# Patient Record
Sex: MALE | Race: WHITE | NOT HISPANIC OR LATINO
[De-identification: names, ages, dates, MRNs, and addresses within clinical notes are randomized per-mention and may not be internally consistent; named-entity substitution may affect disease eponyms.]

---

## 2017-01-20 ENCOUNTER — OTHER (OUTPATIENT)
Age: 72
End: 2017-01-20

## 2017-01-20 ENCOUNTER — APPOINTMENT (OUTPATIENT)
Dept: INTERNAL MEDICINE | Facility: CLINIC | Age: 72
End: 2017-01-20

## 2017-01-20 VITALS — SYSTOLIC BLOOD PRESSURE: 150 MMHG | DIASTOLIC BLOOD PRESSURE: 80 MMHG

## 2017-01-23 LAB
PSA FREE FLD-MCNC: 18.6 %
PSA FREE SERPL-MCNC: 0.38 NG/ML
PSA SERPL-MCNC: 2.07 NG/ML

## 2017-02-24 ENCOUNTER — APPOINTMENT (OUTPATIENT)
Dept: INTERNAL MEDICINE | Facility: CLINIC | Age: 72
End: 2017-02-24

## 2017-02-26 LAB
25(OH)D3 SERPL-MCNC: 29.5 NG/ML
ALBUMIN SERPL ELPH-MCNC: 4.5 G/DL
ALP BLD-CCNC: 95 U/L
ALT SERPL-CCNC: 20 U/L
ANION GAP SERPL CALC-SCNC: 18 MMOL/L
APPEARANCE: CLEAR
AST SERPL-CCNC: 19 U/L
BASOPHILS # BLD AUTO: 0.05 K/UL
BASOPHILS NFR BLD AUTO: 0.8 %
BILIRUB SERPL-MCNC: 0.3 MG/DL
BILIRUBIN URINE: NEGATIVE
BLOOD URINE: NEGATIVE
BUN SERPL-MCNC: 30 MG/DL
CALCIUM SERPL-MCNC: 9.9 MG/DL
CHLORIDE SERPL-SCNC: 105 MMOL/L
CHOLEST SERPL-MCNC: 192 MG/DL
CHOLEST/HDLC SERPL: 3.4 RATIO
CO2 SERPL-SCNC: 22 MMOL/L
COLOR: YELLOW
CREAT SERPL-MCNC: 1.18 MG/DL
CREAT SPEC-SCNC: 109 MG/DL
EOSINOPHIL # BLD AUTO: 0.28 K/UL
EOSINOPHIL NFR BLD AUTO: 4.5 %
GLUCOSE QUALITATIVE U: NORMAL MG/DL
GLUCOSE SERPL-MCNC: 137 MG/DL
HBA1C MFR BLD HPLC: 7.1 %
HCT VFR BLD CALC: 46.4 %
HDLC SERPL-MCNC: 56 MG/DL
HGB BLD-MCNC: 14.7 G/DL
IMM GRANULOCYTES NFR BLD AUTO: 0.5 %
KETONES URINE: NEGATIVE
LDLC SERPL CALC-MCNC: 113 MG/DL
LEUKOCYTE ESTERASE URINE: NEGATIVE
LYMPHOCYTES # BLD AUTO: 1.42 K/UL
LYMPHOCYTES NFR BLD AUTO: 23 %
MAN DIFF?: NORMAL
MCHC RBC-ENTMCNC: 28.9 PG
MCHC RBC-ENTMCNC: 31.7 GM/DL
MCV RBC AUTO: 91.2 FL
MICROALBUMIN 24H UR DL<=1MG/L-MCNC: 4.8 MG/DL
MICROALBUMIN/CREAT 24H UR-RTO: 44 UG/MG
MONOCYTES # BLD AUTO: 0.79 K/UL
MONOCYTES NFR BLD AUTO: 12.8 %
NEUTROPHILS # BLD AUTO: 3.61 K/UL
NEUTROPHILS NFR BLD AUTO: 58.4 %
NITRITE URINE: NEGATIVE
PH URINE: 6
PLATELET # BLD AUTO: 200 K/UL
POTASSIUM SERPL-SCNC: 4.7 MMOL/L
PROT SERPL-MCNC: 7.7 G/DL
PROTEIN URINE: NEGATIVE MG/DL
RBC # BLD: 5.09 M/UL
RBC # FLD: 13.5 %
SODIUM SERPL-SCNC: 145 MMOL/L
SPECIFIC GRAVITY URINE: 1.02
T4 SERPL-MCNC: 6 UG/DL
TRIGL SERPL-MCNC: 117 MG/DL
TSH SERPL-ACNC: 1.87 UIU/ML
UROBILINOGEN URINE: NORMAL MG/DL
WBC # FLD AUTO: 6.18 K/UL

## 2017-02-28 ENCOUNTER — APPOINTMENT (OUTPATIENT)
Dept: INTERNAL MEDICINE | Facility: CLINIC | Age: 72
End: 2017-02-28

## 2017-02-28 ENCOUNTER — NON-APPOINTMENT (OUTPATIENT)
Age: 72
End: 2017-02-28

## 2017-02-28 VITALS
WEIGHT: 175 LBS | BODY MASS INDEX: 26.22 KG/M2 | RESPIRATION RATE: 15 BRPM | HEART RATE: 60 BPM | HEIGHT: 68.5 IN | SYSTOLIC BLOOD PRESSURE: 150 MMHG | DIASTOLIC BLOOD PRESSURE: 80 MMHG

## 2017-02-28 RX ORDER — OLOPATADINE HYDROCHLORIDE 7 MG/ML
0.7 SOLUTION OPHTHALMIC
Qty: 2 | Refills: 0 | Status: DISCONTINUED | COMMUNITY
Start: 2017-02-21

## 2017-02-28 RX ORDER — AMOXICILLIN 500 MG/1
500 TABLET, FILM COATED ORAL
Qty: 15 | Refills: 0 | Status: DISCONTINUED | COMMUNITY
Start: 2017-01-27

## 2017-02-28 RX ORDER — AMOXICILLIN AND CLAVULANATE POTASSIUM 875; 125 MG/1; MG/1
875-125 TABLET, COATED ORAL TWICE DAILY
Qty: 14 | Refills: 0 | Status: DISCONTINUED | COMMUNITY
Start: 2017-01-20 | End: 2017-02-28

## 2017-04-03 ENCOUNTER — RX RENEWAL (OUTPATIENT)
Age: 72
End: 2017-04-03

## 2017-04-21 ENCOUNTER — APPOINTMENT (OUTPATIENT)
Dept: INTERNAL MEDICINE | Facility: CLINIC | Age: 72
End: 2017-04-21

## 2017-04-24 LAB
ALBUMIN SERPL ELPH-MCNC: 4.7 G/DL
ALP BLD-CCNC: 88 U/L
ALT SERPL-CCNC: 21 U/L
ANION GAP SERPL CALC-SCNC: 16 MMOL/L
AST SERPL-CCNC: 21 U/L
BILIRUB SERPL-MCNC: 0.4 MG/DL
BUN SERPL-MCNC: 28 MG/DL
CALCIUM SERPL-MCNC: 10.1 MG/DL
CHLORIDE SERPL-SCNC: 100 MMOL/L
CHOLEST SERPL-MCNC: 185 MG/DL
CHOLEST/HDLC SERPL: 2.9 RATIO
CO2 SERPL-SCNC: 23 MMOL/L
CREAT SERPL-MCNC: 1.27 MG/DL
GLUCOSE SERPL-MCNC: 159 MG/DL
HBA1C MFR BLD HPLC: 6.9 %
HDLC SERPL-MCNC: 63 MG/DL
LDLC SERPL CALC-MCNC: 99 MG/DL
POTASSIUM SERPL-SCNC: 4.7 MMOL/L
PROT SERPL-MCNC: 7.8 G/DL
SODIUM SERPL-SCNC: 139 MMOL/L
TRIGL SERPL-MCNC: 113 MG/DL

## 2017-04-25 ENCOUNTER — APPOINTMENT (OUTPATIENT)
Dept: INTERNAL MEDICINE | Facility: CLINIC | Age: 72
End: 2017-04-25

## 2017-05-16 ENCOUNTER — APPOINTMENT (OUTPATIENT)
Dept: INTERNAL MEDICINE | Facility: CLINIC | Age: 72
End: 2017-05-16

## 2017-05-16 VITALS — WEIGHT: 176 LBS | BODY MASS INDEX: 26.37 KG/M2 | HEIGHT: 68.5 IN

## 2017-07-26 ENCOUNTER — APPOINTMENT (OUTPATIENT)
Dept: INTERNAL MEDICINE | Facility: CLINIC | Age: 72
End: 2017-07-26

## 2017-07-26 VITALS
SYSTOLIC BLOOD PRESSURE: 122 MMHG | WEIGHT: 176 LBS | HEART RATE: 60 BPM | DIASTOLIC BLOOD PRESSURE: 62 MMHG | HEIGHT: 68.5 IN | BODY MASS INDEX: 26.37 KG/M2

## 2017-08-08 ENCOUNTER — APPOINTMENT (OUTPATIENT)
Dept: INTERNAL MEDICINE | Facility: CLINIC | Age: 72
End: 2017-08-08
Payer: MEDICARE

## 2017-08-08 PROCEDURE — 36415 COLL VENOUS BLD VENIPUNCTURE: CPT

## 2017-08-09 LAB
ALBUMIN SERPL ELPH-MCNC: 4.2 G/DL
ALP BLD-CCNC: 76 U/L
ALT SERPL-CCNC: 18 U/L
ANION GAP SERPL CALC-SCNC: 15 MMOL/L
AST SERPL-CCNC: 25 U/L
BILIRUB SERPL-MCNC: 0.2 MG/DL
BUN SERPL-MCNC: 27 MG/DL
CALCIUM SERPL-MCNC: 9.6 MG/DL
CHLORIDE SERPL-SCNC: 106 MMOL/L
CHOLEST SERPL-MCNC: 177 MG/DL
CHOLEST/HDLC SERPL: 2.9 RATIO
CO2 SERPL-SCNC: 23 MMOL/L
CREAT SERPL-MCNC: 1.19 MG/DL
GLUCOSE SERPL-MCNC: 130 MG/DL
HBA1C MFR BLD HPLC: 6.4 %
HDLC SERPL-MCNC: 61 MG/DL
LDLC SERPL CALC-MCNC: 97 MG/DL
POTASSIUM SERPL-SCNC: 5 MMOL/L
PROT SERPL-MCNC: 7.6 G/DL
PSA SERPL-MCNC: 2.36 NG/ML
SODIUM SERPL-SCNC: 144 MMOL/L
TRIGL SERPL-MCNC: 94 MG/DL

## 2017-10-30 ENCOUNTER — RX RENEWAL (OUTPATIENT)
Age: 72
End: 2017-10-30

## 2017-12-19 ENCOUNTER — APPOINTMENT (OUTPATIENT)
Dept: INTERNAL MEDICINE | Facility: CLINIC | Age: 72
End: 2017-12-19
Payer: MEDICARE

## 2017-12-19 PROCEDURE — 36415 COLL VENOUS BLD VENIPUNCTURE: CPT

## 2017-12-20 LAB
ANION GAP SERPL CALC-SCNC: 15 MMOL/L
BUN SERPL-MCNC: 29 MG/DL
CALCIUM SERPL-MCNC: 9.9 MG/DL
CHLORIDE SERPL-SCNC: 106 MMOL/L
CHOLEST SERPL-MCNC: 174 MG/DL
CHOLEST/HDLC SERPL: 2.9 RATIO
CO2 SERPL-SCNC: 20 MMOL/L
CREAT SERPL-MCNC: 1.33 MG/DL
GLUCOSE SERPL-MCNC: 147 MG/DL
HBA1C MFR BLD HPLC: 6.7 %
HDLC SERPL-MCNC: 61 MG/DL
LDLC SERPL CALC-MCNC: 99 MG/DL
POTASSIUM SERPL-SCNC: 4.9 MMOL/L
SODIUM SERPL-SCNC: 141 MMOL/L
TRIGL SERPL-MCNC: 70 MG/DL

## 2017-12-27 ENCOUNTER — APPOINTMENT (OUTPATIENT)
Dept: INTERNAL MEDICINE | Facility: CLINIC | Age: 72
End: 2017-12-27
Payer: MEDICARE

## 2017-12-27 PROCEDURE — 99213 OFFICE O/P EST LOW 20 MIN: CPT

## 2017-12-27 RX ORDER — RANITIDINE 150 MG/1
150 TABLET ORAL
Qty: 60 | Refills: 5 | Status: DISCONTINUED | COMMUNITY
Start: 2017-04-25 | End: 2017-12-27

## 2018-03-16 ENCOUNTER — APPOINTMENT (OUTPATIENT)
Dept: INTERNAL MEDICINE | Facility: CLINIC | Age: 73
End: 2018-03-16
Payer: MEDICARE

## 2018-03-16 PROCEDURE — 36415 COLL VENOUS BLD VENIPUNCTURE: CPT

## 2018-03-19 LAB
25(OH)D3 SERPL-MCNC: 34.3 NG/ML
ALBUMIN SERPL ELPH-MCNC: 4.6 G/DL
ALP BLD-CCNC: 84 U/L
ALT SERPL-CCNC: 24 U/L
ANION GAP SERPL CALC-SCNC: 13 MMOL/L
APPEARANCE: CLEAR
AST SERPL-CCNC: 21 U/L
BASOPHILS # BLD AUTO: 0.04 K/UL
BASOPHILS NFR BLD AUTO: 0.6 %
BILIRUB SERPL-MCNC: 0.3 MG/DL
BILIRUBIN URINE: NEGATIVE
BLOOD URINE: NEGATIVE
BUN SERPL-MCNC: 27 MG/DL
CALCIUM SERPL-MCNC: 9.9 MG/DL
CHLORIDE SERPL-SCNC: 103 MMOL/L
CHOLEST SERPL-MCNC: 155 MG/DL
CHOLEST/HDLC SERPL: 2.9 RATIO
CO2 SERPL-SCNC: 25 MMOL/L
COLOR: YELLOW
CREAT SERPL-MCNC: 1.19 MG/DL
CREAT SPEC-SCNC: 101 MG/DL
EOSINOPHIL # BLD AUTO: 0.31 K/UL
EOSINOPHIL NFR BLD AUTO: 5 %
GLUCOSE QUALITATIVE U: NEGATIVE MG/DL
GLUCOSE SERPL-MCNC: 153 MG/DL
HBA1C MFR BLD HPLC: 7.1 %
HCT VFR BLD CALC: 41.8 %
HDLC SERPL-MCNC: 54 MG/DL
HGB BLD-MCNC: 14.4 G/DL
IMM GRANULOCYTES NFR BLD AUTO: 0.2 %
KETONES URINE: NEGATIVE
LDLC SERPL CALC-MCNC: 80 MG/DL
LEUKOCYTE ESTERASE URINE: NEGATIVE
LYMPHOCYTES # BLD AUTO: 1.42 K/UL
LYMPHOCYTES NFR BLD AUTO: 22.9 %
MAN DIFF?: NORMAL
MCHC RBC-ENTMCNC: 29.8 PG
MCHC RBC-ENTMCNC: 34.4 GM/DL
MCV RBC AUTO: 86.4 FL
MICROALBUMIN 24H UR DL<=1MG/L-MCNC: 3.7 MG/DL
MICROALBUMIN/CREAT 24H UR-RTO: 37 MG/G
MONOCYTES # BLD AUTO: 0.67 K/UL
MONOCYTES NFR BLD AUTO: 10.8 %
NEUTROPHILS # BLD AUTO: 3.75 K/UL
NEUTROPHILS NFR BLD AUTO: 60.5 %
NITRITE URINE: NEGATIVE
PH URINE: 5.5
PLATELET # BLD AUTO: 192 K/UL
POTASSIUM SERPL-SCNC: 4.7 MMOL/L
PROT SERPL-MCNC: 7.9 G/DL
PROTEIN URINE: NEGATIVE MG/DL
PSA SERPL-MCNC: 2.7 NG/ML
RBC # BLD: 4.84 M/UL
RBC # FLD: 12.8 %
SODIUM SERPL-SCNC: 141 MMOL/L
SPECIFIC GRAVITY URINE: 1.02
TRIGL SERPL-MCNC: 103 MG/DL
TSH SERPL-ACNC: 1.8 UIU/ML
UROBILINOGEN URINE: NEGATIVE MG/DL
WBC # FLD AUTO: 6.2 K/UL

## 2018-04-04 ENCOUNTER — APPOINTMENT (OUTPATIENT)
Dept: INTERNAL MEDICINE | Facility: CLINIC | Age: 73
End: 2018-04-04
Payer: MEDICARE

## 2018-04-04 PROCEDURE — G0439: CPT

## 2018-04-04 PROCEDURE — 99215 OFFICE O/P EST HI 40 MIN: CPT | Mod: 25

## 2018-04-09 ENCOUNTER — FORM ENCOUNTER (OUTPATIENT)
Age: 73
End: 2018-04-09

## 2018-04-10 ENCOUNTER — OUTPATIENT (OUTPATIENT)
Dept: OUTPATIENT SERVICES | Facility: HOSPITAL | Age: 73
LOS: 1 days | End: 2018-04-10
Payer: MEDICARE

## 2018-04-10 ENCOUNTER — APPOINTMENT (OUTPATIENT)
Dept: ULTRASOUND IMAGING | Facility: CLINIC | Age: 73
End: 2018-04-10
Payer: MEDICARE

## 2018-04-10 DIAGNOSIS — Z00.8 ENCOUNTER FOR OTHER GENERAL EXAMINATION: ICD-10-CM

## 2018-04-10 PROCEDURE — 93925 LOWER EXTREMITY STUDY: CPT | Mod: 26

## 2018-04-10 PROCEDURE — 93925 LOWER EXTREMITY STUDY: CPT

## 2018-04-16 ENCOUNTER — OTHER (OUTPATIENT)
Age: 73
End: 2018-04-16

## 2018-04-16 ENCOUNTER — RX RENEWAL (OUTPATIENT)
Age: 73
End: 2018-04-16

## 2018-04-21 ENCOUNTER — RX RENEWAL (OUTPATIENT)
Age: 73
End: 2018-04-21

## 2018-05-02 ENCOUNTER — RX RENEWAL (OUTPATIENT)
Age: 73
End: 2018-05-02

## 2018-07-06 ENCOUNTER — APPOINTMENT (OUTPATIENT)
Dept: INTERNAL MEDICINE | Facility: CLINIC | Age: 73
End: 2018-07-06
Payer: MEDICARE

## 2018-07-06 PROCEDURE — 36415 COLL VENOUS BLD VENIPUNCTURE: CPT

## 2018-07-08 LAB
ALBUMIN SERPL ELPH-MCNC: 4.5 G/DL
ALP BLD-CCNC: 76 U/L
ALT SERPL-CCNC: 22 U/L
ANION GAP SERPL CALC-SCNC: 15 MMOL/L
AST SERPL-CCNC: 26 U/L
BASOPHILS # BLD AUTO: 0.03 K/UL
BASOPHILS NFR BLD AUTO: 0.5 %
BILIRUB SERPL-MCNC: 0.5 MG/DL
BUN SERPL-MCNC: 35 MG/DL
CALCIUM SERPL-MCNC: 9.8 MG/DL
CHLORIDE SERPL-SCNC: 100 MMOL/L
CHOLEST SERPL-MCNC: 155 MG/DL
CHOLEST/HDLC SERPL: 2.6 RATIO
CO2 SERPL-SCNC: 24 MMOL/L
CREAT SERPL-MCNC: 1.35 MG/DL
EOSINOPHIL # BLD AUTO: 0.21 K/UL
EOSINOPHIL NFR BLD AUTO: 3.6 %
GLUCOSE SERPL-MCNC: 114 MG/DL
HBA1C MFR BLD HPLC: 6.5 %
HCT VFR BLD CALC: 43.5 %
HDLC SERPL-MCNC: 59 MG/DL
HGB BLD-MCNC: 13.8 G/DL
IMM GRANULOCYTES NFR BLD AUTO: 0 %
LDLC SERPL CALC-MCNC: 78 MG/DL
LYMPHOCYTES # BLD AUTO: 1.33 K/UL
LYMPHOCYTES NFR BLD AUTO: 22.7 %
MAN DIFF?: NORMAL
MCHC RBC-ENTMCNC: 28.2 PG
MCHC RBC-ENTMCNC: 31.7 GM/DL
MCV RBC AUTO: 89 FL
MONOCYTES # BLD AUTO: 0.78 K/UL
MONOCYTES NFR BLD AUTO: 13.3 %
NEUTROPHILS # BLD AUTO: 3.51 K/UL
NEUTROPHILS NFR BLD AUTO: 59.9 %
PLATELET # BLD AUTO: 208 K/UL
POTASSIUM SERPL-SCNC: 4.6 MMOL/L
PROT SERPL-MCNC: 7.7 G/DL
RBC # BLD: 4.89 M/UL
RBC # FLD: 12.9 %
SODIUM SERPL-SCNC: 139 MMOL/L
TRIGL SERPL-MCNC: 90 MG/DL
WBC # FLD AUTO: 5.86 K/UL

## 2018-07-10 ENCOUNTER — APPOINTMENT (OUTPATIENT)
Dept: INTERNAL MEDICINE | Facility: CLINIC | Age: 73
End: 2018-07-10
Payer: MEDICARE

## 2018-07-10 PROCEDURE — 99213 OFFICE O/P EST LOW 20 MIN: CPT

## 2018-07-10 RX ORDER — ISOSORBIDE MONONITRATE 30 MG/1
30 TABLET, EXTENDED RELEASE ORAL
Qty: 30 | Refills: 0 | Status: DISCONTINUED | COMMUNITY
Start: 2018-01-24

## 2018-07-10 RX ORDER — METOPROLOL SUCCINATE 25 MG/1
25 TABLET, EXTENDED RELEASE ORAL
Qty: 90 | Refills: 0 | Status: DISCONTINUED | COMMUNITY
Start: 2018-04-20

## 2018-07-10 NOTE — HISTORY OF PRESENT ILLNESS
[FreeTextEntry1] : 72 yo man returns for f/u evaluation and management of his chronic problems.   [de-identified] : 72 yo male with ASHD, hypertension, and diabetes who has now stopped Metoprolol and he thinks the circulation in the foot is better.\par He is having a good summer and has no recurent problems.

## 2018-07-10 NOTE — ASSESSMENT
[FreeTextEntry1] : 72 yo male with ASHD, AODM, Hypertension, and lots of symptoms which have gotten better.  His metabolic parameters and findings are all in the recommended range.\par

## 2018-07-10 NOTE — PHYSICAL EXAM
[No Acute Distress] : no acute distress [No JVD] : no jugular venous distention [Supple] : supple [Clear to Auscultation] : lungs were clear to auscultation bilaterally [Normal Percussion] : the chest was normal to percussion [Regular Rhythm] : with a regular rhythm [Normal S1, S2] : normal S1 and S2 [No Edema] : there was no peripheral edema [Soft] : abdomen soft [No CVA Tenderness] : no CVA  tenderness [No Spinal Tenderness] : no spinal tenderness [No Rash] : no rash [Normal Affect] : the affect was normal [Normal Insight/Judgement] : insight and judgment were intact

## 2018-09-12 ENCOUNTER — RX RENEWAL (OUTPATIENT)
Age: 73
End: 2018-09-12

## 2018-10-16 ENCOUNTER — RX RENEWAL (OUTPATIENT)
Age: 73
End: 2018-10-16

## 2018-10-23 ENCOUNTER — MEDICATION RENEWAL (OUTPATIENT)
Age: 73
End: 2018-10-23

## 2019-01-04 ENCOUNTER — APPOINTMENT (OUTPATIENT)
Dept: INTERNAL MEDICINE | Facility: CLINIC | Age: 74
End: 2019-01-04
Payer: MEDICARE

## 2019-01-04 PROCEDURE — 36415 COLL VENOUS BLD VENIPUNCTURE: CPT

## 2019-01-05 LAB
ALBUMIN SERPL ELPH-MCNC: 4.4 G/DL
ALP BLD-CCNC: 81 U/L
ALT SERPL-CCNC: 22 U/L
ANION GAP SERPL CALC-SCNC: 13 MMOL/L
AST SERPL-CCNC: 24 U/L
BASOPHILS # BLD AUTO: 0.03 K/UL
BASOPHILS NFR BLD AUTO: 0.5 %
BILIRUB SERPL-MCNC: 0.3 MG/DL
BUN SERPL-MCNC: 30 MG/DL
CALCIUM SERPL-MCNC: 9.7 MG/DL
CHLORIDE SERPL-SCNC: 103 MMOL/L
CHOLEST SERPL-MCNC: 161 MG/DL
CHOLEST/HDLC SERPL: 2.9 RATIO
CO2 SERPL-SCNC: 26 MMOL/L
CREAT SERPL-MCNC: 1.36 MG/DL
EOSINOPHIL # BLD AUTO: 0.36 K/UL
EOSINOPHIL NFR BLD AUTO: 5.8 %
GLUCOSE SERPL-MCNC: 160 MG/DL
HBA1C MFR BLD HPLC: 6.9 %
HCT VFR BLD CALC: 45 %
HDLC SERPL-MCNC: 55 MG/DL
HGB BLD-MCNC: 14.2 G/DL
IMM GRANULOCYTES NFR BLD AUTO: 0.5 %
LDLC SERPL CALC-MCNC: 79 MG/DL
LYMPHOCYTES # BLD AUTO: 1.45 K/UL
LYMPHOCYTES NFR BLD AUTO: 23.3 %
MAN DIFF?: NORMAL
MCHC RBC-ENTMCNC: 28.6 PG
MCHC RBC-ENTMCNC: 31.6 GM/DL
MCV RBC AUTO: 90.5 FL
MONOCYTES # BLD AUTO: 0.69 K/UL
MONOCYTES NFR BLD AUTO: 11.1 %
NEUTROPHILS # BLD AUTO: 3.66 K/UL
NEUTROPHILS NFR BLD AUTO: 58.8 %
PLATELET # BLD AUTO: 203 K/UL
POTASSIUM SERPL-SCNC: 4.9 MMOL/L
PROT SERPL-MCNC: 7.5 G/DL
RBC # BLD: 4.97 M/UL
RBC # FLD: 12.8 %
SODIUM SERPL-SCNC: 142 MMOL/L
TRIGL SERPL-MCNC: 133 MG/DL
WBC # FLD AUTO: 6.22 K/UL

## 2019-01-10 ENCOUNTER — APPOINTMENT (OUTPATIENT)
Dept: INTERNAL MEDICINE | Facility: CLINIC | Age: 74
End: 2019-01-10
Payer: MEDICARE

## 2019-01-10 PROCEDURE — 99213 OFFICE O/P EST LOW 20 MIN: CPT

## 2019-01-10 NOTE — PHYSICAL EXAM
[No Acute Distress] : no acute distress [Well Nourished] : well nourished [No JVD] : no jugular venous distention [Clear to Auscultation] : lungs were clear to auscultation bilaterally [Normal Percussion] : the chest was normal to percussion [Normal Rate] : normal rate  [Regular Rhythm] : with a regular rhythm [Normal S1, S2] : normal S1 and S2 [No Edema] : there was no peripheral edema [de-identified] : 1/6 murmur

## 2019-01-10 NOTE — HISTORY OF PRESENT ILLNESS
[FreeTextEntry1] : 74 yo male presents for an interval examination.  He notes no change in symptoms and is actually feeling well. [de-identified] : 74 yo male with hypertension, Diabetes, hyperlipidemia, and CAD presents for an interval evaluation.  He admits to being bad with his diet and wants to have his blood tests checked.

## 2019-01-10 NOTE — PHYSICAL EXAM
[No Acute Distress] : no acute distress [Well Nourished] : well nourished [No JVD] : no jugular venous distention [Clear to Auscultation] : lungs were clear to auscultation bilaterally [Normal Percussion] : the chest was normal to percussion [Normal Rate] : normal rate  [Regular Rhythm] : with a regular rhythm [Normal S1, S2] : normal S1 and S2 [No Edema] : there was no peripheral edema [de-identified] : 1/6 murmur

## 2019-01-10 NOTE — HISTORY OF PRESENT ILLNESS
[FreeTextEntry1] : 74 yo male presents for an interval examination.  He notes no change in symptoms and is actually feeling well. [de-identified] : 72 yo male with hypertension, Diabetes, hyperlipidemia, and CAD presents for an interval evaluation.  He admits to being bad with his diet and wants to have his blood tests checked.

## 2019-03-12 ENCOUNTER — APPOINTMENT (OUTPATIENT)
Dept: INTERNAL MEDICINE | Facility: CLINIC | Age: 74
End: 2019-03-12

## 2019-04-03 ENCOUNTER — APPOINTMENT (OUTPATIENT)
Dept: CARDIOLOGY | Facility: CLINIC | Age: 74
End: 2019-04-03
Payer: MEDICARE

## 2019-04-03 ENCOUNTER — NON-APPOINTMENT (OUTPATIENT)
Age: 74
End: 2019-04-03

## 2019-04-03 VITALS
HEART RATE: 84 BPM | HEIGHT: 68.5 IN | SYSTOLIC BLOOD PRESSURE: 169 MMHG | BODY MASS INDEX: 26.22 KG/M2 | DIASTOLIC BLOOD PRESSURE: 86 MMHG | WEIGHT: 175 LBS | OXYGEN SATURATION: 98 %

## 2019-04-03 VITALS — SYSTOLIC BLOOD PRESSURE: 140 MMHG | DIASTOLIC BLOOD PRESSURE: 80 MMHG | HEART RATE: 80 BPM

## 2019-04-03 PROCEDURE — 99205 OFFICE O/P NEW HI 60 MIN: CPT

## 2019-04-03 PROCEDURE — 93000 ELECTROCARDIOGRAM COMPLETE: CPT

## 2019-04-03 NOTE — PHYSICAL EXAM
[General Appearance - Well Developed] : well developed [Normal Appearance] : normal appearance [Well Groomed] : well groomed [General Appearance - Well Nourished] : well nourished [No Deformities] : no deformities [General Appearance - In No Acute Distress] : no acute distress [Normal Conjunctiva] : the conjunctiva exhibited no abnormalities [Eyelids - No Xanthelasma] : the eyelids demonstrated no xanthelasmas [Normal Oral Mucosa] : normal oral mucosa [No Oral Pallor] : no oral pallor [No Oral Cyanosis] : no oral cyanosis [Normal Jugular Venous A Waves Present] : normal jugular venous A waves present [Normal Jugular Venous V Waves Present] : normal jugular venous V waves present [No Jugular Venous Navas A Waves] : no jugular venous navas A waves [Heart Rate And Rhythm] : heart rate and rhythm were normal [Heart Sounds] : normal S1 and S2 [Respiration, Rhythm And Depth] : normal respiratory rhythm and effort [Exaggerated Use Of Accessory Muscles For Inspiration] : no accessory muscle use [Auscultation Breath Sounds / Voice Sounds] : lungs were clear to auscultation bilaterally [Abdomen Soft] : soft [Abdomen Tenderness] : non-tender [Abdomen Mass (___ Cm)] : no abdominal mass palpated [Abnormal Walk] : normal gait [Gait - Sufficient For Exercise Testing] : the gait was sufficient for exercise testing [Nail Clubbing] : no clubbing of the fingernails [Cyanosis, Localized] : no localized cyanosis [Petechial Hemorrhages (___cm)] : no petechial hemorrhages [Skin Color & Pigmentation] : normal skin color and pigmentation [] : no rash [No Venous Stasis] : no venous stasis [Skin Lesions] : no skin lesions [No Skin Ulcers] : no skin ulcer [No Xanthoma] : no  xanthoma was observed [Oriented To Time, Place, And Person] : oriented to person, place, and time [Affect] : the affect was normal [Mood] : the mood was normal [FreeTextEntry1] : Somewhat anxious

## 2019-04-03 NOTE — REVIEW OF SYSTEMS
[Recent Weight Gain (___ Lbs)] : no recent weight gain [Feeling Fatigued] : not feeling fatigued [Recent Weight Loss (___ Lbs)] : no recent weight loss [Dyspnea on exertion] : dyspnea during exertion [Chest  Pressure] : chest pressure [Lower Ext Edema] : no extremity edema [Leg Claudication] : no intermittent leg claudication [Palpitations] : no palpitations [Abdominal Pain] : no abdominal pain [Heartburn] : no heartburn [Change in Appetite] : no change in appetite [Dysphagia] : no dysphagia [Dizziness] : no dizziness [Convulsions] : no convulsions [see HPI] : see HPI [Depression] : no depression [Anxiety] : anxiety [Under Stress] : not under stress [Suicidal] : not suicidal [Negative] : Heme/Lymph

## 2019-04-03 NOTE — HISTORY OF PRESENT ILLNESS
[FreeTextEntry1] : April 3, 2019. Patient has been followed by Dr. Larry Toth and was followed by cardiology at [facility]. In June of 2011 after a positive stress test had cardiac angiography, which had a distal 90% RCA lesion and only nonobstructive disease in the LAD and circumflex. This was stented and his symptoms were improved. His symptoms then recurred. They havet been exertional chest pressure and shortness of breath when he walks after eating a meal, especially a large meal. Never gets it at rest and does not get it when he exerts himself if he has not just eaten. Did well for a while, but then the symptoms returned and in October of 2016 after another positive ECG treadmill test he had repeat cardiac catheterization. There was an 80% mid RCA lesion and again no significant disease in the LAD was circumflex and he had 2 stents to the RCA . The difference was this time his symptoms did not change. He has been placed on Omeprazole and this has not seemed to make a difference, or perhaps it did initially. He has not seen GI. He otherwise seems to be doing well. In July of 2014. He had bilateral carotid endarterectomies. His most recent echo showed normal LV function. His EKG is normal sinus rhythm and a normal tracing. He has hypertension and hyperlipidemia.He is a former smoker. His father had hypertension and CVA, but there does not seem to be a family history of coronary artery disease.

## 2019-04-03 NOTE — REASON FOR VISIT
[FreeTextEntry1] : 73-year-old with known coronary disease and exertional chest pain after eating a large meal. Now for new cardiology evaluation

## 2019-04-15 ENCOUNTER — APPOINTMENT (OUTPATIENT)
Age: 74
End: 2019-04-15
Payer: MEDICARE

## 2019-04-15 ENCOUNTER — RX RENEWAL (OUTPATIENT)
Age: 74
End: 2019-04-15

## 2019-04-15 ENCOUNTER — LABORATORY RESULT (OUTPATIENT)
Age: 74
End: 2019-04-15

## 2019-04-15 PROCEDURE — 36415 COLL VENOUS BLD VENIPUNCTURE: CPT

## 2019-04-16 ENCOUNTER — LABORATORY RESULT (OUTPATIENT)
Age: 74
End: 2019-04-16

## 2019-04-16 LAB
ALBUMIN SERPL ELPH-MCNC: 4.7 G/DL
ALP BLD-CCNC: 83 U/L
ALT SERPL-CCNC: 22 U/L
ANION GAP SERPL CALC-SCNC: 11 MMOL/L
APPEARANCE: CLEAR
AST SERPL-CCNC: 26 U/L
BASOPHILS # BLD AUTO: 0.04 K/UL
BASOPHILS NFR BLD AUTO: 0.7 %
BILIRUB SERPL-MCNC: 0.3 MG/DL
BILIRUBIN URINE: NEGATIVE
BLOOD URINE: NEGATIVE
BUN SERPL-MCNC: 22 MG/DL
CALCIUM SERPL-MCNC: 9.9 MG/DL
CHLORIDE SERPL-SCNC: 107 MMOL/L
CHOLEST SERPL-MCNC: 159 MG/DL
CHOLEST/HDLC SERPL: 2.9 RATIO
CK SERPL-CCNC: 57 U/L
CO2 SERPL-SCNC: 25 MMOL/L
COLOR: NORMAL
CREAT SERPL-MCNC: 1.09 MG/DL
CREAT SPEC-SCNC: 124 MG/DL
EOSINOPHIL # BLD AUTO: 0.26 K/UL
EOSINOPHIL NFR BLD AUTO: 4.3 %
ESTIMATED AVERAGE GLUCOSE: 169 MG/DL
GLUCOSE QUALITATIVE U: NEGATIVE
GLUCOSE SERPL-MCNC: 130 MG/DL
HBA1C MFR BLD HPLC: 7.5 %
HCT VFR BLD CALC: 43.7 %
HDLC SERPL-MCNC: 55 MG/DL
HGB BLD-MCNC: 13.8 G/DL
IMM GRANULOCYTES NFR BLD AUTO: 0.8 %
KETONES URINE: NEGATIVE
LDLC SERPL CALC-MCNC: 73 MG/DL
LEUKOCYTE ESTERASE URINE: NEGATIVE
LYMPHOCYTES # BLD AUTO: 1.25 K/UL
LYMPHOCYTES NFR BLD AUTO: 20.7 %
MAN DIFF?: NORMAL
MCHC RBC-ENTMCNC: 28.2 PG
MCHC RBC-ENTMCNC: 31.6 GM/DL
MCV RBC AUTO: 89.4 FL
MICROALBUMIN 24H UR DL<=1MG/L-MCNC: 17.4 MG/DL
MICROALBUMIN/CREAT 24H UR-RTO: 141 MG/G
MONOCYTES # BLD AUTO: 0.68 K/UL
MONOCYTES NFR BLD AUTO: 11.2 %
NEUTROPHILS # BLD AUTO: 3.77 K/UL
NEUTROPHILS NFR BLD AUTO: 62.3 %
NITRITE URINE: NEGATIVE
PH URINE: 6
PLATELET # BLD AUTO: 204 K/UL
POTASSIUM SERPL-SCNC: 4.8 MMOL/L
PROT SERPL-MCNC: 7.6 G/DL
PROTEIN URINE: ABNORMAL
PSA FREE FLD-MCNC: 16 %
PSA FREE SERPL-MCNC: 0.87 NG/ML
PSA SERPL-MCNC: 5.51 NG/ML
RBC # BLD: 4.89 M/UL
RBC # FLD: 12.9 %
SODIUM SERPL-SCNC: 143 MMOL/L
SPECIFIC GRAVITY URINE: 1.02
T4 SERPL-MCNC: 5.6 UG/DL
TRIGL SERPL-MCNC: 157 MG/DL
TSH SERPL-ACNC: 1.42 UIU/ML
UROBILINOGEN URINE: NORMAL
WBC # FLD AUTO: 6.05 K/UL

## 2019-04-17 LAB — 25(OH)D3 SERPL-MCNC: 40.5 NG/ML

## 2019-04-19 ENCOUNTER — APPOINTMENT (OUTPATIENT)
Dept: CARDIOLOGY | Facility: CLINIC | Age: 74
End: 2019-04-19

## 2019-04-19 ENCOUNTER — OUTPATIENT (OUTPATIENT)
Dept: OUTPATIENT SERVICES | Facility: HOSPITAL | Age: 74
LOS: 1 days | End: 2019-04-19
Payer: MEDICARE

## 2019-04-19 DIAGNOSIS — R07.9 CHEST PAIN, UNSPECIFIED: ICD-10-CM

## 2019-04-19 PROCEDURE — 75574 CT ANGIO HRT W/3D IMAGE: CPT | Mod: 26

## 2019-04-19 PROCEDURE — 75574 CT ANGIO HRT W/3D IMAGE: CPT

## 2019-04-24 ENCOUNTER — CLINICAL ADVICE (OUTPATIENT)
Age: 74
End: 2019-04-24

## 2019-04-25 ENCOUNTER — APPOINTMENT (OUTPATIENT)
Dept: INTERNAL MEDICINE | Facility: CLINIC | Age: 74
End: 2019-04-25
Payer: MEDICARE

## 2019-04-25 VITALS
RESPIRATION RATE: 14 BRPM | HEART RATE: 68 BPM | SYSTOLIC BLOOD PRESSURE: 118 MMHG | HEIGHT: 68.5 IN | DIASTOLIC BLOOD PRESSURE: 64 MMHG | BODY MASS INDEX: 25.62 KG/M2 | WEIGHT: 171 LBS

## 2019-04-25 PROCEDURE — G0439: CPT

## 2019-04-25 PROCEDURE — 36415 COLL VENOUS BLD VENIPUNCTURE: CPT

## 2019-04-25 PROCEDURE — 99214 OFFICE O/P EST MOD 30 MIN: CPT | Mod: 25

## 2019-04-25 RX ORDER — ROSUVASTATIN CALCIUM 20 MG/1
20 TABLET, FILM COATED ORAL
Qty: 90 | Refills: 3 | Status: DISCONTINUED | COMMUNITY
Start: 2018-04-16 | End: 2019-04-25

## 2019-04-25 RX ORDER — OMEPRAZOLE MAGNESIUM 20 MG/1
20 TABLET, DELAYED RELEASE ORAL DAILY
Qty: 90 | Refills: 2 | Status: DISCONTINUED | COMMUNITY
Start: 2017-12-27 | End: 2019-04-25

## 2019-04-25 NOTE — PHYSICAL EXAM
[No Acute Distress] : no acute distress [Well Nourished] : well nourished [Well-Appearing] : well-appearing [Well Developed] : well developed [Normal Sclera/Conjunctiva] : normal sclera/conjunctiva [PERRL] : pupils equal round and reactive to light [EOMI] : extraocular movements intact [Normal Outer Ear/Nose] : the outer ears and nose were normal in appearance [Normal Oropharynx] : the oropharynx was normal [No JVD] : no jugular venous distention [Supple] : supple [No Lymphadenopathy] : no lymphadenopathy [Thyroid Normal, No Nodules] : the thyroid was normal and there were no nodules present [No Respiratory Distress] : no respiratory distress  [Clear to Auscultation] : lungs were clear to auscultation bilaterally [No Accessory Muscle Use] : no accessory muscle use [Normal Percussion] : the chest was normal to percussion [Regular Rhythm] : with a regular rhythm [Normal Rate] : normal rate  [Normal S1, S2] : normal S1 and S2 [No Murmur] : no murmur heard [No Carotid Bruits] : no carotid bruits [No Varicosities] : no varicosities [No Abdominal Bruit] : a ~M bruit was not heard ~T in the abdomen [No Edema] : there was no peripheral edema [No Extremity Clubbing/Cyanosis] : no extremity clubbing/cyanosis [No Palpable Aorta] : no palpable aorta [Non-distended] : non-distended [Soft] : abdomen soft [Non Tender] : non-tender [No Masses] : no abdominal mass palpated [Normal Bowel Sounds] : normal bowel sounds [No HSM] : no HSM [Normal Posterior Cervical Nodes] : no posterior cervical lymphadenopathy [No CVA Tenderness] : no CVA  tenderness [Normal Anterior Cervical Nodes] : no anterior cervical lymphadenopathy [No Spinal Tenderness] : no spinal tenderness [No Joint Swelling] : no joint swelling [No Rash] : no rash [Grossly Normal Strength/Tone] : grossly normal strength/tone [Coordination Grossly Intact] : coordination grossly intact [Normal Gait] : normal gait [No Focal Deficits] : no focal deficits [Deep Tendon Reflexes (DTR)] : deep tendon reflexes were 2+ and symmetric [Normal Affect] : the affect was normal [Normal Insight/Judgement] : insight and judgment were intact [de-identified] : Carotid Endarterectomy bilat [de-identified] : Trace left post tibial pulses

## 2019-04-25 NOTE — HEALTH RISK ASSESSMENT
[Very Good] : ~his/her~ current health as very good [Good] : ~his/her~  mood as  good [Intercurrent ED visits] : went to ED [16-20] : 16-20 [No falls in past year] : Patient reported no falls in the past year [0] : 2) Feeling down, depressed, or hopeless: Not at all (0) [Unemployed] : unemployed [High School] : high school [] :  [# Of Children ___] : has [unfilled] children [Fully functional (bathing, dressing, toileting, transferring, walking, feeding)] : Fully functional (bathing, dressing, toileting, transferring, walking, feeding) [Feels Safe at Home] : Feels safe at home [Fully functional (using the telephone, shopping, preparing meals, housekeeping, doing laundry, using] : Fully functional and needs no help or supervision to perform IADLs (using the telephone, shopping, preparing meals, housekeeping, doing laundry, using transportation, managing medications and managing finances) [With Patient/Caregiver] : With Patient/Caregiver [Name: ___] : Health Care Proxy's Name: [unfilled]  [Relationship: ___] : Relationship: [unfilled] [de-identified] : Nedra Bolton, [de-identified] : Stopped 30 years ago [] : No [de-identified] : Walking, and house chores [de-identified] : Low salt [FreeTextEntry2] :  [[id number]] : 0 [AdvancecareDate] : 4/25/19 [FreeTextEntry4] : [id number] Darlin

## 2019-04-25 NOTE — REVIEW OF SYSTEMS
[Recent Change In Weight] : ~T recent weight change [Nausea] : nausea [Hearing Loss] : hearing loss [Easy Bruising] : easy bruising [Negative] : Heme/Lymph [FreeTextEntry2] : Umair

## 2019-04-29 ENCOUNTER — RX RENEWAL (OUTPATIENT)
Age: 74
End: 2019-04-29

## 2019-05-21 ENCOUNTER — APPOINTMENT (OUTPATIENT)
Dept: INTERNAL MEDICINE | Facility: CLINIC | Age: 74
End: 2019-05-21
Payer: MEDICARE

## 2019-05-21 VITALS
HEIGHT: 68.5 IN | BODY MASS INDEX: 25.62 KG/M2 | SYSTOLIC BLOOD PRESSURE: 106 MMHG | TEMPERATURE: 97.8 F | HEART RATE: 64 BPM | WEIGHT: 171 LBS | DIASTOLIC BLOOD PRESSURE: 70 MMHG | RESPIRATION RATE: 14 BRPM

## 2019-05-21 VITALS — TEMPERATURE: 97.8 F

## 2019-05-21 PROCEDURE — 99214 OFFICE O/P EST MOD 30 MIN: CPT

## 2019-05-21 NOTE — HISTORY OF PRESENT ILLNESS
[FreeTextEntry1] : 72 yo male presents for a f/u of his symptoms since altering his medications which he is tolerating well.  Patient is s/p a Prostate Bx yesterday [de-identified] : 72 yo male with anginal symptoms, hypertension, and recent headache due to a rise in his BP medications all of which is good at this point.\par He denies pain from Prostate Bx but would like to have his temperature checked

## 2019-06-03 ENCOUNTER — RX RENEWAL (OUTPATIENT)
Age: 74
End: 2019-06-03

## 2019-07-18 ENCOUNTER — APPOINTMENT (OUTPATIENT)
Dept: INTERNAL MEDICINE | Facility: CLINIC | Age: 74
End: 2019-07-18
Payer: MEDICARE

## 2019-07-18 PROCEDURE — 36415 COLL VENOUS BLD VENIPUNCTURE: CPT

## 2019-07-19 LAB
PSA FREE FLD-MCNC: 25 %
PSA FREE SERPL-MCNC: 0.1 NG/ML
PSA SERPL-MCNC: 0.4 NG/ML

## 2019-07-29 ENCOUNTER — APPOINTMENT (OUTPATIENT)
Dept: INTERNAL MEDICINE | Facility: CLINIC | Age: 74
End: 2019-07-29
Payer: MEDICARE

## 2019-07-29 VITALS
WEIGHT: 174 LBS | SYSTOLIC BLOOD PRESSURE: 140 MMHG | DIASTOLIC BLOOD PRESSURE: 70 MMHG | BODY MASS INDEX: 26.07 KG/M2 | HEIGHT: 68.5 IN

## 2019-07-29 PROCEDURE — 99213 OFFICE O/P EST LOW 20 MIN: CPT

## 2019-07-29 RX ORDER — FINASTERIDE 5 MG/1
5 TABLET, FILM COATED ORAL
Qty: 90 | Refills: 0 | Status: DISCONTINUED | COMMUNITY
Start: 2016-11-11 | End: 2019-07-29

## 2019-07-29 RX ORDER — LISINOPRIL 20 MG/1
20 TABLET ORAL
Qty: 60 | Refills: 3 | Status: DISCONTINUED | COMMUNITY
Start: 2019-04-25 | End: 2019-07-29

## 2019-07-29 RX ORDER — AMLODIPINE BESYLATE 10 MG/1
10 TABLET ORAL
Qty: 1 | Refills: 1 | Status: DISCONTINUED | COMMUNITY
Start: 2017-04-25 | End: 2019-07-29

## 2019-07-29 RX ORDER — SULFAMETHOXAZOLE AND TRIMETHOPRIM 800; 160 MG/1; MG/1
800-160 TABLET ORAL
Qty: 10 | Refills: 0 | Status: COMPLETED | COMMUNITY
Start: 2019-05-01

## 2019-07-29 RX ORDER — SODIUM PHOSPHATE,MONO-DIBASIC 19G-7G/118
7-19 ENEMA (ML) RECTAL
Qty: 266 | Refills: 0 | Status: COMPLETED | COMMUNITY
Start: 2019-07-23

## 2019-07-29 RX ORDER — CEFUROXIME AXETIL 500 MG/1
500 TABLET ORAL
Qty: 10 | Refills: 0 | Status: DISCONTINUED | COMMUNITY
Start: 2019-05-01

## 2019-07-29 NOTE — HISTORY OF PRESENT ILLNESS
[FreeTextEntry1] : 73 yo male presents for the evaluation of his BP which has been running low [de-identified] : 75 yo male with ASHD and hypertension recently diagnosed with Prostate Cancer and started on hormonal therapy.  Since starting the therapy he has found his BP to be surprisingly low at times.  He has been holding his Amlodipine and lisinopril since Saturday.  NO BP medication since Saturday

## 2019-07-29 NOTE — PHYSICAL EXAM
[No Acute Distress] : no acute distress [Well Nourished] : well nourished [Well Developed] : well developed [Well-Appearing] : well-appearing [Normal Sclera/Conjunctiva] : normal sclera/conjunctiva [PERRL] : pupils equal round and reactive to light [EOMI] : extraocular movements intact [Normal Outer Ear/Nose] : the outer ears and nose were normal in appearance [Normal Oropharynx] : the oropharynx was normal [No JVD] : no jugular venous distention [No Lymphadenopathy] : no lymphadenopathy [Supple] : supple [Thyroid Normal, No Nodules] : the thyroid was normal and there were no nodules present [No Respiratory Distress] : no respiratory distress  [No Accessory Muscle Use] : no accessory muscle use [Clear to Auscultation] : lungs were clear to auscultation bilaterally [Normal Rate] : normal rate  [Normal S1, S2] : normal S1 and S2 [Regular Rhythm] : with a regular rhythm [No Murmur] : no murmur heard [No Carotid Bruits] : no carotid bruits [No Abdominal Bruit] : a ~M bruit was not heard ~T in the abdomen [No Varicosities] : no varicosities [Pedal Pulses Present] : the pedal pulses are present [No Edema] : there was no peripheral edema [No Palpable Aorta] : no palpable aorta [No Extremity Clubbing/Cyanosis] : no extremity clubbing/cyanosis [Soft] : abdomen soft [Non Tender] : non-tender [Non-distended] : non-distended [No Masses] : no abdominal mass palpated [No HSM] : no HSM [Normal Bowel Sounds] : normal bowel sounds [Normal Posterior Cervical Nodes] : no posterior cervical lymphadenopathy [Normal Anterior Cervical Nodes] : no anterior cervical lymphadenopathy [No CVA Tenderness] : no CVA  tenderness [No Spinal Tenderness] : no spinal tenderness [No Joint Swelling] : no joint swelling [Grossly Normal Strength/Tone] : grossly normal strength/tone [Coordination Grossly Intact] : coordination grossly intact [No Rash] : no rash [No Focal Deficits] : no focal deficits [Normal Gait] : normal gait [Deep Tendon Reflexes (DTR)] : deep tendon reflexes were 2+ and symmetric [Normal Affect] : the affect was normal [Normal Insight/Judgement] : insight and judgment were intact

## 2019-08-21 ENCOUNTER — APPOINTMENT (OUTPATIENT)
Dept: INTERNAL MEDICINE | Facility: CLINIC | Age: 74
End: 2019-08-21
Payer: MEDICARE

## 2019-08-21 VITALS
WEIGHT: 172 LBS | HEIGHT: 68.5 IN | HEART RATE: 64 BPM | BODY MASS INDEX: 25.77 KG/M2 | SYSTOLIC BLOOD PRESSURE: 110 MMHG | RESPIRATION RATE: 15 BRPM | DIASTOLIC BLOOD PRESSURE: 58 MMHG

## 2019-08-21 PROCEDURE — 99214 OFFICE O/P EST MOD 30 MIN: CPT

## 2019-08-21 NOTE — PHYSICAL EXAM
[Well Nourished] : well nourished [No Acute Distress] : no acute distress [Well Developed] : well developed [Well-Appearing] : well-appearing [Normal Sclera/Conjunctiva] : normal sclera/conjunctiva [PERRL] : pupils equal round and reactive to light [EOMI] : extraocular movements intact [Normal Outer Ear/Nose] : the outer ears and nose were normal in appearance [No JVD] : no jugular venous distention [Normal Oropharynx] : the oropharynx was normal [No Lymphadenopathy] : no lymphadenopathy [Supple] : supple [Thyroid Normal, No Nodules] : the thyroid was normal and there were no nodules present [No Respiratory Distress] : no respiratory distress  [No Accessory Muscle Use] : no accessory muscle use [Clear to Auscultation] : lungs were clear to auscultation bilaterally [Normal Rate] : normal rate  [Regular Rhythm] : with a regular rhythm [Normal S1, S2] : normal S1 and S2 [No Murmur] : no murmur heard [No Abdominal Bruit] : a ~M bruit was not heard ~T in the abdomen [No Carotid Bruits] : no carotid bruits [Pedal Pulses Present] : the pedal pulses are present [No Varicosities] : no varicosities [No Edema] : there was no peripheral edema [No Palpable Aorta] : no palpable aorta [Non Tender] : non-tender [Soft] : abdomen soft [No Extremity Clubbing/Cyanosis] : no extremity clubbing/cyanosis [Non-distended] : non-distended [No Masses] : no abdominal mass palpated [No HSM] : no HSM [Normal Bowel Sounds] : normal bowel sounds [Normal Posterior Cervical Nodes] : no posterior cervical lymphadenopathy [No CVA Tenderness] : no CVA  tenderness [Normal Anterior Cervical Nodes] : no anterior cervical lymphadenopathy [No Joint Swelling] : no joint swelling [No Spinal Tenderness] : no spinal tenderness [Grossly Normal Strength/Tone] : grossly normal strength/tone [No Rash] : no rash [No Focal Deficits] : no focal deficits [Coordination Grossly Intact] : coordination grossly intact [Deep Tendon Reflexes (DTR)] : deep tendon reflexes were 2+ and symmetric [Normal Gait] : normal gait [Normal Affect] : the affect was normal [Normal Insight/Judgement] : insight and judgment were intact

## 2019-08-21 NOTE — HISTORY OF PRESENT ILLNESS
[Spouse] : spouse [FreeTextEntry1] : 75 yo male with prostate cancer presents for an interval exam.  He is suffering with hot flashes and sweats because of this therapy.  He is awaiting RT [de-identified] : 75 yo male discovered to have Prostate Cancer in addition to his hypertension, ASHD, and GERD.  He has been started on Lupron and Zytiga with a fall in his PSA.  Unfortunately, he is having drenching night sweats and flashes.  In general, he feels OK and hiis medical illness is not bothering him.  His Bp has been controlled on his present drugs.\par He will begin his RT in October,.

## 2019-09-13 ENCOUNTER — APPOINTMENT (OUTPATIENT)
Dept: INTERNAL MEDICINE | Facility: CLINIC | Age: 74
End: 2019-09-13
Payer: MEDICARE

## 2019-09-13 VITALS
DIASTOLIC BLOOD PRESSURE: 70 MMHG | HEIGHT: 68.5 IN | BODY MASS INDEX: 25.62 KG/M2 | SYSTOLIC BLOOD PRESSURE: 134 MMHG | WEIGHT: 171 LBS

## 2019-09-13 PROCEDURE — 36415 COLL VENOUS BLD VENIPUNCTURE: CPT

## 2019-09-13 PROCEDURE — 99214 OFFICE O/P EST MOD 30 MIN: CPT | Mod: 25

## 2019-09-13 RX ORDER — AMLODIPINE BESYLATE 5 MG/1
5 TABLET ORAL
Qty: 90 | Refills: 1 | Status: DISCONTINUED | COMMUNITY
Start: 2019-04-29 | End: 2019-09-13

## 2019-09-13 NOTE — PHYSICAL EXAM
[General Appearance - In No Acute Distress] : in no acute distress [General Appearance - Well Nourished] : well nourished [General Appearance - Well Developed] : well developed [Jugular Venous Distention Increased] : there was no jugular-venous distention [Respiration, Rhythm And Depth] : normal respiratory rhythm and effort [Auscultation Breath Sounds / Voice Sounds] : lungs were clear to auscultation bilaterally [Heart Rate And Rhythm] : heart rate was normal and rhythm regular [Heart Sounds Gallop] : no gallops [Heart Sounds Pericardial Friction Rub] : no pericardial rub [Edema] : there was no peripheral edema [Abdomen Soft] : soft [Abdomen Tenderness] : non-tender [No CVA Tenderness] : no ~M costovertebral angle tenderness [No Spinal Tenderness] : no spinal tenderness [Abnormal Walk] : normal gait [No Focal Deficits] : no focal deficits [FreeTextEntry1] : No renal bruit

## 2019-09-16 LAB
ANION GAP SERPL CALC-SCNC: 15 MMOL/L
APPEARANCE: CLEAR
BACTERIA: NEGATIVE
BILIRUBIN URINE: NEGATIVE
BLOOD URINE: ABNORMAL
BUN SERPL-MCNC: 38 MG/DL
CALCIUM SERPL-MCNC: 9.8 MG/DL
CHLORIDE SERPL-SCNC: 105 MMOL/L
CO2 SERPL-SCNC: 19 MMOL/L
COLOR: YELLOW
CREAT SERPL-MCNC: 1.78 MG/DL
GLUCOSE QUALITATIVE U: NEGATIVE
GLUCOSE SERPL-MCNC: 125 MG/DL
HYALINE CASTS: 0 /LPF
KETONES URINE: NEGATIVE
LEUKOCYTE ESTERASE URINE: NEGATIVE
MICROSCOPIC-UA: NORMAL
NITRITE URINE: NEGATIVE
PH URINE: 5.5
POTASSIUM SERPL-SCNC: 5.7 MMOL/L
PROTEIN URINE: ABNORMAL
RED BLOOD CELLS URINE: 2 /HPF
SODIUM SERPL-SCNC: 139 MMOL/L
SPECIFIC GRAVITY URINE: 1.02
SQUAMOUS EPITHELIAL CELLS: 1 /HPF
UROBILINOGEN URINE: NORMAL
WHITE BLOOD CELLS URINE: 3 /HPF

## 2019-09-20 ENCOUNTER — APPOINTMENT (OUTPATIENT)
Dept: INTERNAL MEDICINE | Facility: CLINIC | Age: 74
End: 2019-09-20
Payer: MEDICARE

## 2019-09-20 PROCEDURE — 36415 COLL VENOUS BLD VENIPUNCTURE: CPT

## 2019-09-23 LAB
ANION GAP SERPL CALC-SCNC: 10 MMOL/L
BUN SERPL-MCNC: 38 MG/DL
CALCIUM SERPL-MCNC: 9.8 MG/DL
CHLORIDE SERPL-SCNC: 105 MMOL/L
CO2 SERPL-SCNC: 26 MMOL/L
CREAT SERPL-MCNC: 1.74 MG/DL
GLUCOSE SERPL-MCNC: 159 MG/DL
POTASSIUM SERPL-SCNC: 5.4 MMOL/L
SODIUM SERPL-SCNC: 141 MMOL/L

## 2019-10-07 ENCOUNTER — APPOINTMENT (OUTPATIENT)
Dept: INTERNAL MEDICINE | Facility: CLINIC | Age: 74
End: 2019-10-07
Payer: MEDICARE

## 2019-10-07 PROCEDURE — 36415 COLL VENOUS BLD VENIPUNCTURE: CPT

## 2019-10-10 LAB
ANION GAP SERPL CALC-SCNC: 13 MMOL/L
BUN SERPL-MCNC: 38 MG/DL
CALCIUM SERPL-MCNC: 9.7 MG/DL
CHLORIDE SERPL-SCNC: 104 MMOL/L
CO2 SERPL-SCNC: 23 MMOL/L
CREAT SERPL-MCNC: 1.25 MG/DL
GLUCOSE SERPL-MCNC: 152 MG/DL
POTASSIUM SERPL-SCNC: 4.4 MMOL/L
SODIUM SERPL-SCNC: 140 MMOL/L

## 2019-10-21 ENCOUNTER — RX RENEWAL (OUTPATIENT)
Age: 74
End: 2019-10-21

## 2019-11-15 ENCOUNTER — APPOINTMENT (OUTPATIENT)
Dept: INTERNAL MEDICINE | Facility: CLINIC | Age: 74
End: 2019-11-15
Payer: MEDICARE

## 2019-11-15 VITALS
RESPIRATION RATE: 15 BRPM | DIASTOLIC BLOOD PRESSURE: 80 MMHG | WEIGHT: 178 LBS | HEIGHT: 68.5 IN | BODY MASS INDEX: 26.67 KG/M2 | HEART RATE: 62 BPM | SYSTOLIC BLOOD PRESSURE: 130 MMHG

## 2019-11-15 PROCEDURE — 99214 OFFICE O/P EST MOD 30 MIN: CPT

## 2019-11-15 NOTE — PHYSICAL EXAM
[No Acute Distress] : no acute distress [Well Developed] : well developed [Normal Oropharynx] : the oropharynx was normal [Clear to Auscultation] : lungs were clear to auscultation bilaterally [Normal Percussion] : the chest was normal to percussion [Normal Rate] : normal rate  [Regular Rhythm] : with a regular rhythm [No Edema] : there was no peripheral edema [Soft] : abdomen soft [Non Tender] : non-tender

## 2019-11-15 NOTE — HISTORY OF PRESENT ILLNESS
[FreeTextEntry1] : 75 yo male going through hormonal therapy and very tired, sleeping poorly [de-identified] : 75 yo patient with multiple medical problems going through hormonal deprivation therapy and experiencing hot flashes and drenching night weats for which he was given Gabapentin with no effect.\par He is sleeping poorly and is tired.\par He is otherwise well.

## 2020-01-02 ENCOUNTER — NON-APPOINTMENT (OUTPATIENT)
Age: 75
End: 2020-01-02

## 2020-01-02 ENCOUNTER — APPOINTMENT (OUTPATIENT)
Dept: INTERNAL MEDICINE | Facility: CLINIC | Age: 75
End: 2020-01-02
Payer: MEDICARE

## 2020-01-02 VITALS
SYSTOLIC BLOOD PRESSURE: 118 MMHG | DIASTOLIC BLOOD PRESSURE: 68 MMHG | RESPIRATION RATE: 15 BRPM | BODY MASS INDEX: 26.07 KG/M2 | WEIGHT: 174 LBS | HEART RATE: 62 BPM | HEIGHT: 68.5 IN

## 2020-01-02 VITALS — TEMPERATURE: 98.3 F

## 2020-01-02 PROCEDURE — 99214 OFFICE O/P EST MOD 30 MIN: CPT | Mod: 25

## 2020-01-02 PROCEDURE — 36415 COLL VENOUS BLD VENIPUNCTURE: CPT

## 2020-01-02 PROCEDURE — 93000 ELECTROCARDIOGRAM COMPLETE: CPT

## 2020-01-02 NOTE — PHYSICAL EXAM
[No Acute Distress] : no acute distress [Well Nourished] : well nourished [Well Developed] : well developed [Well-Appearing] : well-appearing [Normal Sclera/Conjunctiva] : normal sclera/conjunctiva [EOMI] : extraocular movements intact [PERRL] : pupils equal round and reactive to light [Normal Outer Ear/Nose] : the outer ears and nose were normal in appearance [Normal Oropharynx] : the oropharynx was normal [No JVD] : no jugular venous distention [No Lymphadenopathy] : no lymphadenopathy [Supple] : supple [Thyroid Normal, No Nodules] : the thyroid was normal and there were no nodules present [No Respiratory Distress] : no respiratory distress  [No Accessory Muscle Use] : no accessory muscle use [Clear to Auscultation] : lungs were clear to auscultation bilaterally [Regular Rhythm] : with a regular rhythm [Normal Rate] : normal rate  [Normal S1, S2] : normal S1 and S2 [No Murmur] : no murmur heard [No Carotid Bruits] : no carotid bruits [No Abdominal Bruit] : a ~M bruit was not heard ~T in the abdomen [No Varicosities] : no varicosities [No Edema] : there was no peripheral edema [Soft] : abdomen soft [No Palpable Aorta] : no palpable aorta [Non Tender] : non-tender [No Masses] : no abdominal mass palpated [Non-distended] : non-distended [No HSM] : no HSM [Normal Bowel Sounds] : normal bowel sounds [Normal Posterior Cervical Nodes] : no posterior cervical lymphadenopathy [Normal Anterior Cervical Nodes] : no anterior cervical lymphadenopathy [No CVA Tenderness] : no CVA  tenderness [No Joint Swelling] : no joint swelling [No Spinal Tenderness] : no spinal tenderness [No Rash] : no rash [Coordination Grossly Intact] : coordination grossly intact [Grossly Normal Strength/Tone] : grossly normal strength/tone [Normal Gait] : normal gait [Deep Tendon Reflexes (DTR)] : deep tendon reflexes were 2+ and symmetric [Normal Affect] : the affect was normal [No Focal Deficits] : no focal deficits [Normal Insight/Judgement] : insight and judgment were intact [de-identified] : s/p endarterectomy [de-identified] : Hernias as previously

## 2020-01-02 NOTE — HISTORY OF PRESENT ILLNESS
[Coronary Artery Disease] : coronary artery disease [Recent Myocardial Infarction] : no recent myocardial infarction [Smoker] : smoker [Implantable Device/Pacemaker] : no implantable device/pacemaker [No Adverse Anesthesia Reaction] : no adverse anesthesia reaction in self or family member [Chronic Anticoagulation] : chronic anticoagulation [Diabetes] : diabetes [____ METs%] : [unfilled] METs% [(Patient denies any chest pain, claudication, dyspnea on exertion, orthopnea, palpitations or syncope)] : Patient denies any chest pain, claudication, dyspnea on exertion, orthopnea, palpitations or syncope [Moderate (4-6 METs)] : Moderate (4-6 METs) [Anti-Platelet Agents: _____] : Anti-Platelet Agents: [unfilled] [FreeTextEntry2] : 1/8/2020 [FreeTextEntry1] : Cataract Extraction OS and lens implant. [FreeTextEntry3] : ANA MARIA Hawk [FreeTextEntry4] : 73 yo male with ASHD s/p stents (the last about 3-4 years ago) and AODM as well as hypertension and Prostate Cancer who is presenting for medical evaluation prior to cataract surgery.\par He is feeling well except for Hot Flashes  due to Hormonal deprivation treatment with LUpron.  He is s/p a Course of RT which was finished in December.\par He is asymptomatic from the cardiopulmonary point of view at this time and has had a recent CT angiogram that failed to reveal any coronary artery lesions in need of intervention. [FreeTextEntry7] : CTA

## 2020-01-02 NOTE — ASSESSMENT
[Patient Optimized for Surgery] : Patient optimized for surgery [Modify anti-platelet treatment prior to procedure] : Modify anti-platelet treatment prior to procedure [No Further Testing Recommended] : no further testing recommended [As per surgery] : as per surgery [Continue medications as is] : Continue current medications [FreeTextEntry4] : 75 yo male with hypertension, AODM, ASHD, and Prostate Cancer presents for a medical evaluation prior to planned Cataract Surgery.  He is optimized for the planned surgery.   [FreeTextEntry6] : To continue ASA 81 mg po QOD [FreeTextEntry7] : To take only lisinopril 10 mg on the morning of surgery

## 2020-01-02 NOTE — PLAN
[FreeTextEntry1] : In view of stents in the past and extensive vascular disease would prefer continuation of minimal ASA prophylaxis throughout operative period.

## 2020-01-03 LAB
ALBUMIN SERPL ELPH-MCNC: 4.5 G/DL
ALP BLD-CCNC: 83 U/L
ALT SERPL-CCNC: 32 U/L
ANION GAP SERPL CALC-SCNC: 13 MMOL/L
AST SERPL-CCNC: 25 U/L
BASOPHILS # BLD AUTO: 0.04 K/UL
BASOPHILS NFR BLD AUTO: 0.8 %
BILIRUB SERPL-MCNC: 0.3 MG/DL
BUN SERPL-MCNC: 45 MG/DL
CALCIUM SERPL-MCNC: 10 MG/DL
CHLORIDE SERPL-SCNC: 105 MMOL/L
CO2 SERPL-SCNC: 23 MMOL/L
CREAT SERPL-MCNC: 1.59 MG/DL
EOSINOPHIL # BLD AUTO: 0.26 K/UL
EOSINOPHIL NFR BLD AUTO: 5.4 %
GLUCOSE SERPL-MCNC: 139 MG/DL
HCT VFR BLD CALC: 38.7 %
HGB BLD-MCNC: 11.6 G/DL
IMM GRANULOCYTES NFR BLD AUTO: 0.4 %
LYMPHOCYTES # BLD AUTO: 0.77 K/UL
LYMPHOCYTES NFR BLD AUTO: 15.9 %
MAN DIFF?: NORMAL
MCHC RBC-ENTMCNC: 28.6 PG
MCHC RBC-ENTMCNC: 30 GM/DL
MCV RBC AUTO: 95.3 FL
MONOCYTES # BLD AUTO: 0.57 K/UL
MONOCYTES NFR BLD AUTO: 11.8 %
NEUTROPHILS # BLD AUTO: 3.18 K/UL
NEUTROPHILS NFR BLD AUTO: 65.7 %
PLATELET # BLD AUTO: 169 K/UL
POTASSIUM SERPL-SCNC: 5 MMOL/L
PROT SERPL-MCNC: 7.1 G/DL
RBC # BLD: 4.06 M/UL
RBC # FLD: 12.5 %
SODIUM SERPL-SCNC: 141 MMOL/L
WBC # FLD AUTO: 4.84 K/UL

## 2020-02-14 ENCOUNTER — APPOINTMENT (OUTPATIENT)
Dept: INTERNAL MEDICINE | Facility: CLINIC | Age: 75
End: 2020-02-14
Payer: MEDICARE

## 2020-02-14 PROCEDURE — 36415 COLL VENOUS BLD VENIPUNCTURE: CPT

## 2020-02-15 LAB
ALBUMIN SERPL ELPH-MCNC: 4.6 G/DL
ALP BLD-CCNC: 113 U/L
ALT SERPL-CCNC: 18 U/L
ANION GAP SERPL CALC-SCNC: 13 MMOL/L
AST SERPL-CCNC: 19 U/L
BILIRUB SERPL-MCNC: 0.2 MG/DL
BUN SERPL-MCNC: 47 MG/DL
CALCIUM SERPL-MCNC: 10.4 MG/DL
CHLORIDE SERPL-SCNC: 105 MMOL/L
CHOLEST SERPL-MCNC: 160 MG/DL
CHOLEST/HDLC SERPL: 3 RATIO
CO2 SERPL-SCNC: 24 MMOL/L
CREAT SERPL-MCNC: 1.5 MG/DL
ESTIMATED AVERAGE GLUCOSE: 186 MG/DL
GLUCOSE SERPL-MCNC: 177 MG/DL
HBA1C MFR BLD HPLC: 8.1 %
HDLC SERPL-MCNC: 54 MG/DL
LDLC SERPL CALC-MCNC: 70 MG/DL
POTASSIUM SERPL-SCNC: 5.1 MMOL/L
PROT SERPL-MCNC: 7 G/DL
SODIUM SERPL-SCNC: 143 MMOL/L
TRIGL SERPL-MCNC: 180 MG/DL

## 2020-02-19 ENCOUNTER — APPOINTMENT (OUTPATIENT)
Dept: INTERNAL MEDICINE | Facility: CLINIC | Age: 75
End: 2020-02-19
Payer: MEDICARE

## 2020-02-19 DIAGNOSIS — H25.11 AGE-RELATED NUCLEAR CATARACT, RIGHT EYE: ICD-10-CM

## 2020-02-19 PROCEDURE — 99214 OFFICE O/P EST MOD 30 MIN: CPT

## 2020-02-20 NOTE — PHYSICAL EXAM
[No Acute Distress] : no acute distress [Well Nourished] : well nourished [Well Developed] : well developed [Well-Appearing] : well-appearing [Normal Sclera/Conjunctiva] : normal sclera/conjunctiva [Normal Oropharynx] : the oropharynx was normal [Normal TMs] : both tympanic membranes were normal [No JVD] : no jugular venous distention [No Lymphadenopathy] : no lymphadenopathy [Thyroid Normal, No Nodules] : the thyroid was normal and there were no nodules present [No Respiratory Distress] : no respiratory distress  [No Accessory Muscle Use] : no accessory muscle use [Clear to Auscultation] : lungs were clear to auscultation bilaterally [Normal Percussion] : the chest was normal to percussion [Normal Rate] : normal rate  [Regular Rhythm] : with a regular rhythm [Normal S1, S2] : normal S1 and S2 [No Carotid Bruits] : no carotid bruits [No Abdominal Bruit] : a ~M bruit was not heard ~T in the abdomen [No Varicosities] : no varicosities [Pedal Pulses Present] : the pedal pulses are present [No Edema] : there was no peripheral edema [No Palpable Aorta] : no palpable aorta [No Extremity Clubbing/Cyanosis] : no extremity clubbing/cyanosis [Soft] : abdomen soft [Non Tender] : non-tender [Non-distended] : non-distended [No HSM] : no HSM [No Masses] : no abdominal mass palpated [Normal Bowel Sounds] : normal bowel sounds [Normal Posterior Cervical Nodes] : no posterior cervical lymphadenopathy [Normal Anterior Cervical Nodes] : no anterior cervical lymphadenopathy [Normal] : no posterior cervical lymphadenopathy and no anterior cervical lymphadenopathy [No CVA Tenderness] : no CVA  tenderness [No Spinal Tenderness] : no spinal tenderness [No Joint Swelling] : no joint swelling [Grossly Normal Strength/Tone] : grossly normal strength/tone [Coordination Grossly Intact] : coordination grossly intact [No Focal Deficits] : no focal deficits [Normal Gait] : normal gait [Normal Affect] : the affect was normal [Normal Insight/Judgement] : insight and judgment were intact [de-identified] : 1/6 murmur

## 2020-02-20 NOTE — HISTORY OF PRESENT ILLNESS
[FreeTextEntry1] : 73 yo patient presents feeling OK and for clearance for a second cataract surgery next week. [de-identified] : 75 yo male that underwent cataract surgery on his left eye recently and without problems that is now scheduled for surgery next week on the right eye with insertion of a variable lens.  He continues to do well and is in the midst of treatment for Prostate Cancer.  He is receiving hormonal deprivation therapy s/p RT and is experiencing hot flashes which are difficult for him.\par He has been stable from the cardiovascular point of view.

## 2020-03-30 ENCOUNTER — RX RENEWAL (OUTPATIENT)
Age: 75
End: 2020-03-30

## 2020-04-23 ENCOUNTER — RX CHANGE (OUTPATIENT)
Age: 75
End: 2020-04-23

## 2020-05-12 ENCOUNTER — RX RENEWAL (OUTPATIENT)
Age: 75
End: 2020-05-12

## 2020-06-06 ENCOUNTER — RX RENEWAL (OUTPATIENT)
Age: 75
End: 2020-06-06

## 2020-06-08 ENCOUNTER — LABORATORY RESULT (OUTPATIENT)
Age: 75
End: 2020-06-08

## 2020-06-08 ENCOUNTER — APPOINTMENT (OUTPATIENT)
Dept: INTERNAL MEDICINE | Facility: CLINIC | Age: 75
End: 2020-06-08
Payer: MEDICARE

## 2020-06-08 ENCOUNTER — NON-APPOINTMENT (OUTPATIENT)
Age: 75
End: 2020-06-08

## 2020-06-08 VITALS
SYSTOLIC BLOOD PRESSURE: 120 MMHG | WEIGHT: 179 LBS | HEIGHT: 68.5 IN | TEMPERATURE: 97 F | DIASTOLIC BLOOD PRESSURE: 72 MMHG | BODY MASS INDEX: 26.82 KG/M2 | HEART RATE: 66 BPM

## 2020-06-08 PROCEDURE — 36415 COLL VENOUS BLD VENIPUNCTURE: CPT

## 2020-06-08 PROCEDURE — 99214 OFFICE O/P EST MOD 30 MIN: CPT | Mod: 25

## 2020-06-08 NOTE — HISTORY OF PRESENT ILLNESS
[FreeTextEntry8] : 73 yo male presents urgently this morning because of right sided abdominal  pain that started about 3 days ago\par 73 yo male with hypertension, ASHD, PVD, and a history of kidney stones and diverticular disease who was doing well on Friday.  He had a poppy seed bagel in the AM and a sausage and pepper hero for dinner.  About 3 hours after dinner, he experienced the suddened onset of right sided mid abdominal  and flank pain which waxes and wanes but does not resolve.  He has developed no other symptoms, had a BM after the onset of the pain but none since but is passing gas, he has not eaten anything and when he tries to eat the pain get worse.  He denies fever, diarrhea, vomiting, or bleeding.  He denies urinary frequency.  He describes no radiation.\par He thinks it might be renal colic which he has experienced on the left side.\par He has been taking some Oxycodone for pain but none today.

## 2020-06-08 NOTE — PHYSICAL EXAM
[No Acute Distress] : no acute distress [Normal Sclera/Conjunctiva] : normal sclera/conjunctiva [No Respiratory Distress] : no respiratory distress  [No Accessory Muscle Use] : no accessory muscle use [Clear to Auscultation] : lungs were clear to auscultation bilaterally [Regular Rhythm] : with a regular rhythm [Normal Rate] : normal rate  [No Edema] : there was no peripheral edema [Soft] : abdomen soft [Non-distended] : non-distended [Non Tender] : non-tender [Normal Bowel Sounds] : normal bowel sounds [No HSM] : no HSM [No Hernias] : no hernias [de-identified] : Pain on deep palpation more in the flank than the abdomen withou evidence or rebound

## 2020-06-10 ENCOUNTER — APPOINTMENT (OUTPATIENT)
Dept: INTERNAL MEDICINE | Facility: CLINIC | Age: 75
End: 2020-06-10
Payer: MEDICARE

## 2020-06-10 LAB
ALBUMIN SERPL ELPH-MCNC: 4.6 G/DL
ALP BLD-CCNC: 84 U/L
ALT SERPL-CCNC: 14 U/L
ANION GAP SERPL CALC-SCNC: 17 MMOL/L
AST SERPL-CCNC: 18 U/L
BASOPHILS # BLD AUTO: 0 K/UL
BASOPHILS NFR BLD AUTO: 0 %
BILIRUB SERPL-MCNC: 0.4 MG/DL
BUN SERPL-MCNC: 54 MG/DL
CALCIUM SERPL-MCNC: 9.9 MG/DL
CHLORIDE SERPL-SCNC: 103 MMOL/L
CO2 SERPL-SCNC: 20 MMOL/L
CREAT SERPL-MCNC: 3.45 MG/DL
EOSINOPHIL # BLD AUTO: 0 K/UL
EOSINOPHIL NFR BLD AUTO: 0 %
ERYTHROCYTE [SEDIMENTATION RATE] IN BLOOD BY WESTERGREN METHOD: 104 MM/HR
GLUCOSE SERPL-MCNC: 140 MG/DL
HCT VFR BLD CALC: 40.9 %
HGB BLD-MCNC: 12.7 G/DL
LYMPHOCYTES # BLD AUTO: 0.89 K/UL
LYMPHOCYTES NFR BLD AUTO: 7 %
MAN DIFF?: NORMAL
MCHC RBC-ENTMCNC: 28.3 PG
MCHC RBC-ENTMCNC: 31.1 GM/DL
MCV RBC AUTO: 91.1 FL
MONOCYTES # BLD AUTO: 2.1 K/UL
MONOCYTES NFR BLD AUTO: 16.5 %
NEUTROPHILS # BLD AUTO: 9.74 K/UL
NEUTROPHILS NFR BLD AUTO: 76.5 %
PLATELET # BLD AUTO: 207 K/UL
POTASSIUM SERPL-SCNC: 5.6 MMOL/L
PROT SERPL-MCNC: 7.3 G/DL
RBC # BLD: 4.49 M/UL
RBC # FLD: 13.5 %
SODIUM SERPL-SCNC: 141 MMOL/L
WBC # FLD AUTO: 12.73 K/UL

## 2020-06-10 PROCEDURE — 36415 COLL VENOUS BLD VENIPUNCTURE: CPT

## 2020-06-11 ENCOUNTER — RX RENEWAL (OUTPATIENT)
Age: 75
End: 2020-06-11

## 2020-06-15 ENCOUNTER — LABORATORY RESULT (OUTPATIENT)
Age: 75
End: 2020-06-15

## 2020-06-15 ENCOUNTER — APPOINTMENT (OUTPATIENT)
Dept: INTERNAL MEDICINE | Facility: CLINIC | Age: 75
End: 2020-06-15
Payer: MEDICARE

## 2020-06-15 PROCEDURE — 99213 OFFICE O/P EST LOW 20 MIN: CPT | Mod: 25

## 2020-06-15 PROCEDURE — 36415 COLL VENOUS BLD VENIPUNCTURE: CPT

## 2020-06-15 NOTE — PHYSICAL EXAM
[No Acute Distress] : no acute distress [No Respiratory Distress] : no respiratory distress  [Well-Appearing] : well-appearing [Clear to Auscultation] : lungs were clear to auscultation bilaterally [No Accessory Muscle Use] : no accessory muscle use [Normal Rate] : normal rate  [No Edema] : there was no peripheral edema [Regular Rhythm] : with a regular rhythm [Soft] : abdomen soft [Non Tender] : non-tender [de-identified] : Uncomfortable from urinary manipulation

## 2020-06-15 NOTE — HISTORY OF PRESENT ILLNESS
[FreeTextEntry1] : 75 yo male s/p right ureteral stent and stone extraction on Friday.  Notes some burning today on urination. [de-identified] : 75 yo male with multiple medical problems who presented with Renal Colic and ANTONIO and is s/p ureterolithotomy and stent on Friday.  He notes some hematuria and dysuria for the last 2 days but denies fever or chills.  He was given Macrodantin BID which he is still taking.\par He states that his appetite is good.

## 2020-06-22 ENCOUNTER — APPOINTMENT (OUTPATIENT)
Dept: INTERNAL MEDICINE | Facility: CLINIC | Age: 75
End: 2020-06-22

## 2020-06-22 ENCOUNTER — APPOINTMENT (OUTPATIENT)
Dept: INTERNAL MEDICINE | Facility: CLINIC | Age: 75
End: 2020-06-22
Payer: MEDICARE

## 2020-06-22 PROCEDURE — 36415 COLL VENOUS BLD VENIPUNCTURE: CPT

## 2020-06-24 LAB
ALBUMIN SERPL ELPH-MCNC: 4.5 G/DL
ALP BLD-CCNC: 129 U/L
ALT SERPL-CCNC: 28 U/L
ANION GAP SERPL CALC-SCNC: 16 MMOL/L
ANION GAP SERPL CALC-SCNC: 17 MMOL/L
ANION GAP SERPL CALC-SCNC: 17 MMOL/L
APPEARANCE: ABNORMAL
AST SERPL-CCNC: 23 U/L
BASOPHILS # BLD AUTO: 0.05 K/UL
BASOPHILS NFR BLD AUTO: 0.7 %
BILIRUB SERPL-MCNC: 0.2 MG/DL
BILIRUBIN URINE: ABNORMAL
BLOOD URINE: ABNORMAL
BUN SERPL-MCNC: 40 MG/DL
BUN SERPL-MCNC: 49 MG/DL
BUN SERPL-MCNC: 72 MG/DL
CALCIUM SERPL-MCNC: 10 MG/DL
CALCIUM SERPL-MCNC: 10.6 MG/DL
CALCIUM SERPL-MCNC: 9.3 MG/DL
CHLORIDE SERPL-SCNC: 100 MMOL/L
CHLORIDE SERPL-SCNC: 105 MMOL/L
CHLORIDE SERPL-SCNC: 106 MMOL/L
CHOLEST SERPL-MCNC: 167 MG/DL
CHOLEST/HDLC SERPL: 3 RATIO
CO2 SERPL-SCNC: 18 MMOL/L
CO2 SERPL-SCNC: 19 MMOL/L
CO2 SERPL-SCNC: 21 MMOL/L
COLOR: ABNORMAL
CREAT SERPL-MCNC: 1.49 MG/DL
CREAT SERPL-MCNC: 1.95 MG/DL
CREAT SERPL-MCNC: 4.22 MG/DL
EOSINOPHIL # BLD AUTO: 0.33 K/UL
EOSINOPHIL NFR BLD AUTO: 4.7 %
ESTIMATED AVERAGE GLUCOSE: 180 MG/DL
GLUCOSE QUALITATIVE U: ABNORMAL
GLUCOSE SERPL-MCNC: 127 MG/DL
GLUCOSE SERPL-MCNC: 146 MG/DL
GLUCOSE SERPL-MCNC: 156 MG/DL
HBA1C MFR BLD HPLC: 7.9 %
HCT VFR BLD CALC: 39.3 %
HDLC SERPL-MCNC: 55 MG/DL
HGB BLD-MCNC: 11.8 G/DL
IMM GRANULOCYTES NFR BLD AUTO: 0.7 %
KETONES URINE: NEGATIVE
LDLC SERPL CALC-MCNC: 78 MG/DL
LEUKOCYTE ESTERASE URINE: ABNORMAL
LYMPHOCYTES # BLD AUTO: 0.93 K/UL
LYMPHOCYTES NFR BLD AUTO: 13.2 %
MAN DIFF?: NORMAL
MCHC RBC-ENTMCNC: 27.4 PG
MCHC RBC-ENTMCNC: 30 GM/DL
MCV RBC AUTO: 91.2 FL
MONOCYTES # BLD AUTO: 0.7 K/UL
MONOCYTES NFR BLD AUTO: 10 %
NEUTROPHILS # BLD AUTO: 4.96 K/UL
NEUTROPHILS NFR BLD AUTO: 70.7 %
NITRITE URINE: POSITIVE
PH URINE: 6
PLATELET # BLD AUTO: 255 K/UL
POTASSIUM SERPL-SCNC: 4.9 MMOL/L
POTASSIUM SERPL-SCNC: 5.2 MMOL/L
POTASSIUM SERPL-SCNC: 5.9 MMOL/L
PROT SERPL-MCNC: 7 G/DL
PROTEIN URINE: ABNORMAL
PSA SERPL-MCNC: <0.01 NG/ML
RBC # BLD: 4.31 M/UL
RBC # FLD: 13.3 %
SODIUM SERPL-SCNC: 134 MMOL/L
SODIUM SERPL-SCNC: 140 MMOL/L
SODIUM SERPL-SCNC: 144 MMOL/L
SPECIFIC GRAVITY URINE: 1.02
T4 FREE SERPL-MCNC: 1 NG/DL
TRIGL SERPL-MCNC: 167 MG/DL
TSH SERPL-ACNC: 1.01 UIU/ML
UROBILINOGEN URINE: ABNORMAL
WBC # FLD AUTO: 7.02 K/UL

## 2020-08-25 RX ORDER — LISINOPRIL 10 MG/1
10 TABLET ORAL DAILY
Qty: 90 | Refills: 5 | Status: DISCONTINUED | COMMUNITY
Start: 2019-09-13 | End: 2020-08-25

## 2020-08-26 ENCOUNTER — RX CHANGE (OUTPATIENT)
Age: 75
End: 2020-08-26

## 2020-08-27 ENCOUNTER — RX CHANGE (OUTPATIENT)
Age: 75
End: 2020-08-27

## 2020-09-18 ENCOUNTER — APPOINTMENT (OUTPATIENT)
Dept: INTERNAL MEDICINE | Facility: CLINIC | Age: 75
End: 2020-09-18
Payer: MEDICARE

## 2020-09-18 PROCEDURE — 82044 UR ALBUMIN SEMIQUANTITATIVE: CPT | Mod: QW

## 2020-09-24 ENCOUNTER — NON-APPOINTMENT (OUTPATIENT)
Age: 75
End: 2020-09-24

## 2020-09-24 ENCOUNTER — APPOINTMENT (OUTPATIENT)
Dept: INTERNAL MEDICINE | Facility: CLINIC | Age: 75
End: 2020-09-24
Payer: MEDICARE

## 2020-09-24 VITALS
OXYGEN SATURATION: 94 % | HEART RATE: 87 BPM | TEMPERATURE: 98 F | BODY MASS INDEX: 25.48 KG/M2 | DIASTOLIC BLOOD PRESSURE: 72 MMHG | WEIGHT: 176 LBS | HEIGHT: 69.5 IN | RESPIRATION RATE: 16 BRPM | SYSTOLIC BLOOD PRESSURE: 130 MMHG

## 2020-09-24 DIAGNOSIS — Z92.3 PERSONAL HISTORY OF IRRADIATION: ICD-10-CM

## 2020-09-24 PROCEDURE — 93000 ELECTROCARDIOGRAM COMPLETE: CPT | Mod: 59

## 2020-09-24 PROCEDURE — G0439: CPT

## 2020-09-24 PROCEDURE — 99214 OFFICE O/P EST MOD 30 MIN: CPT | Mod: 25

## 2020-09-24 RX ORDER — EMPAGLIFLOZIN 10 MG/1
10 TABLET, FILM COATED ORAL DAILY
Qty: 90 | Refills: 3 | Status: DISCONTINUED | COMMUNITY
Start: 2020-02-27 | End: 2020-09-24

## 2020-09-29 PROBLEM — Z92.3: Status: RESOLVED | Noted: 2020-09-29 | Resolved: 2020-09-29

## 2020-09-29 NOTE — HISTORY OF PRESENT ILLNESS
[FreeTextEntry1] : 76 yo man presents for a preventive and comprehensive exam.  He is actually feeling pretty well.  Having hot flashes. [de-identified] : 76 yo male s/p Cataract Surgery with Prostate Cancer (on Lupron), hypertension, AODM, ASHD, GERD,  PVD, Hyperlipidemia, and low back pain presenting for a comprehensive exam.  He is actually feeling pretty well but is concerned about the cost of his medications and his sciatica pain which is improved.l\par His major problem is Hot Flashes and sweats from his hormonal deprivation therapy

## 2020-09-29 NOTE — HEALTH RISK ASSESSMENT
[Good] : ~his/her~ current health as good [Very Good] : ~his/her~  mood as very good [No] : No [No falls in past year] : Patient reported no falls in the past year [0] : 2) Feeling down, depressed, or hopeless: Not at all (0) [None] : None [With Significant Other] : lives with significant other [# of Members in Household ___] :  household currently consist of [unfilled] member(s) [Retired] : retired [High School] : high school [] :  [# Of Children ___] : has [unfilled] children [Feels Safe at Home] : Feels safe at home [Fully functional (bathing, dressing, toileting, transferring, walking, feeding)] : Fully functional (bathing, dressing, toileting, transferring, walking, feeding) [Fully functional (using the telephone, shopping, preparing meals, housekeeping, doing laundry, using] : Fully functional and needs no help or supervision to perform IADLs (using the telephone, shopping, preparing meals, housekeeping, doing laundry, using transportation, managing medications and managing finances) [Reports changes in hearing] : Reports changes in hearing [Reports normal functional visual acuity (ie: able to read med bottle)] : Reports normal functional visual acuity [Smoke Detector] : smoke detector [Carbon Monoxide Detector] : carbon monoxide detector [Safety elements used in home] : safety elements used in home [Seat Belt] :  uses seat belt [Sunscreen] : uses sunscreen [Name: ___] : Health Care Proxy's Name: [unfilled]  [Relationship: ___] : Relationship: [unfilled] [FreeTextEntry1] : Diabetes and cost of medication [] : No [de-identified] : Walking when he can; PT for his back; Yard work [de-identified] : Eating poorly during the Pandemic [Change in mental status noted] : No change in mental status noted [Language] : denies difficulty with language [Behavior] : denies difficulty with behavior [Learning/Retaining New Information] : denies difficulty learning/retaining new information [Handling Complex Tasks] : denies difficulty handling complex tasks [Reasoning] : denies difficulty with reasoning [Sexually Active] : not sexually active [High Risk Behavior] : no high risk behavior [Reports changes in vision] : Reports no changes in vision [Reports changes in dental health] : Reports no changes in dental health [de-identified] : Getting worse [AdvancecareDate] : 9/24/20

## 2020-09-30 LAB
25(OH)D3 SERPL-MCNC: 55.4 NG/ML
ALBUMIN SERPL ELPH-MCNC: 5 G/DL
ALP BLD-CCNC: 108 U/L
ALT SERPL-CCNC: 21 U/L
ANION GAP SERPL CALC-SCNC: 15 MMOL/L
APPEARANCE: CLEAR
AST SERPL-CCNC: 21 U/L
BASOPHILS # BLD AUTO: 0.04 K/UL
BASOPHILS NFR BLD AUTO: 0.9 %
BILIRUB SERPL-MCNC: 0.3 MG/DL
BILIRUBIN URINE: NEGATIVE
BLOOD URINE: NEGATIVE
BUN SERPL-MCNC: 33 MG/DL
CALCIUM SERPL-MCNC: 10.4 MG/DL
CHLORIDE SERPL-SCNC: 105 MMOL/L
CHOLEST SERPL-MCNC: 194 MG/DL
CHOLEST/HDLC SERPL: 2.9 RATIO
CO2 SERPL-SCNC: 24 MMOL/L
COLOR: YELLOW
CREAT SERPL-MCNC: 1.19 MG/DL
EOSINOPHIL # BLD AUTO: 0.2 K/UL
EOSINOPHIL NFR BLD AUTO: 4.4 %
ESTIMATED AVERAGE GLUCOSE: 194 MG/DL
GLUCOSE QUALITATIVE U: ABNORMAL
GLUCOSE SERPL-MCNC: 179 MG/DL
HBA1C MFR BLD HPLC: 8.4 %
HCT VFR BLD CALC: 42 %
HDLC SERPL-MCNC: 66 MG/DL
HGB BLD-MCNC: 12.6 G/DL
IMM GRANULOCYTES NFR BLD AUTO: 0.7 %
KETONES URINE: NEGATIVE
LDLC SERPL CALC-MCNC: 98 MG/DL
LEUKOCYTE ESTERASE URINE: NEGATIVE
LYMPHOCYTES # BLD AUTO: 0.8 K/UL
LYMPHOCYTES NFR BLD AUTO: 17.4 %
MAN DIFF?: NORMAL
MCHC RBC-ENTMCNC: 27.2 PG
MCHC RBC-ENTMCNC: 30 GM/DL
MCV RBC AUTO: 90.7 FL
MONOCYTES # BLD AUTO: 0.61 K/UL
MONOCYTES NFR BLD AUTO: 13.3 %
NEUTROPHILS # BLD AUTO: 2.91 K/UL
NEUTROPHILS NFR BLD AUTO: 63.3 %
NITRITE URINE: NEGATIVE
PH URINE: 5
PLATELET # BLD AUTO: 185 K/UL
POTASSIUM SERPL-SCNC: 4.8 MMOL/L
PROT SERPL-MCNC: 7.3 G/DL
PROTEIN URINE: NORMAL
PSA SERPL-MCNC: <0.01 NG/ML
RBC # BLD: 4.63 M/UL
RBC # FLD: 13.8 %
SARS-COV-2 IGG SERPL IA-ACNC: 0.01 INDEX
SARS-COV-2 IGG SERPL QL IA: NEGATIVE
SODIUM SERPL-SCNC: 144 MMOL/L
SPECIFIC GRAVITY URINE: 1.02
T4 SERPL-MCNC: 5.9 UG/DL
TRIGL SERPL-MCNC: 144 MG/DL
TSH SERPL-ACNC: 1.62 UIU/ML
UROBILINOGEN URINE: NORMAL
WBC # FLD AUTO: 4.59 K/UL

## 2020-10-28 ENCOUNTER — RX RENEWAL (OUTPATIENT)
Age: 75
End: 2020-10-28

## 2020-11-24 ENCOUNTER — APPOINTMENT (OUTPATIENT)
Dept: INTERNAL MEDICINE | Facility: CLINIC | Age: 75
End: 2020-11-24
Payer: MEDICARE

## 2020-11-24 PROCEDURE — 99214 OFFICE O/P EST MOD 30 MIN: CPT | Mod: 25

## 2020-11-24 PROCEDURE — 36415 COLL VENOUS BLD VENIPUNCTURE: CPT

## 2020-11-24 NOTE — PHYSICAL EXAM
[No Acute Distress] : no acute distress [Well Developed] : well developed [Normal Oropharynx] : the oropharynx was normal [Clear to Auscultation] : lungs were clear to auscultation bilaterally [Normal Percussion] : the chest was normal to percussion [Normal Rate] : normal rate  [Regular Rhythm] : with a regular rhythm [No Edema] : there was no peripheral edema [No CVA Tenderness] : no CVA  tenderness [No Rash] : no rash [Memory Grossly Normal] : memory grossly normal

## 2020-11-24 NOTE — HISTORY OF PRESENT ILLNESS
[FreeTextEntry8] : 76 yo male presents for the evaluation of two episodes of painless dark urine which he thinks is blood since yesterday.  At other times he has urinated and the urine looks clear.\par He denies trauma or changes in medication.\par He is taking Vitamins.\par The patient has recently gone through Radiation for Prostate Cancer and is getting hormonal deprivation therapy.\par In addition, he has had kidney stones and is s/p instrumentation and cystoscopy in June.  He has residual stones and may have past one.\par

## 2020-11-30 ENCOUNTER — APPOINTMENT (OUTPATIENT)
Dept: ULTRASOUND IMAGING | Facility: CLINIC | Age: 75
End: 2020-11-30
Payer: MEDICARE

## 2020-11-30 ENCOUNTER — OUTPATIENT (OUTPATIENT)
Dept: OUTPATIENT SERVICES | Facility: HOSPITAL | Age: 75
LOS: 1 days | End: 2020-11-30
Payer: MEDICARE

## 2020-11-30 DIAGNOSIS — C61 MALIGNANT NEOPLASM OF PROSTATE: ICD-10-CM

## 2020-11-30 LAB
ANION GAP SERPL CALC-SCNC: 11 MMOL/L
BACTERIA UR CULT: NORMAL
BASOPHILS # BLD AUTO: 0.03 K/UL
BASOPHILS NFR BLD AUTO: 0.5 %
BILIRUB UR QL STRIP: NEGATIVE
BUN SERPL-MCNC: 37 MG/DL
CALCIUM SERPL-MCNC: 10.3 MG/DL
CHLORIDE SERPL-SCNC: 102 MMOL/L
CLARITY UR: NORMAL
CO2 SERPL-SCNC: 26 MMOL/L
COLLECTION METHOD: NORMAL
CREAT SERPL-MCNC: 1.48 MG/DL
EOSINOPHIL # BLD AUTO: 0.24 K/UL
EOSINOPHIL NFR BLD AUTO: 3.6 %
GLUCOSE SERPL-MCNC: 101 MG/DL
GLUCOSE UR-MCNC: ABNORMAL
HCG UR QL: 0.2 EU/DL
HCT VFR BLD CALC: 38.3 %
HGB BLD-MCNC: 12.2 G/DL
HGB UR QL STRIP.AUTO: NORMAL
IMM GRANULOCYTES NFR BLD AUTO: 0.8 %
KETONES UR-MCNC: NEGATIVE
LEUKOCYTE ESTERASE UR QL STRIP: NEGATIVE
LYMPHOCYTES # BLD AUTO: 1.08 K/UL
LYMPHOCYTES NFR BLD AUTO: 16.4 %
MAN DIFF?: NORMAL
MCHC RBC-ENTMCNC: 28 PG
MCHC RBC-ENTMCNC: 31.9 GM/DL
MCV RBC AUTO: 88 FL
MONOCYTES # BLD AUTO: 0.81 K/UL
MONOCYTES NFR BLD AUTO: 12.3 %
NEUTROPHILS # BLD AUTO: 4.38 K/UL
NEUTROPHILS NFR BLD AUTO: 66.4 %
NITRITE UR QL STRIP: NEGATIVE
PH UR STRIP: 5.5
PLATELET # BLD AUTO: 184 K/UL
POTASSIUM SERPL-SCNC: 5.2 MMOL/L
PROT UR STRIP-MCNC: NORMAL
RBC # BLD: 4.35 M/UL
RBC # FLD: 13.5 %
SODIUM SERPL-SCNC: 139 MMOL/L
SP GR UR STRIP: 1.01
WBC # FLD AUTO: 6.59 K/UL

## 2020-11-30 PROCEDURE — 76770 US EXAM ABDO BACK WALL COMP: CPT

## 2020-11-30 PROCEDURE — 76770 US EXAM ABDO BACK WALL COMP: CPT | Mod: 26

## 2020-12-07 LAB — BACTERIA UR CULT: NORMAL

## 2020-12-24 ENCOUNTER — RX RENEWAL (OUTPATIENT)
Age: 75
End: 2020-12-24

## 2021-01-08 ENCOUNTER — APPOINTMENT (OUTPATIENT)
Dept: INTERNAL MEDICINE | Facility: CLINIC | Age: 76
End: 2021-01-08
Payer: MEDICARE

## 2021-01-08 LAB
BASOPHILS # BLD AUTO: 0.04 K/UL
BASOPHILS NFR BLD AUTO: 0.7 %
EOSINOPHIL # BLD AUTO: 0.23 K/UL
EOSINOPHIL NFR BLD AUTO: 3.9 %
ESTIMATED AVERAGE GLUCOSE: 200 MG/DL
HBA1C MFR BLD HPLC: 8.6 %
HCT VFR BLD CALC: 41 %
HGB BLD-MCNC: 13 G/DL
IMM GRANULOCYTES NFR BLD AUTO: 0.9 %
LYMPHOCYTES # BLD AUTO: 0.86 K/UL
LYMPHOCYTES NFR BLD AUTO: 14.7 %
MAN DIFF?: NORMAL
MCHC RBC-ENTMCNC: 29 PG
MCHC RBC-ENTMCNC: 31.7 GM/DL
MCV RBC AUTO: 91.5 FL
MONOCYTES # BLD AUTO: 0.63 K/UL
MONOCYTES NFR BLD AUTO: 10.8 %
NEUTROPHILS # BLD AUTO: 4.05 K/UL
NEUTROPHILS NFR BLD AUTO: 69 %
PLATELET # BLD AUTO: 191 K/UL
RBC # BLD: 4.48 M/UL
RBC # FLD: 13.2 %
WBC # FLD AUTO: 5.86 K/UL

## 2021-01-08 PROCEDURE — 36415 COLL VENOUS BLD VENIPUNCTURE: CPT

## 2021-01-08 PROCEDURE — 99072 ADDL SUPL MATRL&STAF TM PHE: CPT

## 2021-01-09 LAB
25(OH)D3 SERPL-MCNC: 51.4 NG/ML
ALBUMIN SERPL ELPH-MCNC: 4.9 G/DL
ALP BLD-CCNC: 112 U/L
ALT SERPL-CCNC: 20 U/L
ANION GAP SERPL CALC-SCNC: 17 MMOL/L
APPEARANCE: CLEAR
AST SERPL-CCNC: 17 U/L
BILIRUB SERPL-MCNC: 0.3 MG/DL
BILIRUBIN URINE: NEGATIVE
BLOOD URINE: NEGATIVE
BUN SERPL-MCNC: 40 MG/DL
CALCIUM SERPL-MCNC: 10.3 MG/DL
CHLORIDE SERPL-SCNC: 107 MMOL/L
CHOLEST SERPL-MCNC: 190 MG/DL
CO2 SERPL-SCNC: 21 MMOL/L
COLOR: YELLOW
CREAT SERPL-MCNC: 1.57 MG/DL
CREAT SPEC-SCNC: 78 MG/DL
GLUCOSE QUALITATIVE U: ABNORMAL
GLUCOSE SERPL-MCNC: 193 MG/DL
HDLC SERPL-MCNC: 64 MG/DL
KETONES URINE: NEGATIVE
LDLC SERPL CALC-MCNC: 93 MG/DL
LEUKOCYTE ESTERASE URINE: NEGATIVE
MICROALBUMIN 24H UR DL<=1MG/L-MCNC: 2.7 MG/DL
MICROALBUMIN/CREAT 24H UR-RTO: 35 MG/G
NITRITE URINE: NEGATIVE
NONHDLC SERPL-MCNC: 127 MG/DL
PH URINE: 5.5
POTASSIUM SERPL-SCNC: 5 MMOL/L
PROT SERPL-MCNC: 7.4 G/DL
PROTEIN URINE: NORMAL
PSA SERPL-MCNC: <0.01 NG/ML
SODIUM SERPL-SCNC: 144 MMOL/L
SPECIFIC GRAVITY URINE: 1.02
TRIGL SERPL-MCNC: 169 MG/DL
TSH SERPL-ACNC: 1.75 UIU/ML
UROBILINOGEN URINE: NORMAL

## 2021-01-27 ENCOUNTER — APPOINTMENT (OUTPATIENT)
Dept: INTERNAL MEDICINE | Facility: CLINIC | Age: 76
End: 2021-01-27
Payer: MEDICARE

## 2021-01-27 VITALS
DIASTOLIC BLOOD PRESSURE: 78 MMHG | SYSTOLIC BLOOD PRESSURE: 134 MMHG | HEIGHT: 69.5 IN | TEMPERATURE: 98.4 F | BODY MASS INDEX: 26.64 KG/M2 | WEIGHT: 184 LBS | HEART RATE: 80 BPM | RESPIRATION RATE: 16 BRPM

## 2021-01-27 PROCEDURE — 99072 ADDL SUPL MATRL&STAF TM PHE: CPT

## 2021-01-27 PROCEDURE — 99214 OFFICE O/P EST MOD 30 MIN: CPT

## 2021-01-27 NOTE — HISTORY OF PRESENT ILLNESS
[FreeTextEntry1] : 76 yo male presents because he has not been exercising and is worried about his blood sugar which is high. [de-identified] : 74 yo male with ASHD, Diabetes, and Prostate Carcininoma who presents because his blood sugar is poorly controlled.  He has not been exercising and has been eating everything because of Covid.

## 2021-01-27 NOTE — PHYSICAL EXAM
[No Acute Distress] : no acute distress [Well Nourished] : well nourished [Normal Outer Ear/Nose] : the outer ears and nose were normal in appearance [No JVD] : no jugular venous distention [Clear to Auscultation] : lungs were clear to auscultation bilaterally [Normal Percussion] : the chest was normal to percussion [Normal Rate] : normal rate  [Regular Rhythm] : with a regular rhythm [No Edema] : there was no peripheral edema [Soft] : abdomen soft [Non Tender] : non-tender

## 2021-02-24 ENCOUNTER — APPOINTMENT (OUTPATIENT)
Dept: INTERNAL MEDICINE | Facility: CLINIC | Age: 76
End: 2021-02-24

## 2021-03-09 ENCOUNTER — APPOINTMENT (OUTPATIENT)
Dept: CARDIOLOGY | Facility: CLINIC | Age: 76
End: 2021-03-09
Payer: MEDICARE

## 2021-03-09 ENCOUNTER — NON-APPOINTMENT (OUTPATIENT)
Age: 76
End: 2021-03-09

## 2021-03-09 VITALS — DIASTOLIC BLOOD PRESSURE: 86 MMHG | HEART RATE: 96 BPM | SYSTOLIC BLOOD PRESSURE: 146 MMHG

## 2021-03-09 VITALS
HEART RATE: 102 BPM | WEIGHT: 185 LBS | DIASTOLIC BLOOD PRESSURE: 66 MMHG | OXYGEN SATURATION: 98 % | BODY MASS INDEX: 26.78 KG/M2 | SYSTOLIC BLOOD PRESSURE: 150 MMHG | HEIGHT: 69.5 IN

## 2021-03-09 PROCEDURE — 93325 DOPPLER ECHO COLOR FLOW MAPG: CPT

## 2021-03-09 PROCEDURE — 36415 COLL VENOUS BLD VENIPUNCTURE: CPT

## 2021-03-09 PROCEDURE — 93351 STRESS TTE COMPLETE: CPT

## 2021-03-09 PROCEDURE — 99215 OFFICE O/P EST HI 40 MIN: CPT | Mod: 25

## 2021-03-09 PROCEDURE — 93320 DOPPLER ECHO COMPLETE: CPT

## 2021-03-09 PROCEDURE — 99072 ADDL SUPL MATRL&STAF TM PHE: CPT

## 2021-03-09 PROCEDURE — 93000 ELECTROCARDIOGRAM COMPLETE: CPT | Mod: 59

## 2021-03-10 LAB
ALBUMIN SERPL ELPH-MCNC: 4.8 G/DL
ALP BLD-CCNC: 130 U/L
ALT SERPL-CCNC: 22 U/L
ANION GAP SERPL CALC-SCNC: 16 MMOL/L
APTT BLD: 34.1 SEC
AST SERPL-CCNC: 20 U/L
BASOPHILS # BLD AUTO: 0.05 K/UL
BASOPHILS NFR BLD AUTO: 0.8 %
BILIRUB SERPL-MCNC: 0.2 MG/DL
BUN SERPL-MCNC: 40 MG/DL
CALCIUM SERPL-MCNC: 10.4 MG/DL
CHLORIDE SERPL-SCNC: 102 MMOL/L
CHOLEST SERPL-MCNC: 185 MG/DL
CO2 SERPL-SCNC: 21 MMOL/L
CREAT SERPL-MCNC: 1.56 MG/DL
EOSINOPHIL # BLD AUTO: 0.46 K/UL
EOSINOPHIL NFR BLD AUTO: 7 %
ESTIMATED AVERAGE GLUCOSE: 192 MG/DL
GLUCOSE SERPL-MCNC: 160 MG/DL
HBA1C MFR BLD HPLC: 8.3 %
HCT VFR BLD CALC: 38.3 %
HDLC SERPL-MCNC: 53 MG/DL
HGB BLD-MCNC: 12.3 G/DL
IMM GRANULOCYTES NFR BLD AUTO: 0.9 %
INR PPP: 0.95 RATIO
LDLC SERPL CALC-MCNC: 61 MG/DL
LYMPHOCYTES # BLD AUTO: 1.01 K/UL
LYMPHOCYTES NFR BLD AUTO: 15.4 %
MAN DIFF?: NORMAL
MCHC RBC-ENTMCNC: 29.9 PG
MCHC RBC-ENTMCNC: 32.1 GM/DL
MCV RBC AUTO: 93 FL
MONOCYTES # BLD AUTO: 0.88 K/UL
MONOCYTES NFR BLD AUTO: 13.4 %
NEUTROPHILS # BLD AUTO: 4.11 K/UL
NEUTROPHILS NFR BLD AUTO: 62.5 %
NONHDLC SERPL-MCNC: 132 MG/DL
NT-PROBNP SERPL-MCNC: 373 PG/ML
PLATELET # BLD AUTO: 202 K/UL
POTASSIUM SERPL-SCNC: 4.8 MMOL/L
PROT SERPL-MCNC: 7.4 G/DL
PT BLD: 11.3 SEC
RBC # BLD: 4.12 M/UL
RBC # FLD: 13.8 %
SODIUM SERPL-SCNC: 138 MMOL/L
TRIGL SERPL-MCNC: 356 MG/DL
TSH SERPL-ACNC: 1.95 UIU/ML
WBC # FLD AUTO: 6.57 K/UL

## 2021-03-12 ENCOUNTER — OUTPATIENT (OUTPATIENT)
Dept: OUTPATIENT SERVICES | Facility: HOSPITAL | Age: 76
LOS: 1 days | End: 2021-03-12
Payer: MEDICARE

## 2021-03-12 DIAGNOSIS — Z11.52 ENCOUNTER FOR SCREENING FOR COVID-19: ICD-10-CM

## 2021-03-12 LAB
SARS-COV-2 IGG SERPL IA-ACNC: 0.12 INDEX
SARS-COV-2 IGG SERPL QL IA: NEGATIVE
SARS-COV-2 RNA SPEC QL NAA+PROBE: SIGNIFICANT CHANGE UP

## 2021-03-12 PROCEDURE — U0003: CPT

## 2021-03-12 PROCEDURE — U0005: CPT

## 2021-03-12 PROCEDURE — C9803: CPT

## 2021-03-15 ENCOUNTER — OUTPATIENT (OUTPATIENT)
Dept: OUTPATIENT SERVICES | Facility: HOSPITAL | Age: 76
LOS: 1 days | Discharge: HOME | End: 2021-03-15
Payer: MEDICARE

## 2021-03-15 VITALS
SYSTOLIC BLOOD PRESSURE: 150 MMHG | RESPIRATION RATE: 16 BRPM | DIASTOLIC BLOOD PRESSURE: 68 MMHG | OXYGEN SATURATION: 95 % | TEMPERATURE: 98 F | HEART RATE: 82 BPM | WEIGHT: 182.1 LBS | HEIGHT: 69 IN

## 2021-03-15 VITALS
RESPIRATION RATE: 20 BRPM | SYSTOLIC BLOOD PRESSURE: 145 MMHG | TEMPERATURE: 98 F | HEART RATE: 81 BPM | OXYGEN SATURATION: 98 % | DIASTOLIC BLOOD PRESSURE: 67 MMHG

## 2021-03-15 DIAGNOSIS — Z98.890 OTHER SPECIFIED POSTPROCEDURAL STATES: Chronic | ICD-10-CM

## 2021-03-15 DIAGNOSIS — Z95.5 PRESENCE OF CORONARY ANGIOPLASTY IMPLANT AND GRAFT: Chronic | ICD-10-CM

## 2021-03-15 DIAGNOSIS — R93.1 ABNORMAL FINDINGS ON DIAGNOSTIC IMAGING OF HEART AND CORONARY CIRCULATION: ICD-10-CM

## 2021-03-15 DIAGNOSIS — I25.10 ATHEROSCLEROTIC HEART DISEASE OF NATIVE CORONARY ARTERY WITHOUT ANGINA PECTORIS: ICD-10-CM

## 2021-03-15 LAB
ALBUMIN SERPL ELPH-MCNC: 4.7 G/DL — SIGNIFICANT CHANGE UP (ref 3.3–5)
ALP SERPL-CCNC: 100 U/L — SIGNIFICANT CHANGE UP (ref 40–120)
ALT FLD-CCNC: 20 U/L — SIGNIFICANT CHANGE UP (ref 10–45)
ANION GAP SERPL CALC-SCNC: 12 MMOL/L — SIGNIFICANT CHANGE UP (ref 5–17)
AST SERPL-CCNC: 19 U/L — SIGNIFICANT CHANGE UP (ref 10–40)
BILIRUB SERPL-MCNC: 0.2 MG/DL — SIGNIFICANT CHANGE UP (ref 0.2–1.2)
BUN SERPL-MCNC: 47 MG/DL — HIGH (ref 7–23)
CALCIUM SERPL-MCNC: 10.6 MG/DL — HIGH (ref 8.4–10.5)
CHLORIDE SERPL-SCNC: 106 MMOL/L — SIGNIFICANT CHANGE UP (ref 96–108)
CO2 SERPL-SCNC: 23 MMOL/L — SIGNIFICANT CHANGE UP (ref 22–31)
CREAT SERPL-MCNC: 1.62 MG/DL — HIGH (ref 0.5–1.3)
GLUCOSE BLDC GLUCOMTR-MCNC: 130 MG/DL — HIGH (ref 70–99)
GLUCOSE BLDC GLUCOMTR-MCNC: 90 MG/DL — SIGNIFICANT CHANGE UP (ref 70–99)
GLUCOSE SERPL-MCNC: 132 MG/DL — HIGH (ref 70–99)
HCT VFR BLD CALC: 38.1 % — LOW (ref 39–50)
HGB BLD-MCNC: 12.4 G/DL — LOW (ref 13–17)
MCHC RBC-ENTMCNC: 29.5 PG — SIGNIFICANT CHANGE UP (ref 27–34)
MCHC RBC-ENTMCNC: 32.5 GM/DL — SIGNIFICANT CHANGE UP (ref 32–36)
MCV RBC AUTO: 90.5 FL — SIGNIFICANT CHANGE UP (ref 80–100)
NRBC # BLD: 0 /100 WBCS — SIGNIFICANT CHANGE UP (ref 0–0)
PLATELET # BLD AUTO: 211 K/UL — SIGNIFICANT CHANGE UP (ref 150–400)
POTASSIUM SERPL-MCNC: 5 MMOL/L — SIGNIFICANT CHANGE UP (ref 3.5–5.3)
POTASSIUM SERPL-SCNC: 5 MMOL/L — SIGNIFICANT CHANGE UP (ref 3.5–5.3)
PROT SERPL-MCNC: 7.9 G/DL — SIGNIFICANT CHANGE UP (ref 6–8.3)
RBC # BLD: 4.21 M/UL — SIGNIFICANT CHANGE UP (ref 4.2–5.8)
RBC # FLD: 13.4 % — SIGNIFICANT CHANGE UP (ref 10.3–14.5)
SODIUM SERPL-SCNC: 141 MMOL/L — SIGNIFICANT CHANGE UP (ref 135–145)
WBC # BLD: 6.1 K/UL — SIGNIFICANT CHANGE UP (ref 3.8–10.5)
WBC # FLD AUTO: 6.1 K/UL — SIGNIFICANT CHANGE UP (ref 3.8–10.5)

## 2021-03-15 PROCEDURE — C1887: CPT

## 2021-03-15 PROCEDURE — 93456 R HRT CORONARY ARTERY ANGIO: CPT | Mod: 26,59

## 2021-03-15 PROCEDURE — 99152 MOD SED SAME PHYS/QHP 5/>YRS: CPT

## 2021-03-15 PROCEDURE — 80053 COMPREHEN METABOLIC PANEL: CPT

## 2021-03-15 PROCEDURE — C9600: CPT | Mod: RC

## 2021-03-15 PROCEDURE — 85027 COMPLETE CBC AUTOMATED: CPT

## 2021-03-15 PROCEDURE — 99153 MOD SED SAME PHYS/QHP EA: CPT

## 2021-03-15 PROCEDURE — C1894: CPT

## 2021-03-15 PROCEDURE — C1725: CPT

## 2021-03-15 PROCEDURE — 93005 ELECTROCARDIOGRAM TRACING: CPT

## 2021-03-15 PROCEDURE — 93456 R HRT CORONARY ARTERY ANGIO: CPT | Mod: 59

## 2021-03-15 PROCEDURE — C1874: CPT

## 2021-03-15 PROCEDURE — 82962 GLUCOSE BLOOD TEST: CPT

## 2021-03-15 PROCEDURE — 93010 ELECTROCARDIOGRAM REPORT: CPT | Mod: 76

## 2021-03-15 PROCEDURE — C1769: CPT

## 2021-03-15 PROCEDURE — 92928 PRQ TCAT PLMT NTRAC ST 1 LES: CPT | Mod: RC

## 2021-03-15 RX ORDER — CLOPIDOGREL BISULFATE 75 MG/1
1 TABLET, FILM COATED ORAL
Qty: 0 | Refills: 3 | DISCHARGE
Start: 2021-03-15 | End: 2022-03-09

## 2021-03-15 RX ORDER — SODIUM CHLORIDE 9 MG/ML
1000 INJECTION INTRAMUSCULAR; INTRAVENOUS; SUBCUTANEOUS
Refills: 0 | Status: DISCONTINUED | OUTPATIENT
Start: 2021-03-15 | End: 2021-03-29

## 2021-03-15 RX ORDER — DEXTROSE 50 % IN WATER 50 %
25 SYRINGE (ML) INTRAVENOUS ONCE
Refills: 0 | Status: DISCONTINUED | OUTPATIENT
Start: 2021-03-15 | End: 2021-03-29

## 2021-03-15 RX ORDER — CLOPIDOGREL BISULFATE 75 MG/1
1 TABLET, FILM COATED ORAL
Qty: 90 | Refills: 3
Start: 2021-03-15 | End: 2022-03-09

## 2021-03-15 RX ORDER — INSULIN LISPRO 100/ML
VIAL (ML) SUBCUTANEOUS AT BEDTIME
Refills: 0 | Status: DISCONTINUED | OUTPATIENT
Start: 2021-03-15 | End: 2021-03-29

## 2021-03-15 RX ORDER — GLUCAGON INJECTION, SOLUTION 0.5 MG/.1ML
1 INJECTION, SOLUTION SUBCUTANEOUS ONCE
Refills: 0 | Status: DISCONTINUED | OUTPATIENT
Start: 2021-03-15 | End: 2021-03-29

## 2021-03-15 RX ORDER — INSULIN LISPRO 100/ML
VIAL (ML) SUBCUTANEOUS
Refills: 0 | Status: DISCONTINUED | OUTPATIENT
Start: 2021-03-15 | End: 2021-03-29

## 2021-03-15 RX ORDER — SODIUM CHLORIDE 9 MG/ML
1000 INJECTION, SOLUTION INTRAVENOUS
Refills: 0 | Status: DISCONTINUED | OUTPATIENT
Start: 2021-03-15 | End: 2021-03-29

## 2021-03-15 RX ORDER — DEXTROSE 50 % IN WATER 50 %
12.5 SYRINGE (ML) INTRAVENOUS ONCE
Refills: 0 | Status: DISCONTINUED | OUTPATIENT
Start: 2021-03-15 | End: 2021-03-29

## 2021-03-15 RX ORDER — DEXTROSE 50 % IN WATER 50 %
15 SYRINGE (ML) INTRAVENOUS ONCE
Refills: 0 | Status: DISCONTINUED | OUTPATIENT
Start: 2021-03-15 | End: 2021-03-29

## 2021-03-15 RX ORDER — METFORMIN HYDROCHLORIDE 850 MG/1
1 TABLET ORAL
Qty: 0 | Refills: 0 | DISCHARGE

## 2021-03-15 RX ADMIN — SODIUM CHLORIDE 75 MILLILITER(S): 9 INJECTION INTRAMUSCULAR; INTRAVENOUS; SUBCUTANEOUS at 13:35

## 2021-03-15 NOTE — H&P CARDIOLOGY - HISTORY OF PRESENT ILLNESS
76 y/o  male PMH HTN, HLD, DM2, CAD with prior stents x 3 to RCA (most recent 10/2016), B/L CEA, borderline severe AS (OG 0.8 sqcm on most recent echo), mild to mod MR, with c/o worsening exertional chest pain over the past few months. Seen and evaluated by cardiologist, Dr. Miller, and had a stress echo on 3/9/2021. Exercised for 2 mins - EKG had 4-5mm horizontal ST depressions which in recovery became downsloping with TWI and lasted over 20 mins. Echo showed new WMA in the anterior wall and possibly lateral wall; EF 65%, diastolic dysfunction. Referred for further evaluation today.    76 y/o  male PMH HTN, HLD, DM2, CAD with prior stents x 3 to RCA (most recent 10/2016), B/L CEA, borderline severe AS (OG 0.8 sqcm on most recent echo), mild to mod MR, CKD, with c/o worsening exertional chest pain over the past few months. Seen and evaluated by cardiologist, Dr. Miller, and had a stress echo on 3/9/2021. Exercised for 2 mins - EKG had 4-5mm horizontal ST depressions which in recovery became downsloping with TWI and lasted over 20 mins. Echo showed new WMA in the anterior wall and possibly lateral wall; EF 65%, diastolic dysfunction. Referred for further evaluation today.

## 2021-03-15 NOTE — CHART NOTE - NSCHARTNOTEFT_GEN_A_CORE
Removal of Femoral Sheath    Pulses in the right lower extremity are palpable. The patient was placed in the supine position. The insertion site was identified and the sutures were removed per protocol.  The 5 and 7 Romanian femoral sheath were then removed. Direct pressure was applied for 20 minutes.     Monitoring of the right groin and both lower extremities including neuro-vascular checks and vital signs every 15 minutes x 4, then every 30 minutes x 2, then every 1 hour was ordered.    Complications: None

## 2021-03-15 NOTE — H&P CARDIOLOGY - PSH
H/O carotid endarterectomy    Stented coronary artery     H/O carotid endarterectomy    H/O hernia repair    H/O lithotripsy    Stented coronary artery

## 2021-03-15 NOTE — ASU DISCHARGE PLAN (ADULT/PEDIATRIC) - ASU DC SPECIAL INSTRUCTIONSFT
Wound Care:   the day AFTER your procedure remove bandage GENTLY, and clean using  mild soap and gentle warm, water stream, pat dry. leave OPEN to air. YOU MAY SHOWER   DO NOT apply lotions, creams, ointments, powder, perfumes to your incision site  DO NOT SOAK your site for 1 week ( no baths, no pools, no tubs, etc...)  Check  your groin daily. A small amount of bruising, and soreness are normal    ACTIVITY: for 24 hours   - DO NOT DRIVE  - DO NOT make any important decisions or sign legal documents   - DO NOT operate heavy machineries  - you may resume sexual activity in 48 hours, unless otherwise instructed by your cardiologist     If your procedure was done through the WRIST: for the NEXT 3DAYS:  - avoid pushing, pulling, with that affected wrist   - avoid repeated movement of that hand and wrist (eg: typing, hammering)  - DO NOT LIFT anything more than 5 lbs     If your procedure was done through the GROIN: for the NEXT 5 DAYS  - Limit climbing stairs, DO NOT soak in bathtub or pool  - no strenuous activities, pushing, pulling, straining  - Do not lift anything 10lbs or heavier     MEDICATION:   take your medications as explained (see discharge paperwork)   If you received a STENT, you will be taking antiplatelet medications to KEEP YOUR STENT OPEN ( eg: Aspirin, Plavix, Brilinta, Effient, etc).  Take as prescribed DO NOT STOP taking them without consulting with your cardiologist first.     Follow heart healthy diet recommended by your doctor, , if you smoke STOP SMOKING ( may call [phone] for center of tobacco control if you need assistance)     CALL your doctor to make appointment in 2 WEEKS     ***CALL YOUR DOCTOR***  if you experience: fever, chills, body aches, or severe pain, swelling, redness, heat or yellow discharge at incision site  If you experience Bleeding or excruciating pain at the procedural site, swelling ( golf ball size) at your procedural site  If you experience CHEST PAIN  If you experience extremity numbness, tingling, temperature change ( of your procedural site)   If you are unable to reach your doctor, you may contact:   -Cardiology Office at [facility] at [phone] or   - [facility] [phone]  - [facility] [phone]

## 2021-03-15 NOTE — ASU DISCHARGE PLAN (ADULT/PEDIATRIC) - CARE PROVIDER_API CALL
Adam Miller (MD)  Cardiovascular Disease; Internal Medicine  [address], [address]  [place] [address]  Phone: [phone]  Fax: [phone]  Established Patient  Follow Up Time: 2 weeks

## 2021-03-15 NOTE — H&P CARDIOLOGY - PMH
Coronary artery disease involving native coronary artery of native heart, angina presence unspecified    Essential hypertension    Hyperlipidemia, unspecified hyperlipidemia type    Type 2 diabetes mellitus without complication, without long-term current use of insulin     ANTONIO (acute kidney injury)    Aortic valve stenosis, etiology of cardiac valve disease unspecified    Coronary artery disease involving native coronary artery of native heart, angina presence unspecified    Essential hypertension    Hyperlipidemia, unspecified hyperlipidemia type    Mitral valve insufficiency, unspecified etiology    Type 2 diabetes mellitus without complication, without long-term current use of insulin     ANTONIO (acute kidney injury)    Aortic valve stenosis, etiology of cardiac valve disease unspecified    Chronic kidney disease, unspecified CKD stage    Coronary artery disease involving native coronary artery of native heart, angina presence unspecified    Essential hypertension    Hyperlipidemia, unspecified hyperlipidemia type    Mitral valve insufficiency, unspecified etiology    Renal calculi    Type 2 diabetes mellitus without complication, without long-term current use of insulin

## 2021-03-16 DIAGNOSIS — I25.110 ATHEROSCLEROTIC HEART DISEASE OF NATIVE CORONARY ARTERY WITH UNSTABLE ANGINA PECTORIS: ICD-10-CM

## 2021-03-16 PROBLEM — N20.0 CALCULUS OF KIDNEY: Chronic | Status: ACTIVE | Noted: 2021-03-15

## 2021-03-16 PROBLEM — N18.9 CHRONIC KIDNEY DISEASE, UNSPECIFIED: Chronic | Status: ACTIVE | Noted: 2021-03-15

## 2021-03-16 PROBLEM — I25.10 ATHEROSCLEROTIC HEART DISEASE OF NATIVE CORONARY ARTERY WITHOUT ANGINA PECTORIS: Chronic | Status: ACTIVE | Noted: 2021-03-15

## 2021-03-16 PROBLEM — I35.0 NONRHEUMATIC AORTIC (VALVE) STENOSIS: Chronic | Status: ACTIVE | Noted: 2021-03-15

## 2021-03-16 PROBLEM — E11.9 TYPE 2 DIABETES MELLITUS WITHOUT COMPLICATIONS: Chronic | Status: ACTIVE | Noted: 2021-03-15

## 2021-03-16 PROBLEM — E78.5 HYPERLIPIDEMIA, UNSPECIFIED: Chronic | Status: ACTIVE | Noted: 2021-03-15

## 2021-03-16 PROBLEM — I10 ESSENTIAL (PRIMARY) HYPERTENSION: Chronic | Status: ACTIVE | Noted: 2021-03-15

## 2021-03-16 PROBLEM — I34.0 NONRHEUMATIC MITRAL (VALVE) INSUFFICIENCY: Chronic | Status: ACTIVE | Noted: 2021-03-15

## 2021-03-16 PROBLEM — N17.9 ACUTE KIDNEY FAILURE, UNSPECIFIED: Chronic | Status: ACTIVE | Noted: 2021-03-15

## 2021-03-16 NOTE — PHYSICAL EXAM
[General Appearance - Well Developed] : well developed [Normal Appearance] : normal appearance [Well Groomed] : well groomed [General Appearance - Well Nourished] : well nourished [No Deformities] : no deformities [General Appearance - In No Acute Distress] : no acute distress [Normal Conjunctiva] : the conjunctiva exhibited no abnormalities [Eyelids - No Xanthelasma] : the eyelids demonstrated no xanthelasmas [Normal Oral Mucosa] : normal oral mucosa [No Oral Pallor] : no oral pallor [No Oral Cyanosis] : no oral cyanosis [Normal Jugular Venous A Waves Present] : normal jugular venous A waves present [Normal Jugular Venous V Waves Present] : normal jugular venous V waves present [No Jugular Venous Navas A Waves] : no jugular venous navas A waves [Respiration, Rhythm And Depth] : normal respiratory rhythm and effort [Exaggerated Use Of Accessory Muscles For Inspiration] : no accessory muscle use [Auscultation Breath Sounds / Voice Sounds] : lungs were clear to auscultation bilaterally [Heart Rate And Rhythm] : heart rate and rhythm were normal [Heart Sounds] : normal S1 and S2 [Abdomen Soft] : soft [Abdomen Tenderness] : non-tender [Abdomen Mass (___ Cm)] : no abdominal mass palpated [Abnormal Walk] : normal gait [Gait - Sufficient For Exercise Testing] : the gait was sufficient for exercise testing [Nail Clubbing] : no clubbing of the fingernails [Cyanosis, Localized] : no localized cyanosis [Petechial Hemorrhages (___cm)] : no petechial hemorrhages [Skin Color & Pigmentation] : normal skin color and pigmentation [] : no rash [No Venous Stasis] : no venous stasis [Skin Lesions] : no skin lesions [No Skin Ulcers] : no skin ulcer [No Xanthoma] : no  xanthoma was observed [Oriented To Time, Place, And Person] : oriented to person, place, and time [Affect] : the affect was normal [Mood] : the mood was normal [FreeTextEntry1] : Somewhat anxious

## 2021-03-16 NOTE — REVIEW OF SYSTEMS
[Recent Weight Gain (___ Lbs)] : recent [unfilled] ~Ulb weight gain [Dyspnea on exertion] : dyspnea during exertion [Chest  Pressure] : chest pressure [see HPI] : see HPI [Anxiety] : anxiety [Negative] : Heme/Lymph [Feeling Fatigued] : not feeling fatigued [Recent Weight Loss (___ Lbs)] : no recent weight loss [Lower Ext Edema] : no extremity edema [Leg Claudication] : no intermittent leg claudication [Palpitations] : no palpitations [Abdominal Pain] : no abdominal pain [Heartburn] : no heartburn [Change in Appetite] : no change in appetite [Dysphagia] : no dysphagia [Dizziness] : no dizziness [Convulsions] : no convulsions [Depression] : no depression [Under Stress] : not under stress [Suicidal] : not suicidal

## 2021-03-16 NOTE — HISTORY OF PRESENT ILLNESS
[FreeTextEntry1] : April 3, 2019. Patient has been followed by Dr. Larry Toth and was followed by cardiology at [facility]. In June of 2011 after a positive stress test had cardiac angiography, which had a distal 90% RCA lesion and only nonobstructive disease in the LAD and circumflex. This was stented and his symptoms were improved. His symptoms then recurred. They havet been exertional chest pressure and shortness of breath when he walks after eating a meal, especially a large meal. Never gets it at rest and does not get it when he exerts himself if he has not just eaten. Did well for a while, but then the symptoms returned and in October of 2016 after another positive ECG treadmill test he had repeat cardiac catheterization. There was an 80% mid RCA lesion and again no significant disease in the LAD was circumflex and he had 2 stents to the RCA . The difference was this time his symptoms did not change. He has been placed on Omeprazole and this has not seemed to make a difference, or perhaps it did initially. He has not seen GI. He otherwise seems to be doing well. In July of 2014. He had bilateral carotid endarterectomies. His most recent echo showed normal LV function. His EKG is normal sinus rhythm and a normal tracing. He has hypertension and hyperlipidemia.He is a former smoker. His father had hypertension and CVA, but there does not seem to be a family history of coronary artery disease. \par April 24, 2019.  Patient tried to increase his omeprazole, and then said he got a stomach ache and stopped it. He went for a CTA of the coronary arteries. Minor disease in the LAD and borderline severe 60-69% in the proximal circumflex and 60-70% in the RCA. Problem with him is whether his symptoms are related to ischemia or not. After long discussion I recommended that he have the angiogram. If non-obstructive disease, try Ranexa and if no help, consider noncardiac causes. If significant disease then he should have stents and again, we can see if this relieves his symptoms or not. Patient to discuss with his wife and then let me know   \par \par March 1, 2021.Patient called.  His exertional chest pain is getting worse and coming more frequently and more easier for the last few months.  I have not seen him since April 2019 and back then when we did a CTA it was mostly unrevealing despite his previous 3 stents in his RCA.  I will schedule him for a stress echo as soon as possible. \par March 9, 2021.  Patient came for stress echocardiogram.  Resting echo with borderline severe though not yet critical aortic stenosis and totally normal LV function.  On treadmill within the first minute STs started to go down and heart rate shot up to 140.  I stopped the treadmill at 2 minutes with severe ST depressions and shortness of breath but no chest pain.  Post exercise echo shows new wall motion abnormality in the entire anterior wall and may be some hypokinesis in the lateral wall.  Long discussion with patient and wife and they will be booked for coronary angiography and left and right heart cath as soon as he can have a Covid swab done.  (He had the first vaccine already and the second vaccine was 5 days ago)\par March 16, 2021.Cath only showed 80% RCA lesion and the rest was nonobstructive.  The RCA was stented and the patient was sent home that evening.  He is on aspirin and Plavix.  He will continue metoprolol ER 50 daily.  He has a follow-up with me in 2 weeks and then will be evaluated by structural heart for a TAVR \par \par \par \par

## 2021-03-30 ENCOUNTER — APPOINTMENT (OUTPATIENT)
Dept: CARDIOLOGY | Facility: CLINIC | Age: 76
End: 2021-03-30
Payer: MEDICARE

## 2021-03-30 ENCOUNTER — NON-APPOINTMENT (OUTPATIENT)
Age: 76
End: 2021-03-30

## 2021-03-30 VITALS
HEART RATE: 76 BPM | WEIGHT: 182 LBS | SYSTOLIC BLOOD PRESSURE: 126 MMHG | OXYGEN SATURATION: 98 % | DIASTOLIC BLOOD PRESSURE: 67 MMHG | BODY MASS INDEX: 26.49 KG/M2

## 2021-03-30 PROCEDURE — 99215 OFFICE O/P EST HI 40 MIN: CPT

## 2021-03-30 PROCEDURE — 93000 ELECTROCARDIOGRAM COMPLETE: CPT

## 2021-03-30 PROCEDURE — 99072 ADDL SUPL MATRL&STAF TM PHE: CPT

## 2021-03-30 PROCEDURE — 36415 COLL VENOUS BLD VENIPUNCTURE: CPT

## 2021-03-31 LAB
ALBUMIN SERPL ELPH-MCNC: 4.4 G/DL
ALP BLD-CCNC: 105 U/L
ALT SERPL-CCNC: 16 U/L
ANION GAP SERPL CALC-SCNC: 14 MMOL/L
AST SERPL-CCNC: 17 U/L
BASOPHILS # BLD AUTO: 0.05 K/UL
BASOPHILS NFR BLD AUTO: 0.8 %
BILIRUB SERPL-MCNC: 0.3 MG/DL
BUN SERPL-MCNC: 43 MG/DL
CALCIUM SERPL-MCNC: 9.8 MG/DL
CHLORIDE SERPL-SCNC: 104 MMOL/L
CO2 SERPL-SCNC: 20 MMOL/L
CREAT SERPL-MCNC: 1.45 MG/DL
EOSINOPHIL # BLD AUTO: 0.26 K/UL
EOSINOPHIL NFR BLD AUTO: 4.2 %
HCT VFR BLD CALC: 37.1 %
HGB BLD-MCNC: 11.6 G/DL
IMM GRANULOCYTES NFR BLD AUTO: 1.1 %
LYMPHOCYTES # BLD AUTO: 0.96 K/UL
LYMPHOCYTES NFR BLD AUTO: 15.6 %
MAN DIFF?: NORMAL
MCHC RBC-ENTMCNC: 29.1 PG
MCHC RBC-ENTMCNC: 31.3 GM/DL
MCV RBC AUTO: 93.2 FL
MONOCYTES # BLD AUTO: 0.74 K/UL
MONOCYTES NFR BLD AUTO: 12 %
NEUTROPHILS # BLD AUTO: 4.09 K/UL
NEUTROPHILS NFR BLD AUTO: 66.3 %
NT-PROBNP SERPL-MCNC: 478 PG/ML
PLATELET # BLD AUTO: 207 K/UL
POTASSIUM SERPL-SCNC: 5.1 MMOL/L
PROT SERPL-MCNC: 7.2 G/DL
RBC # BLD: 3.98 M/UL
RBC # FLD: 13.2 %
SODIUM SERPL-SCNC: 139 MMOL/L
WBC # FLD AUTO: 6.17 K/UL

## 2021-04-06 ENCOUNTER — NON-APPOINTMENT (OUTPATIENT)
Age: 76
End: 2021-04-06

## 2021-04-06 ENCOUNTER — APPOINTMENT (OUTPATIENT)
Dept: CARDIOTHORACIC SURGERY | Facility: CLINIC | Age: 76
End: 2021-04-06
Payer: MEDICARE

## 2021-04-06 ENCOUNTER — OUTPATIENT (OUTPATIENT)
Dept: OUTPATIENT SERVICES | Facility: HOSPITAL | Age: 76
LOS: 1 days | End: 2021-04-06
Payer: MEDICARE

## 2021-04-06 VITALS
SYSTOLIC BLOOD PRESSURE: 151 MMHG | HEIGHT: 69 IN | WEIGHT: 183 LBS | BODY MASS INDEX: 27.11 KG/M2 | HEART RATE: 70 BPM | RESPIRATION RATE: 15 BRPM | DIASTOLIC BLOOD PRESSURE: 72 MMHG | TEMPERATURE: 97.4 F | OXYGEN SATURATION: 98 %

## 2021-04-06 VITALS
OXYGEN SATURATION: 98 % | SYSTOLIC BLOOD PRESSURE: 151 MMHG | HEART RATE: 74 BPM | WEIGHT: 183 LBS | DIASTOLIC BLOOD PRESSURE: 72 MMHG | RESPIRATION RATE: 16 BRPM | HEIGHT: 69 IN | BODY MASS INDEX: 27.11 KG/M2 | TEMPERATURE: 97.4 F

## 2021-04-06 DIAGNOSIS — Z95.5 PRESENCE OF CORONARY ANGIOPLASTY IMPLANT AND GRAFT: Chronic | ICD-10-CM

## 2021-04-06 DIAGNOSIS — I35.0 NONRHEUMATIC AORTIC (VALVE) STENOSIS: ICD-10-CM

## 2021-04-06 DIAGNOSIS — K21.9 GASTRO-ESOPHAGEAL REFLUX DISEASE W/OUT ESOPHAGITIS: ICD-10-CM

## 2021-04-06 DIAGNOSIS — U07.1 COVID-19: ICD-10-CM

## 2021-04-06 DIAGNOSIS — Z98.890 OTHER SPECIFIED POSTPROCEDURAL STATES: Chronic | ICD-10-CM

## 2021-04-06 LAB
ALBUMIN SERPL ELPH-MCNC: 4.8 G/DL
ALP BLD-CCNC: 110 U/L
ALT SERPL-CCNC: 18 U/L
ANION GAP SERPL CALC-SCNC: 13 MMOL/L
AST SERPL-CCNC: 18 U/L
BASOPHILS # BLD AUTO: 0.05 K/UL
BASOPHILS NFR BLD AUTO: 0.7 %
BILIRUB SERPL-MCNC: 0.2 MG/DL
BUN SERPL-MCNC: 40 MG/DL
CALCIUM SERPL-MCNC: 10.8 MG/DL
CHLORIDE SERPL-SCNC: 106 MMOL/L
CO2 SERPL-SCNC: 21 MMOL/L
CREAT SERPL-MCNC: 1.41 MG/DL
EOSINOPHIL # BLD AUTO: 0.18 K/UL
EOSINOPHIL NFR BLD AUTO: 2.6 %
GLUCOSE SERPL-MCNC: 146 MG/DL
HCT VFR BLD CALC: 38.3 %
HGB BLD-MCNC: 12.1 G/DL
IMM GRANULOCYTES NFR BLD AUTO: 1.6 %
LYMPHOCYTES # BLD AUTO: 0.95 K/UL
LYMPHOCYTES NFR BLD AUTO: 13.8 %
MAN DIFF?: NORMAL
MCHC RBC-ENTMCNC: 29 PG
MCHC RBC-ENTMCNC: 31.6 GM/DL
MCV RBC AUTO: 91.8 FL
MONOCYTES # BLD AUTO: 0.75 K/UL
MONOCYTES NFR BLD AUTO: 10.9 %
NEUTROPHILS # BLD AUTO: 4.84 K/UL
NEUTROPHILS NFR BLD AUTO: 70.4 %
NT-PROBNP SERPL-MCNC: 927 PG/ML
PLATELET # BLD AUTO: 214 K/UL
POTASSIUM SERPL-SCNC: 5.3 MMOL/L
PROT SERPL-MCNC: 8.1 G/DL
RBC # BLD: 4.17 M/UL
RBC # FLD: 13.2 %
SODIUM SERPL-SCNC: 140 MMOL/L
WBC # FLD AUTO: 6.88 K/UL

## 2021-04-06 PROCEDURE — 99072 ADDL SUPL MATRL&STAF TM PHE: CPT

## 2021-04-06 PROCEDURE — 99204 OFFICE O/P NEW MOD 45 MIN: CPT

## 2021-04-06 PROCEDURE — 99211 OFF/OP EST MAY X REQ PHY/QHP: CPT

## 2021-04-06 PROCEDURE — 99214 OFFICE O/P EST MOD 30 MIN: CPT

## 2021-04-06 PROCEDURE — 93306 TTE W/DOPPLER COMPLETE: CPT | Mod: 26

## 2021-04-06 PROCEDURE — 93306 TTE W/DOPPLER COMPLETE: CPT

## 2021-04-06 PROCEDURE — 93000 ELECTROCARDIOGRAM COMPLETE: CPT

## 2021-04-06 RX ORDER — ASPIRIN 81 MG/1
81 TABLET, DELAYED RELEASE ORAL
Refills: 0 | Status: ACTIVE | COMMUNITY

## 2021-04-06 RX ORDER — GABAPENTIN 300 MG/1
300 CAPSULE ORAL TWICE DAILY
Qty: 180 | Refills: 3 | Status: DISCONTINUED | COMMUNITY
Start: 2020-01-02 | End: 2021-04-06

## 2021-04-06 NOTE — REVIEW OF SYSTEMS
[Feeling Tired] : feeling tired [Chest Pain] : chest pain [SOB on Exertion] : shortness of breath during exertion [Negative] : Heme/Lymph [Lower Ext Edema] : no extremity edema [Dizziness] : no dizziness

## 2021-04-06 NOTE — REASON FOR VISIT
[Initial Evaluation] : an initial evaluation [Spouse] : spouse [FreeTextEntry1] : aortic valve stenosis

## 2021-04-06 NOTE — ASSESSMENT
[FreeTextEntry1] : Mr. Burgos is a 75 year old male with past medical history includes HTN, HLD, Prostate Cancer (treatment completed last month), GERD, BPH, and Carotid Disease s/p bilateral CEA in July 2014.  He was referred by Dr. Miller for evaluation of aortic stenosis. He has a history of CAD with previous stent x 2 to RCA. He began experiencing exertional angina in early March. He had an abnormal stress test on 3/9. Cardiac catheterization on 3/15 with Dr. Beaulieu demonstrated a prox RCA lesion of 80% which was stented. Echocardiogram showed progression of aortic stenosis, and he was referred for evaluation. He reports shortness of breath on exertion, denies dizziness, PND, orthopnea or syncope. \par \par I had the pleasure of evaluating your patient,Mr. ROSA BURGOS who is an 75 year M with heart failure symptoms of chest discomfort on exertion (NYHA class II).\par \par  Mr. ROSA BURGOS has severe AS with pG 46 mmHg and mG 25 mmHg, DVI 0.25 and an OG of 1 cm²).  He is a low risk for surgical aortic valve replacement.  The risks and benefits of both SAVR and JESSICA have been explained and the patient would like to proceed with further workup and consideration for JESSICA by the structural heart team.  I explained the risks of vascular complication, pacemaker requirement and possible paravalvular leakage. He will require CT scan and catheterization before finalizing plans for the procedure. The patient was also made aware of the possibility of using conscious sedation or general anesthesia during the procedure.  Once completed, all imaging will be reviewed by the Heart Team and an optimal treatment strategy will be recommended.\par \par Plan:\par 1) Lab work scheduled for today CBC, CMP and Pro BNP\par 2) Structural CT scan without coronaries \par 3) Cardiac Catherization to evaluate coronary anatomy \par \par \par

## 2021-04-06 NOTE — DATA REVIEWED
[FreeTextEntry1] : Echo: 3/9/2021 OG 0.8 sqcm, mGr 28 mmHg, pGr 50 mmHg, AoV 3.5, DVI 0.26, moderate mitral regurgitation LVEF 73%. \par \par Cardiac Cath: 3/15/2021 LM ml, prox/mid LAD 30%, dCx 40%, pRCA 80% \par Stent: 3/15/2021 CALLUM pRCA

## 2021-04-06 NOTE — PHYSICAL EXAM
[General Appearance - Well Developed] : well developed [Normal Appearance] : normal appearance [General Appearance - Well Nourished] : well nourished [Normal Conjunctiva] : the conjunctiva exhibited no abnormalities [Normal Oral Mucosa] : normal oral mucosa [Normal Jugular Venous A Waves Present] : normal jugular venous A waves present [Normal Jugular Venous V Waves Present] : normal jugular venous V waves present [No Jugular Venous Navas A Waves] : no jugular venous navas A waves [Normal Rate] : normal [Rhythm Regular] : regular [No Pitting Edema] : no pitting edema present [Respiration, Rhythm And Depth] : normal respiratory rhythm and effort [Auscultation Breath Sounds / Voice Sounds] : lungs were clear to auscultation bilaterally [Bowel Sounds] : normal bowel sounds [Abdomen Soft] : soft [Abnormal Walk] : normal gait [Nail Clubbing] : no clubbing of the fingernails [Skin Turgor] : normal skin turgor [] : no rash [Oriented To Time, Place, And Person] : oriented to person, place, and time [Impaired Insight] : insight and judgment were intact [I] : a grade 1 [Right Carotid Bruit] : no bruit heard over the right carotid [Left Carotid Bruit] : no bruit heard over the left carotid [Rt] : no varicose veins of the right leg [Lt] : no varicose veins of the left leg

## 2021-04-06 NOTE — PHYSICAL EXAM
[General Appearance - Alert] : alert [General Appearance - In No Acute Distress] : in no acute distress [Sclera] : the sclera and conjunctiva were normal [Hearing Threshold Finger Rub Not [place]] : hearing was normal [Jugular Venous Distention Increased] : there was no jugular-venous distention [Both Tympanic Membranes Were Examined] : both tympanic membranes were normal [Respiration, Rhythm And Depth] : normal respiratory rhythm and effort [Auscultation Breath Sounds / Voice Sounds] : lungs were clear to auscultation bilaterally [Heart Rate And Rhythm] : heart rate was normal and rhythm regular [Murmurs] : no murmurs [Examination Of The Chest] : the chest was normal in appearance [Chest Visual Inspection Thoracic Asymmetry] : no chest asymmetry [Breast Appearance] : normal in appearance [Bowel Sounds] : normal bowel sounds [Abdomen Soft] : soft [Cervical Lymph Nodes Enlarged Posterior Bilaterally] : posterior cervical [Cervical Lymph Nodes Enlarged Anterior Bilaterally] : anterior cervical [Involuntary Movements] : no involuntary movements were seen [Skin Color & Pigmentation] : normal skin color and pigmentation [No Focal Deficits] : no focal deficits [Oriented To Time, Place, And Person] : oriented to person, place, and time [Impaired Insight] : insight and judgment were intact [Right Carotid Bruit] : no bruit heard over the right carotid [Left Carotid Bruit] : no bruit heard over the left carotid

## 2021-04-06 NOTE — HISTORY OF PRESENT ILLNESS
[FreeTextEntry1] : Mr. Harper is a 75 year old male, referred by Dr. Miller for evaluation of aortic stenosis. He has a history of CAD with previous stent x2 to RCA. He began experiencing exertional angina in early March. He had an abnormal stress test on 3/9. Cardiac catheterization on 3/15 with Dr. Beaulieu demonstrated a prox RCA lesion of 80% which was stented. TTE showed progression of aortic stenosis, and he was referred for evaluation. \par \par He has had persistent exertional angina for the past two years. He has had multiple interventions to his RCA over the years (2 stents in 2011, 2 stents in 2016, and in March with Dr. Beaulieu), only feeling relief from symptoms after the first intervention in 2011. He has no dyspnea, PND, orthopnea, dizziness, or LE edema. \par \par Past medical history includes HTN, HLD, Prostate Cancer (completed treatment last month), GERD, BPH, and Carotid Disease s/p bilateral CEA in 2004. \par \par He is most bothered by "a feeling in my chest area" that was triggered when he took a walk after eating--this dates back 3 years. He notes "now it happens all the time, this chest pressure and pain" and he feels pain "going down my arms and legs" with activity. He was a very active man, cutting his grass, shoveling snow, and working on his cars, but is no longer able to do those things comfortably. He is very unhappy with his QOL and notes "I have to try something". He has no dizziness, LH, or syncope. He does not have dyspnea. He quit smoking over 30 years ago. If he eats tomatoes or tomato sauce, he takes a PPI "for the acid reflux", also with spicy foods. He has not seen a Gastroenterologist or had an EGD. \par \par Repeat echocardiogram demonstrates borderline severe aortic stenosis. EF normal, OG 1 sqcm, mGr 25 mHg, pGr 46 mmHg, DVI 0.25. He was up this morning at 4am to urinate, and then went to his kitchen for a glass of water. The line to the  was leaking and his kitchen was flooded. He notes "I was in such a frenzy that the symptoms I have came up and were really strong and I had to sit and compose myself", which is why he feels his BP is elevated today.

## 2021-04-06 NOTE — HISTORY OF PRESENT ILLNESS
[FreeTextEntry1] : Mr. Harper is a 75 year old male with past medical history includes HTN, HLD, Prostate Cancer, GERD, BPH, and Carotid Disease s/p bilateral CEA in July 2014.  He was referred by Dr. Miller for evaluation of aortic stenosis. He has a history of CAD with previous stent x 2 to RCA. He began experiencing exertional angina in early March. He had an abnormal stress test on 3/9. Cardiac catheterization on 3/15 with Dr. Beaulieu demonstrated a prox RCA lesion of 80% which was stented. TTE showed progression of aortic stenosis, and he was referred for evaluation. He reports chest discomfort on exertion which has progressed (NYHA II). \par \par

## 2021-04-06 NOTE — REVIEW OF SYSTEMS
[Feeling Fatigued] : feeling fatigued [Chest  Pressure] : chest pressure [Negative] : Heme/Lymph [Fever] : no fever [Chills] : no chills [Shortness Of Breath] : no shortness of breath [Dyspnea on exertion] : not dyspnea during exertion [Lower Ext Edema] : no extremity edema [Abdominal Pain] : no abdominal pain [Nausea] : no nausea [Change in Appetite] : no change in appetite [Dizziness] : no dizziness [Numbness (Hypesthesia)] : no numbness

## 2021-04-06 NOTE — CONSULT LETTER
[Dear  ___] : Dear ~DMITRIY, [Please see my note below.] : Please see my note below. [Consult Closing:] : Thank you very much for allowing me to participate in the care of this patient.  If you have any questions, please do not hesitate to contact me. [Sincerely,] : Sincerely, [FreeTextEntry2] : Dr. Adam Miller\par [address] [facility]\[place], [address] [FreeTextEntry3] : Presley Johnston MD\par \par Cardiovascular & Thoracic Surgery\par Professor\par [facility]\par \par

## 2021-04-06 NOTE — DISCUSSION/SUMMARY
[Aortic Stenosis] : aortic stenosis [Deteriorating] : deteriorating [None] : none [Aortic Valve Replacement] : aortic valve replacement [Patient] : the patient [Family] : the patient's family [FreeTextEntry1] : Mr Harper has borderline severe AS (OG 1) and chronic angina that is refractory to both medical therapy and recent repeat revascularization; there may also be underlying microvascular disease. It is unclear if there is also a non-cardiac component to his symptoms. As I have explained to him and his wife in extensive detail, his symptoms are somewhat disproportionate to his degree of AS, and as such he may not have complete relief after JESSICA. That being said, with an OG of 1 and symptoms, it is reasonable to proceed. He will be scheduled for a cardiac structural CT to complete his pre JESSICA evaluation, after which all imaging will be reviewed by the Heart Team and an optimal treatment strategy recommended. He clearly has symptoms with eating and is using a PPI intermittently; he states he has never had an EGD. I am therefore recommending that concurrent to our JESSICA evaluation, he should be referred to GI for evaluation--he intends to discuss this with his PCP ASAP. We discussed the potential risks (and benefits) of JESSICA in detail, including but not limited to death, stroke, renal failure, bleeding, the potential need for conversion to OHS, and the possible need for a pacemaker--he acknowledges all and wishes to proceed.

## 2021-04-22 ENCOUNTER — RESULT REVIEW (OUTPATIENT)
Age: 76
End: 2021-04-22

## 2021-04-22 ENCOUNTER — OUTPATIENT (OUTPATIENT)
Dept: OUTPATIENT SERVICES | Facility: HOSPITAL | Age: 76
LOS: 1 days | End: 2021-04-22
Payer: MEDICARE

## 2021-04-22 ENCOUNTER — APPOINTMENT (OUTPATIENT)
Dept: CARDIOLOGY | Facility: CLINIC | Age: 76
End: 2021-04-22

## 2021-04-22 DIAGNOSIS — Z00.00 ENCOUNTER FOR GENERAL ADULT MEDICAL EXAMINATION WITHOUT ABNORMAL FINDINGS: ICD-10-CM

## 2021-04-22 DIAGNOSIS — Z98.890 OTHER SPECIFIED POSTPROCEDURAL STATES: Chronic | ICD-10-CM

## 2021-04-22 DIAGNOSIS — I35.0 NONRHEUMATIC AORTIC (VALVE) STENOSIS: ICD-10-CM

## 2021-04-22 DIAGNOSIS — Z95.5 PRESENCE OF CORONARY ANGIOPLASTY IMPLANT AND GRAFT: Chronic | ICD-10-CM

## 2021-04-22 PROCEDURE — 75572 CT HRT W/3D IMAGE: CPT

## 2021-04-22 PROCEDURE — 75572 CT HRT W/3D IMAGE: CPT | Mod: 26

## 2021-04-26 ENCOUNTER — NON-APPOINTMENT (OUTPATIENT)
Age: 76
End: 2021-04-26

## 2021-05-11 ENCOUNTER — OUTPATIENT (OUTPATIENT)
Dept: OUTPATIENT SERVICES | Facility: HOSPITAL | Age: 76
LOS: 1 days | End: 2021-05-11
Payer: MEDICARE

## 2021-05-11 ENCOUNTER — APPOINTMENT (OUTPATIENT)
Dept: ULTRASOUND IMAGING | Facility: HOSPITAL | Age: 76
End: 2021-05-11

## 2021-05-11 VITALS
HEIGHT: 69 IN | TEMPERATURE: 98 F | WEIGHT: 181.44 LBS | HEART RATE: 68 BPM | DIASTOLIC BLOOD PRESSURE: 80 MMHG | SYSTOLIC BLOOD PRESSURE: 146 MMHG | RESPIRATION RATE: 16 BRPM | OXYGEN SATURATION: 97 %

## 2021-05-11 DIAGNOSIS — E11.9 TYPE 2 DIABETES MELLITUS WITHOUT COMPLICATIONS: ICD-10-CM

## 2021-05-11 DIAGNOSIS — Z98.890 OTHER SPECIFIED POSTPROCEDURAL STATES: Chronic | ICD-10-CM

## 2021-05-11 DIAGNOSIS — Z29.9 ENCOUNTER FOR PROPHYLACTIC MEASURES, UNSPECIFIED: ICD-10-CM

## 2021-05-11 DIAGNOSIS — I35.0 NONRHEUMATIC AORTIC (VALVE) STENOSIS: ICD-10-CM

## 2021-05-11 DIAGNOSIS — I10 ESSENTIAL (PRIMARY) HYPERTENSION: ICD-10-CM

## 2021-05-11 DIAGNOSIS — Z11.52 ENCOUNTER FOR SCREENING FOR COVID-19: ICD-10-CM

## 2021-05-11 DIAGNOSIS — Z95.5 PRESENCE OF CORONARY ANGIOPLASTY IMPLANT AND GRAFT: Chronic | ICD-10-CM

## 2021-05-11 LAB
A1C WITH ESTIMATED AVERAGE GLUCOSE RESULT: 7.3 % — HIGH (ref 4–5.6)
ANION GAP SERPL CALC-SCNC: 16 MMOL/L — SIGNIFICANT CHANGE UP (ref 5–17)
BLD GP AB SCN SERPL QL: NEGATIVE — SIGNIFICANT CHANGE UP
BUN SERPL-MCNC: 38 MG/DL — HIGH (ref 7–23)
CALCIUM SERPL-MCNC: 10 MG/DL — SIGNIFICANT CHANGE UP (ref 8.4–10.5)
CHLORIDE SERPL-SCNC: 103 MMOL/L — SIGNIFICANT CHANGE UP (ref 96–108)
CO2 SERPL-SCNC: 20 MMOL/L — LOW (ref 22–31)
CREAT SERPL-MCNC: 1.48 MG/DL — HIGH (ref 0.5–1.3)
ESTIMATED AVERAGE GLUCOSE: 163 MG/DL — HIGH (ref 68–114)
GLUCOSE SERPL-MCNC: 157 MG/DL — HIGH (ref 70–99)
HCT VFR BLD CALC: 38.4 % — LOW (ref 39–50)
HGB BLD-MCNC: 12.4 G/DL — LOW (ref 13–17)
MCHC RBC-ENTMCNC: 29.3 PG — SIGNIFICANT CHANGE UP (ref 27–34)
MCHC RBC-ENTMCNC: 32.3 GM/DL — SIGNIFICANT CHANGE UP (ref 32–36)
MCV RBC AUTO: 90.8 FL — SIGNIFICANT CHANGE UP (ref 80–100)
MRSA PCR RESULT.: SIGNIFICANT CHANGE UP
NRBC # BLD: 0 /100 WBCS — SIGNIFICANT CHANGE UP (ref 0–0)
PLATELET # BLD AUTO: 197 K/UL — SIGNIFICANT CHANGE UP (ref 150–400)
POTASSIUM SERPL-MCNC: 4.8 MMOL/L — SIGNIFICANT CHANGE UP (ref 3.5–5.3)
POTASSIUM SERPL-SCNC: 4.8 MMOL/L — SIGNIFICANT CHANGE UP (ref 3.5–5.3)
RBC # BLD: 4.23 M/UL — SIGNIFICANT CHANGE UP (ref 4.2–5.8)
RBC # FLD: 13.2 % — SIGNIFICANT CHANGE UP (ref 10.3–14.5)
RH IG SCN BLD-IMP: NEGATIVE — SIGNIFICANT CHANGE UP
S AUREUS DNA NOSE QL NAA+PROBE: SIGNIFICANT CHANGE UP
SARS-COV-2 RNA SPEC QL NAA+PROBE: SIGNIFICANT CHANGE UP
SODIUM SERPL-SCNC: 139 MMOL/L — SIGNIFICANT CHANGE UP (ref 135–145)
WBC # BLD: 5.46 K/UL — SIGNIFICANT CHANGE UP (ref 3.8–10.5)
WBC # FLD AUTO: 5.46 K/UL — SIGNIFICANT CHANGE UP (ref 3.8–10.5)

## 2021-05-11 PROCEDURE — U0003: CPT

## 2021-05-11 PROCEDURE — 71046 X-RAY EXAM CHEST 2 VIEWS: CPT

## 2021-05-11 PROCEDURE — 93880 EXTRACRANIAL BILAT STUDY: CPT | Mod: 26

## 2021-05-11 PROCEDURE — C9803: CPT

## 2021-05-11 PROCEDURE — 87640 STAPH A DNA AMP PROBE: CPT

## 2021-05-11 PROCEDURE — 71046 X-RAY EXAM CHEST 2 VIEWS: CPT | Mod: 26

## 2021-05-11 PROCEDURE — 80048 BASIC METABOLIC PNL TOTAL CA: CPT

## 2021-05-11 PROCEDURE — 83036 HEMOGLOBIN GLYCOSYLATED A1C: CPT

## 2021-05-11 PROCEDURE — G0463: CPT

## 2021-05-11 PROCEDURE — 86850 RBC ANTIBODY SCREEN: CPT

## 2021-05-11 PROCEDURE — 87641 MR-STAPH DNA AMP PROBE: CPT

## 2021-05-11 PROCEDURE — 85027 COMPLETE CBC AUTOMATED: CPT

## 2021-05-11 PROCEDURE — 93880 EXTRACRANIAL BILAT STUDY: CPT

## 2021-05-11 PROCEDURE — 86901 BLOOD TYPING SEROLOGIC RH(D): CPT

## 2021-05-11 PROCEDURE — U0005: CPT

## 2021-05-11 PROCEDURE — 86900 BLOOD TYPING SEROLOGIC ABO: CPT

## 2021-05-11 RX ORDER — METOPROLOL TARTRATE 50 MG
1 TABLET ORAL
Qty: 0 | Refills: 0 | DISCHARGE

## 2021-05-11 RX ORDER — EMPAGLIFLOZIN 10 MG/1
1 TABLET, FILM COATED ORAL
Qty: 0 | Refills: 0 | DISCHARGE

## 2021-05-11 RX ORDER — ROSUVASTATIN CALCIUM 5 MG/1
1 TABLET ORAL
Qty: 0 | Refills: 0 | DISCHARGE

## 2021-05-11 RX ORDER — METFORMIN HYDROCHLORIDE 850 MG/1
1 TABLET ORAL
Qty: 0 | Refills: 0 | DISCHARGE

## 2021-05-11 RX ORDER — LISINOPRIL 2.5 MG/1
1 TABLET ORAL
Qty: 0 | Refills: 0 | DISCHARGE

## 2021-05-11 NOTE — H&P PST ADULT - HEALTH CARE MAINTENANCE
Moderna 2nd   COVID + 3/2021 asymptomatic, no antibodies 3/11 negative Has not received flu vaccine 2020-21 season  Received Moderna vaccines X 2 - does not recall date

## 2021-05-11 NOTE — H&P PST ADULT - NSICDXPASTMEDICALHX_GEN_ALL_CORE_FT
PAST MEDICAL HISTORY:  ANTONIO (acute kidney injury)     Aortic valve stenosis, etiology of cardiac valve disease unspecified     Chronic kidney disease, unspecified CKD stage     Coronary artery disease involving native coronary artery of native heart, angina presence unspecified     Essential hypertension     Hyperlipidemia, unspecified hyperlipidemia type     Mitral valve insufficiency, unspecified etiology     Prostate cancer 5/2019 - treated with radiation and hormones    Renal calculi     Type 2 diabetes mellitus without complication, without long-term current use of insulin

## 2021-05-11 NOTE — H&P PST ADULT - HISTORY OF PRESENT ILLNESS
74 y/o  male PMH HTN, HLD, DM2, CAD with prior stents x 3 to RCA (most recent 10/2016), B/L CEA, borderline severe AS (OG 0.8 sqcm on most recent echo), mild to mod MR, CKD, with c/o worsening exertional chest pain over the past few months. Seen and evaluated by cardiologist, Dr. Miller, and had a stress echo on 3/9/2021. Exercised for 2 mins - EKG had 4-5mm horizontal ST depressions which in recovery became downsloping with TWI and lasted over 20 mins. Echo showed new WMA in the anterior wall and possibly lateral wall; EF 65%, diastolic dysfunction. Referred for further evaluation today.    76 yo male, PMH HTN, HLD, DM2, Carotid stenosis s/p bilateral endarterectomy, CAD CALLUM x 3 to RCA, most recent 3/15/21. Pt. reports chest pain this winter while shoveling snow, saw cardiology, had abnormal stress test, s/p cardiac cath and a new stent placed on 3/15/21, Echocardiogram revealed moderate to severe AS. Pt. still SOB when exerting himself, denies any further chest pain. Presents to [facility] for scheduled TAVR on 5/13/21. Pt. denies COVID-19 infection in 2020/2021, denies close contact with anyone that has been ill, no fever, cough, dyspnea in past two weeks. Pt. tested + for COVID-19 on 3/5 (tested since his grandchild tested positive) he was asymptomatic, new test on 3/12 was negative for COVID-19,  IgG antibodies on 3/11/21 negative.     Pt. is scheduled for COVID-19 testing today, 5/12, at [facility]

## 2021-05-11 NOTE — H&P PST ADULT - NSICDXPROBLEM_GEN_ALL_CORE_FT
PROBLEM DIAGNOSES  Problem: Severe aortic stenosis  Assessment and Plan: TAVR 5/13/21  Pre-op instructions, including Chlorhexidine soap, provided - all questions answered    Problem: Hypertension  Assessment and Plan: Continue BP meds as indicated    Problem: DM2 (diabetes mellitus, type 2)  Assessment and Plan: FS on arrival to [facility]    Problem: Prophylactic measure  Assessment and Plan: The Caprini score indicates that this patient is at high risk for a VTE event (score 6 or greater). Surgical patients in this group will benefit from both pharmacologic prophylaxis and intermittent compression devices.  The surgical team will determine the balance between VTE risk and bleeding risk, and other clinical considerations

## 2021-05-11 NOTE — H&P PST ADULT - ASSESSMENT
STEPHANIEI VTE 2.0 SCORE [CLOT updated 2019]    AGE RELATED RISK FACTORS                                                       MOBILITY RELATED FACTORS  [ ] Age 41-60 years                                            (1 Point)                    [ ] Bed rest                                                        (1 Point)  [ ] Age: 61-74 years                                           (2 Points)                  [ ] Plaster cast                                                   (2 Points)  [ x] Age= 75 years                                              (3 Points)                    [ ] Bed bound for more than 72 hours                 (2 Points)    DISEASE RELATED RISK FACTORS                                               GENDER SPECIFIC FACTORS  [ ] Edema in the lower extremities                       (1 Point)              [ ] Pregnancy                                                     (1 Point)  [ ] Varicose veins                                               (1 Point)                     [ ] Post-partum < 6 weeks                                   (1 Point)             [x ] BMI > 25 Kg/m2                                            (1 Point)                     [ ] Hormonal therapy  or oral contraception          (1 Point)                 [ ] Sepsis (in the previous month)                        (1 Point)               [ ] History of pregnancy complications                 (1 point)  [ ] Pneumonia or serious lung disease                                               [ ] Unexplained or recurrent                     (1 Point)           (in the previous month)                               (1 Point)  [ ] Abnormal pulmonary function test                     (1 Point)                 SURGERY RELATED RISK FACTORS  [ ] Acute myocardial infarction                              (1 Point)               [ ]  Section                                             (1 Point)  [ ] Congestive heart failure (in the previous month)  (1 Point)      [ ] Minor surgery                                                  (1 Point)   [ ] Inflammatory bowel disease                             (1 Point)               [ ] Arthroscopic surgery                                        (2 Points)  [ ] Central venous access                                      (2 Points)                [x ] General surgery lasting more than 45 minutes (2 points)  [x ] Malignancy- Present or previous                   (2 Points)                [ ] Elective arthroplasty                                         (5 points)    [ ] Stroke (in the previous month)                          (5 Points)                                                                                                                                                           HEMATOLOGY RELATED FACTORS                                                 TRAUMA RELATED RISK FACTORS  [ ] Prior episodes of VTE                                     (3 Points)                [ ] Fracture of the hip, pelvis, or leg                       (5 Points)  [ ] Positive family history for VTE                         (3 Points)             [ ] Acute spinal cord injury (in the previous month)  (5 Points)  [ ] Prothrombin 74029 A                                     (3 Points)               [ ] Paralysis  (less than 1 month)                             (5 Points)  [ ] Factor V Leiden                                             (3 Points)                  [ ] Multiple Trauma within 1 month                        (5 Points)  [ ] Lupus anticoagulants                                     (3 Points)                                                           [ ] Anticardiolipin antibodies                               (3 Points)                                                       [ ] High homocysteine in the blood                      (3 Points)                                             [ ] Other congenital or acquired thrombophilia      (3 Points)                                                [ ] Heparin induced thrombocytopenia                  (3 Points)                                     Total Score [   8       ]

## 2021-05-11 NOTE — H&P PST ADULT - NSICDXPASTSURGICALHX_GEN_ALL_CORE_FT
PAST SURGICAL HISTORY:  H/O carotid endarterectomy bilateral 7/2014    H/O hernia repair 1966, left inguinal    H/O lithotripsy 6/2020    Stented coronary artery X 3 in RCA, last one 3/15/21

## 2021-05-11 NOTE — H&P PST ADULT - PAIN SCALE PREFERRED, PROFILE
Disp Refills Start End     hydrocodone-ibuprofen (VICOPROFEN) 7.5-200 MG per tablet 60 tablet 0 6/18/2020     Sig - Route: Take 1 tablet by mouth 2 times daily as needed for Pain. - Oral      Last visit 12/5/19  Next visit 12/10/20    PDMP reviewed 07/17/20  PDMP dispensed 6/18/2020  Qty: 60  Days: 30  Refills: 0    Rx prepped and routed to MD for approval.    numerical 0-10

## 2021-05-12 PROBLEM — C61 MALIGNANT NEOPLASM OF PROSTATE: Chronic | Status: ACTIVE | Noted: 2021-05-11

## 2021-05-13 ENCOUNTER — INPATIENT (INPATIENT)
Facility: HOSPITAL | Age: 76
LOS: 1 days | Discharge: HOME | DRG: 267 | End: 2021-05-15
Attending: THORACIC SURGERY (CARDIOTHORACIC VASCULAR SURGERY) | Admitting: THORACIC SURGERY (CARDIOTHORACIC VASCULAR SURGERY)
Payer: MEDICARE

## 2021-05-13 ENCOUNTER — APPOINTMENT (OUTPATIENT)
Dept: CARDIOTHORACIC SURGERY | Facility: HOSPITAL | Age: 76
End: 2021-05-13

## 2021-05-13 VITALS
OXYGEN SATURATION: 99 % | SYSTOLIC BLOOD PRESSURE: 126 MMHG | TEMPERATURE: 98 F | HEIGHT: 69 IN | HEART RATE: 100 BPM | WEIGHT: 181.88 LBS | RESPIRATION RATE: 18 BRPM | DIASTOLIC BLOOD PRESSURE: 62 MMHG

## 2021-05-13 DIAGNOSIS — Z95.5 PRESENCE OF CORONARY ANGIOPLASTY IMPLANT AND GRAFT: Chronic | ICD-10-CM

## 2021-05-13 DIAGNOSIS — I35.0 NONRHEUMATIC AORTIC (VALVE) STENOSIS: ICD-10-CM

## 2021-05-13 DIAGNOSIS — Z98.890 OTHER SPECIFIED POSTPROCEDURAL STATES: Chronic | ICD-10-CM

## 2021-05-13 LAB
ALBUMIN SERPL ELPH-MCNC: 4.1 G/DL — SIGNIFICANT CHANGE UP (ref 3.3–5)
ALP SERPL-CCNC: 74 U/L — SIGNIFICANT CHANGE UP (ref 40–120)
ALT FLD-CCNC: 19 U/L — SIGNIFICANT CHANGE UP (ref 10–45)
ANION GAP SERPL CALC-SCNC: 16 MMOL/L — SIGNIFICANT CHANGE UP (ref 5–17)
AST SERPL-CCNC: 22 U/L — SIGNIFICANT CHANGE UP (ref 10–40)
BILIRUB SERPL-MCNC: 0.3 MG/DL — SIGNIFICANT CHANGE UP (ref 0.2–1.2)
BUN SERPL-MCNC: 42 MG/DL — HIGH (ref 7–23)
CALCIUM SERPL-MCNC: 8.9 MG/DL — SIGNIFICANT CHANGE UP (ref 8.4–10.5)
CHLORIDE SERPL-SCNC: 105 MMOL/L — SIGNIFICANT CHANGE UP (ref 96–108)
CO2 SERPL-SCNC: 18 MMOL/L — LOW (ref 22–31)
CREAT SERPL-MCNC: 1.61 MG/DL — HIGH (ref 0.5–1.3)
GLUCOSE BLDC GLUCOMTR-MCNC: 124 MG/DL — HIGH (ref 70–99)
GLUCOSE BLDC GLUCOMTR-MCNC: 148 MG/DL — HIGH (ref 70–99)
GLUCOSE SERPL-MCNC: 113 MG/DL — HIGH (ref 70–99)
POTASSIUM SERPL-MCNC: 4.6 MMOL/L — SIGNIFICANT CHANGE UP (ref 3.5–5.3)
POTASSIUM SERPL-SCNC: 4.6 MMOL/L — SIGNIFICANT CHANGE UP (ref 3.5–5.3)
PROT SERPL-MCNC: 6.4 G/DL — SIGNIFICANT CHANGE UP (ref 6–8.3)
RH IG SCN BLD-IMP: NEGATIVE — SIGNIFICANT CHANGE UP
SARS-COV-2 RNA SPEC QL NAA+PROBE: SIGNIFICANT CHANGE UP
SODIUM SERPL-SCNC: 139 MMOL/L — SIGNIFICANT CHANGE UP (ref 135–145)

## 2021-05-13 PROCEDURE — 33361 REPLACE AORTIC VALVE PERQ: CPT | Mod: 62,Q0

## 2021-05-13 PROCEDURE — 37220: CPT

## 2021-05-13 PROCEDURE — 93355 ECHO TRANSESOPHAGEAL (TEE): CPT

## 2021-05-13 PROCEDURE — 93010 ELECTROCARDIOGRAM REPORT: CPT

## 2021-05-13 RX ORDER — CEFUROXIME AXETIL 250 MG
1500 TABLET ORAL EVERY 8 HOURS
Refills: 0 | Status: COMPLETED | OUTPATIENT
Start: 2021-05-13 | End: 2021-05-14

## 2021-05-13 RX ORDER — ASPIRIN/CALCIUM CARB/MAGNESIUM 324 MG
81 TABLET ORAL DAILY
Refills: 0 | Status: DISCONTINUED | OUTPATIENT
Start: 2021-05-13 | End: 2021-05-15

## 2021-05-13 RX ORDER — CEFUROXIME AXETIL 250 MG
1500 TABLET ORAL ONCE
Refills: 0 | Status: COMPLETED | OUTPATIENT
Start: 2021-05-13 | End: 2021-05-13

## 2021-05-13 RX ORDER — LIDOCAINE HCL 20 MG/ML
0.2 VIAL (ML) INJECTION ONCE
Refills: 0 | Status: DISCONTINUED | OUTPATIENT
Start: 2021-05-13 | End: 2021-05-13

## 2021-05-13 RX ORDER — SODIUM CHLORIDE 9 MG/ML
3 INJECTION INTRAMUSCULAR; INTRAVENOUS; SUBCUTANEOUS EVERY 8 HOURS
Refills: 0 | Status: DISCONTINUED | OUTPATIENT
Start: 2021-05-13 | End: 2021-05-13

## 2021-05-13 RX ORDER — HYDRALAZINE HCL 50 MG
25 TABLET ORAL ONCE
Refills: 0 | Status: COMPLETED | OUTPATIENT
Start: 2021-05-13 | End: 2021-05-13

## 2021-05-13 RX ORDER — CLOPIDOGREL BISULFATE 75 MG/1
75 TABLET, FILM COATED ORAL DAILY
Refills: 0 | Status: DISCONTINUED | OUTPATIENT
Start: 2021-05-13 | End: 2021-05-15

## 2021-05-13 RX ADMIN — Medication 100 MILLIGRAM(S): at 21:38

## 2021-05-13 RX ADMIN — CLOPIDOGREL BISULFATE 75 MILLIGRAM(S): 75 TABLET, FILM COATED ORAL at 21:38

## 2021-05-13 RX ADMIN — Medication 25 MILLIGRAM(S): at 18:21

## 2021-05-13 NOTE — BRIEF OPERATIVE NOTE - NSICDXBRIEFPROCEDURE_GEN_ALL_CORE_FT
PROCEDURES:  TAVR, percutaneous femoral approach, using prosthetic valve 13-May-2021 15:04:53  Jalil Sen

## 2021-05-13 NOTE — PRE-OP CHECKLIST - 2.
Patient here for blood work. [facility].
patient oriented to unit and procedure

## 2021-05-13 NOTE — PROGRESS NOTE ADULT - ASSESSMENT
76 yo male, PMH HTN, HLD, DM2, Carotid stenosis s/p bilateral endarterectomy, CAD CALLUM x 3 to RCA, most recent 3/15/21. Pt. reports chest pain this winter while shoveling snow, saw cardiology, had abnormal stress test, s/p cardiac cath and a new stent placed on 3/15/21, Echocardiogram revealed moderate to severe AS. Pt. still SOB when exerting himself, denies any further chest pain. Presents to PST for scheduled TAVR on 5/13/21. Pt. denies COVID-19 infection in 2020/2021, denies close contact with anyone that has been ill, no fever, cough, dyspnea in past two weeks. Pt. tested + for COVID-19 on 3/5 (tested since his grandchild tested positive) he was asymptomatic, new test on 3/12 was negative for COVID-19,  IgG antibodies on 3/11/21 negative.       5/13 VSS; Patient seen at bedside, VSS, groins are stable. Restart ASA/PLAVIX.  74 yo male, PMH HTN, HLD, DM2, Carotid stenosis s/p bilateral endarterectomy, CAD CALLUM x 3 to RCA, most recent 3/15/21. Pt. reports chest pain this winter while shoveling snow, saw cardiology, had abnormal stress test, s/p cardiac cath and a new stent placed on 3/15/21, Echocardiogram revealed moderate to severe AS. Pt. still SOB when exerting himself, denies any further chest pain. Presents to PST for scheduled TAVR on 5/13/21. Pt. denies COVID-19 infection in 2020/2021, denies close contact with anyone that has been ill, no fever, cough, dyspnea in past two weeks. Pt. tested + for COVID-19 on 3/5 (tested since his grandchild tested positive) he was asymptomatic, new test on 3/12 was negative for COVID-19,  IgG antibodies on 3/11/21 negative.       5/13 VSS; Patient seen at bedside, VSS, groins are stable. Restart ASA/PLAVIX tonight @ 21:00  74 yo male, PMH HTN, HLD, DM2, Carotid stenosis s/p bilateral endarterectomy, CAD CALLUM x 3 to RCA, most recent 3/15/21. Pt. reports chest pain this winter while shoveling snow, saw cardiology, had abnormal stress test, s/p cardiac cath and a new stent placed on 3/15/21, Echocardiogram revealed moderate to severe AS. Pt. still SOB when exerting himself, denies any further chest pain. Presents to PST for scheduled TAVR on 5/13/21. Pt. denies COVID-19 infection in 2020/2021, denies close contact with anyone that has been ill, no fever, cough, dyspnea in past two weeks. Pt. tested + for COVID-19 on 3/5 (tested since his grandchild tested positive) he was asymptomatic, new test on 3/12 was negative for COVID-19,  IgG antibodies on 3/11/21 negative.       5/13 VSS; Patient seen at bedside, VSS, groins are stable. Restart ASA/Plavix tonight @ 21:00

## 2021-05-13 NOTE — PRE-ANESTHESIA EVALUATION ADULT - NSANTHPMHFT_GEN_ALL_CORE
74 yo male, PMH HTN, HLD, DM2, Carotid stenosis s/p bilateral endarterectomy, CAD CALLUM x 3 to RCA, most recent 3/15/21. TTE shows normal BiV function, mod MR, moderate to severe AS.

## 2021-05-13 NOTE — PROGRESS NOTE ADULT - SUBJECTIVE AND OBJECTIVE BOX
This patient has been implanted with a Transcatheter Aortic Valve Implant under the following NCT/SUSI:    TAVR:    Commercial Implant NCT [id number], SUSI N/A and will be placed into the TVT Registry.        DUSTY Jones  [id number]

## 2021-05-13 NOTE — PROGRESS NOTE ADULT - SUBJECTIVE AND OBJECTIVE BOX
VITAL SIGNS    Telemetry:   Vital Signs Last 24 Hrs  T(C): 36.6 (05-13-21 @ 17:39), Max: 36.7 (05-13-21 @ 15:00)  T(F): 97.9 (05-13-21 @ 17:39), Max: 98 (05-13-21 @ 15:00)  HR: 81 (05-13-21 @ 17:39) (70 - 100)  BP: 158/70 (05-13-21 @ 17:39) (126/62 - 158/70)  RR: 18 (05-13-21 @ 17:39) (16 - 18)  SpO2: 98% (05-13-21 @ 17:39) (94% - 100%)            05-13 @ 07:01  -  05-13 @ 17:50  --------------------------------------------------------  IN: 0 mL / OUT: 300 mL / NET: -300 mL       Daily Height in cm: 175.26 (13 May 2021 12:08)    Daily   Admit Wt: Drug Dosing Weight  Height (cm): 175.3 (13 May 2021 12:08)  Weight (kg): 82.5 (13 May 2021 12:08)  BMI (kg/m2): 26.8 (13 May 2021 12:08)  BSA (m2): 1.98 (13 May 2021 12:08)    Bilirubin Total, Serum: 0.3 mg/dL (05-13 @ 16:53)    CAPILLARY BLOOD GLUCOSE      POCT Blood Glucose.: 124 mg/dL (13 May 2021 16:00)  POCT Blood Glucose.: 148 mg/dL (13 May 2021 09:14)          MEDICATIONS  aspirin enteric coated 81 milliGRAM(s) Oral daily  cefuroxime  IVPB 1500 milliGRAM(s) IV Intermittent every 8 hours  clopidogrel Tablet 75 milliGRAM(s) Oral daily      >>> <<<  PHYSICAL EXAM  Subjective: "Hi"   Neurology: alert and oriented x 3, nonfocal, no gross deficits  CV : RRR S1S2, no murmurs, rubs or gallops   Lungs: CTA B/L, No wheezing, rales, rhonchi. Non labored respirations   Abdomen: soft, NT, ND, (-)BM  :  voiding  Extremities: No LE edema bilaterally, +DP, No calf tenderness       LABS  05-13    139  |  105  |  42<H>  ----------------------------<  113<H>  4.6   |  18<L>  |  1.61<H>    Ca    8.9      13 May 2021 16:53    TPro  6.4  /  Alb  4.1  /  TBili  0.3  /  DBili  x   /  AST  22  /  ALT  19  /  AlkPhos  74  05-13                         PAST MEDICAL & SURGICAL HISTORY:  Essential hypertension    Hyperlipidemia, unspecified hyperlipidemia type    Type 2 diabetes mellitus without complication, without long-term current use of insulin    Coronary artery disease involving native coronary artery of native heart, angina presence unspecified    Aortic valve stenosis, etiology of cardiac valve disease unspecified    Mitral valve insufficiency, unspecified etiology    ANTONIO (acute kidney injury)    Chronic kidney disease, unspecified CKD stage    Renal calculi    Prostate cancer  5/2019 - treated with radiation and hormones    Stented coronary artery  X 3 in RCA, last one 3/15/21    H/O carotid endarterectomy  bilateral 7/2014    H/O hernia repair  1966, left inguinal    H/O lithotripsy  6/2020          VITAL SIGNS    Telemetry: SR 90   Vital Signs Last 24 Hrs  T(C): 36.6 (05-13-21 @ 17:39), Max: 36.7 (05-13-21 @ 15:00)  T(F): 97.9 (05-13-21 @ 17:39), Max: 98 (05-13-21 @ 15:00)  HR: 81 (05-13-21 @ 17:39) (70 - 100)  BP: 158/70 (05-13-21 @ 17:39) (126/62 - 158/70)  RR: 18 (05-13-21 @ 17:39) (16 - 18)  SpO2: 98% (05-13-21 @ 17:39) (94% - 100%)            05-13 @ 07:01  -  05-13 @ 17:50  --------------------------------------------------------  IN: 0 mL / OUT: 300 mL / NET: -300 mL       Daily Height in cm: 175.26 (13 May 2021 12:08)    Daily   Admit Wt: Drug Dosing Weight  Height (cm): 175.3 (13 May 2021 12:08)  Weight (kg): 82.5 (13 May 2021 12:08)  BMI (kg/m2): 26.8 (13 May 2021 12:08)  BSA (m2): 1.98 (13 May 2021 12:08)    Bilirubin Total, Serum: 0.3 mg/dL (05-13 @ 16:53)    CAPILLARY BLOOD GLUCOSE      POCT Blood Glucose.: 124 mg/dL (13 May 2021 16:00)  POCT Blood Glucose.: 148 mg/dL (13 May 2021 09:14)          MEDICATIONS  aspirin enteric coated 81 milliGRAM(s) Oral daily  cefuroxime  IVPB 1500 milliGRAM(s) IV Intermittent every 8 hours  clopidogrel Tablet 75 milliGRAM(s) Oral daily      >>> <<<  PHYSICAL EXAM  Subjective: "Hi"   Neurology: alert and oriented x 3, nonfocal, no gross deficits  CV : RRR S1S2, no murmurs, rubs or gallops   Lungs: CTA B/L, No wheezing, rales, rhonchi. Non labored respirations   Abdomen: soft, NT, ND, (-)BM  :  voiding  Bilat groins c/d/i soft, no hematoma, no bleeding   Extremities: No LE edema bilaterally, +DP, No calf tenderness       LABS  05-13    139  |  105  |  42<H>  ----------------------------<  113<H>  4.6   |  18<L>  |  1.61<H>    Ca    8.9      13 May 2021 16:53    TPro  6.4  /  Alb  4.1  /  TBili  0.3  /  DBili  x   /  AST  22  /  ALT  19  /  AlkPhos  74  05-13                      PAST MEDICAL & SURGICAL HISTORY:  Essential hypertension    Hyperlipidemia, unspecified hyperlipidemia type    Type 2 diabetes mellitus without complication, without long-term current use of insulin    Coronary artery disease involving native coronary artery of native heart, angina presence unspecified    Aortic valve stenosis, etiology of cardiac valve disease unspecified    Mitral valve insufficiency, unspecified etiology    ANTONIO (acute kidney injury)    Chronic kidney disease, unspecified CKD stage    Renal calculi    Prostate cancer  5/2019 - treated with radiation and hormones    Stented coronary artery  X 3 in RCA, last one 3/15/21    H/O carotid endarterectomy  bilateral 7/2014    H/O hernia repair  1966, left inguinal    H/O lithotripsy  6/2020

## 2021-05-13 NOTE — CHART NOTE - NSCHARTNOTEFT_GEN_A_CORE
s/p TAVR    Gen anesthesia  protamine given    left groin perclose x1   right groin perclose x1  left right harrison: removed    recent stent: continue asa and plavix (ordered for tonight)    post op Zinacef ordered    Dispo: 2C 263D  sign out given to Ava GUERRIER

## 2021-05-13 NOTE — PROGRESS NOTE ADULT - PROBLEM SELECTOR PLAN 1
5/13 S/P TAVR   Continue with Asa/Plavix   TTE in am   EKG daily   Monitor bilateral groins 5/13 S/P TAVR   Restart Asa/Plavix @ 21:00 5/13   TTE in am   EKG daily   Monitor bilateral groins

## 2021-05-14 ENCOUNTER — FORM ENCOUNTER (OUTPATIENT)
Age: 76
End: 2021-05-14

## 2021-05-14 DIAGNOSIS — E11.9 TYPE 2 DIABETES MELLITUS WITHOUT COMPLICATIONS: ICD-10-CM

## 2021-05-14 DIAGNOSIS — Z95.2 PRESENCE OF PROSTHETIC HEART VALVE: ICD-10-CM

## 2021-05-14 DIAGNOSIS — I10 ESSENTIAL (PRIMARY) HYPERTENSION: ICD-10-CM

## 2021-05-14 LAB
ALBUMIN SERPL ELPH-MCNC: 4 G/DL — SIGNIFICANT CHANGE UP (ref 3.3–5)
ALP SERPL-CCNC: 80 U/L — SIGNIFICANT CHANGE UP (ref 40–120)
ALT FLD-CCNC: 16 U/L — SIGNIFICANT CHANGE UP (ref 10–45)
ANION GAP SERPL CALC-SCNC: 13 MMOL/L — SIGNIFICANT CHANGE UP (ref 5–17)
AST SERPL-CCNC: 22 U/L — SIGNIFICANT CHANGE UP (ref 10–40)
BILIRUB SERPL-MCNC: 0.3 MG/DL — SIGNIFICANT CHANGE UP (ref 0.2–1.2)
BUN SERPL-MCNC: 40 MG/DL — HIGH (ref 7–23)
CALCIUM SERPL-MCNC: 8.9 MG/DL — SIGNIFICANT CHANGE UP (ref 8.4–10.5)
CHLORIDE SERPL-SCNC: 105 MMOL/L — SIGNIFICANT CHANGE UP (ref 96–108)
CO2 SERPL-SCNC: 19 MMOL/L — LOW (ref 22–31)
CREAT SERPL-MCNC: 1.72 MG/DL — HIGH (ref 0.5–1.3)
GLUCOSE BLDC GLUCOMTR-MCNC: 179 MG/DL — HIGH (ref 70–99)
GLUCOSE BLDC GLUCOMTR-MCNC: 212 MG/DL — HIGH (ref 70–99)
GLUCOSE BLDC GLUCOMTR-MCNC: 214 MG/DL — HIGH (ref 70–99)
GLUCOSE SERPL-MCNC: 113 MG/DL — HIGH (ref 70–99)
HCT VFR BLD CALC: 34.2 % — LOW (ref 39–50)
HGB BLD-MCNC: 10.9 G/DL — LOW (ref 13–17)
MCHC RBC-ENTMCNC: 28.8 PG — SIGNIFICANT CHANGE UP (ref 27–34)
MCHC RBC-ENTMCNC: 31.9 GM/DL — LOW (ref 32–36)
MCV RBC AUTO: 90.2 FL — SIGNIFICANT CHANGE UP (ref 80–100)
NRBC # BLD: 0 /100 WBCS — SIGNIFICANT CHANGE UP (ref 0–0)
PLATELET # BLD AUTO: 147 K/UL — LOW (ref 150–400)
POTASSIUM SERPL-MCNC: 4.5 MMOL/L — SIGNIFICANT CHANGE UP (ref 3.5–5.3)
POTASSIUM SERPL-SCNC: 4.5 MMOL/L — SIGNIFICANT CHANGE UP (ref 3.5–5.3)
PROT SERPL-MCNC: 6.5 G/DL — SIGNIFICANT CHANGE UP (ref 6–8.3)
RBC # BLD: 3.79 M/UL — LOW (ref 4.2–5.8)
RBC # FLD: 13.2 % — SIGNIFICANT CHANGE UP (ref 10.3–14.5)
SODIUM SERPL-SCNC: 137 MMOL/L — SIGNIFICANT CHANGE UP (ref 135–145)
WBC # BLD: 6.17 K/UL — SIGNIFICANT CHANGE UP (ref 3.8–10.5)
WBC # FLD AUTO: 6.17 K/UL — SIGNIFICANT CHANGE UP (ref 3.8–10.5)

## 2021-05-14 PROCEDURE — 93010 ELECTROCARDIOGRAM REPORT: CPT

## 2021-05-14 PROCEDURE — 93306 TTE W/DOPPLER COMPLETE: CPT | Mod: 26

## 2021-05-14 PROCEDURE — 99222 1ST HOSP IP/OBS MODERATE 55: CPT

## 2021-05-14 PROCEDURE — 99232 SBSQ HOSP IP/OBS MODERATE 35: CPT

## 2021-05-14 RX ORDER — INSULIN LISPRO 100/ML
VIAL (ML) SUBCUTANEOUS
Refills: 0 | Status: DISCONTINUED | OUTPATIENT
Start: 2021-05-14 | End: 2021-05-15

## 2021-05-14 RX ORDER — PANTOPRAZOLE SODIUM 20 MG/1
40 TABLET, DELAYED RELEASE ORAL
Refills: 0 | Status: DISCONTINUED | OUTPATIENT
Start: 2021-05-14 | End: 2021-05-15

## 2021-05-14 RX ORDER — LISINOPRIL 2.5 MG/1
10 TABLET ORAL DAILY
Refills: 0 | Status: DISCONTINUED | OUTPATIENT
Start: 2021-05-14 | End: 2021-05-15

## 2021-05-14 RX ORDER — ATORVASTATIN CALCIUM 80 MG/1
20 TABLET, FILM COATED ORAL AT BEDTIME
Refills: 0 | Status: DISCONTINUED | OUTPATIENT
Start: 2021-05-14 | End: 2021-05-15

## 2021-05-14 RX ADMIN — ATORVASTATIN CALCIUM 20 MILLIGRAM(S): 80 TABLET, FILM COATED ORAL at 22:08

## 2021-05-14 RX ADMIN — PANTOPRAZOLE SODIUM 40 MILLIGRAM(S): 20 TABLET, DELAYED RELEASE ORAL at 11:15

## 2021-05-14 RX ADMIN — Medication 100 MILLIGRAM(S): at 05:10

## 2021-05-14 RX ADMIN — Medication 2: at 11:39

## 2021-05-14 RX ADMIN — CLOPIDOGREL BISULFATE 75 MILLIGRAM(S): 75 TABLET, FILM COATED ORAL at 11:15

## 2021-05-14 RX ADMIN — LISINOPRIL 10 MILLIGRAM(S): 2.5 TABLET ORAL at 09:29

## 2021-05-14 RX ADMIN — Medication 2: at 16:51

## 2021-05-14 RX ADMIN — Medication 81 MILLIGRAM(S): at 11:15

## 2021-05-14 RX ADMIN — Medication 1: at 22:08

## 2021-05-14 NOTE — PROGRESS NOTE ADULT - PROBLEM SELECTOR PLAN 1
5/13 S/P TAVR   Restart Asa/Plavix @ 21:00 5/13   TTE in am   EKG daily   Monitor bilateral groins continue postop care  continue asa and plavix for anticoagulation   ck echo today  ekg daily   monitor bilateral groin sites  shower qd  discharge planning- home in am sat 5/15 with MEG

## 2021-05-14 NOTE — PROGRESS NOTE ADULT - SUBJECTIVE AND OBJECTIVE BOX
Structural Heart Team    Mr Harper was seen this morning.  He was conversing with his roommate and had no complaints.  He said he felt well and denied chest pain/pressure, sob and dizziness as well as groin pain.  There were no acute events overnight.       REVIEW OF SYSTEMS:    CONSTITUTIONAL: No weakness, fevers or chills  EYES/ENT: No visual changes;  No vertigo or throat pain   NECK: No pain or stiffness  RESPIRATORY: No cough, wheezing, hemoptysis; No shortness of breath  CARDIOVASCULAR: No chest pain or palpitations  GASTROINTESTINAL: No abdominal or epigastric pain. No nausea, vomiting, or hematemesis; No diarrhea or constipation. No melena or hematochezia.  GENITOURINARY: No dysuria, frequency or hematuria  NEUROLOGICAL: No numbness or weakness  SKIN: No itching, rashes      Allergies    Zytiga (Unknown)    Intolerances      Vital Signs Last 24 Hrs  T(C): 36.4 (14 May 2021 11:31), Max: 36.9 (14 May 2021 04:23)  T(F): 97.6 (14 May 2021 11:31), Max: 98.4 (14 May 2021 04:23)  HR: 97 (14 May 2021 11:31) (70 - 100)  BP: 148/70 (14 May 2021 11:31) (127/65 - 161/66)  BP(mean): 86 (13 May 2021 19:39) (69 - 86)  RR: 18 (14 May 2021 11:31) (16 - 18)  SpO2: 93% (14 May 2021 11:31) (93% - 100%)    MEDICATIONS  (STANDING):  aspirin enteric coated 81 milliGRAM(s) Oral daily  atorvastatin 20 milliGRAM(s) Oral at bedtime  clopidogrel Tablet 75 milliGRAM(s) Oral daily  insulin lispro (ADMELOG) corrective regimen sliding scale   SubCutaneous Before meals and at bedtime  lisinopril 10 milliGRAM(s) Oral daily  pantoprazole    Tablet 40 milliGRAM(s) Oral before breakfast      Exam-  General: NAD, WDWN, appropriate affect  Cor: s1s2, RRR, no murmurs   EKG/Tele: SR , QRS 90  Pulm: Clear, no wheezes, rales, or rhonchi, no use of accessory muscles  Gastrointestinal: soft, nontender, nondistended, +bowel sounds  Extremities: no edema, 2+ DP pulses  Groin: nontender, clean and dry, soft, no hematoma b/l  Neuro: A&Ox3, nonfocal                          10.9   6.17  )-----------( 147      ( 14 May 2021 04:30 )             34.2   05-14    137  |  105  |  40<H>  ----------------------------<  113<H>  4.5   |  19<L>  |  1.72<H>    Ca    8.9      14 May 2021 04:30    TPro  6.5  /  Alb  4.0  /  TBili  0.3  /  DBili  x   /  AST  22  /  ALT  16  /  AlkPhos  80  05-14    I&O's Summary    13 May 2021 07:01  -  14 May 2021 07:00  --------------------------------------------------------  IN: 450 mL / OUT: 2050 mL / NET: -1600 mL    14 May 2021 07:01  -  14 May 2021 14:59  --------------------------------------------------------  IN: 760 mL / OUT: 700 mL / NET: 60 mL      < from: Transthoracic Echocardiogram (05.14.21 @ 07:45) >  Dimensions:    Normal Values:  LA:     3.6    2.0 - 4.0 cm  Ao:     3.2    2.0 - 3.8 cm  SEPTUM: 1.1    0.6 - 1.2 cm  PWT:    1.0    0.6 - 1.1 cm  LVIDd:  4.6    3.0 - 5.6 cm  LVIDs:  2.9    1.8 - 4.0 cm  Derived variables:  LVMI: 86 g/m2  RWT: 0.43  Fractional short: 37 %  EF (Visual Estimate): 70-75 %  Doppler Peak Velocity (m/sec): AoV=1.7  ------------------------------------------------------------------------  Observations:  Mitral Valve: Mitral annular calcification, otherwise  normal mitral valve. Mild mitral regurgitation.  Aortic Valve/Aorta: Transcatheter aortic valve replacement.  Peak transaortic valve gradient equals 12 mm Hg, mean  transaortic valve gradient equals 6 mm Hg, which is  probably normal in the presence of a transcatheter aortic  valve replacement. Dimensionless Index=0.68. No aortic  valve regurgitation seen. Peak left ventricular outflow  tract gradient equals 3 mm Hg, mean gradient is equal to 2  mm Hg, LVOT velocity time integral equals 21 cm.  Aortic Root: 3.2 cm.  LVOT diameter: 2.1 cm.  Left Atrium: Normal left atrium.  LA volume index = 34  cc/m2.  Left Ventricle: Normal left ventricular systolic function.  No segmental wall motion abnormalities. Normal left  ventricular internal dimensions and wall thicknesses.  Moderate diastolic dysfunction (Stage II).  Right Heart: Normal right atrium. Normal right ventricular  size and function. Tricuspid valve not well visualized,  probably normal. Minimal tricuspid regurgitation. Pulmonic  valve not well visualized, probably normal. Minimal  pulmonic regurgitation.  Pericardium/Pleura: Normal pericardium with no pericardial  effusion.  Hemodynamic: Estimated right ventricular systolic pressure  equals 34 mm Hg, assuming right atrial pressure equals 3 mm  Hg, consistent with normal pulmonary pressures.  ------------------------------------------------------------------------  Conclusions:  1. Mitral annular calcification, otherwise normal mitral  valve. Mild mitral regurgitation.  2. Transcatheter aortic valve replacement. Peak transaortic  valve gradient equals 12 mm Hg, mean transaortic valve  gradient equals 6 mm Hg, which is probably normal in the  presence of a transcatheter aortic valve replacement.  Dimensionless Index=0.68. No aortic valve regurgitation  seen.  3. Normal left ventricular systolic function. No segmental  wall motion abnormalities.  4. Normal right ventricular size and function.    < end of copied text >          Assessment/Plan:  Mr Harper is POD 1 s/p TF TAVR (29 CV) for severe symptomatic AS  - post TAVR TTE done and noted  - asa/plavix  - resume preop meds  - ambulate as tolerated  - likely d/c home tomorrow   -- will d/c home with an MCOT to monitor for post TAVR conduction delays and arrhythmias for 30 days    - Discharge plan: follow up with Dr. Johnston in one week and follow up with Structural Heart Team in one month.  Echo will be done at 1 month follow up visit.  Plan discussed with patient     DUSTY Coleman  [phone]

## 2021-05-14 NOTE — PROGRESS NOTE ADULT - SUBJECTIVE AND OBJECTIVE BOX
VITAL SIGNS    Telemetry:    Vital Signs Last 24 Hrs  T(C): 36.9 (21 @ 04:23), Max: 36.9 (21 @ 04:23)  T(F): 98.4 (21 @ 04:23), Max: 98.4 (21 @ 04:23)  HR: 83 (21 04:23) (70 - 100)  BP: 143/68 (21 @ 04:23) (126/62 - 161/66)  RR: 18 (21 @ 04:23) (16 - 18)  SpO2: 95% (21 @ 04:23) (94% - 100%)             @ 07:01  -   @ 07:00  --------------------------------------------------------  IN: 450 mL / OUT: 2050 mL / NET: -1600 mL     @ 07:01  -   @ 09:08  --------------------------------------------------------  IN: 0 mL / OUT: 300 mL / NET: -300 mL       Daily Height in cm: 175.26 (13 May 2021 12:08)    Daily Weight in k.5 (14 May 2021 08:54)  Admit Wt: Drug Dosing Weight  Height (cm): 175.3 (13 May 2021 12:08)  Weight (kg): 82.5 (13 May 2021 12:08)  BMI (kg/m2): 26.8 (13 May 2021 12:08)  BSA (m2): 1.98 (13 May 2021 12:08)    Bilirubin Total, Serum: 0.3 mg/dL ( @ 04:30)  Bilirubin Total, Serum: 0.3 mg/dL ( @ 16:53)    CAPILLARY BLOOD GLUCOSE      POCT Blood Glucose.: 124 mg/dL (13 May 2021 16:00)  POCT Blood Glucose.: 148 mg/dL (13 May 2021 09:14)          aspirin enteric coated 81 milliGRAM(s) Oral daily  clopidogrel Tablet 75 milliGRAM(s) Oral daily      PHYSICAL EXAM    Subjective: "Hi.   Neurology: alert and oriented x 3, nonfocal, no gross deficits  CV : tele:  RSR  Sternal Wound :  CDI with dressing , Stable  Lungs: clear. RR easy, unlabored   Abdomen: soft, nontender, nondistended, positive bowel sounds, bowel movement   Neg N/V/D   :  pt voiding without difficulty   Extremities:   TILLEY; edema, neg calf tenderness.   PPP bilaterally      PW:  Chest tubes:                 VITAL SIGNS    Telemetry:  rsr 70-80   Vital Signs Last 24 Hrs  T(C): 36.9 (21 @ 04:23), Max: 36.9 (21 @ 04:23)  T(F): 98.4 (21 @ 04:23), Max: 98.4 (21 @ 04:23)  HR: 83 (21 04:23) (70 - 100)  BP: 143/68 (21 @ 04:23) (126/62 - 161/66)  RR: 18 (21 @ 04:23) (16 - 18)  SpO2: 95% (21 @ 04:23) (94% - 100%)             @ 07:01  -   @ 07:00  --------------------------------------------------------  IN: 450 mL / OUT: 2050 mL / NET: -1600 mL     @ 07:01  -   @ 09:08  --------------------------------------------------------  IN: 0 mL / OUT: 300 mL / NET: -300 mL       Daily Height in cm: 175.26 (13 May 2021 12:08)    Daily Weight in k.5 (14 May 2021 08:54)  Admit Wt: Drug Dosing Weight  Height (cm): 175.3 (13 May 2021 12:08)  Weight (kg): 82.5 (13 May 2021 12:08)  BMI (kg/m2): 26.8 (13 May 2021 12:08)  BSA (m2): 1.98 (13 May 2021 12:08)    Bilirubin Total, Serum: 0.3 mg/dL ( 04:30)  Bilirubin Total, Serum: 0.3 mg/dL ( @ 16:53)    CAPILLARY BLOOD GLUCOSE      POCT Blood Glucose.: 124 mg/dL (13 May 2021 16:00)  POCT Blood Glucose.: 148 mg/dL (13 May 2021 09:14)          aspirin enteric coated 81 milliGRAM(s) Oral daily  clopidogrel Tablet 75 milliGRAM(s) Oral daily      PHYSICAL EXAM    Subjective: "Can I shower?"   Neurology: alert and oriented x 3, nonfocal, no gross deficits  CV : tele:  RSR 70-80   Lungs: clear. RR easy, unlabored   Abdomen: soft, nontender, nondistended, positive bowel sounds, +  bowel movement   Neg N/V/D   :  pt voiding without difficulty   Extremities:   TILLEY; neg LE edema, neg calf tenderness.   PPP bilaterally; bilateral groin sites cdi thelma- neg hematoma/ bleeding

## 2021-05-14 NOTE — PROGRESS NOTE ADULT - PROBLEM SELECTOR PROBLEM 1
Aortic valve stenosis, etiology of cardiac valve disease unspecified S/P TAVR (transcatheter aortic valve replacement)

## 2021-05-14 NOTE — CONSULT NOTE ADULT - SUBJECTIVE AND OBJECTIVE BOX
Patient seen and evaluated @   Chief Complaint: Patient is a 75y old  Male who presents with a chief complaint of S/P TAVR (13 May 2021 17:50)      HPI: June 2011 patient had cardiac cath at [facility] with a stent to the distal RCA and only nonobstructive disease in the LAD and circumflex.  2016 symptoms recurred and he had another cath with an 80% mid RCA lesion and again no change in the LAD and circumflex and he received 2 stents to the RCA.  However his symptoms did not resolve this time and he was placed on omeprazole with some improvement.  Patient also had bilateral carotid endarterectomies in the past.  In April 2019 he had a CTA of his coronaries which showed moderate disease in the LAD, borderline severe lesion in the proximal circumflex and a 60 to 70% lesion in the RCA.  Patient did not want to do another angiogram at that time Ranexa was added.  I had not seen him since 2019 until March 2021 when he was having exertional chest pain that was getting worse over the last few months.  On stress echocardiogram he had severe ST depression in just the first minute and his heart rate shot up to 140 in the first minute.  Post exercise there was no wall motion abnormalities in the entire anterior wall and some hypokinesis in the lateral wall the echo itself had severe but not critical aortic stenosis.  He was booked for cardiac cath but this showed only an 80% RCA lesion and the rest was nonobstructive disease.  The RCA was stented.  Patient still had symptoms and it was felt that his aortic stenosis must be giving him the symptoms and he was scheduled for TAVR    PMH:   Essential hypertension    Hyperlipidemia, unspecified hyperlipidemia type    Type 2 diabetes mellitus without complication, without long-term current use of insulin    Coronary artery disease involving native coronary artery of native heart, angina presence unspecified    Aortic valve stenosis, etiology of cardiac valve disease unspecified    Mitral valve insufficiency, unspecified etiology    ANTONIO (acute kidney injury)    Chronic kidney disease, unspecified CKD stage    Renal calculi    Prostate cancer      PSH:   Stented right coronary artery 3 different times    H/O carotid endarterectomy 2014    H/O hernia repair    H/O lithotripsy    OUTPATIENT MEDS: Plavix 75 mg. asa 81 mg. Lisinopril 20 mg.  Crestor 40 mg.  Gabapentin 300 mg twice daily.  Glimepiride 2 mg in the morning.  Metformin 1000 mg daily.  Metoprolol ER 50 mg daily.  Omeprazole 40 mg daily.  Steglatro 15 mg daily    Medications:   aspirin enteric coated 81 milliGRAM(s) Oral daily  clopidogrel Tablet 75 milliGRAM(s) Oral daily    Allergies:  Zytiga (Unknown)    FAMILY HISTORY:    Social History:   Smoking: No  Alcohol: No  Drugs: No    Review of Systems:  Constitutional: no recent weight loss  HEENT: no scleral icterus. Normal mucosa.  Respiratory: no shortness of breath. No history of asthma. No COPD  Cardiovascular: (+) Chest Pain, no Palpitations, no UPTON, PND, or Orthopnea. no Syncope. No history of rheumatic fever.   Gastrointestinal: No Abdominal Pain, no Diarrhea, no Constipation, no Nausea or Vomiting  Genitourinary: No Nocturia, Dysuria, or Incontinence. No hematuria  Extremities: no edema. No cyanosis.  Neurologic: No Focal deficit. No Paresthesias. No Syncope. No seizures  Lymphatic: No Swelling. No Lymphadenopathy   Skin: No Rash,  Ecchymoses, Wounds, or Lesions  Psychiatry: No Depression. No Anxiety.    10 point review of systems is otherwise negative except as mentioned above            [ ]Unable to obtain    Physical Exam:  T(C): 36.9 (05-14-21 @ 04:23), Max: 36.9 (05-14-21 @ 04:23)  HR: 83 (05-14-21 @ 04:23) (70 - 100)  BP: 143/68 (05-14-21 @ 04:23) (126/62 - 161/66)  RR: 18 (05-14-21 @ 04:23) (16 - 18)  SpO2: 95% (05-14-21 @ 04:23) (94% - 100%)  Wt(kg): --    05-13 @ 07:01  -  05-14 @ 07:00  --------------------------------------------------------  IN: 450 mL / OUT: 2050 mL / NET: -1600 mL    05-14 @ 07:01  -  05-14 @ 08:08  --------------------------------------------------------  IN: 0 mL / OUT: 300 mL / NET: -300 mL      Daily Height in cm: 175.26 (13 May 2021 12:08)    Daily     Appearance: WD, WN, NAD  Eyes:  No scleral icterus. PERRL, EOMI  HENT: Normal oral mucosa.]NC/AT  Neck: No JVD at 90 degrees. Normal carotid upstrokes without bruits or murmurs.  Cardiovascular: Normal PMI. Normal S1, S2 physiologically split. No S3 or S4. No murmurs.  Respiratory: Clear to auscultation bilaterally. No wheezes. No rales.  Gastrointestinal: Soft.  Non-tender. No hepatosplenomegally.  Extremities: No clubbing. No edema. Pulses 2+ bilaterally symmetric including pedal.  Musculoskeletal:  No joint deformity   Neurologic: Non-focal  Lymphatic:  No lymphadenopathy  Psychiatry: [+ ] AAOx3 [ +] Mood & affect appropriate  Skin: No rashes. No ecchymoses. No cyanosis    Cardiovascular Diagnostic Testing:  ECG:    Echo:< from: Intra-Operative Transesophageal Echo (05.13.21 @ 14:10) >  Patient name: ROSA BURGOS  YOB: 1945   Age: 75 (M)   MR#: [id number]  Study Date: 5/13/2021  Location: [place] [id number]: Vincenzo Ornelas M.D.  Study quality: Technically good  Referring Physician: Presley Johnston MD  Blood Pressure: 161/57 mmHg  Height: 175 cm  Weight: 82 kg  BSA: 2 m2  ------------------------------------------------------------------------  PROCEDURE: Echocardiography, transesophageal (ILIANA) for  guidance of a transcatheter intracardiac or great vessel(s)  structural intervention(s) (alejandro-and intra-procedural),  real-time image acquisition and documentation, guidance  with quantitative measurements, probe manipulation,  interpretation, and report, including diagnostic  transesophageal echocardiography and, when performed,  administration of ultrasound contrast, Doppler, color flow,  and 3D.  INDICATION: Nonrheumatic aortic (valve) stenosis (I35.0)  ------------------------------------------------------------------------  Pre-TAVR Observations:  Mitral Valve: Normal mitral valve. Mild mitral  regurgitation.  Aortic Valve/Aorta: Calcified trileaflet aortic valve with  decreased opening. Peak transaortic valve gradient equals  36 mm Hg, mean transaortic valve gradient equals 5 mm Hg,  estimated aortic valve area equals 0.9 sqcm (by continuity  equation), aortic valve velocity time integral equals 66  cm, consistent with severe aortic stenosis. Peak left  ventricular outflow tract gradient equals 1 mm Hg, mean  gradient is equal to 1 mm Hg,LVOT velocity time integral  equals 16 cm.  LVOT diameter: 2.2 cm.  Left Atrium: No left atrial or left atrial appendage  thrombus.  Left Ventricle: Normal left ventricular systolic function.  No segmental wall motion abnormalities. Mild concentric  left ventricular hypertrophy.  Right Heart: Normal right atrium. Normal right ventricular  size and function. Normal tricuspid valve. Mild tricuspid  regurgitation. Normal pulmonic valve.  Pericardium/Pleura: Normal pericardium with no pericardial  effusion.  Hemodynamic: Estimated right atrial pressure is 8 mm Hg.  ------------------------------------------------------------------------  Post-TAVR Conclusions:    Under continuous echocardiographic monitoring, there was  placement of a #29 Evolut Pro Plus transcatheter aortic  valve(TAVR). The final results showed the TAVR in the  aortic position with good leaflet mobility with no  paravalvular aortic regurgitation and no intravalvular  aortic regurgitation. The peak aortic valve  gradient was  12 mmHg; the mean aortic valve gradient was  5 mmHg; the  Aortic valve VTI was  31 cm.  The LVOT peak gradient was 5  mm Hg, the LVOT mean gradient was  2  mmHg; LVOT VTI was  18 cm. The Post aortic valve area (OG) is  2 sqcm.  Left  ventricular and right ventricular function remained  unchanged. Degree of mitral and tricuspid regurgitation are  not significantly changed. There was no pericardial  effusion noted.  All findings reviewed with implant team.  ------------------------------------------------------------------------  Conclusions:  1. Calcified trileaflet aortic valve with decreased  opening. Peak transaortic valve gradient equals 36 mm Hg,  mean transaortic valve gradient equals 21 mm Hg, estimated  aortic valve area equals 0.9 sqcm (by continuity equation),  aortic valve velocity time integral equals 66 cm,  consistent with severe aortic stenosis.  2. No left atrial or left atrial appendage thrombus.  3. Mild concentric left ventricular hypertrophy.  4. Normal left ventricular systolic function. No segmental  wall motion abnormalities. Peak left ventricular outflow  tract gradient equals 1 mm Hg, mean gradient is equal to 1  mm Hg, LVOT velocity time integral equals 16 cm.  5. Normal right ventricular size and function.  Confirmed on  5/13/2021 - 15:15:31 by DAVID Corrigan  ------------------------------------------------------------------------    < end of copied text >      Stress Testing: 3/9/2021 Exercised 2 minutes and 10 seconds to a heart rate of 147 with 4 to 5 mm of horizontal ST depression which became downsloping with T wave inversions for over 20 minutes of recovery.  New wall motion abnormalities on echo in the anterior wall and possibly lateral wall.    Cath:< from: Cardiac Cath Lab - Adult (03.15.21 @ 12:14) >  CORONARY VESSELS: The coronary circulation is right dominant.  LM:   --  LM: Normal.  LAD:   --  Proximal LAD: There was a 30 % stenosis.  --  Mid LAD: There was a 30 % stenosis.  CX:   --  Distal circumflex: There was a 40 % stenosis.  RCA:   --  Proximal RCA: There was a 80 % stenosis.  COMPLICATIONS: There were no complications.    < end of copied text >      Interpretation of Telemetry:    Imaging:    Labs:                        10.9   6.17  )-----------( 147      ( 14 May 2021 04:30 )             34.2     05-14    137  |  105  |  40<H>  ----------------------------<  113<H>  4.5   |  19<L>  |  1.72<H>    Ca    8.9      14 May 2021 04:30    TPro  6.5  /  Alb  4.0  /  TBili  0.3  /  DBili  x   /  AST  22  /  ALT  16  /  AlkPhos  80  05-14                 Patient seen and evaluated @ 830  Chief Complaint: Patient is a 75y old  Male who presents with a chief complaint of S/P TAVR (13 May 2021 17:50)      HPI: June 2011 patient had cardiac cath at [facility] with a stent to the distal RCA and only nonobstructive disease in the LAD and circumflex.  2016 symptoms recurred and he had another cath with an 80% mid RCA lesion and again no change in the LAD and circumflex and he received 2 stents to the RCA.  However his symptoms did not resolve this time and he was placed on omeprazole with some improvement.  Patient also had bilateral carotid endarterectomies in the past.  In April 2019 he had a CTA of his coronaries which showed moderate disease in the LAD, borderline severe lesion in the proximal circumflex and a 60 to 70% lesion in the RCA.  Patient did not want to do another angiogram at that time Ranexa was added.  I had not seen him since 2019 until March 2021 when he was having exertional chest pain that was getting worse over the last few months.  On stress echocardiogram he had severe ST depression in just the first minute and his heart rate shot up to 140 in the first minute.  Post exercise there was no wall motion abnormalities in the entire anterior wall and some hypokinesis in the lateral wall the echo itself had severe but not critical aortic stenosis.  He was booked for cardiac cath but this showed only an 80% RCA lesion and the rest was nonobstructive disease.  The RCA was stented.  Patient still had symptoms and it was felt that his aortic stenosis must be giving him the symptoms and he was scheduled for TAVR    PMH:   Essential hypertension    Hyperlipidemia, unspecified hyperlipidemia type    Type 2 diabetes mellitus without complication, without long-term current use of insulin    Coronary artery disease involving native coronary artery of native heart, angina presence unspecified    Aortic valve stenosis, etiology of cardiac valve disease unspecified    Mitral valve insufficiency, unspecified etiology    ANTONIO (acute kidney injury)    Chronic kidney disease, unspecified CKD stage    Renal calculi    Prostate cancer      PSH:   Stented right coronary artery 3 different times    H/O carotid endarterectomy 2014    H/O hernia repair    H/O lithotripsy    OUTPATIENT MEDS: Plavix 75 mg. asa 81 mg. Lisinopril 20 mg.  Crestor 40 mg.  Gabapentin 300 mg twice daily.  Glimepiride 2 mg in the morning.  Metformin 1000 mg daily.  Metoprolol ER 50 mg daily.  Omeprazole 40 mg daily.  Steglatro 15 mg daily    Medications:   aspirin enteric coated 81 milliGRAM(s) Oral daily  clopidogrel Tablet 75 milliGRAM(s) Oral daily    Allergies:  Zytiga (Unknown)    FAMILY HISTORY:    Social History:   Smoking: No  Alcohol: No  Drugs: No    Review of Systems:  Constitutional: no recent weight loss  HEENT: no scleral icterus. Normal mucosa.  Respiratory: no shortness of breath. No history of asthma. No COPD  Cardiovascular: (+) Chest Pain, no Palpitations, no UPTON, PND, or Orthopnea. no Syncope. No history of rheumatic fever.   Gastrointestinal: No Abdominal Pain, no Diarrhea, no Constipation, no Nausea or Vomiting  Genitourinary: No Nocturia, Dysuria, or Incontinence. No hematuria  Extremities: no edema. No cyanosis.  Neurologic: No Focal deficit. No Paresthesias. No Syncope. No seizures  Lymphatic: No Swelling. No Lymphadenopathy   Skin: No Rash,  Ecchymoses, Wounds, or Lesions  Psychiatry: No Depression. No Anxiety.    10 point review of systems is otherwise negative except as mentioned above            [ ]Unable to obtain    Physical Exam:  T(C): 36.9 (05-14-21 @ 04:23), Max: 36.9 (05-14-21 @ 04:23)  HR: 83 (05-14-21 @ 04:23) (70 - 100)  BP: 143/68 (05-14-21 @ 04:23) (126/62 - 161/66)  RR: 18 (05-14-21 @ 04:23) (16 - 18)  SpO2: 95% (05-14-21 @ 04:23) (94% - 100%)  Wt(kg): --    05-13 @ 07:01  -  05-14 @ 07:00  --------------------------------------------------------  IN: 450 mL / OUT: 2050 mL / NET: -1600 mL    05-14 @ 07:01  -  05-14 @ 08:08  --------------------------------------------------------  IN: 0 mL / OUT: 300 mL / NET: -300 mL      Daily Height in cm: 175.26 (13 May 2021 12:08)    Daily     Appearance: WD, WN, NAD  Eyes:  No scleral icterus. PERRL, EOMI  HENT: Normal oral mucosa.]NC/AT  Neck: No JVD at 90 degrees. Normal carotid upstrokes without bruits or murmurs.  Cardiovascular: Normal PMI. Normal S1, S2 physiologically split. No S3 or S4. 1/6 short systolic murmur.  Respiratory: Clear to auscultation right, ?decreased bs left base. No wheezes. No rales.  Gastrointestinal: Soft.  Non-tender. No hepatosplenomegally.  Extremities: No clubbing. No edema. Pulses 2+ bilaterally symmetric including pedal.  Musculoskeletal:  No joint deformity   Neurologic: Non-focal  Lymphatic:  No lymphadenopathy  Psychiatry: [+ ] AAOx3 [ +] Mood & affect appropriate  Skin: No rashes. No ecchymoses. No cyanosis    Cardiovascular Diagnostic Testing:  ECG: NSR. No change  ECHO < from: Transthoracic Echocardiogram (05.14.21 @ 07:45) >  PROCEDURE: Transthoracic echocardiogram with 2-D, M-Mode  and complete spectral and color flow Doppler.  INDICATION: Dyspnea, unspecified (R06.00)  ------------------------------------------------------------------------  Dimensions:    Normal Values:  LA:     3.6    2.0 - 4.0 cm  Ao:     3.2    2.0 - 3.8 cm  SEPTUM: 1.1    0.6 - 1.2 cm  PWT:    1.0    0.6 - 1.1 cm  LVIDd:  4.6    3.0 - 5.6 cm  LVIDs:  2.9    1.8 - 4.0 cm  Derived variables:  LVMI: 86 g/m2  RWT: 0.43  Fractional short: 37 %  EF (Visual Estimate): 70-75 %  Doppler Peak Velocity (m/sec): AoV=1.7  ------------------------------------------------------------------------  Observations:  Mitral Valve: Mitral annular calcification, otherwise  normal mitral valve. Mild mitral regurgitation.  Aortic Valve/Aorta: Transcatheter aortic valve replacement.  Peak transaortic valve gradient equals 12 mm Hg, mean  transaortic valve gradient equals 6 mm Hg, which is  probably normal in the presence of a transcatheter aortic  valve replacement. Dimensionless Index=0.68. No aortic  valve regurgitation seen. Peak left ventricular outflow  tract gradient equals 3 mm Hg, mean gradient is equal to 2  mm Hg, LVOT velocity time integral equals 21 cm.  Aortic Root: 3.2 cm.  LVOT diameter: 2.1 cm.  Left Atrium: Normal left atrium.  LA volume index = 34  cc/m2.  Left Ventricle: Normal left ventricular systolic function.  No segmental wall motion abnormalities. Normal left  ventricular internal dimensions and wall thicknesses.  Moderate diastolic dysfunction (Stage II).  Right Heart: Normal right atrium. Normal right ventricular  size and function. Tricuspid valve not well visualized,  probably normal. Minimal tricuspid regurgitation. Pulmonic  valve not well visualized, probably normal. Minimal  pulmonic regurgitation.  Pericardium/Pleura: Normal pericardium with no pericardial  effusion.  Hemodynamic: Estimated right ventricular systolic pressure  equals 34 mm Hg, assuming right atrial pressure equals 3 mm  Hg, consistent with normal pulmonary pressures.  ------------------------------------------------------------------------  Conclusions:  1. Mitral annular calcification, otherwise normal mitral  valve. Mild mitral regurgitation.  2. Transcatheter aortic valve replacement. Peak transaortic  valve gradient equals 12 mm Hg, mean transaortic valve  gradient equals 6 mm Hg, which is probably normal in the  presence of a transcatheter aortic valve replacement.  Dimensionless Index=0.68. No aortic valve regurgitation  seen.  3. Normal left ventricular systolic function. No segmental  wall motion abnormalities.  4. Normal right ventricular size and function.  *** Compared with echocardiogram of 5/13/2021, findings are  similar to images post-TAVR placement.  ------------------------------------------------------------------------  Confirmed on  5/14/2021 - 09:58:56 by John Hough M.D.  ----------    < end of copied text >      Echo:< from: Intra-Operative Transesophageal Echo (05.13.21 @ 14:10) >  Patient name: ROSA BURGOS  YOB: 1945   Age: 75 (M)   MR#: [id number]  Study Date: 5/13/2021  Location: [place] [id number]: Vincenzo Ornelas M.D.  Study quality: Technically good  Referring Physician: Presley Johnston MD  Blood Pressure: 161/57 mmHg  Height: 175 cm  Weight: 82 kg  BSA: 2 m2  ------------------------------------------------------------------------  PROCEDURE: Echocardiography, transesophageal (ILIANA) for  guidance of a transcatheter intracardiac or great vessel(s)  structural intervention(s) (alejandro-and intra-procedural),  real-time image acquisition and documentation, guidance  with quantitative measurements, probe manipulation,  interpretation, and report, including diagnostic  transesophageal echocardiography and, when performed,  administration of ultrasound contrast, Doppler, color flow,  and 3D.  INDICATION: Nonrheumatic aortic (valve) stenosis (I35.0)  ------------------------------------------------------------------------  Pre-TAVR Observations:  Mitral Valve: Normal mitral valve. Mild mitral  regurgitation.  Aortic Valve/Aorta: Calcified trileaflet aortic valve with  decreased opening. Peak transaortic valve gradient equals  36 mm Hg, mean transaortic valve gradient equals 5 mm Hg,  estimated aortic valve area equals 0.9 sqcm (by continuity  equation), aortic valve velocity time integral equals 66  cm, consistent with severe aortic stenosis. Peak left  ventricular outflow tract gradient equals 1 mm Hg, mean  gradient is equal to 1 mm Hg,LVOT velocity time integral  equals 16 cm.  LVOT diameter: 2.2 cm.  Left Atrium: No left atrial or left atrial appendage  thrombus.  Left Ventricle: Normal left ventricular systolic function.  No segmental wall motion abnormalities. Mild concentric  left ventricular hypertrophy.  Right Heart: Normal right atrium. Normal right ventricular  size and function. Normal tricuspid valve. Mild tricuspid  regurgitation. Normal pulmonic valve.  Pericardium/Pleura: Normal pericardium with no pericardial  effusion.  Hemodynamic: Estimated right atrial pressure is 8 mm Hg.  ------------------------------------------------------------------------  Post-TAVR Conclusions:    Under continuous echocardiographic monitoring, there was  placement of a #29 Evolut Pro Plus transcatheter aortic  valve(TAVR). The final results showed the TAVR in the  aortic position with good leaflet mobility with no  paravalvular aortic regurgitation and no intravalvular  aortic regurgitation. The peak aortic valve  gradient was  12 mmHg; the mean aortic valve gradient was  5 mmHg; the  Aortic valve VTI was  31 cm.  The LVOT peak gradient was 5  mm Hg, the LVOT mean gradient was  2  mmHg; LVOT VTI was  18 cm. The Post aortic valve area (OG) is  2 sqcm.  Left  ventricular and right ventricular function remained  unchanged. Degree of mitral and tricuspid regurgitation are  not significantly changed. There was no pericardial  effusion noted.  All findings reviewed with implant team.  ------------------------------------------------------------------------  Conclusions:  1. Calcified trileaflet aortic valve with decreased  opening. Peak transaortic valve gradient equals 36 mm Hg,  mean transaortic valve gradient equals 21 mm Hg, estimated  aortic valve area equals 0.9 sqcm (by continuity equation),  aortic valve velocity time integral equals 66 cm,  consistent with severe aortic stenosis.  2. No left atrial or left atrial appendage thrombus.  3. Mild concentric left ventricular hypertrophy.  4. Normal left ventricular systolic function. No segmental  wall motion abnormalities. Peak left ventricular outflow  tract gradient equals 1 mm Hg, mean gradient is equal to 1  mm Hg, LVOT velocity time integral equals 16 cm.  5. Normal right ventricular size and function.  Confirmed on  5/13/2021 - 15:15:31 by DAVID Corrigan  ------------------------------------------------------------------------    < end of copied text >      Stress Testing: 3/9/2021 Exercised 2 minutes and 10 seconds to a heart rate of 147 with 4 to 5 mm of horizontal ST depression which became downsloping with T wave inversions for over 20 minutes of recovery.  New wall motion abnormalities on echo in the anterior wall and possibly lateral wall.    Cath:< from: Cardiac Cath Lab - Adult (03.15.21 @ 12:14) >  CORONARY VESSELS: The coronary circulation is right dominant.  LM:   --  LM: Normal.  LAD:   --  Proximal LAD: There was a 30 % stenosis.  --  Mid LAD: There was a 30 % stenosis.  CX:   --  Distal circumflex: There was a 40 % stenosis.  RCA:   --  Proximal RCA: There was a 80 % stenosis.  COMPLICATIONS: There were no complications.    < end of copied text >      Interpretation of Telemetry:    Imaging:    Labs:                        10.9   6.17  )-----------( 147      ( 14 May 2021 04:30 )             34.2     05-14    137  |  105  |  40<H>  ----------------------------<  113<H>  4.5   |  19<L>  |  1.72<H>    Ca    8.9      14 May 2021 04:30    TPro  6.5  /  Alb  4.0  /  TBili  0.3  /  DBili  x   /  AST  22  /  ALT  16  /  AlkPhos  80  05-14

## 2021-05-14 NOTE — CONSULT NOTE ADULT - ASSESSMENT
Patient looks great sitting up in bed.  Asking if he can go home today.    No chest pain or shortness of breath.    Exam as noted.  Holding sinus.    Had echo this morning as noted.      From my point of view he is stable to go home.  Discharge planning per cardiothoracic surgery.      Resume all outpatient meds.      Follow-up with me 2 weeks if stable.    Adam Miller MD, FACC  (O) [phone]  (C) [phone]

## 2021-05-14 NOTE — PROGRESS NOTE ADULT - ASSESSMENT
76 yo male, PMH HTN, HLD, DM2, Carotid stenosis s/p bilateral endarterectomy, CAD CALLUM x 3 to RCA, most recent 3/15/21. Pt. reports chest pain this winter while shoveling snow, saw cardiology, had abnormal stress test, s/p cardiac cath and a new stent placed on 3/15/21, Echocardiogram revealed moderate to severe AS. Pt. still SOB when exerting himself, denies any further chest pain. Presents to PST for scheduled TAVR on 5/13/21. Pt. denies COVID-19 infection in 2020/2021, denies close contact with anyone that has been ill, no fever, cough, dyspnea in past two weeks. Pt. tested + for COVID-19 on 3/5 (tested since his grandchild tested positive) he was asymptomatic, new test on 3/12 was negative for COVID-19,  IgG antibodies on 3/11/21 negative.       5/13 VSS; Patient seen at bedside, VSS, groins are stable. Restart ASA/Plavix tonight @ 21:00  74 yo male, PMH HTN, HLD, DM2, Carotid stenosis s/p bilateral endarterectomy, CAD CALLUM x 3 to RCA, most recent 3/15/21. Pt. reports chest pain this winter while shoveling snow, saw cardiology, had abnormal stress test, s/p cardiac cath and a new stent placed on 3/15/21, Echocardiogram revealed moderate to severe AS. Pt. still SOB when exerting himself, denies any further chest pain. Presents to PST for scheduled TAVR on 5/13/21. Pt. denies COVID-19 infection in 2020/2021, denies close contact with anyone that has been ill, no fever, cough, dyspnea in past two weeks. Pt. tested + for COVID-19 on 3/5 (tested since his grandchild tested positive) he was asymptomatic, new test on 3/12 was negative for COVID-19,  IgG antibodies on 3/11/21 negative.   5/13 VSS; Patient seen at bedside, VSS, groins are stable. Restart ASA/Plavix tonight @ 21:00   5/14 VSS; ck echo today; EKG RSR 84; bilateral groin sites stable  Discharge planning- home with MCOT in am sat if stable

## 2021-05-15 ENCOUNTER — TRANSCRIPTION ENCOUNTER (OUTPATIENT)
Age: 76
End: 2021-05-15

## 2021-05-15 VITALS
SYSTOLIC BLOOD PRESSURE: 129 MMHG | DIASTOLIC BLOOD PRESSURE: 67 MMHG | HEART RATE: 83 BPM | OXYGEN SATURATION: 97 % | RESPIRATION RATE: 18 BRPM | TEMPERATURE: 98 F

## 2021-05-15 LAB
ANION GAP SERPL CALC-SCNC: 13 MMOL/L — SIGNIFICANT CHANGE UP (ref 5–17)
BUN SERPL-MCNC: 39 MG/DL — HIGH (ref 7–23)
CALCIUM SERPL-MCNC: 9.1 MG/DL — SIGNIFICANT CHANGE UP (ref 8.4–10.5)
CHLORIDE SERPL-SCNC: 104 MMOL/L — SIGNIFICANT CHANGE UP (ref 96–108)
CO2 SERPL-SCNC: 20 MMOL/L — LOW (ref 22–31)
CREAT SERPL-MCNC: 1.94 MG/DL — HIGH (ref 0.5–1.3)
GLUCOSE BLDC GLUCOMTR-MCNC: 152 MG/DL — HIGH (ref 70–99)
GLUCOSE SERPL-MCNC: 130 MG/DL — HIGH (ref 70–99)
HCT VFR BLD CALC: 32.9 % — LOW (ref 39–50)
HGB BLD-MCNC: 10.4 G/DL — LOW (ref 13–17)
MCHC RBC-ENTMCNC: 28.8 PG — SIGNIFICANT CHANGE UP (ref 27–34)
MCHC RBC-ENTMCNC: 31.6 GM/DL — LOW (ref 32–36)
MCV RBC AUTO: 91.1 FL — SIGNIFICANT CHANGE UP (ref 80–100)
NRBC # BLD: 0 /100 WBCS — SIGNIFICANT CHANGE UP (ref 0–0)
PLATELET # BLD AUTO: 141 K/UL — LOW (ref 150–400)
POTASSIUM SERPL-MCNC: 4.5 MMOL/L — SIGNIFICANT CHANGE UP (ref 3.5–5.3)
POTASSIUM SERPL-SCNC: 4.5 MMOL/L — SIGNIFICANT CHANGE UP (ref 3.5–5.3)
RBC # BLD: 3.61 M/UL — LOW (ref 4.2–5.8)
RBC # FLD: 13.2 % — SIGNIFICANT CHANGE UP (ref 10.3–14.5)
SODIUM SERPL-SCNC: 137 MMOL/L — SIGNIFICANT CHANGE UP (ref 135–145)
WBC # BLD: 5.91 K/UL — SIGNIFICANT CHANGE UP (ref 3.8–10.5)
WBC # FLD AUTO: 5.91 K/UL — SIGNIFICANT CHANGE UP (ref 3.8–10.5)

## 2021-05-15 PROCEDURE — C1889: CPT

## 2021-05-15 PROCEDURE — C1769: CPT

## 2021-05-15 PROCEDURE — 82962 GLUCOSE BLOOD TEST: CPT

## 2021-05-15 PROCEDURE — 93355 ECHO TRANSESOPHAGEAL (TEE): CPT

## 2021-05-15 PROCEDURE — 93306 TTE W/DOPPLER COMPLETE: CPT

## 2021-05-15 PROCEDURE — U0005: CPT

## 2021-05-15 PROCEDURE — 99238 HOSP IP/OBS DSCHRG MGMT 30/<: CPT

## 2021-05-15 PROCEDURE — 93005 ELECTROCARDIOGRAM TRACING: CPT

## 2021-05-15 PROCEDURE — 80048 BASIC METABOLIC PNL TOTAL CA: CPT

## 2021-05-15 PROCEDURE — 93010 ELECTROCARDIOGRAM REPORT: CPT

## 2021-05-15 PROCEDURE — 85027 COMPLETE CBC AUTOMATED: CPT

## 2021-05-15 PROCEDURE — C1887: CPT

## 2021-05-15 PROCEDURE — C1725: CPT

## 2021-05-15 PROCEDURE — 86923 COMPATIBILITY TEST ELECTRIC: CPT

## 2021-05-15 PROCEDURE — C9399: CPT

## 2021-05-15 PROCEDURE — U0003: CPT

## 2021-05-15 PROCEDURE — 80053 COMPREHEN METABOLIC PANEL: CPT

## 2021-05-15 PROCEDURE — 76000 FLUOROSCOPY <1 HR PHYS/QHP: CPT

## 2021-05-15 PROCEDURE — C1760: CPT

## 2021-05-15 PROCEDURE — C1894: CPT

## 2021-05-15 PROCEDURE — L8699: CPT

## 2021-05-15 RX ORDER — METOPROLOL TARTRATE 50 MG
1 TABLET ORAL
Qty: 0 | Refills: 0 | DISCHARGE

## 2021-05-15 RX ORDER — METFORMIN HYDROCHLORIDE 850 MG/1
1 TABLET ORAL
Qty: 0 | Refills: 0 | DISCHARGE

## 2021-05-15 RX ORDER — ASPIRIN/CALCIUM CARB/MAGNESIUM 324 MG
1 TABLET ORAL
Qty: 0 | Refills: 0 | DISCHARGE

## 2021-05-15 RX ORDER — LISINOPRIL 2.5 MG/1
1 TABLET ORAL
Qty: 0 | Refills: 0 | DISCHARGE

## 2021-05-15 RX ORDER — GLIMEPIRIDE 1 MG
1 TABLET ORAL
Qty: 0 | Refills: 0 | DISCHARGE

## 2021-05-15 RX ORDER — CLOPIDOGREL BISULFATE 75 MG/1
1 TABLET, FILM COATED ORAL
Qty: 30 | Refills: 0
Start: 2021-05-15 | End: 2021-06-13

## 2021-05-15 RX ORDER — LISINOPRIL 2.5 MG/1
1 TABLET ORAL
Qty: 0 | Refills: 0 | DISCHARGE
Start: 2021-05-15

## 2021-05-15 RX ORDER — ASPIRIN/CALCIUM CARB/MAGNESIUM 324 MG
1 TABLET ORAL
Qty: 30 | Refills: 0
Start: 2021-05-15 | End: 2021-06-13

## 2021-05-15 RX ADMIN — CLOPIDOGREL BISULFATE 75 MILLIGRAM(S): 75 TABLET, FILM COATED ORAL at 08:09

## 2021-05-15 RX ADMIN — PANTOPRAZOLE SODIUM 40 MILLIGRAM(S): 20 TABLET, DELAYED RELEASE ORAL at 05:01

## 2021-05-15 RX ADMIN — Medication 81 MILLIGRAM(S): at 08:09

## 2021-05-15 RX ADMIN — LISINOPRIL 10 MILLIGRAM(S): 2.5 TABLET ORAL at 05:01

## 2021-05-15 RX ADMIN — Medication 1: at 08:09

## 2021-05-15 NOTE — DISCHARGE NOTE PROVIDER - NSDCCPCAREPLAN_GEN_ALL_CORE_FT
PRINCIPAL DISCHARGE DIAGNOSIS  Diagnosis: S/P TAVR (transcatheter aortic valve replacement)  Assessment and Plan of Treatment: S/P TAVR (transcatheter aortic valve replacement)  shower daily  weigh yourself daily  continue current prescriptions as ordered  increase activity as tolerated   no added salt; low fat; low cholesterol, low salt diabetic diet  check blood sugar levels before meals and at bedtime- record levels   follow up with Cardiologist, Dr. Miller,  in 1-2 weeks. Call to schedule appointment.  follow up with cardiac surgeon, Dr. Johnston, on tue May 18th at 9:15 am. Call to confirm appointment. [phone]  follow up echocardiogram and results of cardiac monitor ( MCOT) in 30 days at Dr. Johnston's office. CAll to schedule appointment. [phone]  antibiotic prophylaxis prior to any invasive procedure including dental work

## 2021-05-15 NOTE — DISCHARGE NOTE PROVIDER - NSDCFUADDINST_GEN_ALL_CORE_FT
call Dr. Johnsotn for fever > 101  antibiotic prophylaxis prior to any invasive procedure including dental work  follow up echocardiogram and results of cardiac monitor (MCOT) in 30 days at Dr. Johnston's office  check blood sugar levels before meals and at bedtime- record levels

## 2021-05-15 NOTE — DISCHARGE NOTE PROVIDER - NSDCPNSUBOBJ_GEN_ALL_CORE
VSS  tele: RSR 80's  audible S1 S2  lungs clear, RR easy, unlabored  + bs nt nd + bm; neg n/v/d  LE: neg calf tenderness, neg edema, bilateral groin sites stable cdi thelma- neg hematoma/bleeding; PPP bilaterally    Discharge pt home with MCOT today 5/15 as per Dr. Johnston

## 2021-05-15 NOTE — DISCHARGE NOTE PROVIDER - CARE PROVIDER_API CALL
Presley Johnston (MD)  Thoracic and Cardiac Surgery  [address] [place] [address]  Phone: [phone]  Fax: [phone]  Follow Up Time:     Adam Miller)  Cardiovascular Disease; Internal Medicine  [address]  [place] [address]  Phone: [phone]  Fax: [phone]  Follow Up Time:

## 2021-05-15 NOTE — DISCHARGE NOTE PROVIDER - HOSPITAL COURSE
76 yo male, PMH HTN, HLD, DM2, Carotid stenosis s/p bilateral endarterectomy, CAD CALLUM x 3 to RCA, most recent 3/15/21. Pt. reports chest pain this winter while shoveling snow, saw cardiology, had abnormal stress test, s/p cardiac cath and a new stent placed on 3/15/21, Echocardiogram revealed moderate to severe AS. Pt. still SOB when exerting himself, denies any further chest pain. Presents to PST for scheduled TAVR on 5/13/21. Pt. denies COVID-19 infection in 2020/2021, denies close contact with anyone that has been ill, no fever, cough, dyspnea in past two weeks. Pt. tested + for COVID-19 on 3/5 (tested since his grandchild tested positive) he was asymptomatic, new test on 3/12 was negative for COVID-19,  IgG antibodies on 3/11/21 negative.   5/13 VSS; Patient seen at bedside, VSS, groins are stable. Restart ASA/Plavix tonight @ 21:00   5/14 VSS; ck echo neg AR; neg pericardial effusion; EKG RSR 84; bilateral groin sites stable  Discharge planning-- discharge pt home today w/ MEG 5/15 as per Dr. Johnston - EKG reviewed in am rounds RSR 86

## 2021-05-15 NOTE — DISCHARGE NOTE PROVIDER - NSDCMRMEDTOKEN_GEN_ALL_CORE_FT
aspirin 81 mg oral delayed release tablet: 1 tab(s) orally once a day  clopidogrel 75 mg oral tablet: 1 tab(s) orally once a day  glimepiride 2 mg oral tablet: 1 tab(s) orally once a day  lisinopril 10 mg oral tablet: 1 tab(s) orally once a day  omeprazole 20 mg oral delayed release capsule: 1 cap(s) orally prn  rosuvastatin 40 mg oral tablet: 1 tab(s) orally once a day (in the evening)  Steglatro 15 mg oral tablet: 1 tab(s) orally once a day (in the morning), last dose 5/11 pre-op

## 2021-05-16 ENCOUNTER — TRANSCRIPTION ENCOUNTER (OUTPATIENT)
Age: 76
End: 2021-05-16

## 2021-05-16 RX ORDER — METOPROLOL SUCCINATE 50 MG/1
50 TABLET, EXTENDED RELEASE ORAL
Qty: 90 | Refills: 1 | Status: COMPLETED | COMMUNITY
Start: 2021-03-16 | End: 2021-05-16

## 2021-05-16 RX ORDER — ERTUGLIFLOZIN 5 MG/1
5 TABLET, FILM COATED ORAL
Qty: 90 | Refills: 0 | Status: DISCONTINUED | COMMUNITY
Start: 2020-08-25

## 2021-05-18 ENCOUNTER — APPOINTMENT (OUTPATIENT)
Dept: CARDIOTHORACIC SURGERY | Facility: CLINIC | Age: 76
End: 2021-05-18
Payer: MEDICARE

## 2021-05-18 ENCOUNTER — NON-APPOINTMENT (OUTPATIENT)
Age: 76
End: 2021-05-18

## 2021-05-18 VITALS
SYSTOLIC BLOOD PRESSURE: 162 MMHG | TEMPERATURE: 98 F | DIASTOLIC BLOOD PRESSURE: 74 MMHG | RESPIRATION RATE: 14 BRPM | WEIGHT: 177 LBS | OXYGEN SATURATION: 99 % | BODY MASS INDEX: 26.22 KG/M2 | HEIGHT: 69 IN | HEART RATE: 90 BPM

## 2021-05-18 VITALS — DIASTOLIC BLOOD PRESSURE: 75 MMHG | SYSTOLIC BLOOD PRESSURE: 153 MMHG

## 2021-05-18 PROCEDURE — 99214 OFFICE O/P EST MOD 30 MIN: CPT

## 2021-05-18 PROCEDURE — 99072 ADDL SUPL MATRL&STAF TM PHE: CPT

## 2021-05-20 ENCOUNTER — NON-APPOINTMENT (OUTPATIENT)
Age: 76
End: 2021-05-20

## 2021-05-21 NOTE — REVIEW OF SYSTEMS
[Dyspnea on exertion] : dyspnea during exertion [Chest  Pressure] : chest pressure [see HPI] : see HPI [Anxiety] : anxiety [Negative] : Heme/Lymph [Recent Weight Loss (___ Lbs)] : recent [unfilled] ~Ulb weight loss [Recent Weight Gain (___ Lbs)] : no recent weight gain [Feeling Fatigued] : not feeling fatigued [Lower Ext Edema] : no extremity edema [Leg Claudication] : no intermittent leg claudication [Palpitations] : no palpitations [Abdominal Pain] : no abdominal pain [Heartburn] : no heartburn [Change in Appetite] : no change in appetite [Dysphagia] : no dysphagia [Dizziness] : no dizziness [Convulsions] : no convulsions [Depression] : no depression [Under Stress] : not under stress [Suicidal] : not suicidal

## 2021-05-21 NOTE — HISTORY OF PRESENT ILLNESS
[FreeTextEntry1] : April 3, 2019. Patient has been followed by Dr. Larry Toth and was followed by cardiology at [facility]. In June of 2011 after a positive stress test had cardiac angiography, which had a distal 90% RCA lesion and only nonobstructive disease in the LAD and circumflex. This was stented and his symptoms were improved. His symptoms then recurred. They havet been exertional chest pressure and shortness of breath when he walks after eating a meal, especially a large meal. Never gets it at rest and does not get it when he exerts himself if he has not just eaten. Did well for a while, but then the symptoms returned and in October of 2016 after another positive ECG treadmill test he had repeat cardiac catheterization. There was an 80% mid RCA lesion and again no significant disease in the LAD was circumflex and he had 2 stents to the RCA . The difference was this time his symptoms did not change. He has been placed on Omeprazole and this has not seemed to make a difference, or perhaps it did initially. He has not seen GI. He otherwise seems to be doing well. In July of 2014. He had bilateral carotid endarterectomies. His most recent echo showed normal LV function. His EKG is normal sinus rhythm and a normal tracing. He has hypertension and hyperlipidemia.He is a former smoker. His father had hypertension and CVA, but there does not seem to be a family history of coronary artery disease. \par April 24, 2019.  Patient tried to increase his omeprazole, and then said he got a stomach ache and stopped it. He went for a CTA of the coronary arteries. Minor disease in the LAD and borderline severe 60-69% in the proximal circumflex and 60-70% in the RCA. Problem with him is whether his symptoms are related to ischemia or not. After long discussion I recommended that he have the angiogram. If non-obstructive disease, try Ranexa and if no help, consider noncardiac causes. If significant disease then he should have stents and again, we can see if this relieves his symptoms or not. Patient to discuss with his wife and then let me know   \par \par March 1, 2021.Patient called.  His exertional chest pain is getting worse and coming more frequently and more easier for the last few months.  I have not seen him since April 2019 and back then when we did a CTA it was mostly unrevealing despite his previous 3 stents in his RCA.  I will schedule him for a stress echo as soon as possible. \par March 9, 2021.  Patient came for stress echocardiogram.  Resting echo with borderline severe though not yet critical aortic stenosis and totally normal LV function.  On treadmill within the first minute STs started to go down and heart rate shot up to 140.  I stopped the treadmill at 2 minutes with severe ST depressions and shortness of breath but no chest pain.  Post exercise echo shows new wall motion abnormality in the entire anterior wall and may be some hypokinesis in the lateral wall.  Long discussion with patient and wife and they will be booked for coronary angiography and left and right heart cath as soon as he can have a Covid swab done.  (He had the first vaccine already and the second vaccine was 5 days ago)\par March 16, 2021.Cath only showed 80% RCA lesion and the rest was nonobstructive.  The RCA was stented and the patient was sent home that evening.  He is on aspirin and Plavix.  He will continue metoprolol ER 50 daily.  He has a follow-up with me in 2 weeks and then will be evaluated by structural heart for a TAVR \par March 30, 2021.  Patient here in follow-up.  Had stent to his RCA and is now on a beta-blocker but no change in his exertional symptoms.  In addition somewhat anxious about upcoming procedure; has evaluation appointment 4/6.\par \par \par

## 2021-05-21 NOTE — REASON FOR VISIT
[FreeTextEntry1] : 75-year-old with known coronary disease now status post stent to right coronary artery as part of work-up for TAVR

## 2021-05-25 ENCOUNTER — APPOINTMENT (OUTPATIENT)
Dept: INTERNAL MEDICINE | Facility: CLINIC | Age: 76
End: 2021-05-25
Payer: MEDICARE

## 2021-05-25 VITALS
TEMPERATURE: 97.7 F | OXYGEN SATURATION: 98 % | RESPIRATION RATE: 15 BRPM | WEIGHT: 175 LBS | HEART RATE: 77 BPM | DIASTOLIC BLOOD PRESSURE: 62 MMHG | HEIGHT: 69 IN | BODY MASS INDEX: 25.92 KG/M2 | SYSTOLIC BLOOD PRESSURE: 136 MMHG

## 2021-05-25 PROCEDURE — 99214 OFFICE O/P EST MOD 30 MIN: CPT | Mod: 25

## 2021-05-25 PROCEDURE — 99072 ADDL SUPL MATRL&STAF TM PHE: CPT

## 2021-05-25 PROCEDURE — 99495 TRANSJ CARE MGMT MOD F2F 14D: CPT

## 2021-05-25 NOTE — PHYSICAL EXAM
[No Acute Distress] : no acute distress [Well Developed] : well developed [Normal Sclera/Conjunctiva] : normal sclera/conjunctiva [No Respiratory Distress] : no respiratory distress  [Clear to Auscultation] : lungs were clear to auscultation bilaterally [Normal Rate] : normal rate  [Normal S1, S2] : normal S1 and S2 [No Murmur] : no murmur heard [No Edema] : there was no peripheral edema [Soft] : abdomen soft [Non Tender] : non-tender [No CVA Tenderness] : no CVA  tenderness [No Spinal Tenderness] : no spinal tenderness [No Joint Swelling] : no joint swelling [Normal Mood] : the mood was normal [84905 - Moderate Complexity requires multiple possible diagnoses and/or the management options, moderate complexity of the medical data (tests, etc.) to be reviewed, and moderate risk of significant complications, morbidity, and/or mortality as well as co] : Moderate Complexity [de-identified] : Mult iple Ecchymosis

## 2021-05-25 NOTE — HISTORY OF PRESENT ILLNESS
[Post-hospitalization from ___ Hospital] : Post-hospitalization from [unfilled] Hospital [Admitted on: ___] : The patient was admitted on [unfilled] [Discharged on ___] : discharged on [unfilled] [Discharge Summary] : discharge summary [Patient Contacted By: ____] : and contacted by [unfilled] [FreeTextEntry2] : 76 yo male s/p a coronary stent about one month ago and a TAVR done May 13.  He is being monitored for Atrial fibrillation but notes that he is already black and blue all over.\par He has been noting that his BP is better infact has held his medication today.

## 2021-05-28 ENCOUNTER — APPOINTMENT (OUTPATIENT)
Dept: CARDIOLOGY | Facility: CLINIC | Age: 76
End: 2021-05-28
Payer: MEDICARE

## 2021-05-28 VITALS
HEART RATE: 82 BPM | BODY MASS INDEX: 26.22 KG/M2 | RESPIRATION RATE: 17 BRPM | HEIGHT: 69 IN | SYSTOLIC BLOOD PRESSURE: 135 MMHG | DIASTOLIC BLOOD PRESSURE: 65 MMHG | WEIGHT: 177 LBS | OXYGEN SATURATION: 99 % | TEMPERATURE: 98 F

## 2021-05-28 LAB
ALBUMIN SERPL ELPH-MCNC: 4.5 G/DL
ALP BLD-CCNC: 90 U/L
ALT SERPL-CCNC: 18 U/L
ANION GAP SERPL CALC-SCNC: 14 MMOL/L
AST SERPL-CCNC: 22 U/L
BASOPHILS # BLD AUTO: 0.02 K/UL
BASOPHILS NFR BLD AUTO: 0.4 %
BILIRUB SERPL-MCNC: 0.2 MG/DL
BUN SERPL-MCNC: 58 MG/DL
CALCIUM SERPL-MCNC: 9.9 MG/DL
CHLORIDE SERPL-SCNC: 105 MMOL/L
CHOLEST SERPL-MCNC: 160 MG/DL
CO2 SERPL-SCNC: 17 MMOL/L
CREAT SERPL-MCNC: 1.83 MG/DL
EOSINOPHIL # BLD AUTO: 0.13 K/UL
EOSINOPHIL NFR BLD AUTO: 2.8 %
HCT VFR BLD CALC: 34.2 %
HDLC SERPL-MCNC: 56 MG/DL
HGB BLD-MCNC: 10.5 G/DL
IMM GRANULOCYTES NFR BLD AUTO: 0.4 %
LDLC SERPL CALC-MCNC: 72 MG/DL
LYMPHOCYTES # BLD AUTO: 0.6 K/UL
LYMPHOCYTES NFR BLD AUTO: 13 %
MAN DIFF?: NORMAL
MCHC RBC-ENTMCNC: 28.5 PG
MCHC RBC-ENTMCNC: 30.7 GM/DL
MCV RBC AUTO: 92.7 FL
MONOCYTES # BLD AUTO: 0.42 K/UL
MONOCYTES NFR BLD AUTO: 9.1 %
NEUTROPHILS # BLD AUTO: 3.41 K/UL
NEUTROPHILS NFR BLD AUTO: 74.3 %
NONHDLC SERPL-MCNC: 104 MG/DL
NT-PROBNP SERPL-MCNC: 369 PG/ML
PLATELET # BLD AUTO: 198 K/UL
POTASSIUM SERPL-SCNC: 5.3 MMOL/L
PROT SERPL-MCNC: 6.9 G/DL
RBC # BLD: 3.69 M/UL
RBC # FLD: 13.2 %
SODIUM SERPL-SCNC: 136 MMOL/L
TRIGL SERPL-MCNC: 160 MG/DL
WBC # FLD AUTO: 4.6 K/UL

## 2021-05-28 PROCEDURE — 99215 OFFICE O/P EST HI 40 MIN: CPT

## 2021-05-28 PROCEDURE — 99072 ADDL SUPL MATRL&STAF TM PHE: CPT

## 2021-05-28 PROCEDURE — 36415 COLL VENOUS BLD VENIPUNCTURE: CPT

## 2021-05-28 NOTE — REASON FOR VISIT
[FreeTextEntry1] : 75-year-old with known coronary disease now status post stent to right coronary artery as part of work-up for TAVR, and now s/p TAVR.

## 2021-05-28 NOTE — PHYSICAL EXAM
[General Appearance - Well Developed] : well developed [Normal Appearance] : normal appearance [Well Groomed] : well groomed [No Deformities] : no deformities [General Appearance - Well Nourished] : well nourished [General Appearance - In No Acute Distress] : no acute distress [Normal Conjunctiva] : the conjunctiva exhibited no abnormalities [Eyelids - No Xanthelasma] : the eyelids demonstrated no xanthelasmas [Normal Oral Mucosa] : normal oral mucosa [No Oral Pallor] : no oral pallor [No Oral Cyanosis] : no oral cyanosis [Normal Jugular Venous A Waves Present] : normal jugular venous A waves present [Normal Jugular Venous V Waves Present] : normal jugular venous V waves present [No Jugular Venous Navas A Waves] : no jugular venous navas A waves [Respiration, Rhythm And Depth] : normal respiratory rhythm and effort [Auscultation Breath Sounds / Voice Sounds] : lungs were clear to auscultation bilaterally [Exaggerated Use Of Accessory Muscles For Inspiration] : no accessory muscle use [Heart Rate And Rhythm] : heart rate and rhythm were normal [Heart Sounds] : normal S1 and S2 [Abdomen Soft] : soft [Abdomen Tenderness] : non-tender [Abdomen Mass (___ Cm)] : no abdominal mass palpated [Abnormal Walk] : normal gait [Gait - Sufficient For Exercise Testing] : the gait was sufficient for exercise testing [Nail Clubbing] : no clubbing of the fingernails [Cyanosis, Localized] : no localized cyanosis [Petechial Hemorrhages (___cm)] : no petechial hemorrhages [] : no rash [Skin Color & Pigmentation] : normal skin color and pigmentation [No Venous Stasis] : no venous stasis [Skin Lesions] : no skin lesions [No Skin Ulcers] : no skin ulcer [No Xanthoma] : no  xanthoma was observed [Oriented To Time, Place, And Person] : oriented to person, place, and time [Affect] : the affect was normal [Mood] : the mood was normal [FreeTextEntry1] : Much less anxious

## 2021-05-28 NOTE — HISTORY OF PRESENT ILLNESS
[FreeTextEntry1] : April 3, 2019. Patient has been followed by Dr. Larry Toth and was followed by cardiology at [facility]. In June of 2011 after a positive stress test had cardiac angiography, which had a distal 90% RCA lesion and only nonobstructive disease in the LAD and circumflex. This was stented and his symptoms were improved. His symptoms then recurred. They havet been exertional chest pressure and shortness of breath when he walks after eating a meal, especially a large meal. Never gets it at rest and does not get it when he exerts himself if he has not just eaten. Did well for a while, but then the symptoms returned and in October of 2016 after another positive ECG treadmill test he had repeat cardiac catheterization. There was an 80% mid RCA lesion and again no significant disease in the LAD was circumflex and he had 2 stents to the RCA . The difference was this time his symptoms did not change. He has been placed on Omeprazole and this has not seemed to make a difference, or perhaps it did initially. He has not seen GI. He otherwise seems to be doing well. In July of 2014. He had bilateral carotid endarterectomies. His most recent echo showed normal LV function. His EKG is normal sinus rhythm and a normal tracing. He has hypertension and hyperlipidemia.He is a former smoker. His father had hypertension and CVA, but there does not seem to be a family history of coronary artery disease. \par April 24, 2019.  Patient tried to increase his omeprazole, and then said he got a stomach ache and stopped it. He went for a CTA of the coronary arteries. Minor disease in the LAD and borderline severe 60-69% in the proximal circumflex and 60-70% in the RCA. Problem with him is whether his symptoms are related to ischemia or not. After long discussion I recommended that he have the angiogram. If non-obstructive disease, try Ranexa and if no help, consider noncardiac causes. If significant disease then he should have stents and again, we can see if this relieves his symptoms or not. Patient to discuss with his wife and then let me know   \par \par March 1, 2021.Patient called.  His exertional chest pain is getting worse and coming more frequently and more easier for the last few months.  I have not seen him since April 2019 and back then when we did a CTA it was mostly unrevealing despite his previous 3 stents in his RCA.  I will schedule him for a stress echo as soon as possible. \par March 9, 2021.  Patient came for stress echocardiogram.  Resting echo with borderline severe though not yet critical aortic stenosis and totally normal LV function.  On treadmill within the first minute STs started to go down and heart rate shot up to 140.  I stopped the treadmill at 2 minutes with severe ST depressions and shortness of breath but no chest pain.  Post exercise echo shows new wall motion abnormality in the entire anterior wall and may be some hypokinesis in the lateral wall.  Long discussion with patient and wife and they will be booked for coronary angiography and left and right heart cath as soon as he can have a Covid swab done.  (He had the first vaccine already and the second vaccine was 5 days ago)\par March 16, 2021.Cath only showed 80% RCA lesion and the rest was nonobstructive.  The RCA was stented and the patient was sent home that evening.  He is on aspirin and Plavix.  He will continue metoprolol ER 50 daily.  He has a follow-up with me in 2 weeks and then will be evaluated by structural heart for a TAVR \par March 30, 2021.  Patient here in follow-up.  Had stent to his RCA and is now on a beta-blocker but no change in his exertional symptoms.  In addition somewhat anxious about upcoming procedure; has evaluation appointment 4/6.\par May 28, 2021.  Patient had TAVR on May 13, uncomplicated except his glucoses were high post procedure.  Since he has been home he has stopped eating cakes and bagels and has lost 7 or 8 pounds.  In terms of his angina he is "95% better".  His only complaint is severe ecchymoses once again from his dual antiplatelet therapy..  Has seen Dr. Johnston and Dr. Toth in follow-up.  Remains in sinus rhythm with an unchanged ECG.  Had a monitor with no significant arrhythmias.  Is off metoprolol and currently only on lisinopril 10 mg.  \par \par \par

## 2021-05-28 NOTE — REVIEW OF SYSTEMS
[Weight Loss (___ Lbs)] : [unfilled] ~Ulb weight loss [Negative] : Heme/Lymph [de-identified] : Severe ecchymoses

## 2021-06-01 LAB
ESTIMATED AVERAGE GLUCOSE: 151 MG/DL
GLUCOSE SERPL-MCNC: 180 MG/DL
HBA1C MFR BLD HPLC: 6.9 %

## 2021-06-14 ENCOUNTER — TRANSCRIPTION ENCOUNTER (OUTPATIENT)
Age: 76
End: 2021-06-14

## 2021-07-01 ENCOUNTER — NON-APPOINTMENT (OUTPATIENT)
Age: 76
End: 2021-07-01

## 2021-07-01 ENCOUNTER — OUTPATIENT (OUTPATIENT)
Dept: OUTPATIENT SERVICES | Facility: HOSPITAL | Age: 76
LOS: 1 days | End: 2021-07-01
Payer: MEDICARE

## 2021-07-01 ENCOUNTER — APPOINTMENT (OUTPATIENT)
Dept: CARDIOTHORACIC SURGERY | Facility: CLINIC | Age: 76
End: 2021-07-01
Payer: MEDICARE

## 2021-07-01 VITALS
RESPIRATION RATE: 16 BRPM | HEIGHT: 69 IN | HEART RATE: 75 BPM | DIASTOLIC BLOOD PRESSURE: 77 MMHG | OXYGEN SATURATION: 99 % | TEMPERATURE: 97.8 F | BODY MASS INDEX: 25.62 KG/M2 | SYSTOLIC BLOOD PRESSURE: 159 MMHG | WEIGHT: 173 LBS

## 2021-07-01 DIAGNOSIS — Z98.890 OTHER SPECIFIED POSTPROCEDURAL STATES: Chronic | ICD-10-CM

## 2021-07-01 DIAGNOSIS — Z95.5 PRESENCE OF CORONARY ANGIOPLASTY IMPLANT AND GRAFT: Chronic | ICD-10-CM

## 2021-07-01 DIAGNOSIS — I35.0 NONRHEUMATIC AORTIC (VALVE) STENOSIS: ICD-10-CM

## 2021-07-01 LAB
BASOPHILS # BLD AUTO: 0.04 K/UL
BASOPHILS NFR BLD AUTO: 0.6 %
EOSINOPHIL # BLD AUTO: 0.28 K/UL
EOSINOPHIL NFR BLD AUTO: 4.5 %
HCT VFR BLD CALC: 36.1 %
HGB BLD-MCNC: 11.4 G/DL
IMM GRANULOCYTES NFR BLD AUTO: 0.5 %
LYMPHOCYTES # BLD AUTO: 1.07 K/UL
LYMPHOCYTES NFR BLD AUTO: 17.3 %
MAN DIFF?: NORMAL
MCHC RBC-ENTMCNC: 28.6 PG
MCHC RBC-ENTMCNC: 31.6 GM/DL
MCV RBC AUTO: 90.7 FL
MONOCYTES # BLD AUTO: 0.73 K/UL
MONOCYTES NFR BLD AUTO: 11.8 %
NEUTROPHILS # BLD AUTO: 4.04 K/UL
NEUTROPHILS NFR BLD AUTO: 65.3 %
PLATELET # BLD AUTO: 181 K/UL
RBC # BLD: 3.98 M/UL
RBC # FLD: 13.2 %
WBC # FLD AUTO: 6.19 K/UL

## 2021-07-01 PROCEDURE — 99213 OFFICE O/P EST LOW 20 MIN: CPT

## 2021-07-01 PROCEDURE — 93000 ELECTROCARDIOGRAM COMPLETE: CPT

## 2021-07-01 PROCEDURE — 76376 3D RENDER W/INTRP POSTPROCES: CPT | Mod: 26

## 2021-07-01 PROCEDURE — 93306 TTE W/DOPPLER COMPLETE: CPT | Mod: 26

## 2021-07-01 PROCEDURE — 93306 TTE W/DOPPLER COMPLETE: CPT

## 2021-07-01 PROCEDURE — 99072 ADDL SUPL MATRL&STAF TM PHE: CPT

## 2021-07-01 PROCEDURE — 76376 3D RENDER W/INTRP POSTPROCES: CPT

## 2021-07-01 RX ORDER — CLOPIDOGREL BISULFATE 75 MG/1
75 TABLET, FILM COATED ORAL
Qty: 90 | Refills: 1 | Status: DISCONTINUED | COMMUNITY
Start: 2021-03-30 | End: 2021-07-01

## 2021-07-01 RX ORDER — CHOLECALCIFEROL (VITAMIN D3) 125 MCG
50 MCG TABLET ORAL EVERY OTHER DAY
Refills: 0 | Status: ACTIVE | COMMUNITY
Start: 2021-07-01

## 2021-07-01 NOTE — PHYSICAL EXAM
[Well Developed] : well developed [Well Nourished] : well nourished [No Acute Distress] : no acute distress [Normal Conjunctiva] : normal conjunctiva [No Carotid Bruit] : no carotid bruit [Normal Venous Pressure] : normal venous pressure [Clear Lung Fields] : clear lung fields [Good Air Entry] : good air entry [No Respiratory Distress] : no respiratory distress  [Soft] : abdomen soft [Non Tender] : non-tender [Normal Bowel Sounds] : normal bowel sounds [Normal Gait] : normal gait [No Edema] : no edema [No Cyanosis] : no cyanosis [No Clubbing] : no clubbing [No Varicosities] : no varicosities [No Rash] : no rash [No Skin Lesions] : no skin lesions [Moves all extremities] : moves all extremities [No Focal Deficits] : no focal deficits [Normal Speech] : normal speech [Alert and Oriented] : alert and oriented [Normal memory] : normal memory [Normal Rate] : normal [Heart Rate ___] : [unfilled] bpm [Rhythm Regular] : regular [Normal S1] : normal S1 [Normal S2] : normal S2 [No Gallop] : no gallop heard [Click] : no click [Distant] : the heart sounds were ~L not distant [Pericardial Rub] : no pericardial rub [Prosthetic Aortic Valve] : prosthetic aortic valve heard [I] : a grade 1 [No Pitting Edema] : no pitting edema present [Rt] : no varicose veins of the right leg [Lt] : no varicose veins of the left leg [2+] : left 2+ [No Abnormalities] : the abdominal aorta was not enlarged and no bruit was heard [Bruit] : no bruit heard [de-identified] : dark purple bruising over bilateral arms.

## 2021-07-01 NOTE — REVIEW OF SYSTEMS
[Fever] : no fever [Chills] : no chills [Feeling Fatigued] : not feeling fatigued [SOB] : no shortness of breath [Dyspnea on exertion] : not dyspnea during exertion [Chest Discomfort] : no chest discomfort [Lower Ext Edema] : no extremity edema [Palpitations] : no palpitations [Syncope] : no syncope [Cough] : no cough [Change in Appetite] : no change in appetite [Dizziness] : no dizziness

## 2021-07-01 NOTE — CARDIOLOGY SUMMARY
[de-identified] : 5/15/2021 - 6/13/2021 Post-JESSICA MCOT demonstrated 1 episode of VT with fastest run at 123 BPM, 1% PAC burden, and <1% PVC burden. There was no AF, SVT, heart block, or pauses.\par  [de-identified] : 7/1/2021 DONE TODAY, RESULTS PENDING. \par \par 5/14/2021 Mitral annular calcification, otherwise normal mitral valve. Mild mitral regurgitation. Transcatheter aortic valve replacement. Peak transaortic valve gradient equals 12 mm Hg, mean transaortic valve gradient equals 6 mm Hg, which is probably normal in the\par presence of a transcatheter aortic valve replacement. Dimensionless Index=0.68. No aortic valve regurgitation seen. Normal left ventricular systolic function. No segmental wall motion abnormalities. Normal right ventricular size and function. ** Compared with echocardiogram of 5/13/2021, findings are similar to images post-TAVR placement. \par  [de-identified] : 4/22/2021 CT HEART W/O CORONARIES (PRE-JESSICA) Aortic valve: 2978 (Agatston calcium score).\par  [de-identified] : 3/15/2021 CATH The coronary circulation is right dominant. LM: Normal.  Proximal LAD: 30% stenosis. Mid LAD: 30% stenosis. Distal circumflex: 40% stenosis. Proximal RCA: 80% stenosis.\par \par 3/15/2021 MARYANN CALLUM to pRCA\par  [de-identified] : 5/13/2021 JESSICA using a 29 mm Evolut Pro Plus bioprosthesis\par  [de-identified] : 5/11/2021 CAROTID DUPLEX There is a severe, greater than 70% stenosis of the CHASIDY. There is a moderate, 50-69% stenosis of the left internal carotid artery.  DUSTY Weaver notified\par

## 2021-07-01 NOTE — HISTORY OF PRESENT ILLNESS
[FreeTextEntry1] : ROSA BURGOS is a 76 year old male here on 07/01/2021, 7 weeks after undergoing a transfemoral transcatheter aortic valve implantation using a 29 mm Evolut Pro Plus bioprosthesis on 5/13/2021.  Post-operative course was uneventful and he was discharged on POD #2 with an MCOT. MCOT demonstrated 1 episode of VT with fastest run at 123 BPM, 1% PAC burden, and <1% PVC burden. There was no AF, SVT, heart block, or pauses.\par \par He comes to the office today with a new TTE reporting feeling "great" overall. He is not 100% but is feeling a good 80%. He was able to cut his grass but did have to stop a few times while doing it because of the heat. He is active and up and about all day.  He is able to do a little more since the valve. It is getting better week by week. Sometimes after he eats, especially if he eats "heavy", he gets a sensation in his abdomen but states that is even better. It is not pain, it is a sensation and only if he eats heavy and then do something strenuous. He stops and composes himself and it gets better. He states it is 100% better since the valve. He currently denies fever, chills, cough, loss of smell or taste, palpitations, angina, dyspnea, dizziness, lightheadedness, syncope, or peripheral edema. His energy level is better and appetite is "too good". Denies bowel or bladder problems (has prostate cancer). He has heavy large stools. He is fully vaccinated (Moderna, last shot 3/5/2021). Groin sites are healed. He stopped taking Plavix on June 17th under the direction of Dr. Miller.  \par \par 5 meter walk 3.1 / 2.5 / 2.4 seconds\par NYHA I\par PCP: Dr. Larry Toth (Seen 5/25 and to see in August).\par CARD: Dr. Adam Miller (seen 5/28 and to see end of July).

## 2021-07-01 NOTE — DISCUSSION/SUMMARY
[FreeTextEntry1] : Plan:\par 1) Doing very well 7 weeks s/p JESSICA (29 mm Evolut Pro Plus bioprosthesis on 5/13/2021)\par 2) BP above goal - Medication changes: None. Patient states his BP runs high when he is at the doctor office/hospital. Takes his BP at home and reports it is normal. Continue to monitor.  \par 3) MCOT received and demonstrated 1 episode of VT with fastest run at 123 BPM, 1% PAC burden, and <1% PVC burden. There was no AF, SVT, heart block, or pauses.\par 4) TTE done today - results pending.  \par 5) To follow-up with cardiology as scheduled (Dr. Miller - appt. 7/19)\par 6) To follow-up with PCP as scheduled (Dr. Toth - appt. 8/25)\par 7) To follow-up with structural heart clinic yearly with TTE or sooner if needed.\par 8) Antibiotic prophylaxis discussed\par 9) Pre-op carotid duplex demonstrated severe, > 70% stenosis of the CHASIDY with moderate, 50-69% stenosis of the LICA.  DUSTY Weaver notified. Discussed with patient. \par 10) Patient requested PSA to be added to today's lab work and done. To follow-up with urology for abnormal results. He verbalized understanding.\par \par \par

## 2021-07-02 LAB
ANION GAP SERPL CALC-SCNC: 14 MMOL/L
BUN SERPL-MCNC: 67 MG/DL
CALCIUM SERPL-MCNC: 10.3 MG/DL
CHLORIDE SERPL-SCNC: 107 MMOL/L
CO2 SERPL-SCNC: 18 MMOL/L
CREAT SERPL-MCNC: 2.06 MG/DL
GLUCOSE SERPL-MCNC: 113 MG/DL
POTASSIUM SERPL-SCNC: 6.1 MMOL/L
PSA SERPL-MCNC: <0.01 NG/ML
SODIUM SERPL-SCNC: 139 MMOL/L

## 2021-07-06 LAB
ANION GAP SERPL CALC-SCNC: 14 MMOL/L
BUN SERPL-MCNC: 44 MG/DL
CALCIUM SERPL-MCNC: 10 MG/DL
CHLORIDE SERPL-SCNC: 106 MMOL/L
CO2 SERPL-SCNC: 18 MMOL/L
CREAT SERPL-MCNC: 1.48 MG/DL
GLUCOSE SERPL-MCNC: 118 MG/DL
POTASSIUM SERPL-SCNC: 5.1 MMOL/L
SODIUM SERPL-SCNC: 138 MMOL/L

## 2021-07-19 ENCOUNTER — APPOINTMENT (OUTPATIENT)
Dept: CARDIOLOGY | Facility: CLINIC | Age: 76
End: 2021-07-19
Payer: MEDICARE

## 2021-07-19 VITALS
HEART RATE: 81 BPM | BODY MASS INDEX: 26.29 KG/M2 | SYSTOLIC BLOOD PRESSURE: 120 MMHG | DIASTOLIC BLOOD PRESSURE: 62 MMHG | WEIGHT: 178 LBS | OXYGEN SATURATION: 98 %

## 2021-07-19 PROCEDURE — 99072 ADDL SUPL MATRL&STAF TM PHE: CPT

## 2021-07-19 PROCEDURE — 99214 OFFICE O/P EST MOD 30 MIN: CPT

## 2021-07-19 NOTE — PHYSICAL EXAM
[General Appearance - Well Developed] : well developed [Normal Appearance] : normal appearance [Well Groomed] : well groomed [General Appearance - Well Nourished] : well nourished [No Deformities] : no deformities [General Appearance - In No Acute Distress] : no acute distress [Normal Conjunctiva] : the conjunctiva exhibited no abnormalities [Eyelids - No Xanthelasma] : the eyelids demonstrated no xanthelasmas [Normal Oral Mucosa] : normal oral mucosa [No Oral Pallor] : no oral pallor [No Oral Cyanosis] : no oral cyanosis [Normal Jugular Venous A Waves Present] : normal jugular venous A waves present [Normal Jugular Venous V Waves Present] : normal jugular venous V waves present [No Jugular Venous Navas A Waves] : no jugular venous navas A waves [Respiration, Rhythm And Depth] : normal respiratory rhythm and effort [Exaggerated Use Of Accessory Muscles For Inspiration] : no accessory muscle use [Auscultation Breath Sounds / Voice Sounds] : lungs were clear to auscultation bilaterally [Heart Rate And Rhythm] : heart rate and rhythm were normal [Heart Sounds] : normal S1 and S2 [Abdomen Soft] : soft [Abdomen Tenderness] : non-tender [Abdomen Mass (___ Cm)] : no abdominal mass palpated [Abnormal Walk] : normal gait [Gait - Sufficient For Exercise Testing] : the gait was sufficient for exercise testing [Nail Clubbing] : no clubbing of the fingernails [Cyanosis, Localized] : no localized cyanosis [Petechial Hemorrhages (___cm)] : no petechial hemorrhages [Skin Color & Pigmentation] : normal skin color and pigmentation [] : no rash [No Venous Stasis] : no venous stasis [Skin Lesions] : no skin lesions [No Skin Ulcers] : no skin ulcer [No Xanthoma] : no  xanthoma was observed [Oriented To Time, Place, And Person] : oriented to person, place, and time [Affect] : the affect was normal [Mood] : the mood was normal [FreeTextEntry1] : Much less anxious

## 2021-07-19 NOTE — REASON FOR VISIT
[FreeTextEntry1] : 76-year-old with known coronary disease now status post stent to right coronary artery as part of work-up for TAVR, and now s/p TAVR.

## 2021-07-19 NOTE — REVIEW OF SYSTEMS
[Negative] : Heme/Lymph [Weight Loss (___ Lbs)] : no recent weight loss [Chest Discomfort] : chest discomfort [FreeTextEntry5] : see HPI [de-identified] : Severe ecchymoses

## 2021-07-19 NOTE — HISTORY OF PRESENT ILLNESS
[FreeTextEntry1] : April 3, 2019. Patient has been followed by Dr. Larry Toth and was followed by cardiology at [facility]. In June of 2011 after a positive stress test had cardiac angiography, which had a distal 90% RCA lesion and only nonobstructive disease in the LAD and circumflex. This was stented and his symptoms were improved. His symptoms then recurred. They have been exertional chest pressure and shortness of breath when he walks after eating a meal, especially a large meal. Never gets it at rest and does not get it when he exerts himself if he has not just eaten. Did well for a while, but then the symptoms returned and in October of 2016 after another positive ECG treadmill test he had repeat cardiac catheterization. There was an 80% mid RCA lesion and again no significant disease in the LAD was circumflex and he had 2 stents to the RCA . The difference was this time his symptoms did not change. He has been placed on Omeprazole and this has not seemed to make a difference, or perhaps it did initially. He has not seen GI. He otherwise seems to be doing well. In July of 2014. He had bilateral carotid endarterectomies. His most recent echo showed normal LV function. His EKG is normal sinus rhythm and a normal tracing. He has hypertension and hyperlipidemia.He is a former smoker. His father had hypertension and CVA, but there does not seem to be a family history of coronary artery disease. \par April 24, 2019.  Patient tried to increase his omeprazole, and then said he got a stomach ache and stopped it. He went for a CTA of the coronary arteries. Minor disease in the LAD and borderline severe 60-69% in the proximal circumflex and 60-70% in the RCA. Problem with him is whether his symptoms are related to ischemia or not. After long discussion I recommended that he have the angiogram. If non-obstructive disease, try Ranexa and if no help, consider noncardiac causes. If significant disease then he should have stents and again, we can see if this relieves his symptoms or not. Patient to discuss with his wife and then let me know   \par \par March 1, 2021.Patient called.  His exertional chest pain is getting worse and coming more frequently and more easier for the last few months.  I have not seen him since April 2019 and back then when we did a CTA it was mostly unrevealing despite his previous 3 stents in his RCA.  I will schedule him for a stress echo as soon as possible. \par March 9, 2021.  Patient came for stress echocardiogram.  Resting echo with borderline severe though not yet critical aortic stenosis and totally normal LV function.  On treadmill within the first minute STs started to go down and heart rate shot up to 140.  I stopped the treadmill at 2 minutes with severe ST depressions and shortness of breath but no chest pain.  Post exercise echo shows new wall motion abnormality in the entire anterior wall and may be some hypokinesis in the lateral wall.  Long discussion with patient and wife and they will be booked for coronary angiography and left and right heart cath as soon as he can have a Covid swab done.  (He had the first vaccine already and the second vaccine was 5 days ago)\par March 16, 2021.Cath only showed 80% RCA lesion and the rest was nonobstructive.  The RCA was stented and the patient was sent home that evening.  He is on aspirin and Plavix.  He will continue metoprolol ER 50 daily.  He has a follow-up with me in 2 weeks and then will be evaluated by structural heart for a TAVR \par March 30, 2021.  Patient here in follow-up.  Had stent to his RCA and is now on a beta-blocker but no change in his exertional symptoms.  In addition somewhat anxious about upcoming procedure; has evaluation appointment 4/6.\par May 28, 2021.  Patient had TAVR on May 13, uncomplicated except his glucoses were high post procedure.  Since he has been home he has stopped eating cakes and bagels and has lost 7 or 8 pounds.  In terms of his angina he is "95% better".  His only complaint is severe ecchymoses once again from his dual antiplatelet therapy..  Has seen Dr. Johnston and Dr. Toth in follow-up.  Remains in sinus rhythm with an unchanged ECG.  Had a monitor with no significant arrhythmias.  Is off metoprolol and currently only on lisinopril 10 mg.  \par July 19, 2021.  Patient here in follow-up.  He had seen structural heart and an echo was done on July 1 which showed mild MR, normally functioning TAVR with no paravalvular AI.  Moderate LAE.  Normal LV size and function and normal RV size and function.  He had an ECG on July 1 that was sinus rhythm and unremarkable.  Since his TAVR he has had no episodes of his exertional chest pressure until 1 day after making sure that he mowed his lawn on an empty stomach and had no symptoms, he then went and had a big meal and then remembered he had not swept the street.  When he swept the street he got the chest tightness again and it was relieved with rest.  That was the only episode since and has not recurred.  Otherwise no real complaints initial blood test by structural heart had a potassium of 6.1 and his creatinine had gone up to 2.0.  These were repeated 5 days later and his potassium was 5.1 and his creatinine was 1.43.  He remains on lisinopril just 10 mg daily.\par \par

## 2021-08-09 NOTE — DISCHARGE NOTE PROVIDER - CARE PROVIDERS DIRECT ADDRESSES
,[email or web address]
No

## 2021-08-17 ENCOUNTER — APPOINTMENT (OUTPATIENT)
Dept: INTERNAL MEDICINE | Facility: CLINIC | Age: 76
End: 2021-08-17
Payer: MEDICARE

## 2021-08-17 LAB
BASOPHILS # BLD AUTO: 0.04 K/UL
BASOPHILS NFR BLD AUTO: 0.8 %
EOSINOPHIL # BLD AUTO: 0.28 K/UL
EOSINOPHIL NFR BLD AUTO: 5.4 %
ESTIMATED AVERAGE GLUCOSE: 131 MG/DL
HBA1C MFR BLD HPLC: 6.2 %
HCT VFR BLD CALC: 33.6 %
HGB BLD-MCNC: 10.6 G/DL
IMM GRANULOCYTES NFR BLD AUTO: 0.6 %
LYMPHOCYTES # BLD AUTO: 0.88 K/UL
LYMPHOCYTES NFR BLD AUTO: 16.9 %
MAN DIFF?: NORMAL
MCHC RBC-ENTMCNC: 28 PG
MCHC RBC-ENTMCNC: 31.5 GM/DL
MCV RBC AUTO: 88.7 FL
MONOCYTES # BLD AUTO: 0.6 K/UL
MONOCYTES NFR BLD AUTO: 11.5 %
NEUTROPHILS # BLD AUTO: 3.39 K/UL
NEUTROPHILS NFR BLD AUTO: 64.8 %
PLATELET # BLD AUTO: 170 K/UL
RBC # BLD: 3.79 M/UL
RBC # FLD: 13.3 %
WBC # FLD AUTO: 5.22 K/UL

## 2021-08-17 PROCEDURE — 36415 COLL VENOUS BLD VENIPUNCTURE: CPT

## 2021-08-18 LAB
25(OH)D3 SERPL-MCNC: 57.1 NG/ML
ALBUMIN SERPL ELPH-MCNC: 4.8 G/DL
ALP BLD-CCNC: 97 U/L
ALT SERPL-CCNC: 16 U/L
ANION GAP SERPL CALC-SCNC: 17 MMOL/L
AST SERPL-CCNC: 20 U/L
BILIRUB SERPL-MCNC: <0.2 MG/DL
BUN SERPL-MCNC: 48 MG/DL
CALCIUM SERPL-MCNC: 9.9 MG/DL
CHLORIDE SERPL-SCNC: 108 MMOL/L
CHOLEST SERPL-MCNC: 175 MG/DL
CO2 SERPL-SCNC: 18 MMOL/L
CREAT SERPL-MCNC: 1.57 MG/DL
GLUCOSE SERPL-MCNC: 100 MG/DL
HDLC SERPL-MCNC: 55 MG/DL
LDLC SERPL CALC-MCNC: 85 MG/DL
NONHDLC SERPL-MCNC: 120 MG/DL
POTASSIUM SERPL-SCNC: 5.2 MMOL/L
PROT SERPL-MCNC: 7.2 G/DL
PSA SERPL-MCNC: <0.01 NG/ML
SODIUM SERPL-SCNC: 143 MMOL/L
TRIGL SERPL-MCNC: 177 MG/DL
TSH SERPL-ACNC: 1.33 UIU/ML

## 2021-08-25 ENCOUNTER — APPOINTMENT (OUTPATIENT)
Dept: INTERNAL MEDICINE | Facility: CLINIC | Age: 76
End: 2021-08-25
Payer: MEDICARE

## 2021-08-25 VITALS
SYSTOLIC BLOOD PRESSURE: 136 MMHG | WEIGHT: 173 LBS | HEIGHT: 69 IN | DIASTOLIC BLOOD PRESSURE: 78 MMHG | BODY MASS INDEX: 25.62 KG/M2

## 2021-08-25 PROCEDURE — 99214 OFFICE O/P EST MOD 30 MIN: CPT

## 2021-08-25 RX ORDER — ERTUGLIFLOZIN 15 MG/1
15 TABLET, FILM COATED ORAL DAILY
Qty: 90 | Refills: 1 | Status: DISCONTINUED | COMMUNITY
Start: 2020-08-25 | End: 2021-08-25

## 2021-08-25 NOTE — PHYSICAL EXAM
[No Acute Distress] : no acute distress [Well Nourished] : well nourished [Normal Outer Ear/Nose] : the outer ears and nose were normal in appearance [No JVD] : no jugular venous distention [No Respiratory Distress] : no respiratory distress  [No Accessory Muscle Use] : no accessory muscle use [Clear to Auscultation] : lungs were clear to auscultation bilaterally [Normal Rate] : normal rate  [No Edema] : there was no peripheral edema [Soft] : abdomen soft [Non Tender] : non-tender [Normal] : no CVA or spinal tenderness [de-identified] : Pain in right buttocks sciatic notch

## 2021-08-25 NOTE — HISTORY OF PRESENT ILLNESS
[FreeTextEntry1] : 75 yo male presents for an interval visit.  He notes two problems:\par His "post prandial anginal symptom " has returned and he has been experiencing right buttocks pain for the last month which he relates to a Lupron shot. [de-identified] : 77 yo male s/p AVR shot  with hypertension, AODM, CKD, Prostate Cancer, and now complaining of an exacerbation of his right sided sciatic pain.  He is in distress with the pain (although he seems comfortable right now)  He relates the exacerbation to a shot of his Lupron.  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  He is receiving PT but to date is still bothered.\par Otherwise, he notes a return of his pos-prandial chest tightness that had gone away after his heart valve replacement.

## 2021-08-25 NOTE — HISTORY OF PRESENT ILLNESS
[FreeTextEntry1] : 75 yo male presents for an interval visit.  He notes two problems:\par His "post prandial anginal symptom " has returned and he has been experiencing right buttocks pain for the last month which he relates to a Lupron shot. [de-identified] : 77 yo male s/p AVR shot  with hypertension, AODM, CKD, Prostate Cancer, and now complaining of an exacerbation of his right sided sciatic pain.  He is in distress with the pain (although he seems comfortable right now)  He relates the exacerbation to a shot of his Lupron.  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  He is receiving PT but to date is still bothered.\par Otherwise, he notes a return of his pos-prandial chest tightness that had gone away after his heart valve replacement.

## 2021-09-02 ENCOUNTER — NON-APPOINTMENT (OUTPATIENT)
Age: 76
End: 2021-09-02

## 2021-09-02 ENCOUNTER — INPATIENT (INPATIENT)
Facility: HOSPITAL | Age: 76
LOS: 2 days | Discharge: HOME | DRG: 101 | End: 2021-09-05
Attending: HOSPITALIST | Admitting: INTERNAL MEDICINE
Payer: MEDICARE

## 2021-09-02 VITALS
SYSTOLIC BLOOD PRESSURE: 154 MMHG | HEART RATE: 87 BPM | TEMPERATURE: 98 F | HEIGHT: 69 IN | OXYGEN SATURATION: 98 % | WEIGHT: 173.94 LBS | RESPIRATION RATE: 16 BRPM | DIASTOLIC BLOOD PRESSURE: 71 MMHG

## 2021-09-02 DIAGNOSIS — Z29.9 ENCOUNTER FOR PROPHYLACTIC MEASURES, UNSPECIFIED: ICD-10-CM

## 2021-09-02 DIAGNOSIS — R55 SYNCOPE AND COLLAPSE: ICD-10-CM

## 2021-09-02 DIAGNOSIS — Z98.890 OTHER SPECIFIED POSTPROCEDURAL STATES: Chronic | ICD-10-CM

## 2021-09-02 DIAGNOSIS — I21.4 NON-ST ELEVATION (NSTEMI) MYOCARDIAL INFARCTION: ICD-10-CM

## 2021-09-02 DIAGNOSIS — E11.9 TYPE 2 DIABETES MELLITUS WITHOUT COMPLICATIONS: ICD-10-CM

## 2021-09-02 DIAGNOSIS — I10 ESSENTIAL (PRIMARY) HYPERTENSION: ICD-10-CM

## 2021-09-02 DIAGNOSIS — R77.8 OTHER SPECIFIED ABNORMALITIES OF PLASMA PROTEINS: ICD-10-CM

## 2021-09-02 DIAGNOSIS — Z02.9 ENCOUNTER FOR ADMINISTRATIVE EXAMINATIONS, UNSPECIFIED: ICD-10-CM

## 2021-09-02 DIAGNOSIS — Z95.5 PRESENCE OF CORONARY ANGIOPLASTY IMPLANT AND GRAFT: Chronic | ICD-10-CM

## 2021-09-02 DIAGNOSIS — N18.30 CHRONIC KIDNEY DISEASE, STAGE 3 UNSPECIFIED: ICD-10-CM

## 2021-09-02 LAB
ALBUMIN SERPL ELPH-MCNC: 4.4 G/DL — SIGNIFICANT CHANGE UP (ref 3.3–5)
ALP SERPL-CCNC: 95 U/L — SIGNIFICANT CHANGE UP (ref 40–120)
ALT FLD-CCNC: 53 U/L — HIGH (ref 10–45)
ANION GAP SERPL CALC-SCNC: 15 MMOL/L — SIGNIFICANT CHANGE UP (ref 5–17)
APPEARANCE UR: CLEAR — SIGNIFICANT CHANGE UP
APTT BLD: 33.6 SEC — SIGNIFICANT CHANGE UP (ref 27.5–35.5)
APTT BLD: 34.2 SEC — SIGNIFICANT CHANGE UP (ref 27.5–35.5)
AST SERPL-CCNC: 45 U/L — HIGH (ref 10–40)
BACTERIA # UR AUTO: NEGATIVE — SIGNIFICANT CHANGE UP
BASE EXCESS BLDV CALC-SCNC: -3.2 MMOL/L — LOW (ref -2–2)
BASOPHILS # BLD AUTO: 0.02 K/UL — SIGNIFICANT CHANGE UP (ref 0–0.2)
BASOPHILS NFR BLD AUTO: 0.4 % — SIGNIFICANT CHANGE UP (ref 0–2)
BILIRUB SERPL-MCNC: 0.2 MG/DL — SIGNIFICANT CHANGE UP (ref 0.2–1.2)
BILIRUB UR-MCNC: NEGATIVE — SIGNIFICANT CHANGE UP
BUN SERPL-MCNC: 43 MG/DL — HIGH (ref 7–23)
CA-I SERPL-SCNC: 1.25 MMOL/L — SIGNIFICANT CHANGE UP (ref 1.15–1.33)
CALCIUM SERPL-MCNC: 9.3 MG/DL — SIGNIFICANT CHANGE UP (ref 8.4–10.5)
CHLORIDE BLDV-SCNC: 108 MMOL/L — SIGNIFICANT CHANGE UP (ref 96–108)
CHLORIDE SERPL-SCNC: 106 MMOL/L — SIGNIFICANT CHANGE UP (ref 96–108)
CO2 BLDV-SCNC: 24 MMOL/L — SIGNIFICANT CHANGE UP (ref 22–26)
CO2 SERPL-SCNC: 17 MMOL/L — LOW (ref 22–31)
COLOR SPEC: SIGNIFICANT CHANGE UP
CREAT SERPL-MCNC: 1.57 MG/DL — HIGH (ref 0.5–1.3)
DIFF PNL FLD: NEGATIVE — SIGNIFICANT CHANGE UP
EOSINOPHIL # BLD AUTO: 0.16 K/UL — SIGNIFICANT CHANGE UP (ref 0–0.5)
EOSINOPHIL NFR BLD AUTO: 3.2 % — SIGNIFICANT CHANGE UP (ref 0–6)
EPI CELLS # UR: 0 /HPF — SIGNIFICANT CHANGE UP
GAS PNL BLDV: 138 MMOL/L — SIGNIFICANT CHANGE UP (ref 136–145)
GAS PNL BLDV: SIGNIFICANT CHANGE UP
GAS PNL BLDV: SIGNIFICANT CHANGE UP
GLUCOSE BLDC GLUCOMTR-MCNC: 105 MG/DL — HIGH (ref 70–99)
GLUCOSE BLDC GLUCOMTR-MCNC: 143 MG/DL — HIGH (ref 70–99)
GLUCOSE BLDC GLUCOMTR-MCNC: 62 MG/DL — LOW (ref 70–99)
GLUCOSE BLDC GLUCOMTR-MCNC: 66 MG/DL — LOW (ref 70–99)
GLUCOSE BLDC GLUCOMTR-MCNC: 82 MG/DL — SIGNIFICANT CHANGE UP (ref 70–99)
GLUCOSE BLDV-MCNC: 193 MG/DL — HIGH (ref 70–99)
GLUCOSE SERPL-MCNC: 185 MG/DL — HIGH (ref 70–99)
GLUCOSE UR QL: ABNORMAL
HCO3 BLDV-SCNC: 23 MMOL/L — SIGNIFICANT CHANGE UP (ref 22–29)
HCT VFR BLD CALC: 33.4 % — LOW (ref 39–50)
HCT VFR BLD CALC: 33.8 % — LOW (ref 39–50)
HCT VFR BLDA CALC: 32 % — LOW (ref 39–51)
HGB BLD CALC-MCNC: 10.8 G/DL — LOW (ref 12.6–17.4)
HGB BLD-MCNC: 10.5 G/DL — LOW (ref 13–17)
HGB BLD-MCNC: 10.5 G/DL — LOW (ref 13–17)
HYALINE CASTS # UR AUTO: 0 /LPF — SIGNIFICANT CHANGE UP (ref 0–2)
IMM GRANULOCYTES NFR BLD AUTO: 0.8 % — SIGNIFICANT CHANGE UP (ref 0–1.5)
INR BLD: 0.97 RATIO — SIGNIFICANT CHANGE UP (ref 0.88–1.16)
KETONES UR-MCNC: NEGATIVE — SIGNIFICANT CHANGE UP
LACTATE BLDV-MCNC: 1.5 MMOL/L — SIGNIFICANT CHANGE UP (ref 0.7–2)
LEUKOCYTE ESTERASE UR-ACNC: NEGATIVE — SIGNIFICANT CHANGE UP
LYMPHOCYTES # BLD AUTO: 0.59 K/UL — LOW (ref 1–3.3)
LYMPHOCYTES # BLD AUTO: 11.8 % — LOW (ref 13–44)
MAGNESIUM SERPL-MCNC: 1.8 MG/DL — SIGNIFICANT CHANGE UP (ref 1.6–2.6)
MCHC RBC-ENTMCNC: 27.9 PG — SIGNIFICANT CHANGE UP (ref 27–34)
MCHC RBC-ENTMCNC: 28.2 PG — SIGNIFICANT CHANGE UP (ref 27–34)
MCHC RBC-ENTMCNC: 31.1 GM/DL — LOW (ref 32–36)
MCHC RBC-ENTMCNC: 31.4 GM/DL — LOW (ref 32–36)
MCV RBC AUTO: 88.8 FL — SIGNIFICANT CHANGE UP (ref 80–100)
MCV RBC AUTO: 90.6 FL — SIGNIFICANT CHANGE UP (ref 80–100)
MONOCYTES # BLD AUTO: 0.28 K/UL — SIGNIFICANT CHANGE UP (ref 0–0.9)
MONOCYTES NFR BLD AUTO: 5.6 % — SIGNIFICANT CHANGE UP (ref 2–14)
NEUTROPHILS # BLD AUTO: 3.92 K/UL — SIGNIFICANT CHANGE UP (ref 1.8–7.4)
NEUTROPHILS NFR BLD AUTO: 78.2 % — HIGH (ref 43–77)
NITRITE UR-MCNC: NEGATIVE — SIGNIFICANT CHANGE UP
NRBC # BLD: 0 /100 WBCS — SIGNIFICANT CHANGE UP (ref 0–0)
NRBC # BLD: 0 /100 WBCS — SIGNIFICANT CHANGE UP (ref 0–0)
PCO2 BLDV: 43 MMHG — SIGNIFICANT CHANGE UP (ref 42–55)
PH BLDV: 7.33 — SIGNIFICANT CHANGE UP (ref 7.32–7.43)
PH UR: 6.5 — SIGNIFICANT CHANGE UP (ref 5–8)
PHOSPHATE SERPL-MCNC: 2.7 MG/DL — SIGNIFICANT CHANGE UP (ref 2.5–4.5)
PLATELET # BLD AUTO: 148 K/UL — LOW (ref 150–400)
PLATELET # BLD AUTO: 148 K/UL — LOW (ref 150–400)
PO2 BLDV: 28 MMHG — SIGNIFICANT CHANGE UP (ref 25–45)
POTASSIUM BLDV-SCNC: 5.3 MMOL/L — HIGH (ref 3.5–5.1)
POTASSIUM SERPL-MCNC: 5.3 MMOL/L — SIGNIFICANT CHANGE UP (ref 3.5–5.3)
POTASSIUM SERPL-SCNC: 5.3 MMOL/L — SIGNIFICANT CHANGE UP (ref 3.5–5.3)
PROT SERPL-MCNC: 7.5 G/DL — SIGNIFICANT CHANGE UP (ref 6–8.3)
PROT UR-MCNC: ABNORMAL
PROTHROM AB SERPL-ACNC: 11.7 SEC — SIGNIFICANT CHANGE UP (ref 10.6–13.6)
RBC # BLD: 3.73 M/UL — LOW (ref 4.2–5.8)
RBC # BLD: 3.76 M/UL — LOW (ref 4.2–5.8)
RBC # FLD: 13 % — SIGNIFICANT CHANGE UP (ref 10.3–14.5)
RBC # FLD: 13.4 % — SIGNIFICANT CHANGE UP (ref 10.3–14.5)
RBC CASTS # UR COMP ASSIST: 1 /HPF — SIGNIFICANT CHANGE UP (ref 0–4)
SAO2 % BLDV: 44.3 % — LOW (ref 67–88)
SARS-COV-2 RNA SPEC QL NAA+PROBE: SIGNIFICANT CHANGE UP
SODIUM SERPL-SCNC: 138 MMOL/L — SIGNIFICANT CHANGE UP (ref 135–145)
SP GR SPEC: 1.01 — SIGNIFICANT CHANGE UP (ref 1.01–1.02)
TROPONIN T, HIGH SENSITIVITY RESULT: 433 NG/L — HIGH (ref 0–51)
TROPONIN T, HIGH SENSITIVITY RESULT: 479 NG/L — HIGH (ref 0–51)
UROBILINOGEN FLD QL: NEGATIVE — SIGNIFICANT CHANGE UP
WBC # BLD: 5.01 K/UL — SIGNIFICANT CHANGE UP (ref 3.8–10.5)
WBC # BLD: 6.59 K/UL — SIGNIFICANT CHANGE UP (ref 3.8–10.5)
WBC # FLD AUTO: 5.01 K/UL — SIGNIFICANT CHANGE UP (ref 3.8–10.5)
WBC # FLD AUTO: 6.59 K/UL — SIGNIFICANT CHANGE UP (ref 3.8–10.5)
WBC UR QL: 0 /HPF — SIGNIFICANT CHANGE UP (ref 0–5)

## 2021-09-02 PROCEDURE — 71045 X-RAY EXAM CHEST 1 VIEW: CPT | Mod: 26

## 2021-09-02 PROCEDURE — 70496 CT ANGIOGRAPHY HEAD: CPT | Mod: 26,MA

## 2021-09-02 PROCEDURE — 72170 X-RAY EXAM OF PELVIS: CPT | Mod: 26

## 2021-09-02 PROCEDURE — 93010 ELECTROCARDIOGRAM REPORT: CPT

## 2021-09-02 PROCEDURE — 93306 TTE W/DOPPLER COMPLETE: CPT | Mod: 26

## 2021-09-02 PROCEDURE — 99285 EMERGENCY DEPT VISIT HI MDM: CPT | Mod: CS

## 2021-09-02 PROCEDURE — 99223 1ST HOSP IP/OBS HIGH 75: CPT

## 2021-09-02 PROCEDURE — 70498 CT ANGIOGRAPHY NECK: CPT | Mod: 26,MA

## 2021-09-02 RX ORDER — ATORVASTATIN CALCIUM 80 MG/1
80 TABLET, FILM COATED ORAL AT BEDTIME
Refills: 0 | Status: DISCONTINUED | OUTPATIENT
Start: 2021-09-02 | End: 2021-09-05

## 2021-09-02 RX ORDER — INSULIN LISPRO 100/ML
VIAL (ML) SUBCUTANEOUS AT BEDTIME
Refills: 0 | Status: DISCONTINUED | OUTPATIENT
Start: 2021-09-02 | End: 2021-09-05

## 2021-09-02 RX ORDER — HEPARIN SODIUM 5000 [USP'U]/ML
INJECTION INTRAVENOUS; SUBCUTANEOUS
Qty: 25000 | Refills: 0 | Status: DISCONTINUED | OUTPATIENT
Start: 2021-09-02 | End: 2021-09-02

## 2021-09-02 RX ORDER — LISINOPRIL 2.5 MG/1
10 TABLET ORAL DAILY
Refills: 0 | Status: DISCONTINUED | OUTPATIENT
Start: 2021-09-03 | End: 2021-09-05

## 2021-09-02 RX ORDER — DEXTROSE 50 % IN WATER 50 %
12.5 SYRINGE (ML) INTRAVENOUS ONCE
Refills: 0 | Status: DISCONTINUED | OUTPATIENT
Start: 2021-09-02 | End: 2021-09-05

## 2021-09-02 RX ORDER — HEPARIN SODIUM 5000 [USP'U]/ML
5000 INJECTION INTRAVENOUS; SUBCUTANEOUS EVERY 8 HOURS
Refills: 0 | Status: DISCONTINUED | OUTPATIENT
Start: 2021-09-03 | End: 2021-09-05

## 2021-09-02 RX ORDER — DEXTROSE 50 % IN WATER 50 %
25 SYRINGE (ML) INTRAVENOUS ONCE
Refills: 0 | Status: DISCONTINUED | OUTPATIENT
Start: 2021-09-02 | End: 2021-09-05

## 2021-09-02 RX ORDER — ASPIRIN/CALCIUM CARB/MAGNESIUM 324 MG
324 TABLET ORAL ONCE
Refills: 0 | Status: COMPLETED | OUTPATIENT
Start: 2021-09-02 | End: 2021-09-02

## 2021-09-02 RX ORDER — CHOLECALCIFEROL (VITAMIN D3) 125 MCG
2000 CAPSULE ORAL DAILY
Refills: 0 | Status: DISCONTINUED | OUTPATIENT
Start: 2021-09-03 | End: 2021-09-05

## 2021-09-02 RX ORDER — INSULIN LISPRO 100/ML
VIAL (ML) SUBCUTANEOUS
Refills: 0 | Status: DISCONTINUED | OUTPATIENT
Start: 2021-09-02 | End: 2021-09-05

## 2021-09-02 RX ORDER — OMEPRAZOLE 10 MG/1
1 CAPSULE, DELAYED RELEASE ORAL
Qty: 0 | Refills: 0 | DISCHARGE

## 2021-09-02 RX ORDER — TETANUS TOXOID, REDUCED DIPHTHERIA TOXOID AND ACELLULAR PERTUSSIS VACCINE, ADSORBED 5; 2.5; 8; 8; 2.5 [IU]/.5ML; [IU]/.5ML; UG/.5ML; UG/.5ML; UG/.5ML
0.5 SUSPENSION INTRAMUSCULAR ONCE
Refills: 0 | Status: COMPLETED | OUTPATIENT
Start: 2021-09-02 | End: 2021-09-02

## 2021-09-02 RX ORDER — HEPARIN SODIUM 5000 [USP'U]/ML
4700 INJECTION INTRAVENOUS; SUBCUTANEOUS ONCE
Refills: 0 | Status: COMPLETED | OUTPATIENT
Start: 2021-09-02 | End: 2021-09-02

## 2021-09-02 RX ORDER — GLUCAGON INJECTION, SOLUTION 0.5 MG/.1ML
1 INJECTION, SOLUTION SUBCUTANEOUS ONCE
Refills: 0 | Status: DISCONTINUED | OUTPATIENT
Start: 2021-09-02 | End: 2021-09-05

## 2021-09-02 RX ORDER — PANTOPRAZOLE SODIUM 20 MG/1
40 TABLET, DELAYED RELEASE ORAL
Refills: 0 | Status: DISCONTINUED | OUTPATIENT
Start: 2021-09-02 | End: 2021-09-05

## 2021-09-02 RX ORDER — SODIUM CHLORIDE 9 MG/ML
1000 INJECTION, SOLUTION INTRAVENOUS
Refills: 0 | Status: DISCONTINUED | OUTPATIENT
Start: 2021-09-02 | End: 2021-09-05

## 2021-09-02 RX ORDER — ASPIRIN/CALCIUM CARB/MAGNESIUM 324 MG
81 TABLET ORAL DAILY
Refills: 0 | Status: DISCONTINUED | OUTPATIENT
Start: 2021-09-03 | End: 2021-09-05

## 2021-09-02 RX ORDER — HEPARIN SODIUM 5000 [USP'U]/ML
4700 INJECTION INTRAVENOUS; SUBCUTANEOUS EVERY 6 HOURS
Refills: 0 | Status: DISCONTINUED | OUTPATIENT
Start: 2021-09-02 | End: 2021-09-02

## 2021-09-02 RX ORDER — HEPARIN SODIUM 5000 [USP'U]/ML
4700 INJECTION INTRAVENOUS; SUBCUTANEOUS ONCE
Refills: 0 | Status: DISCONTINUED | OUTPATIENT
Start: 2021-09-02 | End: 2021-09-02

## 2021-09-02 RX ORDER — DEXTROSE 50 % IN WATER 50 %
15 SYRINGE (ML) INTRAVENOUS ONCE
Refills: 0 | Status: DISCONTINUED | OUTPATIENT
Start: 2021-09-02 | End: 2021-09-05

## 2021-09-02 RX ORDER — DEXTROSE 50 % IN WATER 50 %
12.5 SYRINGE (ML) INTRAVENOUS ONCE
Refills: 0 | Status: COMPLETED | OUTPATIENT
Start: 2021-09-02 | End: 2021-09-02

## 2021-09-02 RX ADMIN — TETANUS TOXOID, REDUCED DIPHTHERIA TOXOID AND ACELLULAR PERTUSSIS VACCINE, ADSORBED 0.5 MILLILITER(S): 5; 2.5; 8; 8; 2.5 SUSPENSION INTRAMUSCULAR at 11:23

## 2021-09-02 RX ADMIN — Medication 324 MILLIGRAM(S): at 13:45

## 2021-09-02 RX ADMIN — HEPARIN SODIUM 4700 UNIT(S): 5000 INJECTION INTRAVENOUS; SUBCUTANEOUS at 14:05

## 2021-09-02 RX ADMIN — HEPARIN SODIUM 950 UNIT(S)/HR: 5000 INJECTION INTRAVENOUS; SUBCUTANEOUS at 14:07

## 2021-09-02 RX ADMIN — Medication 12.5 GRAM(S): at 18:20

## 2021-09-02 NOTE — H&P ADULT - PROBLEM SELECTOR PLAN 1
See above.   Unclear to the etiology of presentation.  Tele.  Echo nondiagnostic.   Cardiology fellow and Neurology contacted by examiner this evening.

## 2021-09-02 NOTE — ED PROVIDER NOTE - PROGRESS NOTE DETAILS
pt elevated trop and bnp -- ekg nonischemic pt doesn't endorse cp -- will consult cards- -no antiplt or ac until ct head completed pt elevated trop and bnp -- ekg nonischemic pt doesn't endorse cp in ed -- will consult cards- -no antiplt or ac until ct head completed after further questioning pt enodrses stent in may - over last several weeks exertional cp similar to prev to cath -- will give asa and ac if ct neg

## 2021-09-02 NOTE — CHART NOTE - NSCHARTNOTEFT_GEN_A_CORE
Called by primary team Dr. Santizo for concern of elevated troponin.     This is a 77 yo M with hx of DM2, CKD3, CAD s/p 2 CALLUM to RCA in 3/2021, TAVR in 5/2021 with Dr. Amaya presented with mechanical fall while trying to put on his pants at home. However, per primary team's note, patient's wife noted syncope and was not able to recall events after the fall. He hit his forehead and now has a laceration. He currently denies chest pain, SOB, dizziness, palpitations. Patient was started on hep gtt initially and team called patient's cardiology Dr. Adam Miller, who rec'ed dc'ing the hep gtt. His troponin was at 460 on admission and down-trended to 433. EKG non-ischemic and unchanged from baseline.     VS: 98, 81, 172/80, 17, 98% on RA    Exam is overall unremarkable.    Labs and imaging reviewed. TTE showed mild to moderate MS, normal LVEF.    A/P:    77 yo M s/p CALLUM x2 to RCA and TAVR both this year presented after syncope and found to have elevated troponin in the setting of CKD without EKG changes or symptoms. TTE showed normal LVEF and mild to moderate MS.     - trop already down-trended, not ACS  - can check CK-MB  - agree with DC'ing hep gtt  - continue home baby asa  - maintain on telemetry given syncope, can check orthostatic VS  - replete lytes K > 4, Mg > 2 Called by primary team Dr. Santizo for concern of elevated troponin.     This is a 75 yo M with hx of DM2, CKD3, CAD s/p 2 CALLUM to RCA in 3/2021, TAVR in 5/2021 with Dr. Amaya presented with mechanical fall while trying to put on his pants at home. However, per primary team's note, patient's wife noted syncope and was not able to recall events after the fall. He hit his forehead and now has a laceration. He currently denies chest pain, SOB, dizziness, palpitations. Patient was started on hep gtt initially and team called patient's cardiology Dr. Adam Miller, who rec'ed dc'ing the hep gtt. His troponin was at 460 on admission and down-trended to 433. EKG non-ischemic and unchanged from baseline.     VS: 98, 81, 172/80, 17, 98% on RA    Exam is overall unremarkable.    Labs and imaging reviewed. TTE showed mild to moderate MS, normal LVEF.    A/P:    75 yo M s/p CALLUM x2 to RCA and TAVR both this year presented after syncope and found to have elevated troponin in the setting of CKD without EKG changes or symptoms. TTE showed normal LVEF and mild to moderate MS.     - trop already down-trended, not ACS  - can check CK-MB  - agree with DC'ing hep gtt  - continue home baby asa  - maintain on telemetry given syncope, can check orthostatic VS  - replete lytes K > 4, Mg > 2    Discussed with non-service consult attending Dr. Hernandez.

## 2021-09-02 NOTE — ED PROVIDER NOTE - PHYSICAL EXAMINATION
Const: Well-nourished, Well-developed, appearing stated age.  Eyes: no conjunctival injection, and symmetrical lids.  HEENT: +abrasion to forehead above L eye. Atraumatic external nose and ears.   Neck: Symmetric, trachea midline.   CVS: +S1/S2, Peripheral pulses 2+ and equal in all extremities.  RESP: Unlabored respiratory effort. Clear to auscultation bilaterally.  GI: Nontender/Nondistended, No CVA tenderness b/l.   MSK: Normocephalic/Atraumatic, Lower Extremities w/o calf tenderness or edema b/l. No chest wall tenderness.   Skin: Warm, dry and intact.   Neuro: CNs II-XII grossly intact. Motor & Sensation grossly intact.  Psych: Awake, Alert, & Oriented (AAO) x3. Appropriate mood and affect.

## 2021-09-02 NOTE — ED PROVIDER NOTE - OBJECTIVE STATEMENT
76M PMH HTN, HLD, DM2, Carotid stenosis s/p bilateral endarterectomy, CAD CALLUM x 3 to RCA, most recent 3/15/21, TAVR in May presenting with CC of syncopal episode. Per EMS report - pt syncopized and wife called EMS. Pt denies any prodrome, states that he was getting dressed and fell hitting his head. Wife witnessed shaking movement but pt denies ever fully losing consciousness. No chest pain, abdominal pain, confusion after event. +head trauma, no HA/n/v at this time. On daily baby aspirin, no other AC. Able to stand up and ambulate comfortably after incident.

## 2021-09-02 NOTE — H&P ADULT - NSHPLABSRESULTS_GEN_ALL_CORE
WBC 5.0    Hgb 10.5    Platelets 148    INR 0.9    COVID-19 PCR>>sent.    Random glucose of 185    Cr 1.94>>1.57 (CKD3) WBC 5.0    Hgb 10.5    Platelets 148    INR 0.9    COVID-19 PCR>>sent.    Random glucose of 185    Cr 1.94>>1.57 (CKD3)    HS troponin 461>>433>>repeated.    UA with 30 protein, gluc ++++    Lactate 1.5    CTT head NO bleed, LEFT temporal lobe chronic infarcts  CTA brain negative.  LEFT vertebral artery V4 narrowing    Chest radiograph reviewed with no infiltrate or effusion.  Pelvis no fracture    BNP 1445. WBC 5.0    Hgb 10.5    Platelets 148    INR 0.9    COVID-19 PCR>>sent.    Random glucose of 185    Cr 1.94>>1.57 (CKD3)    HS troponin 461>>433>>repeated.    UA with 30 protein, gluc ++++    Lactate 1.5    CTT head NO bleed, LEFT temporal lobe chronic infarcts  CTA brain negative.  LEFT vertebral artery V4 narrowing    Chest radiograph reviewed with no infiltrate or effusion.  Pelvis no fracture    BNP 1445.    EKG tracing reviewed with NSR at 85 with nonspecific ST changes.

## 2021-09-02 NOTE — ED ADULT NURSE REASSESSMENT NOTE - NS ED NURSE REASSESS COMMENT FT1
Patient aware of being admitted to the hospital. Will be notified when bed is available. Patient has no questions at this time.

## 2021-09-02 NOTE — CONSULT NOTE ADULT - ASSESSMENT
INCOMPLETE  Assessment:  76y R-handed M with h/o hypertension, HLD, DM2, CAD, Aortic valve stenosis, MV insufficiency and CKD p/w syncopal episode and convulsive activity being evaluated for possible seizure.   On exam, he has a left frontal scalp hematoma and no focal neurologic deficits.     Semiology described: R. hand clonic activity followed by B/L clonic activity in both arms lasting 5-6 seconds with partial impaired awareness.     IMPRESSION: Syncopal episode, possible convulsive syncope though should also r/o focal to b/l seizure though very short in nature.     Plan  [] 24 hr vEEG   [] No AED for now Assessment:  76y R-handed M with h/o hypertension, HLD, DM2, CAD, Aortic valve stenosis, MV insufficiency and CKD p/w syncopal episode and convulsive activity being evaluated for possible seizure.   On exam, he has a left frontal scalp hematoma and no focal neurologic deficits.     Semiology described: R. hand clonic activity followed by B/L clonic activity in both arms lasting 5-6 seconds with partial impaired awareness.   CTH with indeterminate hypodensity in left temporal region, perhaps chronic infarcts.    IMPRESSION: Syncopal episode, possible convulsive syncope though should also r/o focal to b/l seizure though very short in nature.     Plan  [] 24 hr vEEG   [] No AED for now  [] Consider MRI brain w/o contrast if EEG abnormal to further elucidate hypodensity in left temporal region given left sided location and right sided semiology, though not direct correlation.  [] Ativan 2mg IVP for any seizure lasting >3 min.     Patrick Hernández, DO  PGY-4 Chief Resident  Neurology Service

## 2021-09-02 NOTE — H&P ADULT - HISTORY OF PRESENT ILLNESS
NIGHT HOSPITALIST:  Patient UNKNOWN to me previously, assigned to me at this point via the ER to admit this 75 y/o M--patient followed by his physicians above--patient with a history of type 2 DM on oral Rx, chronic kidney disease stage 3, essential HTN on an increased dose of lisinopril from 10 to 20 mg daily, CAD with CALLUM x 2 to the RCA in March 2021, s/P TAVR in May 2021 with a true syncopal episode of patient noted by wife to have a syncopal episode with the patient attempting to get dressed and reported a presume mechanical fall, hitting his forehead but did not seek medical attention until he did not recall events with patient noted by spouse to have shaking episodes after the event.   Patient denies HA, or focal weakness.  NO chest pain/pressure.  No palpitations.  No cough, no dyspnoea.  No fever, no chills, no rigors.  No N/V.   No palliative or exacerbating factors. NIGHT HOSPITALIST:  Patient UNKNOWN to me previously, assigned to me at this point via the ER to admit this 75 y/o M--patient followed by his physicians above--patient with a history of type 2 DM on oral Rx, chronic kidney disease stage 3, essential HTN on an increased dose of lisinopril from 10 to 20 mg daily, CAD with CALLUM x 2 to the RCA in March 2021, s/P TAVR in May 2021 with a true syncopal episode of patient noted by wife to have a syncopal episode with the patient attempting to get dressed and reported a presume mechanical fall, hitting his forehead but did not seek medical attention until he did not recall events with patient noted by spouse to have shaking episodes after the event.   Patient denies HA, or focal weakness.  NO chest pain/pressure.  No palpitations.  No cough, no dyspnoea.  No fever, no chills, no rigors.  No N/V.   No palliative or exacerbating factors.    S/P Moderna vaccine x 3 (received booster)

## 2021-09-02 NOTE — ED ADULT NURSE NOTE - NSICDXPASTMEDICALHX_GEN_ALL_CORE_FT
PAST MEDICAL HISTORY:  NATONIO (acute kidney injury)     Aortic valve stenosis, etiology of cardiac valve disease unspecified     Chronic kidney disease, unspecified CKD stage     Coronary artery disease involving native coronary artery of native heart, angina presence unspecified     Essential hypertension     Hyperlipidemia, unspecified hyperlipidemia type     Mitral valve insufficiency, unspecified etiology     Prostate cancer 5/2019 - treated with radiation and hormones    Renal calculi     Type 2 diabetes mellitus without complication, without long-term current use of insulin

## 2021-09-02 NOTE — CONSULT NOTE ADULT - SUBJECTIVE AND OBJECTIVE BOX
NEUROLOGY CONSULT  HPI: 76y R-handed man w/ h/o DM2, CKD3, HTN on increased lisinopril from 10mg to 20mg WD, CDA w/ CALLUM x2 and aortic stenosis s/p TAVR 05/2021 w/ h/o syncopal episode p/w mechanical fall and convulsive episode being evaluated for seizure.  PT was in normal state of health and then on 09/01 evening the wife noticed his right arm rhythmically shaking.  The PT did not quite notice it until this was pointed out to him and then tried to suppress it with his other hand and eventually it stopped.  Unclear how long this lasted but wife thinks less than 1 minute.  Then, today (09/02), PT was putting on socks and felt more dizzy than he has been and fell forward and hit his head.  Pt endorses feeling dizzy described as light-headed pre syncopal since his lisinopril dosing has increased from 10 to 20.  After hitting his head he did not have LOC but was escorted to bed by his wife.  Several minutes later she came back in the room and while PT was still conscious both wife and PT saw R arm rhythmic shaking.  Wife reports this evolving to include entire body shaking.  Entire episode lasted <1 minute and wife estimates only lasted 5-6 seconds.  PT wqs complteely back to normal right away and had no aura or aftrword    .   y of th DM on oral Rx, chronic kidney disease stage 3, essential HTN on an increased dose of lisinopril from 10 to 20 mg daily, CAD with CALLUM x 2 to the RCA in March 2021, s/P TAVR in May 2021 with a true syncopal episode of patient noted by wife to have a syncopal episode with the patient attempting to get dressed and reported a presume mechanical fall, hitting his forehead but did not seek medical attention until he did not recall events with patient noted by spouse to have shaking episodes after the event.   Patient denies HA, or focal weakness.  NO chest pain/pressure.  No palpitations.  No cough, no dyspnoea.  No fever, no chills, no rigors.  No N/V.   No palliative or exacerbating factors.    S/P Moderna vaccine x 3 (received booster) (02 Sep 2021 16:20)      ROS: As above.  PMH & PSH:  PAST MEDICAL & SURGICAL HISTORY:  Essential hypertension    Hyperlipidemia, unspecified hyperlipidemia type    Type 2 diabetes mellitus without complication, without long-term current use of insulin    Coronary artery disease involving native coronary artery of native heart, angina presence unspecified    Aortic valve stenosis, etiology of cardiac valve disease unspecified    Mitral valve insufficiency, unspecified etiology    ANTONIO (acute kidney injury)    Chronic kidney disease, unspecified CKD stage    Renal calculi    Prostate cancer  5/2019 - treated with radiation and hormones    Stented coronary artery  X 3 in RCA, last one 3/15/21    H/O carotid endarterectomy  bilateral 7/2014    H/O hernia repair  1966, left inguinal    H/O lithotripsy  6/2020      Outpatient Provider: Presley Johnston  Outpatient Provider: Fuad Beaulieu  Outpatient Provider: Larry Toth  Outpatient Provider: Adam Miller    Home Medications:  glimepiride 2 mg oral tablet: 1 tab(s) orally once a day (02 Sep 2021 16:41)  Jardiance 25 mg oral tablet: 1 tab(s) orally once a day (in the morning) (02 Sep 2021 16:41)  lisinopril 20 mg oral tablet: 1 tab(s) orally once a day    **NOTE: Patient started taking 20mg 1 week ago, used to take 10 mg. Pharmacy last filled 10mg on 07/2021 (02 Sep 2021 16:36)  metFORMIN 500 mg oral tablet, extended release: 2 tab(s) orally once a day (02 Sep 2021 16:41)  Multiple Vitamins oral tablet: 1 tab(s) orally once a day, every other day  (02 Sep 2021 16:41)  omeprazole 20 mg oral delayed release capsule: 2 cap(s) orally, As Needed (02 Sep 2021 16:41)  rosuvastatin 40 mg oral tablet: 1 tab(s) orally once a day (in the evening) (02 Sep 2021 16:41)  Steglatro 15 mg oral tablet: 1 tab(s) orally once a day (in the morning) (02 Sep 2021 16:41)  Vitamin D3 50 mcg (2000 intl units) oral tablet: 1 tab(s) orally once a day, every other day (02 Sep 2021 16:41)    MEDICATIONS  (STANDING):  atorvastatin 80 milliGRAM(s) Oral at bedtime  dextrose 40% Gel 15 Gram(s) Oral once  dextrose 5%. 1000 milliLiter(s) (50 mL/Hr) IV Continuous <Continuous>  dextrose 5%. 1000 milliLiter(s) (100 mL/Hr) IV Continuous <Continuous>  dextrose 50% Injectable 25 Gram(s) IV Push once  dextrose 50% Injectable 12.5 Gram(s) IV Push once  dextrose 50% Injectable 25 Gram(s) IV Push once  glucagon  Injectable 1 milliGRAM(s) IntraMuscular once  insulin lispro (ADMELOG) corrective regimen sliding scale   SubCutaneous three times a day before meals  insulin lispro (ADMELOG) corrective regimen sliding scale   SubCutaneous at bedtime  multivitamin 1 Tablet(s) Oral daily  pantoprazole    Tablet 40 milliGRAM(s) Oral before breakfast    MEDICATIONS  (PRN):    Allergies    Zytiga (Unknown)    Intolerances      Social History:  Rare wine, negative CAGE screen.   Quit remote cigarettes in 1980. (02 Sep 2021 16:20)     FAMILY HISTORY:  Family history of essential hypertension        OBJECTIVE  Vital Signs Last 24 Hrs  T(C): 36.7 (02 Sep 2021 16:55), Max: 36.7 (02 Sep 2021 16:55)  T(F): 98 (02 Sep 2021 16:55), Max: 98 (02 Sep 2021 16:55)  HR: 81 (02 Sep 2021 16:55) (81 - 96)  BP: 172/80 (02 Sep 2021 16:55) (139/56 - 172/80)  BP(mean): --  RR: 17 (02 Sep 2021 16:55) (16 - 20)  SpO2: 98% (02 Sep 2021 16:55) (98% - 100%)  Orthostatic VS    Height (cm): 175.3 (09-02)  Weight (kg): 80.4 (09-02)  BMI (kg/m2): 26.2 (09-02)    PHYSICAL EXAM:  Mental Status: AxO to person, place, situation, time.   Language: Fluent w/ preserved naming, repetition & comprehension.    CNs: PERRL (R = 3mm, L = 3mm).  EOMI, no nystagmus. B/L V1-3 intact to light touch.  No facial asymmetry b/l.  Hearing grossly normal to conversation.  No dysarthria.  Palate midline & symmetric elevation.  Tongue midline, normal movements.  Cortical: Visual fields intact.  No extinction on double simultaneous touch, no signs of sensory or visual neglect.   Motor: Normal muscle bulk & tone.   */5 strength B/L UE & LE.  Sensation: Symmetric B/L preserved sensation to pin prick & two point discrimination throughout all points tested.    Reflexes: 2/4 throughout, toes downgoing B/L.  Coordination: No dysmetria on B/L FTN or HTS.  B/L rapid alternating movements intact.   Gait: Normal stance, stride length, touch off, arm swing and caterina. Normal Romberg. No postural instability.       Labs:                        10.5   6.59  )-----------( 148      ( 02 Sep 2021 20:20 )             33.4     09-02    138  |  106  |  43<H>  ----------------------------<  185<H>  5.3   |  17<L>  |  1.57<H>    Ca    9.3      02 Sep 2021 11:13  Phos  2.7     09-02  Mg     1.8     09-02    TPro  7.5  /  Alb  4.4  /  TBili  0.2  /  DBili  x   /  AST  45<H>  /  ALT  53<H>  /  AlkPhos  95  09-02    PT/INR - ( 02 Sep 2021 11:13 )   PT: 11.7 sec;   INR: 0.97 ratio         PTT - ( 02 Sep 2021 20:20 )  PTT:34.2 sec  COVID-19 PCR: NotDetec (02 Sep 2021 14:13)  COVID-19 PCR: NotDetec (13 May 2021 15:58)  COVID-19 PCR: NotDetec (11 May 2021 14:36)  COVID-19 PCR: NotDetec (12 Mar 2021 17:53)            Imaging:  CT Angio Neck w/ IV Cont:  (09-02)  CT Angio Head w/ IV Cont:  (09-02)    CT Spine:      MRI Spine:      EEG:    NEUROLOGY CONSULT  HPI: 76y R-handed man w/ h/o DM2, CKD3, HTN on increased lisinopril from 10mg to 20mg WD, CDA w/ CALLUM x2 and aortic stenosis s/p TAVR 05/2021 w/ h/o syncopal episode p/w mechanical fall and convulsive episode being evaluated for seizure.  PT was in normal state of health and then on 09/01 evening the wife noticed his right arm rhythmically shaking.  The PT did not quite notice it until this was pointed out to him and then tried to suppress it with his other hand and eventually it stopped.  Unclear how long this lasted but wife thinks less than 1 minute.  Then, today (09/02), PT was putting on socks and felt more dizzy than he has been and fell forward and hit his head.  Pt endorses feeling dizzy described as light-headed pre syncopal since his lisinopril dosing has increased from 10 to 20.  After hitting his head he did not have LOC but was escorted to bed by his wife.  Several minutes later she came back in the room and while PT was still conscious both wife and PT saw R arm rhythmic shaking.  Wife reports this evolving to include entire body shaking.  Entire episode lasted <1 minute and wife estimates only lasted 5-6 seconds.  PT was complteely back to normal right away and had no aura.  No incontinence, tongue biting or post ictal confusion.  Has never had any similar events prior.  PT has partial recollection of events.      ROS: As above.  PMH & PSH:  PAST MEDICAL & SURGICAL HISTORY:  Essential hypertension  Hyperlipidemia, unspecified hyperlipidemia type  Type 2 diabetes mellitus without complication, without long-term current use of insulin  Coronary artery disease involving native coronary artery of native heart, angina presence unspecified  Aortic valve stenosis, etiology of cardiac valve disease unspecified  Mitral valve insufficiency, unspecified etiology  ANTONIO (acute kidney injury)  Chronic kidney disease, unspecified CKD stage  Renal calculi  Prostate cancer  5/2019 - treated with radiation and hormones  Stented coronary artery  X 3 in RCA, last one 3/15/21  H/O carotid endarterectomy  bilateral 7/2014  H/O hernia repair  1966, left inguinal  H/O lithotripsy  6/2020    Outpatient Provider: Presley Johnston  Outpatient Provider: Fuad Beaulieu  Outpatient Provider: Larry Toth  Outpatient Provider: Adam Miller    Home Medications:  glimepiride 2 mg oral tablet: 1 tab(s) orally once a day (02 Sep 2021 16:41)  Jardiance 25 mg oral tablet: 1 tab(s) orally once a day (in the morning) (02 Sep 2021 16:41)  lisinopril 20 mg oral tablet: 1 tab(s) orally once a day    **NOTE: Patient started taking 20mg 1 week ago, used to take 10 mg. Pharmacy last filled 10mg on 07/2021 (02 Sep 2021 16:36)  metFORMIN 500 mg oral tablet, extended release: 2 tab(s) orally once a day (02 Sep 2021 16:41)  Multiple Vitamins oral tablet: 1 tab(s) orally once a day, every other day  (02 Sep 2021 16:41)  omeprazole 20 mg oral delayed release capsule: 2 cap(s) orally, As Needed (02 Sep 2021 16:41)  rosuvastatin 40 mg oral tablet: 1 tab(s) orally once a day (in the evening) (02 Sep 2021 16:41)  Steglatro 15 mg oral tablet: 1 tab(s) orally once a day (in the morning) (02 Sep 2021 16:41)  Vitamin D3 50 mcg (2000 intl units) oral tablet: 1 tab(s) orally once a day, every other day (02 Sep 2021 16:41)    MEDICATIONS  (STANDING):  atorvastatin 80 milliGRAM(s) Oral at bedtime  dextrose 40% Gel 15 Gram(s) Oral once  dextrose 5%. 1000 milliLiter(s) (50 mL/Hr) IV Continuous <Continuous>  dextrose 5%. 1000 milliLiter(s) (100 mL/Hr) IV Continuous <Continuous>  dextrose 50% Injectable 25 Gram(s) IV Push once  dextrose 50% Injectable 12.5 Gram(s) IV Push once  dextrose 50% Injectable 25 Gram(s) IV Push once  glucagon  Injectable 1 milliGRAM(s) IntraMuscular once  insulin lispro (ADMELOG) corrective regimen sliding scale   SubCutaneous three times a day before meals  insulin lispro (ADMELOG) corrective regimen sliding scale   SubCutaneous at bedtime  multivitamin 1 Tablet(s) Oral daily  pantoprazole    Tablet 40 milliGRAM(s) Oral before breakfast    MEDICATIONS  (PRN):    Allergies: Zytiga (Unknown)    Social History:  Rare wine, negative CAGE screen.   Quit remote cigarettes in 1980. (02 Sep 2021 16:20)     FAMILY HISTORY:  Family history of essential hypertension    OBJECTIVE  Vital Signs Last 24 Hrs  T(C): 36.7 (02 Sep 2021 16:55), Max: 36.7 (02 Sep 2021 16:55)  T(F): 98 (02 Sep 2021 16:55), Max: 98 (02 Sep 2021 16:55)  HR: 81 (02 Sep 2021 16:55) (81 - 96)  BP: 172/80 (02 Sep 2021 16:55) (139/56 - 172/80)  BP(mean): --  RR: 17 (02 Sep 2021 16:55) (16 - 20)  SpO2: 98% (02 Sep 2021 16:55) (98% - 100%)  Orthostatic VS    Height (cm): 175.3 (09-02)  Weight (kg): 80.4 (09-02)  BMI (kg/m2): 26.2 (09-02)    PHYSICAL EXAM:  Skin: Fresh scalp hematoma left frontal region.   Mental Status: AxO to person, place, situation, time.   Language: Fluent w/ preserved naming, repetition & comprehension.    CNs: PERRL (R = 3mm, L = 3mm).  EOMI, no nystagmus. B/L V1-3 intact to light touch.  No facial asymmetry b/l.  Hearing grossly normal to conversation.  No dysarthria.  Palate midline & symmetric elevation.  Tongue midline, normal movements.  Cortical: Visual fields intact.  No extinction on double simultaneous touch, no signs of sensory or visual neglect.   Motor: Normal muscle bulk & tone.   5/5 strength B/L UE & LE.  Sensation: Symmetric B/L preserved sensation to pin prick & two point discrimination throughout all points tested.    Reflexes: 2/4 throughout, toes downgoing B/L.  Coordination: No dysmetria on B/L FTN or HTS.  B/L rapid alternating movements intact.   Gait: Normal stance, stride length, touch off, arm swing and caterina. Normal Romberg. No postural instability.       Labs:                        10.5   6.59  )-----------( 148      ( 02 Sep 2021 20:20 )             33.4     09-02    138  |  106  |  43<H>  ----------------------------<  185<H>  5.3   |  17<L>  |  1.57<H>    Ca    9.3      02 Sep 2021 11:13  Phos  2.7     09-02  Mg     1.8     09-02    TPro  7.5  /  Alb  4.4  /  TBili  0.2  /  DBili  x   /  AST  45<H>  /  ALT  53<H>  /  AlkPhos  95  09-02    PT/INR - ( 02 Sep 2021 11:13 )   PT: 11.7 sec;   INR: 0.97 ratio         PTT - ( 02 Sep 2021 20:20 )  PTT:34.2 sec  COVID-19 PCR: NotDetec (02 Sep 2021 14:13)  COVID-19 PCR: NotDetec (13 May 2021 15:58)  COVID-19 PCR: NotDetec (11 May 2021 14:36)  COVID-19 PCR: NotDetec (12 Mar 2021 17:53)    CTH: (09.02.21 @ 12:41)  There are indeterminate hypodensities in the left temporal lobe but probably represent chronic infarcts at this time. No evidence of acute loss of gray-white matter differentiation. There is no intracranial hemorrhage or mass effect. Further correlation with MRI of the brain suggested for more detailed characterization if there are no contraindications.    CTA brain: There is no large vessel occlusion or aneurysm involving major proximal intracranial arteries. There is moderate to severe narrowing of the V4 segment of the left vertebral artery as above.  CTA NECK: There is no hemodynamically significant narrowing involving major neck arteries.

## 2021-09-02 NOTE — ED ADULT NURSE NOTE - OBJECTIVE STATEMENT
75 y/o male  arrives to the ER s/p fall.  PMH of      Pt is A&Ox4, speaking coherently. Pt states having a fall while changing his pants, loss balance fall around  830AM, head injury no LOC,  founded to have a seizure like activity at 950am, witnessed by wife   Pt denies SOB, chest pain, dizziness, N/V/D, urinary symptoms, fevers, chills.    On assessment airway is patent, breathing spontaneously and unlabored. Skin is dry, warm and color appropriate to race.  Abdomen is soft, no distended, no tender. Full ROM in all extremities. Comfort measures provided. call bell within reach, bed locked in the lowest position. will continue to reassess . 77 y/o male  arrives to the ER s/p fall.  PMH of HTN, CKD.CAD, HLD, DM, prostate cancer. Pt is A&Ox4, speaking coherently. Pt states having a fall while changing his pants, loss balance fall around  830AM, head injury,  no LOC,  founded to have a seizure like activity at 950am, no tongue bit,  no incontinence, seizure witnessed by wife.  At arrival pt has a laceration to the forehead, no bleeding or deformities noted.  On assessment well appearing, airway is patent, breathing spontaneously and unlabored. skin warm and intact, PERRL, EOM intact, sensory intact, equal strength b/l in all upper and lower extremities.   Pt denies SOB, chest pain, dizziness, N/V/D, urinary symptoms, fevers, chills.  Comfort measures provided. call bell within reach, bed locked in the lowest position. will continue to reassess . 77 y/o male  arrives to the ER s/p fall.  PMH of HTN, CKD.CAD, HLD, DM, Aortic stenosis, TAVR( 05/2021). Pt is A&Ox4, speaking coherently. Pt states having a fall while changing his pants, loss balance fall around  830AM, head injury,  no LOC, found to have a seizure  activity at 950am, no tongue bite,  no incontinence, seizure like witnessed by wife.  At arrival pt has a laceration to the forehead, no bleeding or deformities noted.  On assessment well appearing, airway is patent, breathing spontaneously and unlabored. skin warm and intact, PERRL, EOM intact, sensory intact, equal strength b/l in all upper and lower extremities. Pt denies SOB, chest pain, dizziness, N/V/D, urinary symptoms, fevers, chills.  Comfort measures provided. call bell within reach, bed locked in the lowest position. will continue to reassess .

## 2021-09-02 NOTE — H&P ADULT - NSHPOUTPATIENTPROVIDERS_GEN_ALL_CORE
Larry Miller MD (Cardiology) [phone]  Presley Johnston MD [phone] Larry Toth  [phone]  Adam Miller MD (Cardiology) [phone]  Presley Johnston MD [phone] Larry Toth  [phone]  Adam Miller MD (Cardiology) [phone]  Luis Alfredo Membreno MD (Interventional Cardiology) [phone]  Presley Johnston MD [phone]

## 2021-09-02 NOTE — H&P ADULT - NSHPPHYSICALEXAM_GEN_ALL_CORE
Physical exam with an elderly, nontoxic, chronically ill appearing M. Physical exam with an elderly, nontoxic, chronically ill appearing M.    Afebrile.   HR  92   RR 14   /56  99% on RA    HEENT<PERRL< EOMI, LEFT center forehead abrasion S/P fall.  Neck supple, trachea midline  Chest clear, good air exchange  Cor s1 s2, no murmurs  Abdomen soft nontender, normal bowel sounds  Skin dry, see above.  Ext no deformities, poor nail hygiene, no oedema.  Neurologic AxOx3.  Speech fluent.  Cognition intact.  UE/LE 5/5.  Mild stuttering.  NO SI/HI. Physical exam with an elderly, nontoxic, chronically ill appearing M.    Afebrile.   HR  92   RR 14   /56  99% on RA    HEENT<PERRL< EOMI, LEFT center forehead abrasion S/P fall.  Neck supple, trachea midline  Chest clear, good air exchange  Cor s1 s2, no murmurs  Abdomen soft nontender, normal bowel sounds  Skin dry, see above.  Ext no deformities, poor nail hygiene, no oedema.  Full range of motion of all 4 extremities.  Neurologic AxOx3.  Speech fluent.  Cognition intact.  UE/LE 5/5.  Mild stuttering.  NO SI/HI.

## 2021-09-02 NOTE — H&P ADULT - PROBLEM SELECTOR PLAN 7
Transitions of Care Status:  1.  Name of PCP:     Larry Toth  [phone]  2.  PCP Contacted on Admission: [ ] Y    [x ] N    3.  PCP contacted at Discharge: [ ] Y    [ ] N    [ ] N/A  4.  Post-Discharge Appointment Date and Location:  5.  Summary of Handoff given to PCP:

## 2021-09-02 NOTE — H&P ADULT - NSHPADDITIONALINFOADULT_GEN_ALL_CORE
NIGHT HOSPITALIST:   Patient/ adult son in attendance.   Aware of course and agrees with plan/care as above.   Given patient's comorbidities, patient's long term prognosis is guarded.  Emotional support provided to patient/ son.  Care reviewed with covering NP/PA for endorsement to my physician colleagues.    Arash Santizo MD  [phone] NIGHT HOSPITALIST:   Patient/ adult son in attendance.   Aware of course and agrees with plan/care as above.   Given patient's comorbidities, patient's long term prognosis is guarded.  Emotional support provided to patient/ son.  Care reviewed with covering NP/PA, Gail, for endorsement to my physician colleagues.    Arash Santizo MD  [phone] NIGHT HOSPITALIST:   Patient/ adult son in attendance.   Aware of course and agrees with plan/care as above.   Given patient's comorbidities, patient's long term prognosis is guarded.  Emotional support provided to patient/ son.  Discussed with Dr. PACO Miller, Dr. Yoanna Berg and Dr. Patrick Hernández.  Care reviewed with covering NP/PA, Gail, for endorsement to my physician colleagues.    Arash Santizo MD  [phone]

## 2021-09-02 NOTE — H&P ADULT - NSHPREVIEWOFSYSTEMS_GEN_ALL_CORE
NO cough, no wheezing.  NO fever, no chills, no rigors.  NO abdominal pain, no red blood per rectum or melena.  No back pain, no tearing back pain.  NO rash.    No dysuria, no hematuria.  NO weight loss or anorexia.  NO SI/HI.  NO node swelling.  No thyroid symptoms.

## 2021-09-02 NOTE — CONSULT NOTE ADULT - TIME BILLING
Patient was seen and examined with residents. I took a thorough history and performed a detailed examination. Agree with above recommendation. Until EEG done and further imaging and fu with outside neurologist,, would not recommend patient to drive or operate machinery. Also advised not to swim

## 2021-09-02 NOTE — ED PROVIDER NOTE - CARE PLAN
1 Principal Discharge DX:	Head trauma  Secondary Diagnosis:	Seizure   Principal Discharge DX:	Non-ST elevation MI (NSTEMI)  Secondary Diagnosis:	Seizure

## 2021-09-02 NOTE — H&P ADULT - PROBLEM SELECTOR PLAN 2
See above.   Unclear if presentation represents non ST elevation MI with CKD3>>heparin gtt HELD for now.   Requested formal evaluation by the cardiology fellow this evening. See above.   Unclear if presentation represents non ST elevation MI with CKD3>>heparin gtt HELD for now.   HS troponin repeated.  Requested formal evaluation by the cardiology fellow this evening.

## 2021-09-02 NOTE — H&P ADULT - ASSESSMENT
NIGHT HOSPITALIST:  s/P syncopal episode in the setting of patient with presumed mechanical fall but with report by spouse of patient with shaking episodes unclear if this was an ictal event>>patient with mild hypoglycemia in the ER>>will HOLD patient's DM2 medications and provided glucose.   Unclear if this represents non ST elevation MI with patient initiated on IV heparin by the ED attending>>examiner reviewed with Dr. PACO Miller above and not felt by cardiology to be a non ST elevation MI and heparin gtt discontinued.   I  have asked Dr. Yoanna Berg, cardiology fellow to evaluate the patient tonight for further subspecialty guidance for this evening.   Patient has already received his ASA this evening.  Unclear if plans from the cardiology aspect would be a repeat cardiac catheterization, but in the context of patient's CKD3 already complicating interpretation of the elevated HS troponin.   Patient's echo with normal LV function and TAVR.      I have also asked Neurology on call, Dr. Patrick Hernández, to evaluate the patient for concern of an ictal episode, as it is not clear if the patient's mild hypoglycemia in the ER (patient's random glucose was 185 earlier) could explain patient's presentation.    Patient and patient's adult son in attendance>>aware of above, agree with plan/care as above.    Will lower patient's lisinopril to 10 mg daily for now, with the possibility of orthostasis.  Check orthostatics.

## 2021-09-03 LAB
A1C WITH ESTIMATED AVERAGE GLUCOSE RESULT: 6.4 % — HIGH (ref 4–5.6)
APTT BLD: 34.8 SEC — SIGNIFICANT CHANGE UP (ref 27.5–35.5)
COVID-19 SPIKE DOMAIN AB INTERP: POSITIVE
COVID-19 SPIKE DOMAIN ANTIBODY RESULT: >250 U/ML — HIGH
CULTURE RESULTS: NO GROWTH — SIGNIFICANT CHANGE UP
ESTIMATED AVERAGE GLUCOSE: 137 MG/DL — HIGH (ref 68–114)
GLUCOSE BLDC GLUCOMTR-MCNC: 115 MG/DL — HIGH (ref 70–99)
GLUCOSE BLDC GLUCOMTR-MCNC: 230 MG/DL — HIGH (ref 70–99)
GLUCOSE BLDC GLUCOMTR-MCNC: 80 MG/DL — SIGNIFICANT CHANGE UP (ref 70–99)
GLUCOSE BLDC GLUCOMTR-MCNC: 82 MG/DL — SIGNIFICANT CHANGE UP (ref 70–99)
HCT VFR BLD CALC: 36.7 % — LOW (ref 39–50)
HGB BLD-MCNC: 11.5 G/DL — LOW (ref 13–17)
INR BLD: 1.03 RATIO — SIGNIFICANT CHANGE UP (ref 0.88–1.16)
MCHC RBC-ENTMCNC: 27.8 PG — SIGNIFICANT CHANGE UP (ref 27–34)
MCHC RBC-ENTMCNC: 31.3 GM/DL — LOW (ref 32–36)
MCV RBC AUTO: 88.9 FL — SIGNIFICANT CHANGE UP (ref 80–100)
NRBC # BLD: 0 /100 WBCS — SIGNIFICANT CHANGE UP (ref 0–0)
NT-PROBNP SERPL-SCNC: 3287 PG/ML — HIGH (ref 0–300)
PLATELET # BLD AUTO: 176 K/UL — SIGNIFICANT CHANGE UP (ref 150–400)
PROTHROM AB SERPL-ACNC: 12.3 SEC — SIGNIFICANT CHANGE UP (ref 10.6–13.6)
RBC # BLD: 4.13 M/UL — LOW (ref 4.2–5.8)
RBC # FLD: 13.2 % — SIGNIFICANT CHANGE UP (ref 10.3–14.5)
SARS-COV-2 IGG+IGM SERPL QL IA: >250 U/ML — HIGH
SARS-COV-2 IGG+IGM SERPL QL IA: POSITIVE
SPECIMEN SOURCE: SIGNIFICANT CHANGE UP
WBC # BLD: 6.15 K/UL — SIGNIFICANT CHANGE UP (ref 3.8–10.5)
WBC # FLD AUTO: 6.15 K/UL — SIGNIFICANT CHANGE UP (ref 3.8–10.5)

## 2021-09-03 PROCEDURE — 99222 1ST HOSP IP/OBS MODERATE 55: CPT | Mod: GC

## 2021-09-03 PROCEDURE — 99233 SBSQ HOSP IP/OBS HIGH 50: CPT

## 2021-09-03 PROCEDURE — 95720 EEG PHY/QHP EA INCR W/VEEG: CPT

## 2021-09-03 RX ORDER — ACETAMINOPHEN 500 MG
650 TABLET ORAL ONCE
Refills: 0 | Status: COMPLETED | OUTPATIENT
Start: 2021-09-03 | End: 2021-09-03

## 2021-09-03 RX ADMIN — HEPARIN SODIUM 5000 UNIT(S): 5000 INJECTION INTRAVENOUS; SUBCUTANEOUS at 21:41

## 2021-09-03 RX ADMIN — Medication 650 MILLIGRAM(S): at 22:18

## 2021-09-03 RX ADMIN — PANTOPRAZOLE SODIUM 40 MILLIGRAM(S): 20 TABLET, DELAYED RELEASE ORAL at 05:01

## 2021-09-03 RX ADMIN — Medication 2: at 13:05

## 2021-09-03 RX ADMIN — Medication 650 MILLIGRAM(S): at 06:14

## 2021-09-03 RX ADMIN — ATORVASTATIN CALCIUM 80 MILLIGRAM(S): 80 TABLET, FILM COATED ORAL at 21:39

## 2021-09-03 RX ADMIN — Medication 650 MILLIGRAM(S): at 05:01

## 2021-09-03 RX ADMIN — Medication 1 TABLET(S): at 13:05

## 2021-09-03 RX ADMIN — Medication 650 MILLIGRAM(S): at 21:47

## 2021-09-03 RX ADMIN — Medication 2000 UNIT(S): at 13:05

## 2021-09-03 RX ADMIN — HEPARIN SODIUM 5000 UNIT(S): 5000 INJECTION INTRAVENOUS; SUBCUTANEOUS at 13:05

## 2021-09-03 RX ADMIN — LISINOPRIL 10 MILLIGRAM(S): 2.5 TABLET ORAL at 05:01

## 2021-09-03 RX ADMIN — HEPARIN SODIUM 5000 UNIT(S): 5000 INJECTION INTRAVENOUS; SUBCUTANEOUS at 05:01

## 2021-09-03 RX ADMIN — Medication 81 MILLIGRAM(S): at 13:04

## 2021-09-03 NOTE — PHYSICAL THERAPY INITIAL EVALUATION ADULT - PERTINENT HX OF CURRENT PROBLEM, REHAB EVAL
76M PMH T2DM, CKD3, HTN, CAD with DESx2 3/2021, TAVR 5/2021 s/p syncopal episode in the setting of patient with presumed mechanical fall but with report by spouse of patient with shaking episodes. Unclear if this was an ictal event>>patient with mild hypoglycemia in the ER. Admitted for syncope workup.

## 2021-09-03 NOTE — PHYSICAL THERAPY INITIAL EVALUATION ADULT - ADDITIONAL COMMENTS
Patient reports that he lives in a 1 level house with 4 steps to enter. Independent with ADLs, ambulation at baseline, no AD. Community ambulator, [place]  track.

## 2021-09-03 NOTE — CHART NOTE - NSCHARTNOTEFT_GEN_A_CORE
Case discussed with hospitalist.    Plan:  VEEG if possible, if patient elects for outpatient workup, then outpatient epilepsy follow up with Dr. Chapman. 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 Patient should also follow up with stroke neurology (Claribel Tobias, ADRIENNE [phone], [facility], [email or web address]) for further stroke workup.   No driving for 1 year, operating heavy machinery, standing on ledges/high places, or bathing/swimming unsupervised.    Case discussed with primary team and neurology attending, Dr. Quintanilla Case discussed with hospitalist.    Plan:  VEEG if possible, if patient elects for outpatient workup, then outpatient epilepsy follow up with Dr. Chapman. 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 Patient should also follow up with stroke neurology (Claribel Tobias, ADRIENNE [phone], [facility], [email or web address]) for further stroke workup.   No driving for 1 year, operating heavy machinery, standing on ledges/high places, or bathing/swimming unsupervised.    Case discussed with primary team and neurology attending, Dr. Quintanilla. Agreed

## 2021-09-03 NOTE — CHART NOTE - NSCHARTNOTEFT_GEN_A_CORE
EEG reviewed until ~1800    No seizures recorded.    Final report to be completed at the completion of the study tomorrow morning       Mahamed Eaton MD  Epilepsy Fellow    Reading Room: [phone]  On Call Service After Hours: [phone]

## 2021-09-03 NOTE — CHART NOTE - NSCHARTNOTEFT_GEN_A_CORE
77 y/o M--patient followed by his physicians above--patient with a history of type 2 DM on oral Rx, chronic kidney disease stage 3, essential HTN on an increased dose of lisinopril from 10 to 20 mg daily, CAD with CALLUM x 2 to the RCA in March 2021, s/P TAVR in May 2021 with a true syncopal episode.     Endocrine consulted for hypoglycemia. FS noted to be 62 and 66 on 9/2  Patient has a history of T2DM. A1c 6.4.   On multiple agents: Metformin, Glimeperide, Steglatro + Jardiance??    Suspect hypoglycemia is from multiple agents, including sulfonylurea, in the setting of CKD    Today FS are higher, off D5. Patient is eating     Recs:   -Continue low dose correctional scale at this time   -For d/c, would d/c with Metformin 500 mg BID and Jardiance 25 mg daily  -STOP Glimeperide on d/c      Maria Dolores Lewis MD  Endocrine Fellow  Pager: [phone]/JUAN [phone]  Consults 9am-5pm: [phone]  After 5pm and weekends: [phone] 75 y/o M--patient followed by his physicians above--patient with a history of type 2 DM on oral Rx, chronic kidney disease stage 3, essential HTN on an increased dose of lisinopril from 10 to 20 mg daily, CAD with CALLUM x 2 to the RCA in March 2021, s/P TAVR in May 2021 with a true syncopal episode.     Endocrine consulted for hypoglycemia. FS noted to be 62 and 66 on 9/2  Patient has a history of T2DM. A1c 6.4.   On multiple agents: Metformin, Glimeperide, Steglatro + Jardiance??    Suspect hypoglycemia is from multiple agents, including sulfonylurea, in the setting of CKD    Today FS are higher, off D5. Patient is eating     Recs:   -Continue low dose correctional scale at this time   -For d/c, would d/c with Metformin 500 mg BID and Jardiance 25 mg daily  -STOP Glimeperide on d/c    Official consult to follow in AM      Maria Dolores Lewis MD  Endocrine Fellow  Pager: [phone]/JUAN [phone]  Consults 9am-5pm: [phone]  After 5pm and weekends: [phone]

## 2021-09-04 DIAGNOSIS — E16.2 HYPOGLYCEMIA, UNSPECIFIED: ICD-10-CM

## 2021-09-04 DIAGNOSIS — E78.5 HYPERLIPIDEMIA, UNSPECIFIED: ICD-10-CM

## 2021-09-04 DIAGNOSIS — E83.52 HYPERCALCEMIA: ICD-10-CM

## 2021-09-04 LAB
ANION GAP SERPL CALC-SCNC: 15 MMOL/L — SIGNIFICANT CHANGE UP (ref 5–17)
BUN SERPL-MCNC: 34 MG/DL — HIGH (ref 7–23)
CALCIUM SERPL-MCNC: 10.8 MG/DL — HIGH (ref 8.4–10.5)
CHLORIDE SERPL-SCNC: 103 MMOL/L — SIGNIFICANT CHANGE UP (ref 96–108)
CO2 SERPL-SCNC: 20 MMOL/L — LOW (ref 22–31)
CREAT SERPL-MCNC: 1.79 MG/DL — HIGH (ref 0.5–1.3)
GLUCOSE BLDC GLUCOMTR-MCNC: 107 MG/DL — HIGH (ref 70–99)
GLUCOSE BLDC GLUCOMTR-MCNC: 117 MG/DL — HIGH (ref 70–99)
GLUCOSE BLDC GLUCOMTR-MCNC: 122 MG/DL — HIGH (ref 70–99)
GLUCOSE BLDC GLUCOMTR-MCNC: 99 MG/DL — SIGNIFICANT CHANGE UP (ref 70–99)
GLUCOSE SERPL-MCNC: 95 MG/DL — SIGNIFICANT CHANGE UP (ref 70–99)
HCT VFR BLD CALC: 39.7 % — SIGNIFICANT CHANGE UP (ref 39–50)
HGB BLD-MCNC: 12.3 G/DL — LOW (ref 13–17)
MAGNESIUM SERPL-MCNC: 2.2 MG/DL — SIGNIFICANT CHANGE UP (ref 1.6–2.6)
MCHC RBC-ENTMCNC: 27.8 PG — SIGNIFICANT CHANGE UP (ref 27–34)
MCHC RBC-ENTMCNC: 31 GM/DL — LOW (ref 32–36)
MCV RBC AUTO: 89.8 FL — SIGNIFICANT CHANGE UP (ref 80–100)
NRBC # BLD: 0 /100 WBCS — SIGNIFICANT CHANGE UP (ref 0–0)
PLATELET # BLD AUTO: 173 K/UL — SIGNIFICANT CHANGE UP (ref 150–400)
POTASSIUM SERPL-MCNC: 4.6 MMOL/L — SIGNIFICANT CHANGE UP (ref 3.5–5.3)
POTASSIUM SERPL-SCNC: 4.6 MMOL/L — SIGNIFICANT CHANGE UP (ref 3.5–5.3)
RBC # BLD: 4.42 M/UL — SIGNIFICANT CHANGE UP (ref 4.2–5.8)
RBC # FLD: 13.2 % — SIGNIFICANT CHANGE UP (ref 10.3–14.5)
SODIUM SERPL-SCNC: 138 MMOL/L — SIGNIFICANT CHANGE UP (ref 135–145)
WBC # BLD: 4.87 K/UL — SIGNIFICANT CHANGE UP (ref 3.8–10.5)
WBC # FLD AUTO: 4.87 K/UL — SIGNIFICANT CHANGE UP (ref 3.8–10.5)

## 2021-09-04 PROCEDURE — 99233 SBSQ HOSP IP/OBS HIGH 50: CPT

## 2021-09-04 PROCEDURE — 99223 1ST HOSP IP/OBS HIGH 75: CPT

## 2021-09-04 PROCEDURE — 95718 EEG PHYS/QHP 2-12 HR W/VEEG: CPT

## 2021-09-04 RX ORDER — ACETAMINOPHEN 500 MG
650 TABLET ORAL ONCE
Refills: 0 | Status: COMPLETED | OUTPATIENT
Start: 2021-09-04 | End: 2021-09-04

## 2021-09-04 RX ORDER — SODIUM CHLORIDE 9 MG/ML
1000 INJECTION INTRAMUSCULAR; INTRAVENOUS; SUBCUTANEOUS
Refills: 0 | Status: DISCONTINUED | OUTPATIENT
Start: 2021-09-04 | End: 2021-09-05

## 2021-09-04 RX ADMIN — HEPARIN SODIUM 5000 UNIT(S): 5000 INJECTION INTRAVENOUS; SUBCUTANEOUS at 21:18

## 2021-09-04 RX ADMIN — Medication 650 MILLIGRAM(S): at 06:15

## 2021-09-04 RX ADMIN — HEPARIN SODIUM 5000 UNIT(S): 5000 INJECTION INTRAVENOUS; SUBCUTANEOUS at 14:02

## 2021-09-04 RX ADMIN — Medication 650 MILLIGRAM(S): at 21:17

## 2021-09-04 RX ADMIN — Medication 650 MILLIGRAM(S): at 06:45

## 2021-09-04 RX ADMIN — Medication 1 TABLET(S): at 09:00

## 2021-09-04 RX ADMIN — ATORVASTATIN CALCIUM 80 MILLIGRAM(S): 80 TABLET, FILM COATED ORAL at 21:18

## 2021-09-04 RX ADMIN — LISINOPRIL 10 MILLIGRAM(S): 2.5 TABLET ORAL at 08:58

## 2021-09-04 RX ADMIN — Medication 650 MILLIGRAM(S): at 21:47

## 2021-09-04 RX ADMIN — PANTOPRAZOLE SODIUM 40 MILLIGRAM(S): 20 TABLET, DELAYED RELEASE ORAL at 06:18

## 2021-09-04 RX ADMIN — Medication 2000 UNIT(S): at 08:59

## 2021-09-04 RX ADMIN — Medication 81 MILLIGRAM(S): at 08:59

## 2021-09-04 RX ADMIN — HEPARIN SODIUM 5000 UNIT(S): 5000 INJECTION INTRAVENOUS; SUBCUTANEOUS at 06:17

## 2021-09-04 RX ADMIN — SODIUM CHLORIDE 50 MILLILITER(S): 9 INJECTION INTRAMUSCULAR; INTRAVENOUS; SUBCUTANEOUS at 19:18

## 2021-09-04 NOTE — CONSULT NOTE ADULT - ASSESSMENT
77 y/o male with poorly controlled T2DM (A1c 6.4%) complicated with chronic infarcts (on imaging), CKD3, CAD s/p stents here s/p lightheadedness/mechanical fall and seizure like activity. 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 Secondary diagnosis includes HTN, HLD. Endocrine consulted for hypoglycemia and T2DM management (HIGH risk and high medical decision making)    T2DM with hypoglycemia  - FS goal is 100-180  - A1c goal is <7.5% without hypoglycemia  - Recommend continue Admelog low SSI AC (1 unit for every 50>150) and HS (1 unit for every 50>250) in the inpatient setting  - Recommend RD consultation if able prior to discharge. recommend carb consistent diet without snacks inpatient  Outpatient Plan  - Recommend stop Glimepiride 2 mg daily  - Recommend continue Metformin 500 mg 2 tabs QHS contingent to current GFR status. Please page endocrine at [phone] in case CKD worsens past stage 3  - Recommend continue Steglatro 15 mg QD (as long as GFR>30) and once he runs out of this medication, recommend Jardiance 25 mg QD (as long as GFR>30)  - Recommend discharge to home with Freestyle glucometer, test strips (#100) to be tested QD, and lancets (#100) with 3 refills. Goal FS<180  - Recommend outpatient care at [facility] , [address] [place] [address], [facility] [address], [place] [address]. Call [phone] for apt with endocrinologist and RD/CDE  - Recommend minimal annual ophthalmology and podiatry visits    HTN  - Management as per primary team given changes in Lisinopril dose and lightheadedness, and chronic infarcts  - For elderly patients with DM, goal can be <140/90 if approved by neurology team    HLD  - Goal LDL <70 for T2DM patients with CAD  - Recommend continue Rosuvastatin 40 mg QD    D/w primary team  Our team will sign off, please page us for any questions or GFR changes related medication doses.    *Please note a different provider maybe managing this patient tomorrow/daily*. Please contact our office at [phone] regarding follow-up queries about this patient. For any endocrine emergencies, please state urgency when calling [phone] during business hours and to speak with on-call fellow after 5pm and on weekends.    Luann Love DO  Pager: 9AM - 5PM (Mon-Fri): [phone]  After 5PM and on Weekends: [phone]  77 y/o male with poorly controlled T2DM (A1c 6.4%) complicated with chronic infarcts (on imaging), CKD3, CAD s/p stents here s/p lightheadedness/mechanical fall and seizure like activity. 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 Secondary diagnosis includes HTN, HLD. Endocrine consulted for hypoglycemia and T2DM management (HIGH risk and high medical decision making)    T2DM with hypoglycemia  - FS goal is 100-180  - A1c goal is <7.5% without hypoglycemia  - Recommend continue Admelog low SSI AC (1 unit for every 50>150) and HS (1 unit for every 50>250) in the inpatient setting  - Recommend RD consultation if able prior to discharge. recommend carb consistent diet without snacks inpatient  Outpatient Plan  - Recommend stop Glimepiride 2 mg daily  - Recommend continue Metformin 500 mg 2 tabs QHS contingent to current GFR status. Please page endocrine at [phone] in case CKD worsens past stage 3  - Recommend continue Steglatro 15 mg QD (as long as GFR>30) and once he runs out of this medication, recommend Jardiance 25 mg QD (as long as GFR>30)  - Recommend discharge to home with Freestyle glucometer, test strips (#100) to be tested QD, and lancets (#100) with 3 refills. Goal FS<180  - Recommend outpatient care at [facility] , [address] [place] [address], [facility] [address], [place] [address]. Call [phone] for apt with endocrinologist and RD/CDE  - Recommend minimal annual ophthalmology and podiatry visits    HTN  - Management as per primary team given changes in Lisinopril dose and lightheadedness, and chronic infarcts  - For elderly patients with DM, goal can be <140/90 if approved by neurology team    HLD  - Goal LDL <70 for T2DM patients with CAD  - Recommend continue Rosuvastatin 40 mg QD    Hypercalcemia  - Likely due to dehydration in the setting of dehydration and ANTONIO  - D/w primary team to consider IVF as tolerable  - Recheck BMP later today or in AM    D/w primary team  Our team will sign off, please page us for any questions or GFR changes related medication doses.    *Please note a different provider maybe managing this patient tomorrow/daily*. Please contact our office at [phone] regarding follow-up queries about this patient. For any endocrine emergencies, please state urgency when calling [phone] during business hours and to speak with on-call fellow after 5pm and on weekends.    Luann Love DO  Pager: 9AM - 5PM (Mon-Fri): [phone]  After 5PM and on Weekends: [phone]

## 2021-09-04 NOTE — CONSULT NOTE ADULT - SUBJECTIVE AND OBJECTIVE BOX
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   Patient denies HA, or focal weakness.  NO chest pain/pressure.  No palpitations.  No cough, no dyspnoea.  No fever, no chills, no rigors.  No N/V.   No palliative or exacerbating factors.    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 No seizure per EEG prelim report  Endocrine History:        Diet, Soft:   Consistent Carbohydrate No Snacks (CSTCHO)  DASH/TLC Sodium & Cholesterol Restricted (DASH)  No Concentrated Potassium  No Concentrated Phosphorus (09-02-21 @ 17:52) [Active]      PAST MEDICAL & SURGICAL HISTORY:  Essential hypertension    Hyperlipidemia, unspecified hyperlipidemia type    Type 2 diabetes mellitus without complication, without long-term current use of insulin    Coronary artery disease involving native coronary artery of native heart, angina presence unspecified    Aortic valve stenosis, etiology of cardiac valve disease unspecified    Mitral valve insufficiency, unspecified etiology    ANTONIO (acute kidney injury)    Chronic kidney disease, unspecified CKD stage    Renal calculi    Prostate cancer  5/2019 - treated with radiation and hormones    Stented coronary artery  X 3 in RCA, last one 3/15/21    H/O carotid endarterectomy  bilateral 7/2014    H/O hernia repair  1966, left inguinal    H/O lithotripsy  6/2020        FAMILY HISTORY:  Family history of essential hypertension      Social History:    Outpatient Medications:  Home Medications:  glimepiride 2 mg oral tablet: 1 tab(s) orally once a day (02 Sep 2021 16:41)  Jardiance 25 mg oral tablet: 1 tab(s) orally once a day (in the morning) (02 Sep 2021 16:41)  lisinopril 20 mg oral tablet: 1 tab(s) orally once a day    **NOTE: Patient started taking 20mg 1 week ago, used to take 10 mg. Pharmacy last filled 10mg on 07/2021 (02 Sep 2021 16:36)  metFORMIN 500 mg oral tablet, extended release: 2 tab(s) orally once a day (02 Sep 2021 16:41)  Multiple Vitamins oral tablet: 1 tab(s) orally once a day, every other day  (02 Sep 2021 16:41)  omeprazole 20 mg oral delayed release capsule: 2 cap(s) orally, As Needed (02 Sep 2021 16:41)  rosuvastatin 40 mg oral tablet: 1 tab(s) orally once a day (in the evening) (02 Sep 2021 16:41)  Steglatro 15 mg oral tablet: 1 tab(s) orally once a day (in the morning) (02 Sep 2021 16:41)  Vitamin D3 50 mcg (2000 intl units) oral tablet: 1 tab(s) orally once a day, every other day (02 Sep 2021 16:41)      MEDICATIONS  (STANDING):  aspirin enteric coated 81 milliGRAM(s) Oral daily  atorvastatin 80 milliGRAM(s) Oral at bedtime  cholecalciferol 2000 Unit(s) Oral daily  dextrose 40% Gel 15 Gram(s) Oral once  dextrose 5%. 1000 milliLiter(s) (50 mL/Hr) IV Continuous <Continuous>  dextrose 5%. 1000 milliLiter(s) (100 mL/Hr) IV Continuous <Continuous>  dextrose 50% Injectable 25 Gram(s) IV Push once  dextrose 50% Injectable 12.5 Gram(s) IV Push once  dextrose 50% Injectable 25 Gram(s) IV Push once  glucagon  Injectable 1 milliGRAM(s) IntraMuscular once  heparin   Injectable 5000 Unit(s) SubCutaneous every 8 hours  insulin lispro (ADMELOG) corrective regimen sliding scale LOW  SubCutaneous three times a day before meals  insulin lispro (ADMELOG) corrective regimen sliding scale LOW  SubCutaneous at bedtime  lisinopril 10 milliGRAM(s) Oral daily  multivitamin 1 Tablet(s) Oral daily  pantoprazole    Tablet 40 milliGRAM(s) Oral before breakfast    MEDICATIONS  (PRN):      Allergies  Zytiga (Unknown)      Review of Systems:  Constitutional: No fever  Eyes: No blurry vision  Neuro: No tremors  HEENT: No pain  Cardiovascular: No chest pain, palpitations  Respiratory: No SOB, no cough  GI: No nausea, vomiting, abdominal pain  : No dysuria  Skin: no rash  Psych: no depression  Endocrine: no polyuria, polydipsia  Hem/lymph: no swelling  Osteoporosis: no fractures    ALL OTHER SYSTEMS REVIEWED AND NEGATIVE    PHYSICAL EXAM:  VITALS: T(C): 36.6 (09-04-21 @ 08:00)  T(F): 97.9 (09-04-21 @ 08:00), Max: 99 (09-03-21 @ 21:18)  HR: 79 (09-04-21 @ 08:00) (70 - 79)  BP: 103/53 (09-04-21 @ 08:00) (97/58 - 114/61)  RR:  (18 - 18)  SpO2:  (93% - 96%)  Wt(kg): 74 kg    GENERAL: NAD, well-groomed, well-developed  EYES: No proptosis, no lid lag, anicteric  HEENT:  Atraumatic, Normocephalic, moist mucous membranes  THYROID: Normal size, no palpable nodules  RESPIRATORY: Clear to auscultation bilaterally; No rales, rhonchi, wheezing, or rubs  CARDIOVASCULAR: Regular rate and rhythm; No murmurs; no peripheral edema  GI: Soft, nontender, non distended, normal bowel sounds  SKIN: Dry, intact, No rashes or lesions  MUSCULOSKELETAL: Full range of motion, normal strength  NEURO: sensation intact, extraocular movements intact, no tremor, normal reflexes  PSYCH: Alert and oriented x 3, normal affect, normal mood  CUSHING'S SIGNS: no striae    POCT Blood Glucose.: 122 mg/dL (09-04-21 @ 09:06)    POCT Blood Glucose.: 80 mg/dL (09-03-21 @ 21:46)  POCT Blood Glucose.: 82 mg/dL (09-03-21 @ 17:42)  POCT Blood Glucose.: 230 mg/dL (09-03-21 @ 12:52) A2  POCT Blood Glucose.: 115 mg/dL (09-03-21 @ 08:43)    POCT Blood Glucose.: 105 mg/dL (09-02-21 @ 22:25)  POCT Blood Glucose.: 82 mg/dL (09-02-21 @ 21:35)  POCT Blood Glucose.: 143 mg/dL (09-02-21 @ 18:42)  POCT Blood Glucose.: 66 mg/dL (09-02-21 @ 18:07)  POCT Blood Glucose.: 62 mg/dL (09-02-21 @ 18:04)                            12.3   4.87  )-----------( 173      ( 04 Sep 2021 07:41 )             39.7       09-04    138  |  103  |  34<H>  ----------------------------<  95  4.6   |  20<L>  |  1.79<H>    EGFR if : 41<L>  EGFR if non : 36<L>    Ca    10.8<H>      09-04  Mg     2.2     09-04  Phos  2.7     09-02    TPro  7.5  /  Alb  4.4  /  TBili  0.2  /  DBili  x   /  AST  45<H>  /  ALT  53<H>  /  AlkPhos  95  09-02      aA1C with Estimated Average Glucose (09.03.21 @ 12:34)   A1C with Estimated Average Glucose Result: 6.4%    < from: CT Angio Head w/ IV Cont (09.02.21 @ 12:41) >    EXAM:  CT ANGIO BRAIN (W)AW IC                          EXAM:  CT ANGIO NECK (W)AW IC                          EXAM:  CT BRAIN                            PROCEDURE DATE:  09/02/2021            INTERPRETATION:  CT OF THE HEAD WITHOUT CONTRAST AND CTANGIOGRAPHY OF HEAD AND NECK WITH INTRAVENOUS CONTRAST.    CLINICAL INDICATION: Status post fall, seizure, rule out CVA.    TECHNIQUE: Initially volumetric CT acquisition was performed through the brain and reviewed using brain and bone window technique. Sagittal and coronal reconstructed images were obtained. Dose optimization techniques were utilized including kVp/mA modulation along with iterative reconstructions.  Subsequently volumetric acquisition was performed from the thoracic inlet to the vertex.    A total of 90 cc of Omnipaque 350 was injected intravenously without complications.  Dose optimization techniques were utilized including kVp/mA modulation along with iterative reconstructions.  MIP image analysis was performed on a separate workstation.    COMPARISON: CT brain, 4/22/2021.    FINDINGS:    Head CT:    The ventricular and sulcal size and configuration is age appropriate.  There are scattered white matter hypodensities which are nonspecific but most commonly represent chronic microvascular ischemic changes. There is hypodensity in the periventricular left temporal lobe and hypodensity in the posterior pole of the left temporal lobe, which are age indeterminate but most likely represent chronic infarcts at this time.    There is no evidence of mass effect, midline shift, acute intracranial hemorrhage, or extra-axial collections.    There has been bilateral cataract surgery. There are retention cysts/polyps in bilateral maxillary sinus. The tympanomastoid air cellsare clear.      CT angiography:    NECK:  The aortic arch is conventional in anatomy. Origin of supraaortic arteries are patent. The common carotid arteries are normal in course and caliber. There are atherosclerotic plaques at common carotid bifurcations and carotid bulbs without evidence of significant segmental stenosis.    There is atherosclerotic calcification extending from the right common carotid artery into the proximal right internal carotid artery. There is 35-40 % stenosis using NASCET criteria (normal right ICA caliber is 2.9 mm). The distal right internal carotid artery is normal in course and caliber.    There is atherosclerotic calcification extending from the left common carotid artery into the proximal left internal carotid artery. There is a 35-40 % stenosis using NASCET criteria (normal left ICA caliber is 3.0 mm).    Bilateral vertebral arteries are normal in course, caliber and contour, without evidence of dissection or occlusion.    Degenerative changes of the bony cervical spine are noted.    BRAIN:    The petrocavernous and supraclinoid ICA segments are normal in caliber.  The visualized MCA and JOSESITO branches are normal without evidence of stenosis or aneurysm. The ACOM is present. There is fetal origin of the left PCA. The right posterior communicating arteries present is patent. There is moderate to severe narrowing of the V4 segment of the left vertebral artery. The right vertebral artery is dominant. The superior cerebellar arteries, basilar and bilateral posterior cerebral arteries are patent without evidence of stenoses or aneurysm.    Other findings: None.    IMPRESSION:    CT brain: There are indeterminate hypodensities in the left temporal lobe but probably represent chronic infarcts at this time. No evidence of acute loss of gray-white matter differentiation. There is no intracranial hemorrhage or mass effect. Further correlation with MRI of the brain suggested for more detailed characterization if there are no contraindications.    CTA brain: There is no large vessel occlusion or aneurysm involving major proximal intracranial arteries.    There is moderate to severe narrowing of the V4 segment of the left vertebral artery as above.    CTA NECK: There is no hemodynamically significant narrowing involving major neck arteries.    NASCET CAROTID STENOSIS CRITERIA (distal normal appearing ICA as denominator for measurement): 0%-none, 1-49%-mild, 50-70%-moderate, 70-89%-severe, 90-99%-critical.    --- End of Report ---                SUZETTE BETHEA MD; Attending Radiologist  This document has been electronically signed. Sep  2 2021  1:00PM    < end of copied text >           Reason For Consult: T2DM    HPI: NIGHT HOSPITALIST:  Patient UNKNOWN to me previously, assigned to me at this point via the ER to admit this 77 y/o M--patient followed by his physicians above--patient with a history of type 2 DM on oral Rx, chronic kidney disease stage 3, essential HTN on an increased dose of lisinopril from 10 to 20 mg daily, CAD with CALLUM x 2 to the RCA in March 2021, s/P TAVR in May 2021 with a true syncopal episode of patient noted by wife to have a syncopal episode with the patient attempting to get dressed and reported a presume mechanical fall, hitting his forehead but did not seek medical attention until he did not recall events with patient noted by spouse to have shaking episodes after the event.   Patient denies HA, or focal weakness.  NO chest pain/pressure.  No palpitations.  No cough, no dyspnoea.  No fever, no chills, no rigors.  No N/V.   No palliative or exacerbating factors.    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 No seizure per EEG prelim report  Endocrine History:  T2DM x 10+ years, managed by PCP  He takes Metformin 500 mg 2 tabs QHS, has been on Metformin for 10+ years  He takes Glimepiride 2 mg QAM, last dose on Thursday 09/02/21  He takes Steglatro 15 mg QD. He is advised to stop this when he runs out, not covered, and then start jardiance 25 mg QD    He does not check his FS, glucometer is several years old. He follows A1c with PCP, ranges 6-7%  He admits to awareness of hyperglycemia, and s/s lightheadedness or gait instability with presumed hypoglycemia  He eats 2-3 meals a day, admits to eating large portions, bagels, vegetables, meat, breads, rice, pasta    Admits to h/o CKD3, h/o CAD s/p stents. No known h/o CVA, no retinopathy. No neuropathy  He has HTN and HLD, and takes his medications    His wife, follows Dr. Pastrana for Prolia injection      Diet, Soft:   Consistent Carbohydrate No Snacks (CSTCHO)  DASH/TLC Sodium & Cholesterol Restricted (DASH)  No Concentrated Potassium  No Concentrated Phosphorus (09-02-21 @ 17:52) [Active]      PAST MEDICAL & SURGICAL HISTORY:  Essential hypertension    Hyperlipidemia, unspecified hyperlipidemia type    Type 2 diabetes mellitus without complication, without long-term current use of insulin    Coronary artery disease involving native coronary artery of native heart, angina presence unspecified    Aortic valve stenosis, etiology of cardiac valve disease unspecified    Mitral valve insufficiency, unspecified etiology    ANTONIO (acute kidney injury)    Chronic kidney disease, unspecified CKD stage    Renal calculi    Prostate cancer  5/2019 - treated with radiation and hormones    Stented coronary artery  X 3 in RCA, last one 3/15/21    H/O carotid endarterectomy  bilateral 7/2014    H/O hernia repair  1966, left inguinal    H/O lithotripsy  6/2020        FAMILY HISTORY:  Family history of essential hypertension  + T2DM in mother and grandmother      Social History:  Smoked 1 ppd from age [age]  Occ/rare alcohol use    Outpatient Medications:  Home Medications:  glimepiride 2 mg oral tablet: 1 tab(s) orally once a day (02 Sep 2021 16:41)  Jardiance 25 mg oral tablet: 1 tab(s) orally once a day (in the morning) (02 Sep 2021 16:41)  lisinopril 20 mg oral tablet: 1 tab(s) orally once a day    **NOTE: Patient started taking 20mg 1 week ago, used to take 10 mg. Pharmacy last filled 10mg on 07/2021 (02 Sep 2021 16:36)  metFORMIN 500 mg oral tablet, extended release: 2 tab(s) orally once a day (02 Sep 2021 16:41)  Multiple Vitamins oral tablet: 1 tab(s) orally once a day, every other day  (02 Sep 2021 16:41)  omeprazole 20 mg oral delayed release capsule: 2 cap(s) orally, As Needed (02 Sep 2021 16:41)  rosuvastatin 40 mg oral tablet: 1 tab(s) orally once a day (in the evening) (02 Sep 2021 16:41)  Steglatro 15 mg oral tablet: 1 tab(s) orally once a day (in the morning) (02 Sep 2021 16:41)  Vitamin D3 50 mcg (2000 intl units) oral tablet: 1 tab(s) orally once a day, every other day (02 Sep 2021 16:41)      MEDICATIONS  (STANDING):  aspirin enteric coated 81 milliGRAM(s) Oral daily  atorvastatin 80 milliGRAM(s) Oral at bedtime  cholecalciferol 2000 Unit(s) Oral daily  dextrose 40% Gel 15 Gram(s) Oral once  dextrose 5%. 1000 milliLiter(s) (50 mL/Hr) IV Continuous <Continuous>  dextrose 5%. 1000 milliLiter(s) (100 mL/Hr) IV Continuous <Continuous>  dextrose 50% Injectable 25 Gram(s) IV Push once  dextrose 50% Injectable 12.5 Gram(s) IV Push once  dextrose 50% Injectable 25 Gram(s) IV Push once  glucagon  Injectable 1 milliGRAM(s) IntraMuscular once  heparin   Injectable 5000 Unit(s) SubCutaneous every 8 hours  insulin lispro (ADMELOG) corrective regimen sliding scale LOW  SubCutaneous three times a day before meals  insulin lispro (ADMELOG) corrective regimen sliding scale LOW  SubCutaneous at bedtime  lisinopril 10 milliGRAM(s) Oral daily  multivitamin 1 Tablet(s) Oral daily  pantoprazole    Tablet 40 milliGRAM(s) Oral before breakfast    MEDICATIONS  (PRN):      Allergies  Zytiga (Unknown)      Review of Systems:  Constitutional: No fever  Eyes: No blurry vision  Neuro: No tremors  HEENT: No pain  Cardiovascular: No chest pain, palpitations  Respiratory: No SOB, no cough  GI: No nausea, vomiting, abdominal pain  : No dysuria, + h/o prostate cancer  Skin: no rash  Psych: no depression  Endocrine: no polyuria, polydipsia  Hem/lymph: no swelling  Osteoporosis: no fractures    ALL OTHER SYSTEMS REVIEWED AND NEGATIVE    PHYSICAL EXAM:  VITALS: T(C): 36.6 (09-04-21 @ 08:00)  T(F): 97.9 (09-04-21 @ 08:00), Max: 99 (09-03-21 @ 21:18)  HR: 79 (09-04-21 @ 08:00) (70 - 79)  BP: 103/53 (09-04-21 @ 08:00) (97/58 - 114/61)  RR:  (18 - 18)  SpO2:  (93% - 96%)  Wt(kg): 74 kg    GENERAL: NAD, well-groomed, well-developed, EEG head cap  EYES: No proptosis, no lid lag, anicteric  HEENT:  Atraumatic, Normocephalic, moist mucous membranes  THYROID: Normal size, no palpable nodules  RESPIRATORY: Clear to auscultation bilaterally; No rales, rhonchi, wheezing, or rubs  CARDIOVASCULAR: Regular rate and rhythm; No murmurs; no peripheral edema  GI: Soft, nontender, non distended, normal bowel sounds  SKIN: Dry, intact, No rashes or lesions  MUSCULOSKELETAL: Full range of motion, normal strength  NEURO: sensation intact, extraocular movements intact, no tremor  PSYCH: Alert and oriented x 3, normal affect, normal mood  CUSHING'S SIGNS: no striae    POCT Blood Glucose.: 122 mg/dL (09-04-21 @ 09:06)    POCT Blood Glucose.: 80 mg/dL (09-03-21 @ 21:46)  POCT Blood Glucose.: 82 mg/dL (09-03-21 @ 17:42)  POCT Blood Glucose.: 230 mg/dL (09-03-21 @ 12:52) A2  POCT Blood Glucose.: 115 mg/dL (09-03-21 @ 08:43)    POCT Blood Glucose.: 105 mg/dL (09-02-21 @ 22:25)  POCT Blood Glucose.: 82 mg/dL (09-02-21 @ 21:35)  POCT Blood Glucose.: 143 mg/dL (09-02-21 @ 18:42)  POCT Blood Glucose.: 66 mg/dL (09-02-21 @ 18:07)  POCT Blood Glucose.: 62 mg/dL (09-02-21 @ 18:04)                            12.3   4.87  )-----------( 173      ( 04 Sep 2021 07:41 )             39.7       09-04    138  |  103  |  34<H>  ----------------------------<  95  4.6   |  20<L>  |  1.79<H>    EGFR if : 41<L>  EGFR if non : 36<L>    Ca    10.8<H>      09-04  Mg     2.2     09-04  Phos  2.7     09-02    TPro  7.5  /  Alb  4.4  /  TBili  0.2  /  DBili  x   /  AST  45<H>  /  ALT  53<H>  /  AlkPhos  95  09-02      aA1C with Estimated Average Glucose (09.03.21 @ 12:34)   A1C with Estimated Average Glucose Result: 6.4%    < from: CT Angio Head w/ IV Cont (09.02.21 @ 12:41) >    EXAM:  CT ANGIO BRAIN (W)AW IC                          EXAM:  CT ANGIO NECK (W)AW IC                          EXAM:  CT BRAIN                            PROCEDURE DATE:  09/02/2021            INTERPRETATION:  CT OF THE HEAD WITHOUT CONTRAST AND CTANGIOGRAPHY OF HEAD AND NECK WITH INTRAVENOUS CONTRAST.    CLINICAL INDICATION: Status post fall, seizure, rule out CVA.    TECHNIQUE: Initially volumetric CT acquisition was performed through the brain and reviewed using brain and bone window technique. Sagittal and coronal reconstructed images were obtained. Dose optimization techniques were utilized including kVp/mA modulation along with iterative reconstructions.  Subsequently volumetric acquisition was performed from the thoracic inlet to the vertex.    A total of 90 cc of Omnipaque 350 was injected intravenously without complications.  Dose optimization techniques were utilized including kVp/mA modulation along with iterative reconstructions.  MIP image analysis was performed on a separate workstation.    COMPARISON: CT brain, 4/22/2021.    FINDINGS:    Head CT:    The ventricular and sulcal size and configuration is age appropriate.  There are scattered white matter hypodensities which are nonspecific but most commonly represent chronic microvascular ischemic changes. There is hypodensity in the periventricular left temporal lobe and hypodensity in the posterior pole of the left temporal lobe, which are age indeterminate but most likely represent chronic infarcts at this time.    There is no evidence of mass effect, midline shift, acute intracranial hemorrhage, or extra-axial collections.    There has been bilateral cataract surgery. There are retention cysts/polyps in bilateral maxillary sinus. The tympanomastoid air cellsare clear.      CT angiography:    NECK:  The aortic arch is conventional in anatomy. Origin of supraaortic arteries are patent. The common carotid arteries are normal in course and caliber. There are atherosclerotic plaques at common carotid bifurcations and carotid bulbs without evidence of significant segmental stenosis.    There is atherosclerotic calcification extending from the right common carotid artery into the proximal right internal carotid artery. There is 35-40 % stenosis using NASCET criteria (normal right ICA caliber is 2.9 mm). The distal right internal carotid artery is normal in course and caliber.    There is atherosclerotic calcification extending from the left common carotid artery into the proximal left internal carotid artery. There is a 35-40 % stenosis using NASCET criteria (normal left ICA caliber is 3.0 mm).    Bilateral vertebral arteries are normal in course, caliber and contour, without evidence of dissection or occlusion.    Degenerative changes of the bony cervical spine are noted.    BRAIN:    The petrocavernous and supraclinoid ICA segments are normal in caliber.  The visualized MCA and JOSESITO branches are normal without evidence of stenosis or aneurysm. The ACOM is present. There is fetal origin of the left PCA. The right posterior communicating arteries present is patent. There is moderate to severe narrowing of the V4 segment of the left vertebral artery. The right vertebral artery is dominant. The superior cerebellar arteries, basilar and bilateral posterior cerebral arteries are patent without evidence of stenoses or aneurysm.    Other findings: None.    IMPRESSION:    CT brain: There are indeterminate hypodensities in the left temporal lobe but probably represent chronic infarcts at this time. No evidence of acute loss of gray-white matter differentiation. There is no intracranial hemorrhage or mass effect. Further correlation with MRI of the brain suggested for more detailed characterization if there are no contraindications.    CTA brain: There is no large vessel occlusion or aneurysm involving major proximal intracranial arteries.    There is moderate to severe narrowing of the V4 segment of the left vertebral artery as above.    CTA NECK: There is no hemodynamically significant narrowing involving major neck arteries.    NASCET CAROTID STENOSIS CRITERIA (distal normal appearing ICA as denominator for measurement): 0%-none, 1-49%-mild, 50-70%-moderate, 70-89%-severe, 90-99%-critical.    --- End of Report ---                SUZETTE BETHEA MD; Attending Radiologist  This document has been electronically signed. Sep  2 2021  1:00PM    < end of copied text >

## 2021-09-04 NOTE — CHART NOTE - NSCHARTNOTEFT_GEN_A_CORE
EEG report reviewed, no seizures seen.    EEG SUMMARY/CLASSIFICATION    Normal EEG in the awake, drowsy and asleep states.    _____________________________________________________________  EEG IMPRESSION/CLINICAL CORRELATE    Normal prolonged EEG study.  No epileptiform pattern or seizure seen.    No need for AEDs.   Pt can follow up with outpatient neurology after discharge.    Neurology signing off. Please page [phone] for any further questions EEG report reviewed, no seizures seen.    EEG SUMMARY/CLASSIFICATION    Normal EEG in the awake, drowsy and asleep states.    _____________________________________________________________  EEG IMPRESSION/CLINICAL CORRELATE    Normal prolonged EEG study.  No epileptiform pattern or seizure seen.    No need for AEDs.   Pt can follow up with outpatient neurology after discharge. Please refer to prior chart note for discharge recommendations    Neurology signing off. Please page [phone] for any further questions EEG report reviewed, no seizures seen.    EEG SUMMARY/CLASSIFICATION    Normal EEG in the awake, drowsy and asleep states.    _____________________________________________________________  EEG IMPRESSION/CLINICAL CORRELATE    Normal prolonged EEG study.  No epileptiform pattern or seizure seen.    No need for AEDs.   Pt can follow up with outpatient neurology after discharge. Please refer to prior chart note 9/3 for discharge recommendations for outpatient followup    Neurology signing off. Please page [phone] for any further questions

## 2021-09-04 NOTE — EEG REPORT - NS EEG TEXT BOX
[facility]   COMPREHENSIVE EPILEPSY CENTER   REPORT OF LONG-TERM VIDEO EEG     [facility]: [address], [place] [address], Ph#: [phone]  [facility]: [address] [place] [address], Ph#: [phone]  [facility]: [address] [place] [address], Ph#: [phone]    Patient Name: ROSA BURGOS  Age and : 76y ([birth year])  MRN #: [id number]  Location: [address]   Referring Physician: Tru Galvin    Start Time/Date: 16:00 on 21  End Time/Date: 08:00 on 21  Duration: 15 hours 27 mins    _____________________________________________________________  STUDY INFORMATION    EEG Recording Technique:  The patient underwent continuous Video-EEG monitoring, using Telemetry System hardware on the XLTek Digital System. EEG and video data were stored on a computer hard drive with important events saved in digital archive files. The material was reviewed by a physician (electroencephalographer / epileptologist) on a daily basis. Wing and seizure detection algorithms were utilized and reviewed. An EEG Technician attended to the patient, and was available throughout daytime work hours.  The epilepsy center neurologist was available in person or on call 24-hours per day.    EEG Placement and Labeling of Electrodes:  The EEG was performed utilizing 20 channel referential EEG connections (coronal over temporal over parasagittal montage) using all standard 10-20 electrode placements with EKG, with additional electrodes placed in the inferior temporal region using the modified 10-10 montage electrode placements for elective admissions, or if deemed necessary. Recording was at a sampling rate of 256 samples per second per channel. Time synchronized digital video recording was done simultaneously with EEG recording. A low light infrared camera was used for low light recording.     _____________________________________________________________  HISTORY    Patient is a 76y old  Male who presents with a chief complaint of S/P syncopal episode at home with forehead trauma (04 Sep 2021 14:27)      PERTINENT MEDICATION:  no AEDs    _____________________________________________________________  STUDY INTERPRETATION    Findings: The background was continuous and reactive. During wakefulness, the posterior dominant rhythm consisted of symmetric, well-modulated 10 Hz activity, with amplitude to 30 uV, that attenuated to eye opening.      Background Slowing:  No generalized background slowing was present.    Focal Slowing:   None were present.    Sleep Background:  Drowsiness was characterized by fragmentation, attenuation, and slowing of the background activity.    Sleep was characterized by the presence of vertex waves, symmetric sleep spindles and K-complexes.    Other Non-Epileptiform Findings:  None were present.    Interictal Epileptiform Activity:   None were present.    Events:  Clinical events: None recorded.  Seizures: None recorded.    Activation Procedures:   Hyperventilation was not performed.    Photic stimulation was performed and did not elicit any abnormality.     Artifacts:  Intermittent myogenic and movement artifacts were noted.    ECG:  The heart rate on single channel ECG was predominantly between 60-70 BPM.    _____________________________________________________________  EEG SUMMARY/CLASSIFICATION    Normal EEG in the awake, drowsy and asleep states.    _____________________________________________________________  EEG IMPRESSION/CLINICAL CORRELATE    Normal prolonged EEG study.  No epileptiform pattern or seizure seen.    In absence of additional clinical concerns, recommend consideration for discontinuation of current EEG study with reconnection in future if clinically warranted.    *** PRELIMINARY REPORT - PENDING EPILEPSY ATTENDING REVIEW ***  _____________________________________________________________    Enoc Nava MD, MBA Fellow, [facility]     [facility]   COMPREHENSIVE EPILEPSY CENTER   REPORT OF LONG-TERM VIDEO EEG     [facility]: [address], [place] [address], Ph#: [phone]  [facility]: [address] [place] [address], Ph#: [phone]  [facility]: [address] [place] [address], Ph#: [phone]    Patient Name: ROSA BURGOS  Age and : 76y ([birth year])  MRN #: [id number]  Location: [address]   Referring Physician: Tru Galvin    Start Time/Date: 16:00 on 21  End Time/Date: 08:00 on 21  Duration: 15 hours 27 mins    _____________________________________________________________  STUDY INFORMATION    EEG Recording Technique:  The patient underwent continuous Video-EEG monitoring, using Telemetry System hardware on the XLTek Digital System. EEG and video data were stored on a computer hard drive with important events saved in digital archive files. The material was reviewed by a physician (electroencephalographer / epileptologist) on a daily basis. Wing and seizure detection algorithms were utilized and reviewed. An EEG Technician attended to the patient, and was available throughout daytime work hours.  The epilepsy center neurologist was available in person or on call 24-hours per day.    EEG Placement and Labeling of Electrodes:  The EEG was performed utilizing 20 channel referential EEG connections (coronal over temporal over parasagittal montage) using all standard 10-20 electrode placements with EKG, with additional electrodes placed in the inferior temporal region using the modified 10-10 montage electrode placements for elective admissions, or if deemed necessary. Recording was at a sampling rate of 256 samples per second per channel. Time synchronized digital video recording was done simultaneously with EEG recording. A low light infrared camera was used for low light recording.     _____________________________________________________________  HISTORY    Patient is a 76y old  Male who presents with a chief complaint of S/P syncopal episode at home with forehead trauma (04 Sep 2021 14:27)      PERTINENT MEDICATION:  no AEDs    _____________________________________________________________  STUDY INTERPRETATION    Findings: The background was continuous and reactive. During wakefulness, the posterior dominant rhythm consisted of symmetric, well-modulated 10 Hz activity, with amplitude to 30 uV, that attenuated to eye opening.      Background Slowing:  No generalized background slowing was present.    Focal Slowing:   None were present.    Sleep Background:  Drowsiness was characterized by fragmentation, attenuation, and slowing of the background activity.    Sleep was characterized by the presence of vertex waves, symmetric sleep spindles and K-complexes.    Other Non-Epileptiform Findings:  None were present.    Interictal Epileptiform Activity:   None were present.    Events:  Clinical events: None recorded.  Seizures: None recorded.    Activation Procedures:   Hyperventilation was not performed.    Photic stimulation was performed and did not elicit any abnormality.     Artifacts:  Intermittent myogenic and movement artifacts were noted.    ECG:  The heart rate on single channel ECG was predominantly between 60-70 BPM.    _____________________________________________________________  EEG SUMMARY/CLASSIFICATION    Normal EEG in the awake, drowsy and asleep states.    _____________________________________________________________  EEG IMPRESSION/CLINICAL CORRELATE    Normal prolonged EEG study.  No epileptiform pattern or seizure seen.    In absence of additional clinical concerns, recommend consideration for discontinuation of current EEG study with reconnection in future if clinically warranted.    _____________________________________________________________    Enoc Nava MD, LULY  Fellow, [facility]     [facility]   COMPREHENSIVE EPILEPSY CENTER   REPORT OF LONG-TERM VIDEO EEG     [facility]: [address], [place] [address], Ph#: [phone]  [facility]: [address] [place] [address], Ph#: [phone]  [facility]: [address] [place] [address], Ph#: [phone]    Patient Name: ROSA BURGOS  Age and : 76y (41)  MRN #: [id number]  Location: [address]   Referring Physician: Tru Galvin    Start Time/Date: 16:00 on 21  End Time/Date: 17:00 on 21  Duration: 25 hours 00 mins    _____________________________________________________________  STUDY INFORMATION    EEG Recording Technique:  The patient underwent continuous Video-EEG monitoring, using Telemetry System hardware on the XLTek Digital System. EEG and video data were stored on a computer hard drive with important events saved in digital archive files. The material was reviewed by a physician (electroencephalographer / epileptologist) on a daily basis. Wing and seizure detection algorithms were utilized and reviewed. An EEG Technician attended to the patient, and was available throughout daytime work hours.  The epilepsy center neurologist was available in person or on call 24-hours per day.    EEG Placement and Labeling of Electrodes:  The EEG was performed utilizing 20 channel referential EEG connections (coronal over temporal over parasagittal montage) using all standard 10-20 electrode placements with EKG, with additional electrodes placed in the inferior temporal region using the modified 10-10 montage electrode placements for elective admissions, or if deemed necessary. Recording was at a sampling rate of 256 samples per second per channel. Time synchronized digital video recording was done simultaneously with EEG recording. A low light infrared camera was used for low light recording.     _____________________________________________________________  HISTORY    Patient is a 76y old  Male who presents with a chief complaint of S/P syncopal episode at home with forehead trauma (04 Sep 2021 14:27)      PERTINENT MEDICATION:  no AEDs    _____________________________________________________________  STUDY INTERPRETATION    Findings: The background was continuous and reactive. During wakefulness, the posterior dominant rhythm consisted of symmetric, well-modulated 10 Hz activity, with amplitude to 30 uV, that attenuated to eye opening.      Background Slowing:  No generalized background slowing was present.    Focal Slowing:   None were present.    Sleep Background:  Drowsiness was characterized by fragmentation, attenuation, and slowing of the background activity.    Sleep was characterized by the presence of vertex waves, symmetric sleep spindles and K-complexes.    Other Non-Epileptiform Findings:  None were present.    Interictal Epileptiform Activity:   None were present.    Events:  Clinical events: None recorded.  Seizures: None recorded.    Activation Procedures:   Hyperventilation was not performed.    Photic stimulation was performed and did not elicit any abnormality.     Artifacts:  Intermittent myogenic and movement artifacts were noted.    ECG:  The heart rate on single channel ECG was predominantly between 60-70 BPM.    _____________________________________________________________  EEG SUMMARY/CLASSIFICATION    Normal EEG in the awake, drowsy and asleep states.    _____________________________________________________________  EEG IMPRESSION/CLINICAL CORRELATE    Normal prolonged EEG study.  No epileptiform pattern or seizure seen.    In absence of additional clinical concerns, recommend consideration for discontinuation of current EEG study with reconnection in future if clinically warranted.    _____________________________________________________________    Enoc Nava MD, LULY  Fellow, [facility]

## 2021-09-05 ENCOUNTER — TRANSCRIPTION ENCOUNTER (OUTPATIENT)
Age: 76
End: 2021-09-05

## 2021-09-05 VITALS
OXYGEN SATURATION: 98 % | RESPIRATION RATE: 18 BRPM | SYSTOLIC BLOOD PRESSURE: 99 MMHG | TEMPERATURE: 97 F | DIASTOLIC BLOOD PRESSURE: 60 MMHG | HEART RATE: 87 BPM

## 2021-09-05 LAB
ALBUMIN SERPL ELPH-MCNC: 4.4 G/DL — SIGNIFICANT CHANGE UP (ref 3.3–5)
ALP SERPL-CCNC: 93 U/L — SIGNIFICANT CHANGE UP (ref 40–120)
ALT FLD-CCNC: 28 U/L — SIGNIFICANT CHANGE UP (ref 10–45)
ANION GAP SERPL CALC-SCNC: 15 MMOL/L — SIGNIFICANT CHANGE UP (ref 5–17)
ANION GAP SERPL CALC-SCNC: 15 MMOL/L — SIGNIFICANT CHANGE UP (ref 5–17)
AST SERPL-CCNC: 21 U/L — SIGNIFICANT CHANGE UP (ref 10–40)
BILIRUB SERPL-MCNC: 0.4 MG/DL — SIGNIFICANT CHANGE UP (ref 0.2–1.2)
BUN SERPL-MCNC: 36 MG/DL — HIGH (ref 7–23)
BUN SERPL-MCNC: 36 MG/DL — HIGH (ref 7–23)
CALCIUM SERPL-MCNC: 10.4 MG/DL — SIGNIFICANT CHANGE UP (ref 8.4–10.5)
CALCIUM SERPL-MCNC: 10.5 MG/DL — SIGNIFICANT CHANGE UP (ref 8.4–10.5)
CHLORIDE SERPL-SCNC: 104 MMOL/L — SIGNIFICANT CHANGE UP (ref 96–108)
CHLORIDE SERPL-SCNC: 106 MMOL/L — SIGNIFICANT CHANGE UP (ref 96–108)
CO2 SERPL-SCNC: 18 MMOL/L — LOW (ref 22–31)
CO2 SERPL-SCNC: 19 MMOL/L — LOW (ref 22–31)
CREAT SERPL-MCNC: 1.84 MG/DL — HIGH (ref 0.5–1.3)
CREAT SERPL-MCNC: 1.84 MG/DL — HIGH (ref 0.5–1.3)
GLUCOSE BLDC GLUCOMTR-MCNC: 122 MG/DL — HIGH (ref 70–99)
GLUCOSE BLDC GLUCOMTR-MCNC: 140 MG/DL — HIGH (ref 70–99)
GLUCOSE SERPL-MCNC: 117 MG/DL — HIGH (ref 70–99)
GLUCOSE SERPL-MCNC: 120 MG/DL — HIGH (ref 70–99)
HCT VFR BLD CALC: 37.7 % — LOW (ref 39–50)
HGB BLD-MCNC: 12 G/DL — LOW (ref 13–17)
MAGNESIUM SERPL-MCNC: 2.3 MG/DL — SIGNIFICANT CHANGE UP (ref 1.6–2.6)
MCHC RBC-ENTMCNC: 28 PG — SIGNIFICANT CHANGE UP (ref 27–34)
MCHC RBC-ENTMCNC: 31.8 GM/DL — LOW (ref 32–36)
MCV RBC AUTO: 88.1 FL — SIGNIFICANT CHANGE UP (ref 80–100)
NRBC # BLD: 0 /100 WBCS — SIGNIFICANT CHANGE UP (ref 0–0)
PHOSPHATE SERPL-MCNC: 3.7 MG/DL — SIGNIFICANT CHANGE UP (ref 2.5–4.5)
PLATELET # BLD AUTO: 184 K/UL — SIGNIFICANT CHANGE UP (ref 150–400)
POTASSIUM SERPL-MCNC: 4.5 MMOL/L — SIGNIFICANT CHANGE UP (ref 3.5–5.3)
POTASSIUM SERPL-MCNC: 4.7 MMOL/L — SIGNIFICANT CHANGE UP (ref 3.5–5.3)
POTASSIUM SERPL-SCNC: 4.5 MMOL/L — SIGNIFICANT CHANGE UP (ref 3.5–5.3)
POTASSIUM SERPL-SCNC: 4.7 MMOL/L — SIGNIFICANT CHANGE UP (ref 3.5–5.3)
PROT SERPL-MCNC: 7.6 G/DL — SIGNIFICANT CHANGE UP (ref 6–8.3)
RBC # BLD: 4.28 M/UL — SIGNIFICANT CHANGE UP (ref 4.2–5.8)
RBC # FLD: 13.2 % — SIGNIFICANT CHANGE UP (ref 10.3–14.5)
SODIUM SERPL-SCNC: 137 MMOL/L — SIGNIFICANT CHANGE UP (ref 135–145)
SODIUM SERPL-SCNC: 140 MMOL/L — SIGNIFICANT CHANGE UP (ref 135–145)
WBC # BLD: 5 K/UL — SIGNIFICANT CHANGE UP (ref 3.8–10.5)
WBC # FLD AUTO: 5 K/UL — SIGNIFICANT CHANGE UP (ref 3.8–10.5)

## 2021-09-05 PROCEDURE — 95711 VEEG 2-12 HR UNMONITORED: CPT

## 2021-09-05 PROCEDURE — 85025 COMPLETE CBC W/AUTO DIFF WBC: CPT

## 2021-09-05 PROCEDURE — 84100 ASSAY OF PHOSPHORUS: CPT

## 2021-09-05 PROCEDURE — 85018 HEMOGLOBIN: CPT

## 2021-09-05 PROCEDURE — 71045 X-RAY EXAM CHEST 1 VIEW: CPT

## 2021-09-05 PROCEDURE — 84132 ASSAY OF SERUM POTASSIUM: CPT

## 2021-09-05 PROCEDURE — U0005: CPT

## 2021-09-05 PROCEDURE — 85610 PROTHROMBIN TIME: CPT

## 2021-09-05 PROCEDURE — 82803 BLOOD GASES ANY COMBINATION: CPT

## 2021-09-05 PROCEDURE — 85014 HEMATOCRIT: CPT

## 2021-09-05 PROCEDURE — 81001 URINALYSIS AUTO W/SCOPE: CPT

## 2021-09-05 PROCEDURE — 85730 THROMBOPLASTIN TIME PARTIAL: CPT

## 2021-09-05 PROCEDURE — 93005 ELECTROCARDIOGRAM TRACING: CPT

## 2021-09-05 PROCEDURE — 83735 ASSAY OF MAGNESIUM: CPT

## 2021-09-05 PROCEDURE — 84484 ASSAY OF TROPONIN QUANT: CPT

## 2021-09-05 PROCEDURE — 82330 ASSAY OF CALCIUM: CPT

## 2021-09-05 PROCEDURE — 90715 TDAP VACCINE 7 YRS/> IM: CPT

## 2021-09-05 PROCEDURE — 80053 COMPREHEN METABOLIC PANEL: CPT

## 2021-09-05 PROCEDURE — 99285 EMERGENCY DEPT VISIT HI MDM: CPT

## 2021-09-05 PROCEDURE — 82962 GLUCOSE BLOOD TEST: CPT

## 2021-09-05 PROCEDURE — 99232 SBSQ HOSP IP/OBS MODERATE 35: CPT

## 2021-09-05 PROCEDURE — 97161 PT EVAL LOW COMPLEX 20 MIN: CPT

## 2021-09-05 PROCEDURE — 95700 EEG CONT REC W/VID EEG TECH: CPT

## 2021-09-05 PROCEDURE — U0003: CPT

## 2021-09-05 PROCEDURE — 83880 ASSAY OF NATRIURETIC PEPTIDE: CPT

## 2021-09-05 PROCEDURE — 72170 X-RAY EXAM OF PELVIS: CPT

## 2021-09-05 PROCEDURE — 80048 BASIC METABOLIC PNL TOTAL CA: CPT

## 2021-09-05 PROCEDURE — 83605 ASSAY OF LACTIC ACID: CPT

## 2021-09-05 PROCEDURE — 93306 TTE W/DOPPLER COMPLETE: CPT

## 2021-09-05 PROCEDURE — 36415 COLL VENOUS BLD VENIPUNCTURE: CPT

## 2021-09-05 PROCEDURE — 84295 ASSAY OF SERUM SODIUM: CPT

## 2021-09-05 PROCEDURE — 70496 CT ANGIOGRAPHY HEAD: CPT | Mod: MA

## 2021-09-05 PROCEDURE — 82947 ASSAY GLUCOSE BLOOD QUANT: CPT

## 2021-09-05 PROCEDURE — 90471 IMMUNIZATION ADMIN: CPT

## 2021-09-05 PROCEDURE — 70498 CT ANGIOGRAPHY NECK: CPT | Mod: MA

## 2021-09-05 PROCEDURE — 85027 COMPLETE CBC AUTOMATED: CPT

## 2021-09-05 PROCEDURE — 70450 CT HEAD/BRAIN W/O DYE: CPT | Mod: MA

## 2021-09-05 PROCEDURE — 82435 ASSAY OF BLOOD CHLORIDE: CPT

## 2021-09-05 PROCEDURE — 95714 VEEG EA 12-26 HR UNMNTR: CPT

## 2021-09-05 PROCEDURE — 86769 SARS-COV-2 COVID-19 ANTIBODY: CPT

## 2021-09-05 PROCEDURE — 96374 THER/PROPH/DIAG INJ IV PUSH: CPT

## 2021-09-05 PROCEDURE — 83036 HEMOGLOBIN GLYCOSYLATED A1C: CPT

## 2021-09-05 PROCEDURE — 87086 URINE CULTURE/COLONY COUNT: CPT

## 2021-09-05 RX ORDER — LISINOPRIL 2.5 MG/1
1 TABLET ORAL
Qty: 30 | Refills: 0
Start: 2021-09-05 | End: 2021-10-04

## 2021-09-05 RX ORDER — GLIMEPIRIDE 1 MG
1 TABLET ORAL
Qty: 0 | Refills: 0 | DISCHARGE

## 2021-09-05 RX ORDER — ROSUVASTATIN CALCIUM 5 MG/1
1 TABLET ORAL
Qty: 0 | Refills: 0 | DISCHARGE

## 2021-09-05 RX ORDER — ATORVASTATIN CALCIUM 80 MG/1
1 TABLET, FILM COATED ORAL
Qty: 0 | Refills: 0 | DISCHARGE
Start: 2021-09-05

## 2021-09-05 RX ORDER — LISINOPRIL 2.5 MG/1
1 TABLET ORAL
Qty: 0 | Refills: 0 | DISCHARGE

## 2021-09-05 RX ORDER — LISINOPRIL 2.5 MG/1
1 TABLET ORAL
Qty: 0 | Refills: 0 | DISCHARGE
Start: 2021-09-05

## 2021-09-05 RX ORDER — METFORMIN HYDROCHLORIDE 850 MG/1
2 TABLET ORAL
Qty: 0 | Refills: 0 | DISCHARGE

## 2021-09-05 RX ORDER — ATORVASTATIN CALCIUM 80 MG/1
1 TABLET, FILM COATED ORAL
Qty: 30 | Refills: 0
Start: 2021-09-05 | End: 2021-10-04

## 2021-09-05 RX ADMIN — LISINOPRIL 10 MILLIGRAM(S): 2.5 TABLET ORAL at 05:38

## 2021-09-05 RX ADMIN — Medication 81 MILLIGRAM(S): at 11:40

## 2021-09-05 RX ADMIN — SODIUM CHLORIDE 50 MILLILITER(S): 9 INJECTION INTRAMUSCULAR; INTRAVENOUS; SUBCUTANEOUS at 11:39

## 2021-09-05 RX ADMIN — PANTOPRAZOLE SODIUM 40 MILLIGRAM(S): 20 TABLET, DELAYED RELEASE ORAL at 05:38

## 2021-09-05 RX ADMIN — HEPARIN SODIUM 5000 UNIT(S): 5000 INJECTION INTRAVENOUS; SUBCUTANEOUS at 05:38

## 2021-09-05 RX ADMIN — Medication 2000 UNIT(S): at 11:40

## 2021-09-05 RX ADMIN — Medication 1 TABLET(S): at 11:40

## 2021-09-05 NOTE — DISCHARGE NOTE PROVIDER - DISCHARGE DATE
05-Sep-2021 [Time Spent: ___ minutes] : I have spent [unfilled] minutes of time on the encounter. [>50% of the face to face encounter time was spent on counseling and/or coordination of care for ___] : Greater than 50% of the face to face encounter time was spent on counseling and/or coordination of care for [unfilled]

## 2021-09-05 NOTE — CHART NOTE - NSCHARTNOTEFT_GEN_A_CORE
Patient is a 76y old  Male who presents with a chief complaint of S/P syncopal episode at home with forehead trauma (05 Sep 2021 12:47)      Vital Signs Last 24 Hrs  T(C): 36.1 (05 Sep 2021 12:45), Max: 36.9 (04 Sep 2021 20:43)  T(F): 97 (05 Sep 2021 12:45), Max: 98.4 (04 Sep 2021 20:43)  HR: 87 (05 Sep 2021 12:45) (60 - 87)  BP: 99/60 (05 Sep 2021 12:45) (99/60 - 157/65)  BP(mean): --  RR: 18 (05 Sep 2021 12:45) (18 - 18)  SpO2: 98% (05 Sep 2021 12:45) (98% - 99%)      Labs:                          12.0   5.00  )-----------( 184      ( 05 Sep 2021 07:17 )             37.7     09-05    140  |  106  |  36<H>  ----------------------------<  117<H>  4.7   |  19<L>  |  1.84<H>    Ca    10.5      05 Sep 2021 07:17  Phos  3.7     09-05  Mg     2.3     09-05    TPro  7.6  /  Alb  4.4  /  TBili  0.4  /  DBili  x   /  AST  21  /  ALT  28  /  AlkPhos  93  09-05            Radiology:    Physical Exam:  General: WN/WD NAD  Neurology: A&Ox3, nonfocal, TILLEY x 4  Head:  Normocephalic, atraumatic  Respiratory: CTA B/L  CV: RRR, S1S2, no murmur  Abdominal: Soft, NT, ND no palpable mass  MSK: No edema, + peripheral pulses, FROM all 4 extremity    Discharge Note and Plan: discussed with Dr Damion Moffett patient medically stable for discharge   Reviewed Medications with Dr. Moffett and follow   Endocrine recommendations     >Follow up with PMD,  Endocrinology outpatient    >  >  >

## 2021-09-05 NOTE — DISCHARGE NOTE NURSING/CASE MANAGEMENT/SOCIAL WORK - NSDCFUADDAPPT_GEN_ALL_CORE_FT
Please follow up with outpatient in 1-2 weeks   Please follow up with PMD in  1-2 weeks   Please follow up Endocrinology in 1-2 week -   Outpatient care at [facility] Endocrinology , [address], [address], [place] [address]. Call [phone] for apt with endocrinologist and RD/CDE  - Recommend minimal annual ophthalmology and podiatry visits  outpatient epilepsy follow up with Dr. Chapman. ([phone], [address])  ****Endocrine instructed patient to stop Glimepiride ****

## 2021-09-05 NOTE — DISCHARGE NOTE PROVIDER - NSDCFUADDAPPT_GEN_ALL_CORE_FT
Please follow up with outpatient in 1-2 weeks   Please follow up with PMD in  1-2 weeks   Please follow up Endocrinology in 1-2 week  Please follow up with outpatient in 1-2 weeks   Please follow up with PMD in  1-2 weeks   Please follow up Endocrinology in 1-2 week -   Outpatient care at [facility] Endocr[facility]ology , [address], [address], [place] [address]. Call [phone] for apt with endocrinologist and RD/CDE  - Recommend minimal annual ophthalmology and podiatry visits   Please follow up with outpatient in 1-2 weeks   Please follow up with PMD in  1-2 weeks   Please follow up Endocrinology in 1-2 week -   Outpatient care at [facility] Endocrinology , [address], [address], [place] [address]. Call [phone] for apt with endocrinologist and RD/CDE  - Recommend minimal annual ophthalmology and podiatry visits  outpatient epilepsy follow up with Dr. Chapman. ([phone], [address])   Please follow up with outpatient in 1-2 weeks   Please follow up with PMD in  1-2 weeks   Please follow up Endocrinology in 1-2 week -   Outpatient care at [facility] Endocrinology , [address], [address], [place] [address]. Call [phone] for apt with endocrinologist and RD/CDE  - Recommend minimal annual ophthalmology and podiatry visits  outpatient epilepsy follow up with Dr. Chapman. ([phone], [address])  ****Endocrine instructed patient to stop Glimepiride ****

## 2021-09-05 NOTE — DISCHARGE NOTE PROVIDER - NSDCMRMEDTOKEN_GEN_ALL_CORE_FT
aspirin 81 mg oral delayed release tablet: 1 tab(s) orally once a day  glimepiride 2 mg oral tablet: 1 tab(s) orally once a day  Jardiance 25 mg oral tablet: 1 tab(s) orally once a day (in the morning)  lisinopril 20 mg oral tablet: 1 tab(s) orally once a day    **NOTE: Patient started taking 20mg 1 week ago, used to take 10 mg. Pharmacy last filled 10mg on 07/2021  metFORMIN 500 mg oral tablet, extended release: 2 tab(s) orally once a day  Multiple Vitamins oral tablet: 1 tab(s) orally once a day, every other day   omeprazole 20 mg oral delayed release capsule: 2 cap(s) orally, As Needed  rosuvastatin 40 mg oral tablet: 1 tab(s) orally once a day (in the evening)  Steglatro 15 mg oral tablet: 1 tab(s) orally once a day (in the morning)  Vitamin D3 50 mcg (2000 intl units) oral tablet: 1 tab(s) orally once a day, every other day   aspirin 81 mg oral delayed release tablet: 1 tab(s) orally once a day  Jardiance 25 mg oral tablet: 1 tab(s) orally once a day (in the morning)  Lipitor 80 mg oral tablet: 1 tab(s) orally once a day (at bedtime)  lisinopril 10 mg oral tablet: 1 tab(s) orally once a day  metFORMIN 500 mg oral tablet, extended release: 2 tab(s) orally once a day (at bedtime)  Multiple Vitamins oral tablet: 1 tab(s) orally once a day, every other day   omeprazole 20 mg oral delayed release capsule: 2 cap(s) orally, As Needed  Steglatro 15 mg oral tablet: 1 tab(s) orally once a day (in the morning)  Vitamin D3 50 mcg (2000 intl units) oral tablet: 1 tab(s) orally once a day, every other day

## 2021-09-05 NOTE — DISCHARGE NOTE NURSING/CASE MANAGEMENT/SOCIAL WORK - PATIENT PORTAL LINK FT
You can access the FollowMyHealth Patient Portal offered by [facility] by registering at the following website: [email or web address]. By joining [facility]’s FollowMyHealth portal, you will also be able to view your health information using other applications (apps) compatible with our system.

## 2021-09-05 NOTE — CHART NOTE - NSCHARTNOTESELECT_GEN_ALL_CORE
EEG preliminary
Endocrine Fellow/Event Note
Event Note
Event Note
NEUROLOGY/Event Note
Neurology/Event Note

## 2021-09-05 NOTE — PROGRESS NOTE ADULT - PROBLEM SELECTOR PLAN 5
Lisinopril reduced to 10 mg daily.    - check orthostatics
Lisinopril reduced to 10 mg daily.    -orthostatics negative
Lisinopril reduced to 10 mg daily.    -orthostatics negative

## 2021-09-05 NOTE — DISCHARGE NOTE PROVIDER - HOSPITAL COURSE
NIGHT HOSPITALIST:  s/P syncopal episode in the setting of patient with presumed mechanical fall but with report by spouse of patient with shaking episodes unclear if this was an ictal event- ddx includes convulsive vasovagal episode vs. symptomatic hypoglycemia       Problem/Plan - 1:  ·  Problem: Syncope.   ·  Plan: Possible vasovagal convulsive syncope after his mechanical fall vs. must consider seizure vs. symptomatic hypoglycemia as patient is on multiple oral antidiabetics. TTE EF WNL, no valvular abn, no tele events  - f/u orthostatics  - neuro following- rec VEEG- in progress, per prelim note - no seizure activity seen, but will need to await full report   - seizure/fall precautions  - endo consult placed, will stop glimepiride to help with hypoglycemia.     Problem/Plan - 2:  ·  Problem: Elevated troponin.   ·  Plan: Downtrended- low suspicion for ACS, no CP/palpitations, no tele events  - stop trending, may recheck if recurrent syncope or CP.     Problem/Plan - 3:  ·  Problem: Stage 3 chronic kidney disease.   ·  Plan: Cr at roughtly baseline 1.5, continue monitoring.     Problem/Plan - 4:  ·  Problem: Type 2 diabetes mellitus.   ·  Plan: Pt hypoglycemic states he believes he was hypoglycemic when he syncopized at home.  - hold oral diabetics  - sliding scale insulin and fingerstick monitoring  - appreciate endocrine recs  - stop glimepiride on d/c, continue with metformin, steglatro abnd jardiance   - outpt f/u with endocrine.     Problem/Plan - 5:  ·  Problem: Essential hypertension.   ·  Plan: Lisinopril reduced to 10 mg daily.    -orthostatics negative.    s/P syncopal episode in the setting of patient with presumed mechanical fall but with report by spouse of patient with shaking episodes unclear if this was an ictal event- ddx includes convulsive vasovagal episode vs. symptomatic hypoglycemia     Problem/Plan - 1:  ·  Problem: Syncope.   ·  Plan: Possible vasovagal convulsive syncope after his mechanical fall vs. must consider seizure vs. symptomatic hypoglycemia as patient is on multiple oral antidiabetics. TTE EF WNL, no valvular abn, no tele events  - f/u orthostatics  - neuro following- rec VEEG- in progress, per prelim note - no seizure activity seen, but will need to await full report   - seizure/fall precautions  - endo consult placed, will stop glimepiride to help with hypoglycemia.     Problem/Plan - 2:  ·  Problem: Elevated troponin.   ·  Plan: Downtrended- low suspicion for ACS, no CP/palpitations, no tele events  - stop trending, may recheck if recurrent syncope or CP.   Problem/Plan - 3:  ·  Problem: Stage 3 chronic kidney disease.   ·  Plan: Cr at roughtly baseline 1.5, continue monitoring.   Problem/Plan - 4:  ·  Problem: Type 2 diabetes mellitus.   ·  Plan: Pt hypoglycemic states he believes he was hypoglycemic when he syncopized at home.  - hold oral diabetics  - sliding scale insulin and fingerstick monitoring  - appreciate endocrine recs  - stop glimepiride on d/c, continue with metformin, steglatro abnd jardiance   - outpt f/u with endocrine.   Problem/Plan - 5:  ·  Problem: Essential hypertension.   ·  Plan: Lisinopril reduced to 10 mg daily.    -orthostatics negative.    Patient medically stable for discharge

## 2021-09-05 NOTE — PROGRESS NOTE ADULT - ASSESSMENT
NIGHT HOSPITALIST:  s/P syncopal episode in the setting of patient with presumed mechanical fall but with report by spouse of patient with shaking episodes unclear if this was an ictal event- ddx includes convulsive vasovagal episode vs. symptomatic hypoglycemia  

## 2021-09-05 NOTE — PROGRESS NOTE ADULT - PROBLEM SELECTOR PLAN 6
See above.   Patient/son agree to pharmacologic DVT prophylaxis in the interval.

## 2021-09-05 NOTE — PROGRESS NOTE ADULT - PROBLEM SELECTOR PLAN 7
Transitions of Care Status:  1.  Name of PCP:     Larry Toth  [phone]  2.  PCP Contacted on Admission: [ ] Y    [x ] N    3.  PCP contacted at Discharge: [ ] Y    [ ] N    [ ] N/A  4.  Post-Discharge Appointment Date and Location:  5.  Summary of Handoff given to PCP:
Transitions of Care Status:  1.  Name of PCP:     Larry Toth  [phone]  2.  PCP Contacted on Admission: [ ] Y    [x ] N    3.  PCP contacted at Discharge: [ ] Y    [ ] N    [ ] N/A  4.  Post-Discharge Appointment Date and Location:  5.  Summary of Handoff given to PCP:
Transitions of Care Status:  1.  Name of PCP:     Larry Toth  [phone]  2.  PCP Contacted on Admission: [ ] Y    [x ] N    3.  PCP contacted at Discharge: [ ] Y    [ ] N    [ ] N/A  4.  Post-Discharge Appointment Date and Location:  5.  Summary of Handoff given to PCP:    dispo: likely home pending veeg and endo eval- neuro to expedite veeg. If unable to be done this weekend, pt would like to be discharged with OP follow up by monday.

## 2021-09-05 NOTE — PROGRESS NOTE ADULT - PROBLEM SELECTOR PLAN 2
Downtrended- low suspicion for ACS, no CP/palpitations, no tele events  - stop trending, may recheck if recurrent syncope or CP

## 2021-09-05 NOTE — PROGRESS NOTE ADULT - PROBLEM SELECTOR PLAN 1
Possible vasovagal convulsive syncope after his mechanical fall vs. must consider seizure vs. symptomatic hypoglycemia as patient is on multiple oral antidiabetics. TTE EF WNL, no valvular abn, no tele events  - f/u orthostatics  - neuro following- rec VEEG- patient amenable to staying until monday, neuro to expedite EEG inpatient. Otherwise patient would like to f/u as OP with epilepsy  - seizure/fall precautions  - endo consult in AM to re-evaluate home antidiabetics- pt confirms he was on steglatro at home with plans to switch to jardiance but had not actually switched yet.
Possible vasovagal convulsive syncope after his mechanical fall vs. must consider seizure vs. symptomatic hypoglycemia as patient is on multiple oral antidiabetics. TTE EF WNL, no valvular abn, no tele events  - f/u orthostatics  - neuro following- rec VEEG- in progress, per prelim note - no seizure activity seen, but will need to await full report   - seizure/fall precautions  - endo consult placed, will stop glimepiride to help with hypoglycemia
Possible vasovagal convulsive syncope after his mechanical fall vs. must consider seizure vs. symptomatic hypoglycemia as patient is on multiple oral antidiabetics. TTE EF WNL   - orthostatics negative   - neuro following- rec VEEG, completed, no seizure activity seen, appreciate neuro recs   - seizure/fall precautions  -appreciate endo recs, will stop glimepiride to help with hypoglycemia   -pt to check blood glucose levels at home  -BG levels here improved

## 2021-09-05 NOTE — PROVIDER CONTACT NOTE (OTHER) - ACTION/TREATMENT ORDERED:
Provider made aware, no new orders. continue to monitor and notify provider of further ectopies or if patient becomes symptomatic

## 2021-09-05 NOTE — PROGRESS NOTE ADULT - PROBLEM SELECTOR PLAN 3
Cr at roughtly baseline 1.5, continue monitoring

## 2021-09-05 NOTE — PROGRESS NOTE ADULT - PROBLEM SELECTOR PLAN 4
Pt hypoglycemic states he believes he was hypoglycemic when he syncopized at home.  - hold oral diabetics  - sliding scale insulin and fingerstick monitoring  - appreciate endocrine recs  - stop glimepiride on d/c, continue with metformin, steglatro abnd jardiance   - outpt f/u with endocrine
Pt hypoglycemic states he believes he was hypoglycemic when he syncopized at home.  - hold oral diabetics  - sliding scale insulin and fingerstick monitoring  - appreciate endocrine recs  - stop glimepiride on d/c, continue with metformin, steglatro abnd jardiance   - outpt f/u with endocrine
Pt hypoglycemic yesterday, he also states he believes he was hypoglycemic when he syncopized at home.  - hold oral diabetics  - sliding scale insulin and fingerstick monitoring  - would consult endo to re-evaluate oral regimen as a1c is 6.4 and pt may be experiencing polypharmacy

## 2021-09-05 NOTE — PROGRESS NOTE ADULT - REASON FOR ADMISSION
S/P syncopal episode at home with forehead trauma

## 2021-09-05 NOTE — PROGRESS NOTE ADULT - SUBJECTIVE AND OBJECTIVE BOX
Patient is a 76y old  Male who presents with a chief complaint of S/P syncopal episode at home with forehead trauma (04 Sep 2021 11:29)    SUBJECTIVE / OVERNIGHT EVENTS: no acute events overnight     MEDICATIONS  (STANDING):  aspirin enteric coated 81 milliGRAM(s) Oral daily  atorvastatin 80 milliGRAM(s) Oral at bedtime  cholecalciferol 2000 Unit(s) Oral daily  dextrose 40% Gel 15 Gram(s) Oral once  dextrose 5%. 1000 milliLiter(s) (50 mL/Hr) IV Continuous <Continuous>  dextrose 5%. 1000 milliLiter(s) (100 mL/Hr) IV Continuous <Continuous>  dextrose 50% Injectable 25 Gram(s) IV Push once  dextrose 50% Injectable 12.5 Gram(s) IV Push once  dextrose 50% Injectable 25 Gram(s) IV Push once  glucagon  Injectable 1 milliGRAM(s) IntraMuscular once  heparin   Injectable 5000 Unit(s) SubCutaneous every 8 hours  insulin lispro (ADMELOG) corrective regimen sliding scale   SubCutaneous three times a day before meals  insulin lispro (ADMELOG) corrective regimen sliding scale   SubCutaneous at bedtime  lisinopril 10 milliGRAM(s) Oral daily  multivitamin 1 Tablet(s) Oral daily  pantoprazole    Tablet 40 milliGRAM(s) Oral before breakfast  sodium chloride 0.9%. 1000 milliLiter(s) (50 mL/Hr) IV Continuous <Continuous>    MEDICATIONS  (PRN):      Vital Signs Last 24 Hrs  T(C): 36.4 (04 Sep 2021 12:46), Max: 37.2 (03 Sep 2021 21:18)  T(F): 97.6 (04 Sep 2021 12:46), Max: 99 (03 Sep 2021 21:18)  HR: 79 (04 Sep 2021 12:46) (70 - 79)  BP: 132/75 (04 Sep 2021 12:46) (97/58 - 132/75)  BP(mean): --  RR: 18 (04 Sep 2021 12:46) (18 - 18)  SpO2: 98% (04 Sep 2021 12:46) (93% - 98%)  CAPILLARY BLOOD GLUCOSE      POCT Blood Glucose.: 107 mg/dL (04 Sep 2021 12:50)  POCT Blood Glucose.: 122 mg/dL (04 Sep 2021 09:06)  POCT Blood Glucose.: 80 mg/dL (03 Sep 2021 21:46)  POCT Blood Glucose.: 82 mg/dL (03 Sep 2021 17:42)    I&O's Summary    03 Sep 2021 07:01  -  04 Sep 2021 07:00  --------------------------------------------------------  IN: 960 mL / OUT: 0 mL / NET: 960 mL        PHYSICAL EXAM:  GENERAL: NAD  EYES: conjunctiva and sclera clear  CHEST/LUNG: Clear to auscultation bilaterally; No wheeze  HEART: +S1/S2   ABDOMEN: Soft, Nontender, Nondistended  EXTREMITIES:  no LE edema   PSYCH: AAOx3    LABS:                        12.3   4.87  )-----------( 173      ( 04 Sep 2021 07:41 )             39.7     09-04    138  |  103  |  34<H>  ----------------------------<  95  4.6   |  20<L>  |  1.79<H>    Ca    10.8<H>      04 Sep 2021 07:41  Mg     2.2     09-04      PT/INR - ( 03 Sep 2021 07:02 )   PT: 12.3 sec;   INR: 1.03 ratio         PTT - ( 03 Sep 2021 07:02 )  PTT:34.8 sec      Consultant(s) Notes Reviewed: Endocrine   
Patient is a 76y old  Male who presents with a chief complaint of S/P syncopal episode at home with forehead trauma (05 Sep 2021 12:27)      SUBJECTIVE / OVERNIGHT EVENTS: patient feeling well today, no further symptoms overnight. WCT noted on tele, but patient was asymptomatic.     MEDICATIONS  (STANDING):  aspirin enteric coated 81 milliGRAM(s) Oral daily  atorvastatin 80 milliGRAM(s) Oral at bedtime  cholecalciferol 2000 Unit(s) Oral daily  dextrose 40% Gel 15 Gram(s) Oral once  dextrose 5%. 1000 milliLiter(s) (50 mL/Hr) IV Continuous <Continuous>  dextrose 5%. 1000 milliLiter(s) (100 mL/Hr) IV Continuous <Continuous>  dextrose 50% Injectable 25 Gram(s) IV Push once  dextrose 50% Injectable 12.5 Gram(s) IV Push once  dextrose 50% Injectable 25 Gram(s) IV Push once  glucagon  Injectable 1 milliGRAM(s) IntraMuscular once  heparin   Injectable 5000 Unit(s) SubCutaneous every 8 hours  insulin lispro (ADMELOG) corrective regimen sliding scale   SubCutaneous three times a day before meals  insulin lispro (ADMELOG) corrective regimen sliding scale   SubCutaneous at bedtime  lisinopril 10 milliGRAM(s) Oral daily  multivitamin 1 Tablet(s) Oral daily  pantoprazole    Tablet 40 milliGRAM(s) Oral before breakfast  sodium chloride 0.9%. 1000 milliLiter(s) (50 mL/Hr) IV Continuous <Continuous>    MEDICATIONS  (PRN):      Vital Signs Last 24 Hrs  T(C): 36.7 (05 Sep 2021 04:25), Max: 36.9 (04 Sep 2021 20:43)  T(F): 98 (05 Sep 2021 04:25), Max: 98.4 (04 Sep 2021 20:43)  HR: 60 (05 Sep 2021 04:25) (60 - 86)  BP: 128/80 (05 Sep 2021 04:25) (110/65 - 157/65)  BP(mean): --  RR: 18 (05 Sep 2021 04:25) (18 - 18)  SpO2: 98% (05 Sep 2021 04:25) (98% - 99%)  CAPILLARY BLOOD GLUCOSE      POCT Blood Glucose.: 140 mg/dL (05 Sep 2021 08:26)  POCT Blood Glucose.: 99 mg/dL (04 Sep 2021 21:43)  POCT Blood Glucose.: 117 mg/dL (04 Sep 2021 17:38)  POCT Blood Glucose.: 107 mg/dL (04 Sep 2021 12:50)    I&O's Summary    04 Sep 2021 07:01  -  05 Sep 2021 07:00  --------------------------------------------------------  IN: 640 mL / OUT: 1900 mL / NET: -1260 mL    05 Sep 2021 07:01  -  05 Sep 2021 12:47  --------------------------------------------------------  IN: 370 mL / OUT: 0 mL / NET: 370 mL    PHYSICAL EXAM:  GENERAL: NAD  EYES: conjunctiva and sclera clear  CHEST/LUNG: Clear to auscultation bilaterally; No wheeze  HEART: +S1/S2   ABDOMEN: Soft, Nontender  EXTREMITIES: no LE edema   PSYCH: AAOx3    LABS:                        12.0   5.00  )-----------( 184      ( 05 Sep 2021 07:17 )             37.7     09-05    140  |  106  |  36<H>  ----------------------------<  117<H>  4.7   |  19<L>  |  1.84<H>    Ca    10.5      05 Sep 2021 07:17  Phos  3.7     09-05  Mg     2.3     09-05    TPro  7.6  /  Alb  4.4  /  TBili  0.4  /  DBili  x   /  AST  21  /  ALT  28  /  AlkPhos  93  09-05        
Tru aGlvin MD  Division of Hospital Medicine  Pager: [phone]  If no response or off-hours, page [phone]  -------------------------------------    Patient is a 76y old  Male who presents with a chief complaint of S/P syncopal episode at home with forehead trauma (02 Sep 2021 19:43)    SUBJECTIVE / OVERNIGHT EVENTS: none acute, no tele events  ADDITIONAL REVIEW OF SYSTEMS: Pt feels well, no headache, no syncope/presyncope, no confusion, no convulsions. Amenable to inpatient veeg.     MEDICATIONS  (STANDING):  aspirin enteric coated 81 milliGRAM(s) Oral daily  atorvastatin 80 milliGRAM(s) Oral at bedtime  cholecalciferol 2000 Unit(s) Oral daily  dextrose 40% Gel 15 Gram(s) Oral once  dextrose 5%. 1000 milliLiter(s) (50 mL/Hr) IV Continuous <Continuous>  dextrose 5%. 1000 milliLiter(s) (100 mL/Hr) IV Continuous <Continuous>  dextrose 50% Injectable 25 Gram(s) IV Push once  dextrose 50% Injectable 12.5 Gram(s) IV Push once  dextrose 50% Injectable 25 Gram(s) IV Push once  glucagon  Injectable 1 milliGRAM(s) IntraMuscular once  heparin   Injectable 5000 Unit(s) SubCutaneous every 8 hours  insulin lispro (ADMELOG) corrective regimen sliding scale   SubCutaneous three times a day before meals  insulin lispro (ADMELOG) corrective regimen sliding scale   SubCutaneous at bedtime  lisinopril 10 milliGRAM(s) Oral daily  multivitamin 1 Tablet(s) Oral daily  pantoprazole    Tablet 40 milliGRAM(s) Oral before breakfast    MEDICATIONS  (PRN):      CAPILLARY BLOOD GLUCOSE      POCT Blood Glucose.: 230 mg/dL (03 Sep 2021 12:52)  POCT Blood Glucose.: 115 mg/dL (03 Sep 2021 08:43)  POCT Blood Glucose.: 105 mg/dL (02 Sep 2021 22:25)  POCT Blood Glucose.: 82 mg/dL (02 Sep 2021 21:35)  POCT Blood Glucose.: 143 mg/dL (02 Sep 2021 18:42)  POCT Blood Glucose.: 66 mg/dL (02 Sep 2021 18:07)  POCT Blood Glucose.: 62 mg/dL (02 Sep 2021 18:04)    I&O's Summary    03 Sep 2021 07:01  -  03 Sep 2021 14:44  --------------------------------------------------------  IN: 720 mL / OUT: 0 mL / NET: 720 mL        PHYSICAL EXAM:  Vital Signs Last 24 Hrs  T(C): 36.7 (03 Sep 2021 11:47), Max: 36.8 (03 Sep 2021 04:42)  T(F): 98 (03 Sep 2021 11:47), Max: 98.2 (03 Sep 2021 04:42)  HR: 64 (03 Sep 2021 04:42) (64 - 81)  BP: 131/64 (03 Sep 2021 04:42) (131/64 - 172/80)  BP(mean): --  RR: 18 (03 Sep 2021 11:47) (17 - 18)  SpO2: 96% (03 Sep 2021 11:47) (96% - 98%)  CONSTITUTIONAL: NAD, well-developed, well-groomed  EYES: PERRLA; conjunctiva and sclera clear  ENMT: Moist oral mucosa, no pharyngeal injection or exudates; normal dentition  NECK: Supple, no palpable masses; no thyromegaly  RESPIRATORY: Normal respiratory effort; lungs are clear to auscultation bilaterally  CARDIOVASCULAR: Regular rate and rhythm, normal S1 and S2, no murmur/rub/gallop; No lower extremity edema; Peripheral pulses are 2+ bilaterally  ABDOMEN: Nontender to palpation, normoactive bowel sounds, no rebound/guarding; No hepatosplenomegaly  MUSCLOSKELETAL:  Normal gait; no clubbing or cyanosis of digits; no joint swelling or tenderness to palpation  PSYCH: A+O to person, place, and time; affect appropriate  NEUROLOGY: CN 2-12 are intact and symmetric; no gross sensory deficits; no motor deficits  SKIN: +forehaed lac, dressed    LABS:                        11.5   6.15  )-----------( 176      ( 03 Sep 2021 08:03 )             36.7     09-02    138  |  106  |  43<H>  ----------------------------<  185<H>  5.3   |  17<L>  |  1.57<H>    Ca    9.3      02 Sep 2021 11:13  Phos  2.7       Mg     1.8         TPro  7.5  /  Alb  4.4  /  TBili  0.2  /  DBili  x   /  AST  45<H>  /  ALT  53<H>  /  AlkPhos  95  02    PT/INR - ( 03 Sep 2021 07:02 )   PT: 12.3 sec;   INR: 1.03 ratio         PTT - ( 03 Sep 2021 07:02 )  PTT:34.8 sec      Urinalysis Basic - ( 02 Sep 2021 14:22 )    Color: Light Yellow / Appearance: Clear / S.013 / pH: x  Gluc: x / Ketone: Negative  / Bili: Negative / Urobili: Negative   Blood: x / Protein: 30 mg/dL / Nitrite: Negative   Leuk Esterase: Negative / RBC: 1 /hpf / WBC 0 /HPF   Sq Epi: x / Non Sq Epi: 0 /hpf / Bacteria: Negative        Culture - Urine (collected 02 Sep 2021 17:23)  Source: Clean Catch Clean Catch (Midstream)  Final Report (03 Sep 2021 13:46):    No growth        RADIOLOGY & ADDITIONAL TESTS:  Results Reviewed:   Imaging Personally Reviewed:  Electrocardiogram Personally Reviewed:    COORDINATION OF CARE:  Care Discussed with Consultants/Other Providers [Y/N]: neuro team  Prior or Outpatient Records Reviewed [Y/N]:

## 2021-09-05 NOTE — DISCHARGE NOTE PROVIDER - NSDCCPCAREPLAN_GEN_ALL_CORE_FT
PRINCIPAL DISCHARGE DIAGNOSIS  Diagnosis: Non-ST elevation MI (NSTEMI)  Assessment and Plan of Treatment: Low salt, low fat diet.   Weight management.   Take medications as prescribed.    No smoking.  Follow up appointments with your doctor(s)  as instruced.        SECONDARY DISCHARGE DIAGNOSES  Diagnosis: Seizure  Assessment and Plan of Treatment: continue with current medications   follow up with out neurology    Diagnosis: Type 2 diabetes mellitus  Assessment and Plan of Treatment: HgA1C this admission.  Make sure you get your HgA1c checked every three months.  If you take oral diabetes medications, check your blood glucose two times a day.  If you take insulin, check your blood glucose before meals and at bedtime.  It's important not to skip any meals.  Keep a log of your blood glucose results and always take it with you to your doctor appointments.  Keep a list of your current medications including injectables and over the counter medications and bring this medication list with you to all your doctor appointments.  If you have not seen your opthalmologist this year call for appointment.  Check your feet daily for redness, sores, or openings. Do not self treat. If no improvement in two days call your primary care physician for an appointment.  Low blood sugar (hypoglycemia) is a blood sugar below 70mg/dl. Check your blood sugar if you feel signs/symptoms of hypoglycemia. If your blood sugar is below 70 take 15 grams of carbohydrates (ex 4 oz of apple juice, 3-4 glucosr tablets, or 4-6 oz of regular soda) wait 15 minutes and repeat blood sugar to make sure it comes up above 70.  If your blood sugar is above 70 and you are due for a meal, have a meal.  If you are not due for a meal have a snack.  This snack helps keeps your blood sugar at a safe range.      Diagnosis: Syncope  Assessment and Plan of Treatment:     Diagnosis: Stage 3 chronic kidney disease  Assessment and Plan of Treatment: Avoid taking (NSAIDs) - (ex: Ibuprofen, Advil, Celebrex, Naprosyn)  Avoid taking any nephrotoxic agents (can harm kidneys) - Intravenous contrast for diagnostic testing, combination cold medications.  Have all medications adjusted for your renal function by your Health Care Provider.  Blood pressure control is important.  Take all medication as prescribed.      Diagnosis: Essential hypertension  Assessment and Plan of Treatment: Low salt diet  Activity as tolerated.  Take all medication as prescribed.  Follow up with your medical doctor for routine blood pressure monitoring at your next visit.  Notify your doctor if you have any of the following symptoms:   Dizziness, Lightheadedness, Blurry vision, Headache, Chest pain, Shortness of breath       PRINCIPAL DISCHARGE DIAGNOSIS  Diagnosis: Non-ST elevation MI (NSTEMI)  Assessment and Plan of Treatment: Low salt, low fat diet.   Weight management.   Take medications as prescribed.    No smoking.  Follow up appointments with your doctor(s)  as instruced.        SECONDARY DISCHARGE DIAGNOSES  Diagnosis: Seizure  Assessment and Plan of Treatment: continue with current medications   follow up with out neurology    Diagnosis: Type 2 diabetes mellitus  Assessment and Plan of Treatment: HgA1C this admission.  Make sure you get your HgA1c checked every three months.  If you take oral diabetes medications, check your blood glucose two times a day.  If you take insulin, check your blood glucose before meals and at bedtime.  It's important not to skip any meals.  Keep a log of your blood glucose results and always take it with you to your doctor appointments.  Keep a list of your current medications including injectables and over the counter medications and bring this medication list with you to all your doctor appointments.  If you have not seen your opthalmologist this year call for appointment.  Check your feet daily for redness, sores, or openings. Do not self treat. If no improvement in two days call your primary care physician for an appointment.  Low blood sugar (hypoglycemia) is a blood sugar below 70mg/dl. Check your blood sugar if you feel signs/symptoms of hypoglycemia. If your blood sugar is below 70 take 15 grams of carbohydrates (ex 4 oz of apple juice, 3-4 glucosr tablets, or 4-6 oz of regular soda) wait 15 minutes and repeat blood sugar to make sure it comes up above 70.  If your blood sugar is above 70 and you are due for a meal, have a meal.  If you are not due for a meal have a snack.  This snack helps keeps your blood sugar at a safe range.      Diagnosis: Syncope  Assessment and Plan of Treatment:     Diagnosis: Stage 3 chronic kidney disease  Assessment and Plan of Treatment: Avoid taking (NSAIDs) - (ex: Ibuprofen, Advil, Celebrex, Naprosyn)  Avoid taking any nephrotoxic agents (can harm kidneys) - Intravenous contrast for diagnostic testing, combination cold medications.  Have all medications adjusted for your renal function by your Health Care Provider.  Blood pressure control is important.  Take all medication as prescribed.      Diagnosis: Hyperlipidemia  Assessment and Plan of Treatment: continue with current medications    Diagnosis: Essential hypertension  Assessment and Plan of Treatment: Low salt diet  Activity as tolerated.  Take all medication as prescribed.  Follow up with your medical doctor for routine blood pressure monitoring at your next visit.  Notify your doctor if you have any of the following symptoms:   Dizziness, Lightheadedness, Blurry vision, Headache, Chest pain, Shortness of breath       PRINCIPAL DISCHARGE DIAGNOSIS  Diagnosis: Syncope  Assessment and Plan of Treatment: fall precaution  Please follow up with outpatient cardiology      SECONDARY DISCHARGE DIAGNOSES  Diagnosis: Seizure  Assessment and Plan of Treatment: continue with current medications   follow up with out neurology    Diagnosis: Type 2 diabetes mellitus  Assessment and Plan of Treatment: HgA1C this admission.  Make sure you get your HgA1c checked every three months.  If you take oral diabetes medications, check your blood glucose two times a day.  If you take insulin, check your blood glucose before meals and at bedtime.  It's important not to skip any meals.  Keep a log of your blood glucose results and always take it with you to your doctor appointments.  Keep a list of your current medications including injectables and over the counter medications and bring this medication list with you to all your doctor appointments.  If you have not seen your opthalmologist this year call for appointment.  Check your feet daily for redness, sores, or openings. Do not self treat. If no improvement in two days call your primary care physician for an appointment.  Low blood sugar (hypoglycemia) is a blood sugar below 70mg/dl. Check your blood sugar if you feel signs/symptoms of hypoglycemia. If your blood sugar is below 70 take 15 grams of carbohydrates (ex 4 oz of apple juice, 3-4 glucosr tablets, or 4-6 oz of regular soda) wait 15 minutes and repeat blood sugar to make sure it comes up above 70.  If your blood sugar is above 70 and you are due for a meal, have a meal.  If you are not due for a meal have a snack.  This snack helps keeps your blood sugar at a safe range.      Diagnosis: Stage 3 chronic kidney disease  Assessment and Plan of Treatment: Avoid taking (NSAIDs) - (ex: Ibuprofen, Advil, Celebrex, Naprosyn)  Avoid taking any nephrotoxic agents (can harm kidneys) - Intravenous contrast for diagnostic testing, combination cold medications.  Have all medications adjusted for your renal function by your Health Care Provider.  Blood pressure control is important.  Take all medication as prescribed.      Diagnosis: Hyperlipidemia  Assessment and Plan of Treatment: continue with current medications    Diagnosis: Essential hypertension  Assessment and Plan of Treatment: Low salt diet  Activity as tolerated.  Take all medication as prescribed.  Follow up with your medical doctor for routine blood pressure monitoring at your next visit.  Notify your doctor if you have any of the following symptoms:   Dizziness, Lightheadedness, Blurry vision, Headache, Chest pain, Shortness of breath

## 2021-09-05 NOTE — DISCHARGE NOTE PROVIDER - NSDCFUSCHEDAPPT_GEN_ALL_CORE_FT
ROSA BURGOS ; 11/02/2021 ; [facility]  ROSA BURGOS ; 11/03/2021 ; [facility] [address]

## 2021-09-05 NOTE — DISCHARGE NOTE NURSING/CASE MANAGEMENT/SOCIAL WORK - MODE OF TRANSPORTATION
PATIENT NAME: BERTRAM FULTON   MEDICAL RECORD NUMBER: [id number]   ACCOUNT NUMBER: [id number]   ADMIT DATE: 09/29/2017   DISCHARGE DATE: 09/29/2017   ATTENDING PHYSICIAN: ELFEGO YUN MD   ROOM #:   SERVICE: [facility]                                   CARDIAC CATHETERIZATION         DATE OF PROCEDURE:  09/29/2017      ADDENDUM:  Sedation addendum.  The patient received anesthesia in the form of   conscious sedation.  I was the physician who administered conscious sedation.   Conscious sedation began at 9:10 and ended at 9:18 when sedation recovery began.   There was a critical care nurse monitoring the patient throughout the procedure.      Medications for sedation were Versed and fentanyl.            _________________________________   Elfego Yun M.D. CARDIOLOGY         CC:   GENEVA Beverly M.D. Richard Ferolo, M.D.      GC/MOMO   DD: 09/29/2017  DT: 09/29/2017   TD: 09:50  TT: 16:30   [id number]         Wheelchair/Stroller

## 2021-09-07 ENCOUNTER — RX RENEWAL (OUTPATIENT)
Age: 76
End: 2021-09-07

## 2021-09-07 ENCOUNTER — NON-APPOINTMENT (OUTPATIENT)
Age: 76
End: 2021-09-07

## 2021-09-07 RX ORDER — LANCETS 28 GAUGE
EACH MISCELLANEOUS
Qty: 3 | Refills: 1 | Status: ACTIVE | COMMUNITY
Start: 2021-09-07 | End: 1900-01-01

## 2021-09-07 RX ORDER — BLOOD SUGAR DIAGNOSTIC
STRIP MISCELLANEOUS
Qty: 3 | Refills: 1 | Status: ACTIVE | COMMUNITY
Start: 2021-09-07 | End: 1900-01-01

## 2021-09-07 RX ORDER — BLOOD-GLUCOSE METER
KIT MISCELLANEOUS
Qty: 1 | Refills: 0 | Status: ACTIVE | COMMUNITY
Start: 2021-09-07 | End: 1900-01-01

## 2021-09-07 RX ORDER — ISOPROPYL ALCOHOL 70 ML/100ML
SWAB TOPICAL
Qty: 1 | Refills: 6 | Status: ACTIVE | COMMUNITY
Start: 2021-09-07 | End: 1900-01-01

## 2021-09-08 ENCOUNTER — LABORATORY RESULT (OUTPATIENT)
Age: 76
End: 2021-09-08

## 2021-09-08 ENCOUNTER — NON-APPOINTMENT (OUTPATIENT)
Age: 76
End: 2021-09-08

## 2021-09-08 ENCOUNTER — APPOINTMENT (OUTPATIENT)
Dept: INTERNAL MEDICINE | Facility: CLINIC | Age: 76
End: 2021-09-08
Payer: MEDICARE

## 2021-09-08 VITALS
WEIGHT: 174 LBS | OXYGEN SATURATION: 98 % | DIASTOLIC BLOOD PRESSURE: 60 MMHG | SYSTOLIC BLOOD PRESSURE: 120 MMHG | BODY MASS INDEX: 25.77 KG/M2 | HEIGHT: 69 IN | TEMPERATURE: 97.8 F

## 2021-09-08 VITALS — DIASTOLIC BLOOD PRESSURE: 60 MMHG | SYSTOLIC BLOOD PRESSURE: 120 MMHG

## 2021-09-08 PROCEDURE — 36415 COLL VENOUS BLD VENIPUNCTURE: CPT

## 2021-09-08 PROCEDURE — 99496 TRANSJ CARE MGMT HIGH F2F 7D: CPT | Mod: 25

## 2021-09-08 PROCEDURE — 93000 ELECTROCARDIOGRAM COMPLETE: CPT

## 2021-09-08 RX ORDER — GLIMEPIRIDE 2 MG/1
2 TABLET ORAL DAILY
Qty: 90 | Refills: 3 | Status: DISCONTINUED | COMMUNITY
Start: 2021-01-27 | End: 2021-09-08

## 2021-09-09 NOTE — PHYSICAL EXAM
[No Acute Distress] : no acute distress [Well Nourished] : well nourished [PERRL] : pupils equal round and reactive to light [EOMI] : extraocular movements intact [No Lymphadenopathy] : no lymphadenopathy [Supple] : supple [No Accessory Muscle Use] : no accessory muscle use [Clear to Auscultation] : lungs were clear to auscultation bilaterally [No Edema] : there was no peripheral edema [Soft] : abdomen soft [Non Tender] : non-tender [Non-distended] : non-distended [No Masses] : no abdominal mass palpated [No HSM] : no HSM [Normal Bowel Sounds] : normal bowel sounds [Normal] : the cranial nerves were intact [Sensation Tactile Decrease] : light touch was intact [2+] : left 2+ [Normal Affect] : the affect was normal [Normal Insight/Judgement] : insight and judgment were intact [de-identified] : HINTS exam wnl

## 2021-09-09 NOTE — HISTORY OF PRESENT ILLNESS
[Admitted on: ___] : The patient was admitted on [unfilled] [Discharged on ___] : discharged on [unfilled] [Patient Contacted By: ____] : and contacted by [unfilled] [FreeTextEntry2] : \par patient acompanied by daughter-in-law Jessica \par \par pt was admitted to hospital for dizziness found to have low BP so lisionpril decreased to 10mg daily and also found to have hypoglycemia since glimepiride discontinued\par pt states 2 weeks prior also had 3rd dose of covid-19 vaccination\par \par in hospital CT head (-) [facility] \par also had repeat TTE that was unchanged from prior \par had elevated troponin which inpatient did not think was indicative of active CAD \par \par \par yestesrday felt good\par \par today had 2 episoe of dizziness - once when he was on the floor and bending down on knees to  clothes, seocnd time while upright and looked down to grab a glass of water on table\par \par didn't hit head \par \par since hospitalization - has been off glimepride\par \par Sx started about a week ago - only changes at that time was lisinopril increased to 20mg dialy - never started new medicaiton jardiance - continued on steglartro \par \par \par per daughter-in-law patient experience episode of dizziness hours after eating cookies for past 2 days\par \par just got glucometer\par \par

## 2021-09-10 ENCOUNTER — NON-APPOINTMENT (OUTPATIENT)
Age: 76
End: 2021-09-10

## 2021-09-10 LAB
ALBUMIN SERPL ELPH-MCNC: 5 G/DL
ALP BLD-CCNC: 121 U/L
ALT SERPL-CCNC: 74 U/L
ANION GAP SERPL CALC-SCNC: 17 MMOL/L
APPEARANCE: CLEAR
AST SERPL-CCNC: 62 U/L
BACTERIA UR CULT: NORMAL
BACTERIA: NEGATIVE
BASOPHILS # BLD AUTO: 0 K/UL
BASOPHILS NFR BLD AUTO: 0 %
BILIRUB SERPL-MCNC: 0.2 MG/DL
BILIRUBIN URINE: NEGATIVE
BLOOD URINE: NEGATIVE
BUN SERPL-MCNC: 64 MG/DL
CALCIUM SERPL-MCNC: 9.9 MG/DL
CHLORIDE SERPL-SCNC: 106 MMOL/L
CO2 SERPL-SCNC: 18 MMOL/L
COLOR: COLORLESS
CREAT SERPL-MCNC: 1.92 MG/DL
EOSINOPHIL # BLD AUTO: 0.22 K/UL
EOSINOPHIL NFR BLD AUTO: 3.5 %
FOLATE SERPL-MCNC: >20 NG/ML
GLUCOSE QUALITATIVE U: ABNORMAL
GLUCOSE SERPL-MCNC: 197 MG/DL
HCT VFR BLD CALC: 36.1 %
HGB BLD-MCNC: 11.1 G/DL
HYALINE CASTS: 1 /LPF
KETONES URINE: NEGATIVE
LEUKOCYTE ESTERASE URINE: NEGATIVE
LYMPHOCYTES # BLD AUTO: 0.6 K/UL
LYMPHOCYTES NFR BLD AUTO: 9.7 %
MAN DIFF?: NORMAL
MCHC RBC-ENTMCNC: 28.2 PG
MCHC RBC-ENTMCNC: 30.7 GM/DL
MCV RBC AUTO: 91.6 FL
MICROSCOPIC-UA: NORMAL
MONOCYTES # BLD AUTO: 0.59 K/UL
MONOCYTES NFR BLD AUTO: 9.6 %
NEUTROPHILS # BLD AUTO: 4.78 K/UL
NEUTROPHILS NFR BLD AUTO: 77.2 %
NITRITE URINE: NEGATIVE
PH URINE: 5
PLATELET # BLD AUTO: 181 K/UL
POTASSIUM SERPL-SCNC: 5.3 MMOL/L
PROT SERPL-MCNC: 7.6 G/DL
PROTEIN URINE: NORMAL
RBC # BLD: 3.94 M/UL
RBC # FLD: 13.7 %
RED BLOOD CELLS URINE: 0 /HPF
SODIUM SERPL-SCNC: 141 MMOL/L
SPECIFIC GRAVITY URINE: 1.01
SQUAMOUS EPITHELIAL CELLS: 0 /HPF
UROBILINOGEN URINE: NORMAL
VIT B12 SERPL-MCNC: 931 PG/ML
WBC # FLD AUTO: 6.19 K/UL
WHITE BLOOD CELLS URINE: 0 /HPF

## 2021-09-10 RX ORDER — EMPAGLIFLOZIN 25 MG/1
25 TABLET, FILM COATED ORAL DAILY
Qty: 30 | Refills: 3 | Status: DISCONTINUED | COMMUNITY
Start: 2021-08-25 | End: 2021-09-10

## 2021-09-14 ENCOUNTER — APPOINTMENT (OUTPATIENT)
Dept: INTERNAL MEDICINE | Facility: CLINIC | Age: 76
End: 2021-09-14
Payer: MEDICARE

## 2021-09-14 VITALS
HEART RATE: 62 BPM | DIASTOLIC BLOOD PRESSURE: 68 MMHG | WEIGHT: 167 LBS | BODY MASS INDEX: 24.73 KG/M2 | RESPIRATION RATE: 15 BRPM | SYSTOLIC BLOOD PRESSURE: 120 MMHG | HEIGHT: 69 IN

## 2021-09-14 PROCEDURE — 99214 OFFICE O/P EST MOD 30 MIN: CPT

## 2021-09-14 NOTE — HISTORY OF PRESENT ILLNESS
[FreeTextEntry1] : 77 yo male presents after several falls without warning causing injury. [de-identified] : 75 yo male presents after experiencing at least 3 (possibly 4 )falls usually in the AM that occur without warning.  He has sustained injuries with a head wound and multiple black and blues.  He was hospitalized after the first when his wife noted some bilateral shaking about an hour after the first fall.  Evaluation including a head CT and 24 hour EEG monitoring was unrevealing.\par Since discharge he has had at least two more episodes of falling.\par He describes the episodes as falling to the ground and having an out of body experience-they last about 15-30 seconds.  He feels sweaty and not well but denies post episode neurological sequela and is able to right himself after the episodes.\par He denies chest pain or SOB

## 2021-09-14 NOTE — PHYSICAL EXAM
[No Acute Distress] : no acute distress [Normal Outer Ear/Nose] : the outer ears and nose were normal in appearance [No JVD] : no jugular venous distention [Clear to Auscultation] : lungs were clear to auscultation bilaterally [Normal Rate] : normal rate  [Regular Rhythm] : with a regular rhythm [No Edema] : there was no peripheral edema [Soft] : abdomen soft [Non Tender] : non-tender [No CVA Tenderness] : no CVA  tenderness [No Spinal Tenderness] : no spinal tenderness [No Focal Deficits] : no focal deficits [Speech Grossly Normal] : speech grossly normal [Normal Insight/Judgement] : insight and judgment were intact [de-identified] : Ecchymosis

## 2021-09-15 ENCOUNTER — NON-APPOINTMENT (OUTPATIENT)
Age: 76
End: 2021-09-15

## 2021-09-15 ENCOUNTER — APPOINTMENT (OUTPATIENT)
Dept: CARDIOLOGY | Facility: CLINIC | Age: 76
End: 2021-09-15
Payer: MEDICARE

## 2021-09-15 VITALS — SYSTOLIC BLOOD PRESSURE: 112 MMHG | HEART RATE: 80 BPM | DIASTOLIC BLOOD PRESSURE: 61 MMHG

## 2021-09-15 VITALS
HEART RATE: 75 BPM | RESPIRATION RATE: 17 BRPM | OXYGEN SATURATION: 99 % | SYSTOLIC BLOOD PRESSURE: 103 MMHG | BODY MASS INDEX: 24.88 KG/M2 | DIASTOLIC BLOOD PRESSURE: 61 MMHG | HEIGHT: 69 IN | WEIGHT: 168 LBS

## 2021-09-15 VITALS — HEART RATE: 84 BPM | SYSTOLIC BLOOD PRESSURE: 112 MMHG | DIASTOLIC BLOOD PRESSURE: 62 MMHG

## 2021-09-15 VITALS — HEART RATE: 75 BPM | DIASTOLIC BLOOD PRESSURE: 66 MMHG | SYSTOLIC BLOOD PRESSURE: 120 MMHG

## 2021-09-15 VITALS — DIASTOLIC BLOOD PRESSURE: 55 MMHG | SYSTOLIC BLOOD PRESSURE: 104 MMHG | HEART RATE: 80 BPM

## 2021-09-15 PROCEDURE — 99215 OFFICE O/P EST HI 40 MIN: CPT

## 2021-09-15 PROCEDURE — 93000 ELECTROCARDIOGRAM COMPLETE: CPT

## 2021-09-15 NOTE — HISTORY OF PRESENT ILLNESS
[FreeTextEntry1] : April 3, 2019. Patient has been followed by Dr. Larry Toth and was followed by cardiology at [facility]. In June of 2011 after a positive stress test had cardiac angiography, which had a distal 90% RCA lesion and only nonobstructive disease in the LAD and circumflex. This was stented and his symptoms were improved. His symptoms then recurred. They have been exertional chest pressure and shortness of breath when he walks after eating a meal, especially a large meal. Never gets it at rest and does not get it when he exerts himself if he has not just eaten. Did well for a while, but then the symptoms returned and in October of 2016 after another positive ECG treadmill test he had repeat cardiac catheterization. There was an 80% mid RCA lesion and again no significant disease in the LAD was circumflex and he had 2 stents to the RCA . The difference was this time his symptoms did not change. He has been placed on Omeprazole and this has not seemed to make a difference, or perhaps it did initially. He has not seen GI. He otherwise seems to be doing well. In July of 2014. He had bilateral carotid endarterectomies. His most recent echo showed normal LV function. His EKG is normal sinus rhythm and a normal tracing. He has hypertension and hyperlipidemia.He is a former smoker. His father had hypertension and CVA, but there does not seem to be a family history of coronary artery disease. \par April 24, 2019.  Patient tried to increase his omeprazole, and then said he got a stomach ache and stopped it. He went for a CTA of the coronary arteries. Minor disease in the LAD and borderline severe 60-69% in the proximal circumflex and 60-70% in the RCA. Problem with him is whether his symptoms are related to ischemia or not. After long discussion I recommended that he have the angiogram. If non-obstructive disease, try Ranexa and if no help, consider noncardiac causes. If significant disease then he should have stents and again, we can see if this relieves his symptoms or not. Patient to discuss with his wife and then let me know   \par \par March 1, 2021.Patient called.  His exertional chest pain is getting worse and coming more frequently and more easier for the last few months.  I have not seen him since April 2019 and back then when we did a CTA it was mostly unrevealing despite his previous 3 stents in his RCA.  I will schedule him for a stress echo as soon as possible. \par March 9, 2021.  Patient came for stress echocardiogram.  Resting echo with borderline severe though not yet critical aortic stenosis and totally normal LV function.  On treadmill within the first minute STs started to go down and heart rate shot up to 140.  I stopped the treadmill at 2 minutes with severe ST depressions and shortness of breath but no chest pain.  Post exercise echo shows new wall motion abnormality in the entire anterior wall and may be some hypokinesis in the lateral wall.  Long discussion with patient and wife and they will be booked for coronary angiography and left and right heart cath as soon as he can have a Covid swab done.  (He had the first vaccine already and the second vaccine was 5 days ago)\par March 16, 2021.Cath only showed 80% RCA lesion and the rest was nonobstructive.  The RCA was stented and the patient was sent home that evening.  He is on aspirin and Plavix.  He will continue metoprolol ER 50 daily.  He has a follow-up with me in 2 weeks and then will be evaluated by structural heart for a TAVR \par March 30, 2021.  Patient here in follow-up.  Had stent to his RCA and is now on a beta-blocker but no change in his exertional symptoms.  In addition somewhat anxious about upcoming procedure; has evaluation appointment 4/6.\par May 28, 2021.  Patient had TAVR on May 13, uncomplicated except his glucoses were high post procedure.  Since he has been home he has stopped eating cakes and bagels and has lost 7 or 8 pounds.  In terms of his angina he is "95% better".  His only complaint is severe ecchymoses once again from his dual antiplatelet therapy..  Has seen Dr. Johnston and Dr. Toth in follow-up.  Remains in sinus rhythm with an unchanged ECG.  Had a monitor with no significant arrhythmias.  Is off metoprolol and currently only on lisinopril 10 mg.  \par July 19, 2021.  Patient here in follow-up.  He had seen structural heart and an echo was done on July 1 which showed mild MR, normally functioning TAVR with no paravalvular AI.  Moderate LAE.  Normal LV size and function and normal RV size and function.  He had an ECG on July 1 that was sinus rhythm and unremarkable.  Since his TAVR he has had no episodes of his exertional chest pressure until 1 day after making sure that he mowed his lawn on an empty stomach and had no symptoms, he then went and had a big meal and then remembered he had not swept the street.  When he swept the street he got the chest tightness again and it was relieved with rest.  That was the only episode since and has not recurred.  Otherwise no real complaints initial blood test by structural heart had a potassium of 6.1 and his creatinine had gone up to 2.0.  These were repeated 5 days later and his potassium was 5.1 and his creatinine was 1.43.  He remains on lisinopril just 10 mg daily.\par September 15, 2021.  Urgent visit today because of multiple falls with trauma.  I reviewed the notes from Dr. COTTO and Dr. Toth.  EKG here is sinus rhythm with sinus arrhythmia around 76, mild first-degree AV block otherwise normal tracing.  Labs September 10 with worsening creatinine back up to 1.92 and BUN 64 which he thinks is from the dye for the head CT in the hospital but also his LFTs were up.  Echo in the hospital September 2 mostly unrevealing.  The history from the patient and his wife seems to be the 2 falls were when he was erect and then bent over and then went down without warning.  Has never had a true syncopal episode when lying down or sitting.  He is not sure if he has gotten dizzy briefly while sitting.  His blood pressure has been running low despite adjustments in his lisinopril and for now I would just hold it.  The symptoms do not sound like hypoglycemia but he has had his meds adjusted and he is only on metformin and an SGLT2 inhibitor.  Last A1c was 6.2 in August.  With Dr. Barroso and again here in the office not really orthostatic.  ZIO AT monitor was placed.\par

## 2021-09-15 NOTE — PHYSICAL EXAM
[General Appearance - Well Developed] : well developed [Normal Appearance] : normal appearance [Well Groomed] : well groomed [General Appearance - Well Nourished] : well nourished [No Deformities] : no deformities [General Appearance - In No Acute Distress] : no acute distress [Normal Conjunctiva] : the conjunctiva exhibited no abnormalities [Eyelids - No Xanthelasma] : the eyelids demonstrated no xanthelasmas [Normal Oral Mucosa] : normal oral mucosa [No Oral Pallor] : no oral pallor [No Oral Cyanosis] : no oral cyanosis [Normal Jugular Venous A Waves Present] : normal jugular venous A waves present [Normal Jugular Venous V Waves Present] : normal jugular venous V waves present [No Jugular Venous Navas A Waves] : no jugular venous navas A waves [Respiration, Rhythm And Depth] : normal respiratory rhythm and effort [Exaggerated Use Of Accessory Muscles For Inspiration] : no accessory muscle use [Auscultation Breath Sounds / Voice Sounds] : lungs were clear to auscultation bilaterally [Heart Rate And Rhythm] : heart rate and rhythm were normal [Heart Sounds] : normal S1 and S2 [Abdomen Soft] : soft [Abdomen Tenderness] : non-tender [Abdomen Mass (___ Cm)] : no abdominal mass palpated [Abnormal Walk] : normal gait [Gait - Sufficient For Exercise Testing] : the gait was sufficient for exercise testing [Nail Clubbing] : no clubbing of the fingernails [Cyanosis, Localized] : no localized cyanosis [Petechial Hemorrhages (___cm)] : no petechial hemorrhages [Skin Color & Pigmentation] : normal skin color and pigmentation [] : no rash [No Venous Stasis] : no venous stasis [Skin Lesions] : no skin lesions [No Skin Ulcers] : no skin ulcer [No Xanthoma] : no  xanthoma was observed [Oriented To Time, Place, And Person] : oriented to person, place, and time [Affect] : the affect was normal [Mood] : the mood was normal [FreeTextEntry1] : Anxious

## 2021-09-15 NOTE — REVIEW OF SYSTEMS
[Negative] : Heme/Lymph [Feeling Fatigued] : not feeling fatigued [Weight Loss (___ Lbs)] : [unfilled] ~Ulb weight loss [SOB] : no shortness of breath [Dyspnea on exertion] : not dyspnea during exertion [Chest Discomfort] : no chest discomfort [Palpitations] : no palpitations [Syncope] : syncope [Dizziness] : dizziness [Convulsions] : convulsions [FreeTextEntry5] : see HPI [de-identified] : Severe ecchymoses [de-identified] : No more shaking since the one episode in the hospital

## 2021-09-16 ENCOUNTER — TRANSCRIPTION ENCOUNTER (OUTPATIENT)
Age: 76
End: 2021-09-16

## 2021-09-16 ENCOUNTER — INPATIENT (INPATIENT)
Facility: HOSPITAL | Age: 76
LOS: 0 days | Discharge: HOME | DRG: 244 | End: 2021-09-17
Attending: STUDENT IN AN ORGANIZED HEALTH CARE EDUCATION/TRAINING PROGRAM | Admitting: HOSPITALIST
Payer: MEDICARE

## 2021-09-16 VITALS
SYSTOLIC BLOOD PRESSURE: 185 MMHG | WEIGHT: 166.89 LBS | HEIGHT: 69 IN | TEMPERATURE: 99 F | RESPIRATION RATE: 16 BRPM | HEART RATE: 80 BPM | DIASTOLIC BLOOD PRESSURE: 73 MMHG | OXYGEN SATURATION: 100 %

## 2021-09-16 DIAGNOSIS — Z98.890 OTHER SPECIFIED POSTPROCEDURAL STATES: Chronic | ICD-10-CM

## 2021-09-16 DIAGNOSIS — I10 ESSENTIAL (PRIMARY) HYPERTENSION: ICD-10-CM

## 2021-09-16 DIAGNOSIS — I44.2 ATRIOVENTRICULAR BLOCK, COMPLETE: ICD-10-CM

## 2021-09-16 DIAGNOSIS — I44.0 ATRIOVENTRICULAR BLOCK, FIRST DEGREE: ICD-10-CM

## 2021-09-16 DIAGNOSIS — I25.10 ATHEROSCLEROTIC HEART DISEASE OF NATIVE CORONARY ARTERY WITHOUT ANGINA PECTORIS: ICD-10-CM

## 2021-09-16 DIAGNOSIS — Z95.5 PRESENCE OF CORONARY ANGIOPLASTY IMPLANT AND GRAFT: Chronic | ICD-10-CM

## 2021-09-16 DIAGNOSIS — E11.9 TYPE 2 DIABETES MELLITUS WITHOUT COMPLICATIONS: ICD-10-CM

## 2021-09-16 LAB
ALBUMIN SERPL ELPH-MCNC: 4.6 G/DL — SIGNIFICANT CHANGE UP (ref 3.3–5)
ALP SERPL-CCNC: 104 U/L — SIGNIFICANT CHANGE UP (ref 40–120)
ALT FLD-CCNC: 22 U/L — SIGNIFICANT CHANGE UP (ref 10–45)
ANION GAP SERPL CALC-SCNC: 14 MMOL/L — SIGNIFICANT CHANGE UP (ref 5–17)
APTT BLD: 34.9 SEC — SIGNIFICANT CHANGE UP (ref 27.5–35.5)
AST SERPL-CCNC: 20 U/L — SIGNIFICANT CHANGE UP (ref 10–40)
BASOPHILS # BLD AUTO: 0.03 K/UL — SIGNIFICANT CHANGE UP (ref 0–0.2)
BASOPHILS NFR BLD AUTO: 0.6 % — SIGNIFICANT CHANGE UP (ref 0–2)
BILIRUB SERPL-MCNC: 0.3 MG/DL — SIGNIFICANT CHANGE UP (ref 0.2–1.2)
BLD GP AB SCN SERPL QL: NEGATIVE — SIGNIFICANT CHANGE UP
BUN SERPL-MCNC: 56 MG/DL — HIGH (ref 7–23)
CALCIUM SERPL-MCNC: 10 MG/DL — SIGNIFICANT CHANGE UP (ref 8.4–10.5)
CHLORIDE SERPL-SCNC: 103 MMOL/L — SIGNIFICANT CHANGE UP (ref 96–108)
CO2 SERPL-SCNC: 16 MMOL/L — LOW (ref 22–31)
CREAT SERPL-MCNC: 1.7 MG/DL — HIGH (ref 0.5–1.3)
EOSINOPHIL # BLD AUTO: 0.12 K/UL — SIGNIFICANT CHANGE UP (ref 0–0.5)
EOSINOPHIL NFR BLD AUTO: 2.3 % — SIGNIFICANT CHANGE UP (ref 0–6)
GLUCOSE BLDC GLUCOMTR-MCNC: 118 MG/DL — HIGH (ref 70–99)
GLUCOSE SERPL-MCNC: 122 MG/DL — HIGH (ref 70–99)
HCT VFR BLD CALC: 35.2 % — LOW (ref 39–50)
HGB BLD-MCNC: 11.3 G/DL — LOW (ref 13–17)
IMM GRANULOCYTES NFR BLD AUTO: 0.6 % — SIGNIFICANT CHANGE UP (ref 0–1.5)
INR BLD: 0.98 RATIO — SIGNIFICANT CHANGE UP (ref 0.88–1.16)
LYMPHOCYTES # BLD AUTO: 0.83 K/UL — LOW (ref 1–3.3)
LYMPHOCYTES # BLD AUTO: 15.9 % — SIGNIFICANT CHANGE UP (ref 13–44)
MCHC RBC-ENTMCNC: 28 PG — SIGNIFICANT CHANGE UP (ref 27–34)
MCHC RBC-ENTMCNC: 32.1 GM/DL — SIGNIFICANT CHANGE UP (ref 32–36)
MCV RBC AUTO: 87.1 FL — SIGNIFICANT CHANGE UP (ref 80–100)
MONOCYTES # BLD AUTO: 0.53 K/UL — SIGNIFICANT CHANGE UP (ref 0–0.9)
MONOCYTES NFR BLD AUTO: 10.2 % — SIGNIFICANT CHANGE UP (ref 2–14)
NEUTROPHILS # BLD AUTO: 3.67 K/UL — SIGNIFICANT CHANGE UP (ref 1.8–7.4)
NEUTROPHILS NFR BLD AUTO: 70.4 % — SIGNIFICANT CHANGE UP (ref 43–77)
NRBC # BLD: 0 /100 WBCS — SIGNIFICANT CHANGE UP (ref 0–0)
PLATELET # BLD AUTO: 168 K/UL — SIGNIFICANT CHANGE UP (ref 150–400)
POTASSIUM SERPL-MCNC: 4.4 MMOL/L — SIGNIFICANT CHANGE UP (ref 3.5–5.3)
POTASSIUM SERPL-SCNC: 4.4 MMOL/L — SIGNIFICANT CHANGE UP (ref 3.5–5.3)
PROT SERPL-MCNC: 7.7 G/DL — SIGNIFICANT CHANGE UP (ref 6–8.3)
PROTHROM AB SERPL-ACNC: 11.8 SEC — SIGNIFICANT CHANGE UP (ref 10.6–13.6)
RBC # BLD: 4.04 M/UL — LOW (ref 4.2–5.8)
RBC # FLD: 13.3 % — SIGNIFICANT CHANGE UP (ref 10.3–14.5)
RH IG SCN BLD-IMP: NEGATIVE — SIGNIFICANT CHANGE UP
SARS-COV-2 RNA SPEC QL NAA+PROBE: SIGNIFICANT CHANGE UP
SODIUM SERPL-SCNC: 133 MMOL/L — LOW (ref 135–145)
WBC # BLD: 5.21 K/UL — SIGNIFICANT CHANGE UP (ref 3.8–10.5)
WBC # FLD AUTO: 5.21 K/UL — SIGNIFICANT CHANGE UP (ref 3.8–10.5)

## 2021-09-16 PROCEDURE — 93010 ELECTROCARDIOGRAM REPORT: CPT

## 2021-09-16 PROCEDURE — 99285 EMERGENCY DEPT VISIT HI MDM: CPT

## 2021-09-16 PROCEDURE — 99232 SBSQ HOSP IP/OBS MODERATE 35: CPT | Mod: 57

## 2021-09-16 PROCEDURE — 33208 INSRT HEART PM ATRIAL & VENT: CPT | Mod: KX

## 2021-09-16 PROCEDURE — 71045 X-RAY EXAM CHEST 1 VIEW: CPT | Mod: 26

## 2021-09-16 PROCEDURE — 99232 SBSQ HOSP IP/OBS MODERATE 35: CPT

## 2021-09-16 RX ORDER — DEXTROSE 50 % IN WATER 50 %
12.5 SYRINGE (ML) INTRAVENOUS ONCE
Refills: 0 | Status: DISCONTINUED | OUTPATIENT
Start: 2021-09-16 | End: 2021-09-17

## 2021-09-16 RX ORDER — GLUCAGON INJECTION, SOLUTION 0.5 MG/.1ML
1 INJECTION, SOLUTION SUBCUTANEOUS ONCE
Refills: 0 | Status: DISCONTINUED | OUTPATIENT
Start: 2021-09-16 | End: 2021-09-17

## 2021-09-16 RX ORDER — ASPIRIN/CALCIUM CARB/MAGNESIUM 324 MG
81 TABLET ORAL DAILY
Refills: 0 | Status: DISCONTINUED | OUTPATIENT
Start: 2021-09-16 | End: 2021-09-17

## 2021-09-16 RX ORDER — ACETAMINOPHEN 500 MG
650 TABLET ORAL EVERY 6 HOURS
Refills: 0 | Status: DISCONTINUED | OUTPATIENT
Start: 2021-09-16 | End: 2021-09-17

## 2021-09-16 RX ORDER — PANTOPRAZOLE SODIUM 20 MG/1
40 TABLET, DELAYED RELEASE ORAL
Refills: 0 | Status: DISCONTINUED | OUTPATIENT
Start: 2021-09-16 | End: 2021-09-17

## 2021-09-16 RX ORDER — CHOLECALCIFEROL (VITAMIN D3) 125 MCG
2000 CAPSULE ORAL
Refills: 0 | Status: DISCONTINUED | OUTPATIENT
Start: 2021-09-16 | End: 2021-09-17

## 2021-09-16 RX ORDER — INSULIN LISPRO 100/ML
VIAL (ML) SUBCUTANEOUS
Refills: 0 | Status: DISCONTINUED | OUTPATIENT
Start: 2021-09-16 | End: 2021-09-17

## 2021-09-16 RX ORDER — SODIUM CHLORIDE 9 MG/ML
1000 INJECTION, SOLUTION INTRAVENOUS
Refills: 0 | Status: DISCONTINUED | OUTPATIENT
Start: 2021-09-16 | End: 2021-09-17

## 2021-09-16 RX ORDER — ATORVASTATIN CALCIUM 80 MG/1
80 TABLET, FILM COATED ORAL AT BEDTIME
Refills: 0 | Status: DISCONTINUED | OUTPATIENT
Start: 2021-09-16 | End: 2021-09-17

## 2021-09-16 RX ORDER — CEFAZOLIN SODIUM 1 G
2000 VIAL (EA) INJECTION EVERY 8 HOURS
Refills: 0 | Status: COMPLETED | OUTPATIENT
Start: 2021-09-16 | End: 2021-09-17

## 2021-09-16 RX ORDER — DEXTROSE 50 % IN WATER 50 %
15 SYRINGE (ML) INTRAVENOUS ONCE
Refills: 0 | Status: DISCONTINUED | OUTPATIENT
Start: 2021-09-16 | End: 2021-09-17

## 2021-09-16 RX ORDER — DEXTROSE 50 % IN WATER 50 %
25 SYRINGE (ML) INTRAVENOUS ONCE
Refills: 0 | Status: DISCONTINUED | OUTPATIENT
Start: 2021-09-16 | End: 2021-09-17

## 2021-09-16 RX ADMIN — Medication 650 MILLIGRAM(S): at 20:55

## 2021-09-16 RX ADMIN — ATORVASTATIN CALCIUM 80 MILLIGRAM(S): 80 TABLET, FILM COATED ORAL at 20:54

## 2021-09-16 RX ADMIN — Medication 100 MILLIGRAM(S): at 23:15

## 2021-09-16 RX ADMIN — Medication 650 MILLIGRAM(S): at 21:20

## 2021-09-16 NOTE — ED PROVIDER NOTE - CHIEF COMPLAINT
The patient is a 76y Male complaining of  The patient is a 76y Male complaining of pacemaker placement

## 2021-09-16 NOTE — ED ADULT NURSE NOTE - OBJECTIVE STATEMENT
75 y/o male form home coming into the ED for a covid swab prior to pacemaker placement.  PMH CAD, DM, HTN, renal disease, prostate CA.  Pt states he has been wearing a heart monitor at home to interrogate why he has been falling/passing out over the past few weeks.  Pt cardiologist stated he was having a "heart drop" and needed a pacemaker.  Pt denies CP, SOB, abd pain, fever/chills/n/v/d.  Pt is A&Ox4, equal rise and fall fo chest, abd soft non tender, skin warm dry and intact.  Safety and comfort provided.

## 2021-09-16 NOTE — H&P ADULT - RESPIRATORY RATE (BREATHS/MIN)
Detail Level: Zone Modify Regimen: 8/30/18 Daily energy reduced to 50kV for remaining fractions due to US readings 14 Plan: Per the request of Dr. Womack, Kelly Iglesias was seen today for Superficial Radiation Therapy requiring simulation (CPT® 11047) in preparation for treatment of specific diseased site(s). Simulation is necessary to determine correct patient and treatment portal positioning, deliver safe and effective radiation therapy. A high frequency ultrasound image was acquired prior to treatment today for three dimensional evaluation of tumor volume and response to treatment, in addition, geometric accuracy of field placement (CPT® ). Physician evaluation of the ultrasound tumor depth will be ongoing through course of treatment, and is deemed medically necessary ensuring efficacy of treatment. Today’s image and setup was evaluated determining continuation of treatment with the current plan, or necessary changes as appropriate. All appropriate custom blocking and treatment parameters verified by the radiation therapist according to initial simulation.\\n\\nPer Dr. Womack, continued daily US guidance and simulation is required for field placement, measurement of tumor depth, progress and edema monitoring.\\n\\nEvaluation prior to treatment for response and reaction to SRT based on current fraction and cumulative dose with a visual inspection and ultrasound demonstrates a normal expected response. RTOG Acute Radiation Morbidity Score = 1. Superficial Radiation Therapy will continue as planned.\\n\\nUS image guidance and field placement prior to treatment delivery performed. US depth is 0.95mm, repop ++ and MURALI NR with visible inflammation

## 2021-09-16 NOTE — CONSULT NOTE ADULT - ATTENDING COMMENTS
s/p TAVR 6 months ago, RBBB, presenting with recurrent syncopal episodes and paroxysmal AV block correlating with symptoms on outpt MCOT monitoring. Normal LV function. Plan for dual chamber PPM implantation today.

## 2021-09-16 NOTE — ED PROVIDER NOTE - INTERPRETATION
EKG reviewed for rate, rhythm, axis, intervals and segments, including QRS morphology, P wave appearance T wave appearance, MT interval, and QT interval.  I find the EKG to be unremarkable in all of these regards except as follows: 1st deg AVB, incomplete R bundle

## 2021-09-16 NOTE — H&P ADULT - ASSESSMENT
76 y.o M with PMHx of DMII, CKD stage 3, HTN, CAD with CALLUM x 2 3/ 2021 and TAVR in May 2021 who was recently admitted for a syncopal episode discharged 9/5 sent in by cardiologist for [facility]

## 2021-09-16 NOTE — H&P ADULT - HISTORY OF PRESENT ILLNESS
76 y.o M with PMHx of  DMII, CKD stage 3, HTN, CAD with CALLUM x 2 3/ 2021 and TAVR in May 2021 who was recently admitted for a syncopal episode discharged 9/5 sent in by cardiologist for [facility]    Patient states for the past month he has been experiencing episodes of dizzines both with exertion and at rest lasting up to a minute at a time. Has also experienced 5-6 episodes of syncope with facial trauma over this time. during recent admission all work up was negative and no tele events noted. Patient followed up cardiology Dr Miller yesterday. During this visit he was noted to have soft BPs,, EKGw/, first degree AVB, ACE held and zio placed.     Patient had an additional episode of dizziness. He was subsequently called by Dr. Miller to go to [facility] for a pacemaker placement for complete heart block.

## 2021-09-16 NOTE — H&P ADULT - PROBLEM SELECTOR PLAN 4
lisinopril d/c'ed by cardiologist Dr. Adam ortega outpt yesterday due to soft BP  hold ACE-I meds for now  monitor BP, currently stable

## 2021-09-16 NOTE — H&P ADULT - PROBLEM SELECTOR PLAN 1
seen on Zio patch, strips reviewed by EP  EP consult appreciated: plan for Pacemaker placement today   NPO for procedure  hold off on DVT ppx pending procedure   active Type and screen noted  COVID neg   c/w tele monitoring and avoid all nodally acting medications

## 2021-09-16 NOTE — ED PROVIDER NOTE - OBJECTIVE STATEMENT
77 y/o M w/ PMH DM, CKD, HTN, CAD s/p stent, TAVR, presenting to the ED for possible pacemaker placement. Patient was recently admitted for syncope and had outpatient workup with Dr. Miller, was told he had arrythmia and was to come to the ED for admission for pacemaker. He currently denies any symptoms including CP, SOB, nausea, vomiting.

## 2021-09-16 NOTE — ED ADULT NURSE NOTE - NSIMPLEMENTINTERV_GEN_ALL_ED
Implemented All Universal Safety Interventions:  [place] to call system. Call bell, personal items and telephone within reach. Instruct patient to call for assistance. Room bathroom lighting operational. Non-slip footwear when patient is off stretcher. Physically safe environment: no spills, clutter or unnecessary equipment. Stretcher in lowest position, wheels locked, appropriate side rails in place.

## 2021-09-16 NOTE — ED PROVIDER NOTE - ATTENDING CONTRIBUTION TO CARE
77 yo male hx of recurrent syncope/near syncope, recent hospitalization for same, had outpatient holter monitor placement, s/p unclear event last PM which prompted cardiology to direct him to the ED for urgent EP eval and possible pacemaker placement.  asymptomatic on my assessment, normotensive, EKG with 1st deg AVB.  cards fellow @ the bedside.  check labs, COVID swab, admit for further management.

## 2021-09-16 NOTE — ED ADULT TRIAGE NOTE - CHIEF COMPLAINT QUOTE
told by doctor to come to er for covid test for pacemaker placement today recent multiple syncope had holter monitor on recently got results last night

## 2021-09-16 NOTE — H&P ADULT - NSHPLABSRESULTS_GEN_ALL_CORE
Labs personally reviewed:                          11.3   5.21  )-----------( 168      ( 16 Sep 2021 11:01 )             35.2       09-16    133<L>  |  103  |  56<H>  ----------------------------<  122<H>  4.4   |  16<L>  |  1.70<H>    Ca    10.0      16 Sep 2021 11:01    TPro  7.7  /  Alb  4.6  /  TBili  0.3  /  DBili  x   /  AST  20  /  ALT  22  /  AlkPhos  104  09-16      PT/INR - ( 16 Sep 2021 11:19 )   PT: 11.8 sec;   INR: 0.98 ratio         PTT - ( 16 Sep 2021 11:19 )  PTT:34.9 sec

## 2021-09-16 NOTE — CONSULT NOTE ADULT - SUBJECTIVE AND OBJECTIVE BOX
All Cardiology service information can be found  on [email or web address], password: [email or web address]    Patient seen and evaluated at bedside    Reason for Consult: Heart Block     HPI: 77 y/o M history of type 2 DM on oral Rx, chronic kidney disease stage 3, essential HTN, CAD with CALLUM x 2 to the RCA in 2021, s/P TAVR in May 2021, recent admit for a syncopal episode (noted by wife to have a syncopal episode with the patient attempting to get dressed, hitting his forehead, shaking episodes after the event) - work up at that time negative, discharged . Since that time has had multiple syncopal episodes, seen by Dr Miller yesterday, patient w/ soft BPs, EKG showing sinus arrhythmia, first degree AVB, ACE held and zio placed.     PMHx:   Essential hypertension  Hyperlipidemia, unspecified hyperlipidemia type  Type 2 diabetes mellitus without complication, without long-term current use of insulin  Coronary artery disease involving native coronary artery of native heart, angina presence unspecified  Aortic valve stenosis, etiology of cardiac valve disease unspecified  Mitral valve insufficiency, unspecified etiology  ANTONIO (acute kidney injury)  Chronic kidney disease, unspecified CKD stage  Renal calculi  Prostate cancer    PSHx:   Stented coronary artery  H/O carotid endarterectomy  H/O hernia repair  H/O lithotripsy    Allergies:  Zytiga (Unknown)    Home Meds:  Asprin 81  Crestor   Lisinopril (discontinued)    FAMILY HISTORY:  Family history of essential hypertension    REVIEW OF SYSTEMS:  CONSTITUTIONAL: No fevers or chills  EYES/ENT: No visual changes;  No dysphagia  NECK: No pain or stiffness  RESPIRATORY: No cough, wheezing, hemoptysis; No shortness of breath  CARDIOVASCULAR: No chest pain or palpitations; No lower extremity edema  GASTROINTESTINAL: No abdominal or epigastric pain. No nausea, vomiting, or hematemesis; No diarrhea or constipation. No melena or hematochezia.  BACK: No back pain  GENITOURINARY: No dysuria, frequency or hematuria  NEUROLOGICAL: No numbness   SKIN: No itching, burning, rashes, or lesions   All other review of systems is negative unless indicated above.    Physical Exam:  T(F): 98.9 (-), Max: 98.9 (-)  HR: 80 (-) (80 - 80)  BP: 185/73 (-) (185/73 - 185/73)  RR: 16 (-)  SpO2: 100% ()  GENERAL: No acute distress, well-developed  HEAD:  Atraumatic, Normocephalic  ENT: EOMI, PERRLA, conjunctiva and sclera clear, Neck supple, No JVD, moist mucosa  CHEST/LUNG: Clear to auscultation bilaterally; No wheeze, equal breath sounds bilaterally   BACK: No spinal tenderness  HEART: Regular rate and rhythm; No murmurs, rubs, or gallops  ABDOMEN: Soft, Nontender, Nondistended; Bowel sounds present  EXTREMITIES:  No clubbing, cyanosis, or edema  PSYCH: Nl behavior, nl affect  NEUROLOGY: AAOx3, non-focal, cranial nerves intact  SKIN: Normal color, No rashes or lesions  LINES:    Cardiovascular Diagnostic Testing:    ECG: Personally reviewed:    Stress Test: 3/9/2021, Exercised 2 minutes and 10 seconds to a heart rate of 147 and a blood pressure of 148/74 which in recovery alfredo to 164/80. ECG had 4 to 5 mm of horizontal ST depressions which in recovery became downsloping with T wave inversions and lasted over 20 minutes. Echocardiogram showed new wall motion abnormalities in the anterior wall and possibly lateral wall   Echo: 3/9/2021, MAC and calcified mitral leaflets with normal diastolic opening. Mild to moderate MR and mild to moderate MS. Calcified trileaflet aortic valve with peak gradient 50, mean 28, minimal to mild AI. OG 0.8 cm² by continuity equation. Dimensionless index 0.26. Moderate LAE. Normal LV systolic function with LVEF 65%. Findings of diastolic dysfunction. Normal RV size and function with mild TR. RVSP 46. Normal pericardium with no effusion. ; 2021 Intra-Op [facility]. Mild MR. Calcified trileaflet aortic valve with decreased opening. Peak gradient 36 with OG 0.9 cm². No left atrial or left atrial appendage thrombus. Normal LV systolic function with mild concentric LVH. If you keep doing them normal RV size and function with mild TR. Normal pericardium with no effusion. ; 2021 done at [facility]. Mild MR. Well-seated TAVR with peak gradient 10. OG 2.2 cm². No central or paravalvular AI. Moderate LAE. Normal LV systolic function with LVEF 60 to 65%. Normal right atrium. Normal RV size and function. Normal pericardium with no effusion.   Cardiac Cath: 2011, Done at [facility] with 90% distal RCA lesion and nonobstructive disease of LAD and circumflex. ; 10/2016 80% mid RCA lesion, no significant disease in LAD or circumflex. ; 3/15/2021 normal left main. 30% proximal LAD, 30% mid LAD, 40% distal circumflex, 80% proximal RCA( treated with a drug-eluting stent). Cardiac output 6.4, cardiac index 3.2 wedge pressure 20 pulmonary artery 37/10. RV 57/10   Stent: 10/2016, 2 stents to RCA. ; 3/15/2021 drug-eluting stent for proximal RCA.   Cardiac Sur2021, TAVR    Imaging:    CXR: Personally reviewed    Labs: Personally reviewed    - Incomplete -                             All Cardiology service information can be found  on [email or web address], password: [email or web address]    Patient seen and evaluated at bedside    Reason for Consult: Heart Block     HPI: 77 y/o M history of type 2 DM on oral Rx, chronic kidney disease stage 3, essential HTN, CAD with CALLUM x 2 to the RCA in 2021, s/P TAVR in May 2021, recent admit for a syncopal episode referred to the ED by Dr Miller. Patient states for the past month he has been experiencing episodes of dizzines both with exertion and at rest lasting up to a minute at a time. Has also experienced six episodes of syncope with facial trauma over this time, again both with exertion and at rest - admitted with negative work up and no tele events - discharged on . Since that time seen by Dr Miller yesterday, patient w/ soft BPs, EKG showing sinus arrhythmia, first degree AVB, ACE held and zio placed. Patient had an additional episode of dizziness, shortly after called by Dr Miller and told that he would need a pacemaker, reportedly complete heart block. Patient currently well appearing in NSR w/ first degree, denies CP SOB f/c n/v edema abdominal pain.     PMHx:   Essential hypertension  Hyperlipidemia, unspecified hyperlipidemia type  Type 2 diabetes mellitus without complication, without long-term current use of insulin  Coronary artery disease involving native coronary artery of native heart, angina presence unspecified  Aortic valve stenosis, etiology of cardiac valve disease unspecified  Mitral valve insufficiency, unspecified etiology  ANTONIO (acute kidney injury)  Chronic kidney disease, unspecified CKD stage  Renal calculi  Prostate cancer    PSHx:   Stented coronary artery  H/O carotid endarterectomy  H/O hernia repair  H/O lithotripsy    Allergies:  Zytiga (Unknown)    Home Meds:  · Alcohol Swabs Pad; USE AS DIRECTED  · Amoxicillin 500 MG Oral Tablet; TAKE 4 TABLETS 1 HOUR BEFORE DENTAL  APPOINTMENT  · Aspirin Adult Low Dose 81 MG Oral Tablet Delayed Release  · FreeStyle Lancets; USE AS DIRECTED  · FreeStyle Lite Device; USE AS DIRECTED  · FreeStyle Lite Test In Vitro Strip; Test 3 times daily  · Lisinopril 10 MG Oral Tablet; TAKE 1 TABLET DAILY FOR BLOOD PRESSURE  · Meclizine HCl - 12.5 MG Oral Tablet; TAKE 1 TABLET 3 TIMES DAILY AS NEEDED  · metFORMIN HCl  MG Oral Tablet Extended Release 24 Hour; Take 1 tablet twice  daily  · Multi-Vitamin TABS  · Omeprazole 20 MG Oral Capsule Delayed Release; TAKE ONE CAPSULE BY MOUTH  ONCE DAILY  · Rosuvastatin Calcium 40 MG Oral Tablet; TAKE 1 TABLET BY MOUTH NIGHTLY  · Vitamin D2 50 MCG ( [place]) Oral Tablet; take 1 tablet every other day    FAMILY HISTORY:  Family history of essential hypertension    REVIEW OF SYSTEMS:  CONSTITUTIONAL: No fevers or chills  EYES/ENT: No visual changes;  No dysphagia  NECK: No pain or stiffness  RESPIRATORY: No cough, wheezing, hemoptysis; No shortness of breath  CARDIOVASCULAR: No chest pain or palpitations; No lower extremity edema  GASTROINTESTINAL: No abdominal or epigastric pain. No nausea, vomiting, or hematemesis; No diarrhea or constipation. No melena or hematochezia.  BACK: No back pain  GENITOURINARY: No dysuria, frequency or hematuria  NEUROLOGICAL: No numbness   SKIN: No itching, burning, rashes, or lesions   All other review of systems is negative unless indicated above.    Physical Exam:  T(F): 98.9 (), Max: 98.9 ()  HR: 80 () (80 - 80)  BP: 185/73 () (185/73 - 185/73)  RR: 16 ()  SpO2: 100% ()  GENERAL: No acute distress, well-developed  CHEST/LUNG: Clear to auscultation bilaterally; No wheeze, equal breath sounds bilaterally   HEART: Regular rate and rhythm; No murmurs, rubs, or gallops  ABDOMEN: Soft, Nontender, Nondistended; Bowel sounds present  EXTREMITIES:  No clubbing, cyanosis, or edema  PSYCH: Nl behavior, nl affect  NEUROLOGY: AAOx3, non-focal, cranial nerves intact  SKIN: Normal color, No rashes or lesions    Cardiovascular Diagnostic Testing:    ECG: NSR, first degree AVB    Stress Test: 3/9/2021, Exercised 2 minutes and 10 seconds to a heart rate of 147 and a blood pressure of 148/74 which in recovery alfredo to 164/80. ECG had 4 to 5 mm of horizontal ST depressions which in recovery became downsloping with T wave inversions and lasted over 20 minutes. Echocardiogram showed new wall motion abnormalities in the anterior wall and possibly lateral wall   Echo: 3/9/2021, MAC and calcified mitral leaflets with normal diastolic opening. Mild to moderate MR and mild to moderate MS. Calcified trileaflet aortic valve with peak gradient 50, mean 28, minimal to mild AI. OG 0.8 cm² by continuity equation. Dimensionless index 0.26. Moderate LAE. Normal LV systolic function with LVEF 65%. Findings of diastolic dysfunction. Normal RV size and function with mild TR. RVSP 46. Normal pericardium with no effusion. ; 2021 Intra-Op [facility]. Mild MR. Calcified trileaflet aortic valve with decreased opening. Peak gradient 36 with OG 0.9 cm². No left atrial or left atrial appendage thrombus. Normal LV systolic function with mild concentric LVH. If you keep doing them normal RV size and function with mild TR. Normal pericardium with no effusion. ; 2021 done at [facility]. Mild MR. Well-seated TAVR with peak gradient 10. OG 2.2 cm². No central or paravalvular AI. Moderate LAE. Normal LV systolic function with LVEF 60 to 65%. Normal right atrium. Normal RV size and function. Normal pericardium with no effusion.   Cardiac Cath: 2011, Done at [facility] with 90% distal RCA lesion and nonobstructive disease of LAD and circumflex. ; 10/2016 80% mid RCA lesion, no significant disease in LAD or circumflex. ; 3/15/2021 normal left main. 30% proximal LAD, 30% mid LAD, 40% distal circumflex, 80% proximal RCA( treated with a drug-eluting stent). Cardiac output 6.4, cardiac index 3.2 wedge pressure 20 pulmonary artery 37/10. RV 57/10   Stent: 10/2016, 2 stents to RCA. ; 3/15/2021 drug-eluting stent for proximal RCA.   Cardiac Sur2021, TAVR    Imaging:    CXR: Personally reviewed    Labs: Personally reviewed      77 y/o M history of type 2 DM on oral Rx, chronic kidney disease stage 3, essential HTN, CAD with CALLUM x 2 to the RCA in 2021, s/P TAVR in May 2021, recent admit for a syncopal episode referred to the ED by Dr Miller in the setting of possible CHB noted on zio patch correlating with symptoms of dizziness.    - report of CHB on zio, high risk given multiple syncopal episodes, fairly recent TAVR  - will attempt to obtain strips   - monitor on tele, avoid all nodally acting medication  - basic labs including type and screen, coags, COVID swab  - keep NPO for now   - EP will continue to follow                            All Cardiology service information can be found  on [email or web address], password: [email or web address]    Patient seen and evaluated at bedside    Reason for Consult: Heart Block     HPI: 77 y/o M history of type 2 DM on oral Rx, chronic kidney disease stage 3, essential HTN, CAD with CALLUM x 2 to the RCA in 2021, s/P TAVR in May 2021, recent admit for a syncopal episode referred to the ED by Dr Miller. Patient states for the past month he has been experiencing episodes of dizzines both with exertion and at rest lasting up to a minute at a time. Has also experienced six episodes of syncope with facial trauma over this time, again both with exertion and at rest - admitted with negative work up and no tele events - discharged on . Since that time seen by Dr Miller yesterday, patient w/ soft BPs, EKG showing sinus arrhythmia, first degree AVB, ACE held and zio placed. Patient had an additional episode of dizziness, shortly after called by Dr Miller and told that he would need a pacemaker, reportedly complete heart block. Patient currently well appearing in NSR w/ first degree, denies CP SOB f/c n/v edema abdominal pain.     PMHx:   Essential hypertension  Hyperlipidemia, unspecified hyperlipidemia type  Type 2 diabetes mellitus without complication, without long-term current use of insulin  Coronary artery disease involving native coronary artery of native heart, angina presence unspecified  Aortic valve stenosis, etiology of cardiac valve disease unspecified  Mitral valve insufficiency, unspecified etiology  ANTONIO (acute kidney injury)  Chronic kidney disease, unspecified CKD stage  Renal calculi  Prostate cancer    PSHx:   Stented coronary artery  H/O carotid endarterectomy  H/O hernia repair  H/O lithotripsy    Allergies:  Zytiga (Unknown)    Home Meds:  · Alcohol Swabs Pad; USE AS DIRECTED  · Amoxicillin 500 MG Oral Tablet; TAKE 4 TABLETS 1 HOUR BEFORE DENTAL  APPOINTMENT  · Aspirin Adult Low Dose 81 MG Oral Tablet Delayed Release  · FreeStyle Lancets; USE AS DIRECTED  · FreeStyle Lite Device; USE AS DIRECTED  · FreeStyle Lite Test In Vitro Strip; Test 3 times daily  · Lisinopril 10 MG Oral Tablet; TAKE 1 TABLET DAILY FOR BLOOD PRESSURE  · Meclizine HCl - 12.5 MG Oral Tablet; TAKE 1 TABLET 3 TIMES DAILY AS NEEDED  · metFORMIN HCl  MG Oral Tablet Extended Release 24 Hour; Take 1 tablet twice  daily  · Multi-Vitamin TABS  · Omeprazole 20 MG Oral Capsule Delayed Release; TAKE ONE CAPSULE BY MOUTH  ONCE DAILY  · Rosuvastatin Calcium 40 MG Oral Tablet; TAKE 1 TABLET BY MOUTH NIGHTLY  · Vitamin D2 50 MCG ( [place]) Oral Tablet; take 1 tablet every other day    FAMILY HISTORY:  Family history of essential hypertension    REVIEW OF SYSTEMS:  CONSTITUTIONAL: No fevers or chills  EYES/ENT: No visual changes;  No dysphagia  NECK: No pain or stiffness  RESPIRATORY: No cough, wheezing, hemoptysis; No shortness of breath  CARDIOVASCULAR: No chest pain or palpitations; No lower extremity edema  GASTROINTESTINAL: No abdominal or epigastric pain. No nausea, vomiting, or hematemesis; No diarrhea or constipation. No melena or hematochezia.  BACK: No back pain  GENITOURINARY: No dysuria, frequency or hematuria  NEUROLOGICAL: No numbness   SKIN: No itching, burning, rashes, or lesions   All other review of systems is negative unless indicated above.    Physical Exam:  T(F): 98.9 (), Max: 98.9 ()  HR: 80 () (80 - 80)  BP: 185/73 () (185/73 - 185/73)  RR: 16 ()  SpO2: 100% ()  GENERAL: No acute distress, well-developed  CHEST/LUNG: Clear to auscultation bilaterally; No wheeze, equal breath sounds bilaterally   HEART: Regular rate and rhythm; No murmurs, rubs, or gallops  ABDOMEN: Soft, Nontender, Nondistended; Bowel sounds present  EXTREMITIES:  No clubbing, cyanosis, or edema  PSYCH: Nl behavior, nl affect  NEUROLOGY: AAOx3, non-focal, cranial nerves intact  SKIN: Normal color, No rashes or lesions    Cardiovascular Diagnostic Testing:    ECG: NSR, first degree AVB    Stress Test: 3/9/2021, Exercised 2 minutes and 10 seconds to a heart rate of 147 and a blood pressure of 148/74 which in recovery alfredo to 164/80. ECG had 4 to 5 mm of horizontal ST depressions which in recovery became downsloping with T wave inversions and lasted over 20 minutes. Echocardiogram showed new wall motion abnormalities in the anterior wall and possibly lateral wall   Echo: 3/9/2021, MAC and calcified mitral leaflets with normal diastolic opening. Mild to moderate MR and mild to moderate MS. Calcified trileaflet aortic valve with peak gradient 50, mean 28, minimal to mild AI. OG 0.8 cm² by continuity equation. Dimensionless index 0.26. Moderate LAE. Normal LV systolic function with LVEF 65%. Findings of diastolic dysfunction. Normal RV size and function with mild TR. RVSP 46. Normal pericardium with no effusion. ; 2021 Intra-Op [facility]. Mild MR. Calcified trileaflet aortic valve with decreased opening. Peak gradient 36 with OG 0.9 cm². No left atrial or left atrial appendage thrombus. Normal LV systolic function with mild concentric LVH. If you keep doing them normal RV size and function with mild TR. Normal pericardium with no effusion. ; 2021 done at [facility]. Mild MR. Well-seated TAVR with peak gradient 10. OG 2.2 cm². No central or paravalvular AI. Moderate LAE. Normal LV systolic function with LVEF 60 to 65%. Normal right atrium. Normal RV size and function. Normal pericardium with no effusion.   Cardiac Cath: 2011, Done at [facility] with 90% distal RCA lesion and nonobstructive disease of LAD and circumflex. ; 10/2016 80% mid RCA lesion, no significant disease in LAD or circumflex. ; 3/15/2021 normal left main. 30% proximal LAD, 30% mid LAD, 40% distal circumflex, 80% proximal RCA( treated with a drug-eluting stent). Cardiac output 6.4, cardiac index 3.2 wedge pressure 20 pulmonary artery 37/10. RV 57/10   Stent: 10/2016, 2 stents to RCA. ; 3/15/2021 drug-eluting stent for proximal RCA.   Cardiac Sur2021, TAVR    Imaging:    CXR: Personally reviewed    Labs: Personally reviewed      77 y/o M history of type 2 DM on oral Rx, chronic kidney disease stage 3, essential HTN, CAD with CALLUM x 2 to the RCA in 2021, s/P TAVR in May 2021, recent admit for a syncopal episode referred to the ED by Dr Miller in the setting of possible CHB noted on zio patch correlating with symptoms of dizziness.    - report of CHB on zio, high risk given multiple syncopal episodes, fairly recent TAVR  - monitor reviewed showing episode of transient complete heart block  - monitor on tele, avoid all nodally acting medication  - basic labs including type and screen, coags, COVID swab  - keep NPO for now   - will plan for PPM today, discussed with patient and son at bedside, discussed risks and benefits, patient amenable   - EP will continue to follow                            All Cardiology service information can be found  on [email or web address], password: [email or web address]    Patient seen and evaluated at bedside    Reason for Consult: Heart Block     HPI: 77 y/o M history of type 2 DM on oral Rx, chronic kidney disease stage 3, essential HTN, CAD with CALLUM x 2 to the RCA in 2021, s/P TAVR in May 2021, recent admit for a syncopal episode referred to the ED by Dr Miller. Patient states for the past month he has been experiencing episodes of dizzines both with exertion and at rest lasting up to a minute at a time. Has also experienced six episodes of syncope with facial trauma over this time, again both with exertion and at rest - admitted with negative work up and no tele events - discharged on . Since that time seen by Dr Miller yesterday, patient w/ soft BPs, EKG showing sinus arrhythmia, first degree AVB, ACE held and zio placed. Patient had an additional episode of dizziness, shortly after called by Dr Miller and told that he would need a pacemaker, reportedly complete heart block. Patient currently well appearing in NSR w/ first degree, denies CP SOB f/c n/v edema abdominal pain.     PMHx:   Essential hypertension  Hyperlipidemia, unspecified hyperlipidemia type  Type 2 diabetes mellitus without complication, without long-term current use of insulin  Coronary artery disease involving native coronary artery of native heart, angina presence unspecified  Aortic valve stenosis, etiology of cardiac valve disease unspecified  Mitral valve insufficiency, unspecified etiology  ANTONIO (acute kidney injury)  Chronic kidney disease, unspecified CKD stage  Renal calculi  Prostate cancer    PSHx:   Stented coronary artery  H/O carotid endarterectomy  H/O hernia repair  H/O lithotripsy    Allergies:  Zytiga (Unknown)    Home Meds:  · Alcohol Swabs Pad; USE AS DIRECTED  · Amoxicillin 500 MG Oral Tablet; TAKE 4 TABLETS 1 HOUR BEFORE DENTAL  APPOINTMENT  · Aspirin Adult Low Dose 81 MG Oral Tablet Delayed Release  · FreeStyle Lancets; USE AS DIRECTED  · FreeStyle Lite Device; USE AS DIRECTED  · FreeStyle Lite Test In Vitro Strip; Test 3 times daily  · Lisinopril 10 MG Oral Tablet; TAKE 1 TABLET DAILY FOR BLOOD PRESSURE  · Meclizine HCl - 12.5 MG Oral Tablet; TAKE 1 TABLET 3 TIMES DAILY AS NEEDED  · metFORMIN HCl  MG Oral Tablet Extended Release 24 Hour; Take 1 tablet twice  daily  · Multi-Vitamin TABS  · Omeprazole 20 MG Oral Capsule Delayed Release; TAKE ONE CAPSULE BY MOUTH  ONCE DAILY  · Rosuvastatin Calcium 40 MG Oral Tablet; TAKE 1 TABLET BY MOUTH NIGHTLY  · Vitamin D2 50 MCG ( [place]) Oral Tablet; take 1 tablet every other day    FAMILY HISTORY:  Family history of essential hypertension    REVIEW OF SYSTEMS:  CONSTITUTIONAL: No fevers or chills  EYES/ENT: No visual changes;  No dysphagia  NECK: No pain or stiffness  RESPIRATORY: No cough, wheezing, hemoptysis; No shortness of breath  CARDIOVASCULAR: No chest pain or palpitations; No lower extremity edema  GASTROINTESTINAL: No abdominal or epigastric pain. No nausea, vomiting, or hematemesis; No diarrhea or constipation. No melena or hematochezia.  BACK: No back pain  GENITOURINARY: No dysuria, frequency or hematuria  NEUROLOGICAL: No numbness   SKIN: No itching, burning, rashes, or lesions   All other review of systems is negative unless indicated above.    Physical Exam:  T(F): 98.9 (), Max: 98.9 ()  HR: 80 () (80 - 80)  BP: 185/73 () (185/73 - 185/73)  RR: 16 ()  SpO2: 100% ()  GENERAL: No acute distress, well-developed  CHEST/LUNG: Clear to auscultation bilaterally; No wheeze, equal breath sounds bilaterally   HEART: Regular rate and rhythm; No murmurs, rubs, or gallops  ABDOMEN: Soft, Nontender, Nondistended; Bowel sounds present  EXTREMITIES:  No clubbing, cyanosis, or edema  PSYCH: Nl behavior, nl affect  NEUROLOGY: AAOx3, non-focal, cranial nerves intact  SKIN: Normal color, No rashes or lesions    Cardiovascular Diagnostic Testing:    ECG: NSR, first degree AVB, RBBB    Stress Test: 3/9/2021, Exercised 2 minutes and 10 seconds to a heart rate of 147 and a blood pressure of 148/74 which in recovery alfredo to 164/80. ECG had 4 to 5 mm of horizontal ST depressions which in recovery became downsloping with T wave inversions and lasted over 20 minutes. Echocardiogram showed new wall motion abnormalities in the anterior wall and possibly lateral wall   Echo: 3/9/2021, MAC and calcified mitral leaflets with normal diastolic opening. Mild to moderate MR and mild to moderate MS. Calcified trileaflet aortic valve with peak gradient 50, mean 28, minimal to mild AI. OG 0.8 cm² by continuity equation. Dimensionless index 0.26. Moderate LAE. Normal LV systolic function with LVEF 65%. Findings of diastolic dysfunction. Normal RV size and function with mild TR. RVSP 46. Normal pericardium with no effusion. ; 2021 Intra-Op [facility]. Mild MR. Calcified trileaflet aortic valve with decreased opening. Peak gradient 36 with OG 0.9 cm². No left atrial or left atrial appendage thrombus. Normal LV systolic function with mild concentric LVH. If you keep doing them normal RV size and function with mild TR. Normal pericardium with no effusion. ; 2021 done at [facility]. Mild MR. Well-seated TAVR with peak gradient 10. OG 2.2 cm². No central or paravalvular AI. Moderate LAE. Normal LV systolic function with LVEF 60 to 65%. Normal right atrium. Normal RV size and function. Normal pericardium with no effusion.   Cardiac Cath: 2011, Done at [facility] with 90% distal RCA lesion and nonobstructive disease of LAD and circumflex. ; 10/2016 80% mid RCA lesion, no significant disease in LAD or circumflex. ; 3/15/2021 normal left main. 30% proximal LAD, 30% mid LAD, 40% distal circumflex, 80% proximal RCA( treated with a drug-eluting stent). Cardiac output 6.4, cardiac index 3.2 wedge pressure 20 pulmonary artery 37/10. RV 57/10   Stent: 10/2016, 2 stents to RCA. ; 3/15/2021 drug-eluting stent for proximal RCA.   Cardiac Sur2021, TAVR    Imaging:    CXR: Personally reviewed    Labs: Personally reviewed      77 y/o M history of type 2 DM on oral Rx, chronic kidney disease stage 3, essential HTN, CAD with CALLUM x 2 to the RCA in 2021, s/P TAVR in May 2021, recent admit for a syncopal episode referred to the ED by Dr Miller in the setting of possible CHB noted on zio patch correlating with symptoms of dizziness.    - report of CHB on zio, high risk given multiple syncopal episodes, fairly recent TAVR  - monitor reviewed showing episode of transient complete heart block  - monitor on tele, avoid all nodally acting medication  - basic labs including type and screen, coags, COVID swab  - keep NPO for now   - will plan for PPM today, discussed with patient and son at bedside, discussed risks and benefits, patient amenable   - EP will continue to follow

## 2021-09-17 ENCOUNTER — TRANSCRIPTION ENCOUNTER (OUTPATIENT)
Age: 76
End: 2021-09-17

## 2021-09-17 ENCOUNTER — NON-APPOINTMENT (OUTPATIENT)
Age: 76
End: 2021-09-17

## 2021-09-17 VITALS
HEART RATE: 75 BPM | TEMPERATURE: 98 F | OXYGEN SATURATION: 97 % | RESPIRATION RATE: 17 BRPM | SYSTOLIC BLOOD PRESSURE: 138 MMHG | DIASTOLIC BLOOD PRESSURE: 61 MMHG

## 2021-09-17 LAB
ANION GAP SERPL CALC-SCNC: 13 MMOL/L — SIGNIFICANT CHANGE UP (ref 5–17)
BASOPHILS # BLD AUTO: 0.03 K/UL — SIGNIFICANT CHANGE UP (ref 0–0.2)
BASOPHILS NFR BLD AUTO: 0.4 % — SIGNIFICANT CHANGE UP (ref 0–2)
BUN SERPL-MCNC: 46 MG/DL — HIGH (ref 7–23)
CALCIUM SERPL-MCNC: 9.3 MG/DL — SIGNIFICANT CHANGE UP (ref 8.4–10.5)
CHLORIDE SERPL-SCNC: 107 MMOL/L — SIGNIFICANT CHANGE UP (ref 96–108)
CO2 SERPL-SCNC: 18 MMOL/L — LOW (ref 22–31)
COVID-19 SPIKE DOMAIN AB INTERP: POSITIVE
COVID-19 SPIKE DOMAIN ANTIBODY RESULT: >250 U/ML — HIGH
CREAT SERPL-MCNC: 1.62 MG/DL — HIGH (ref 0.5–1.3)
EOSINOPHIL # BLD AUTO: 0.15 K/UL — SIGNIFICANT CHANGE UP (ref 0–0.5)
EOSINOPHIL NFR BLD AUTO: 2.2 % — SIGNIFICANT CHANGE UP (ref 0–6)
GLUCOSE BLDC GLUCOMTR-MCNC: 113 MG/DL — HIGH (ref 70–99)
GLUCOSE BLDC GLUCOMTR-MCNC: 124 MG/DL — HIGH (ref 70–99)
GLUCOSE SERPL-MCNC: 136 MG/DL — HIGH (ref 70–99)
HCT VFR BLD CALC: 33.8 % — LOW (ref 39–50)
HGB BLD-MCNC: 11 G/DL — LOW (ref 13–17)
IMM GRANULOCYTES NFR BLD AUTO: 0.3 % — SIGNIFICANT CHANGE UP (ref 0–1.5)
LYMPHOCYTES # BLD AUTO: 0.89 K/UL — LOW (ref 1–3.3)
LYMPHOCYTES # BLD AUTO: 13 % — SIGNIFICANT CHANGE UP (ref 13–44)
MCHC RBC-ENTMCNC: 28.5 PG — SIGNIFICANT CHANGE UP (ref 27–34)
MCHC RBC-ENTMCNC: 32.5 GM/DL — SIGNIFICANT CHANGE UP (ref 32–36)
MCV RBC AUTO: 87.6 FL — SIGNIFICANT CHANGE UP (ref 80–100)
MONOCYTES # BLD AUTO: 0.71 K/UL — SIGNIFICANT CHANGE UP (ref 0–0.9)
MONOCYTES NFR BLD AUTO: 10.4 % — SIGNIFICANT CHANGE UP (ref 2–14)
NEUTROPHILS # BLD AUTO: 5.05 K/UL — SIGNIFICANT CHANGE UP (ref 1.8–7.4)
NEUTROPHILS NFR BLD AUTO: 73.7 % — SIGNIFICANT CHANGE UP (ref 43–77)
NRBC # BLD: 0 /100 WBCS — SIGNIFICANT CHANGE UP (ref 0–0)
PLATELET # BLD AUTO: 159 K/UL — SIGNIFICANT CHANGE UP (ref 150–400)
POTASSIUM SERPL-MCNC: 4.5 MMOL/L — SIGNIFICANT CHANGE UP (ref 3.5–5.3)
POTASSIUM SERPL-SCNC: 4.5 MMOL/L — SIGNIFICANT CHANGE UP (ref 3.5–5.3)
RBC # BLD: 3.86 M/UL — LOW (ref 4.2–5.8)
RBC # FLD: 13.2 % — SIGNIFICANT CHANGE UP (ref 10.3–14.5)
SARS-COV-2 IGG+IGM SERPL QL IA: >250 U/ML — HIGH
SARS-COV-2 IGG+IGM SERPL QL IA: POSITIVE
SODIUM SERPL-SCNC: 138 MMOL/L — SIGNIFICANT CHANGE UP (ref 135–145)
WBC # BLD: 6.85 K/UL — SIGNIFICANT CHANGE UP (ref 3.8–10.5)
WBC # FLD AUTO: 6.85 K/UL — SIGNIFICANT CHANGE UP (ref 3.8–10.5)

## 2021-09-17 PROCEDURE — 33208 INSRT HEART PM ATRIAL & VENT: CPT

## 2021-09-17 PROCEDURE — 36415 COLL VENOUS BLD VENIPUNCTURE: CPT

## 2021-09-17 PROCEDURE — 99239 HOSP IP/OBS DSCHRG MGMT >30: CPT

## 2021-09-17 PROCEDURE — 71046 X-RAY EXAM CHEST 2 VIEWS: CPT

## 2021-09-17 PROCEDURE — 80048 BASIC METABOLIC PNL TOTAL CA: CPT

## 2021-09-17 PROCEDURE — 85025 COMPLETE CBC W/AUTO DIFF WBC: CPT

## 2021-09-17 PROCEDURE — C1892: CPT

## 2021-09-17 PROCEDURE — 82962 GLUCOSE BLOOD TEST: CPT

## 2021-09-17 PROCEDURE — 93010 ELECTROCARDIOGRAM REPORT: CPT

## 2021-09-17 PROCEDURE — U0003: CPT

## 2021-09-17 PROCEDURE — 93005 ELECTROCARDIOGRAM TRACING: CPT

## 2021-09-17 PROCEDURE — 80053 COMPREHEN METABOLIC PANEL: CPT

## 2021-09-17 PROCEDURE — 86769 SARS-COV-2 COVID-19 ANTIBODY: CPT

## 2021-09-17 PROCEDURE — 86850 RBC ANTIBODY SCREEN: CPT

## 2021-09-17 PROCEDURE — 71046 X-RAY EXAM CHEST 2 VIEWS: CPT | Mod: 26

## 2021-09-17 PROCEDURE — 93010 ELECTROCARDIOGRAM REPORT: CPT | Mod: 77

## 2021-09-17 PROCEDURE — 99285 EMERGENCY DEPT VISIT HI MDM: CPT | Mod: 25

## 2021-09-17 PROCEDURE — 85730 THROMBOPLASTIN TIME PARTIAL: CPT

## 2021-09-17 PROCEDURE — 86901 BLOOD TYPING SEROLOGIC RH(D): CPT

## 2021-09-17 PROCEDURE — 71045 X-RAY EXAM CHEST 1 VIEW: CPT

## 2021-09-17 PROCEDURE — C1889: CPT

## 2021-09-17 PROCEDURE — 86900 BLOOD TYPING SEROLOGIC ABO: CPT

## 2021-09-17 PROCEDURE — 85610 PROTHROMBIN TIME: CPT

## 2021-09-17 PROCEDURE — C1785: CPT

## 2021-09-17 RX ORDER — OXYCODONE AND ACETAMINOPHEN 5; 325 MG/1; MG/1
1 TABLET ORAL EVERY 4 HOURS
Refills: 0 | Status: DISCONTINUED | OUTPATIENT
Start: 2021-09-17 | End: 2021-09-17

## 2021-09-17 RX ORDER — ACETAMINOPHEN 500 MG
2 TABLET ORAL
Qty: 0 | Refills: 0 | DISCHARGE
Start: 2021-09-17

## 2021-09-17 RX ADMIN — OXYCODONE AND ACETAMINOPHEN 1 TABLET(S): 5; 325 TABLET ORAL at 05:42

## 2021-09-17 RX ADMIN — PANTOPRAZOLE SODIUM 40 MILLIGRAM(S): 20 TABLET, DELAYED RELEASE ORAL at 05:13

## 2021-09-17 RX ADMIN — Medication 100 MILLIGRAM(S): at 06:35

## 2021-09-17 RX ADMIN — Medication 81 MILLIGRAM(S): at 05:14

## 2021-09-17 RX ADMIN — OXYCODONE AND ACETAMINOPHEN 1 TABLET(S): 5; 325 TABLET ORAL at 05:14

## 2021-09-17 NOTE — DISCHARGE NOTE PROVIDER - NSDCCPCAREPLAN_GEN_ALL_CORE_FT
PRINCIPAL DISCHARGE DIAGNOSIS  Diagnosis: Complete heart block  Assessment and Plan of Treatment: Continue with follow-up visits to your electrophysiology team and with your home remote device monitoring (if applicable). Continue your medications as prescribed. Monitor your left chest wall site for swelling, bleeding.      SECONDARY DISCHARGE DIAGNOSES  Diagnosis: HTN (hypertension)  Assessment and Plan of Treatment: Continue with your blood pressure medications; eat a heart healthy diet with low salt diet; exercise regularly (consult with your physician or cardiologist first); maintain a heart healthy weight; if you smoke - quit (A resource to help you stop smoking is the [facility] [employer] Tobacco Control – phone number [phone].); include healthy ways to manage stress. Continue to follow with your primary care physician or cardiologist.    Diagnosis: HLD (hyperlipidemia)  Assessment and Plan of Treatment: Continue with your cholesterol medications. Eat a heart healthy diet that is low in saturated fats and salt, and includes whole grains, fruits, vegetables and lean protein; exercise regularly (consult with your physician or cardiologist first); maintain a heart healthy weight; if you smoke - quit (A resource to help you stop smoking is the [facility] [employer] for Tobacco Control – phone number [phone].). Continue to follow with your primary physician or cardiologist.    Diagnosis: DM type 2, goal HbA1c < 7%  Assessment and Plan of Treatment: Your Hemoglobin A1c level is   Continue to follow with your primary care MD or your endocrinologist.  Follow a heart healthy diabetic diet. If you check your fingerstick glucose at home, call your MD if it is greater than 250mg/dL on 2 occasions or less than 100mg/dL on 2 occasions. Know signs of low blood sugar, such as: dizziness, shakiness, sweating, confusion, hunger, nervousness-drink 4 ounces apple juice if occurs and call your doctor. Know early signs of high blood sugar, such as: frequent urination, increased thirst, blurry vision, fatigue, headache - call your doctor if this occurs. Follow with other practitioners to care for your diabetes, such as ophthalmologist and podiatrist.

## 2021-09-17 NOTE — DISCHARGE NOTE PROVIDER - NSDCCPTREATMENT_GEN_ALL_CORE_FT
PRINCIPAL PROCEDURE  Procedure: Insertion of DDD cardiac pacemaker  Findings and Treatment: Medtronic, DDD

## 2021-09-17 NOTE — DISCHARGE NOTE PROVIDER - NSDCFUADDINST_GEN_ALL_CORE_FT
WOUND CARE:  Do NOT scrub, rub, or pick at your incision site  AFTER 3 DAYS you may SHOWER  - use mild soap and gentle warm, water stream, pat dry  DO NOT apply lotions, creams, ointments, powder, parfumes to your incision site  DO NOT SOAK your site for 4-6 weeks ( no baths, no pools, no tubs, etc...)  wear loose clothing around site for 1-2 weeks  IF surgical tape was used DO NOT remove the strips, they will fall off after 7days, if glue was used, it will naturally fall off within 3 weeks  if staples were used, they will b eremoved in 7-10 days by your doctor    ACTIVITY:  for 2 weeks AFTER  your procedure  - DO NOT RAISE your arm above shoulder level ( on the same side of your incision)  for 4 weeks AFTER your procedure   - DO NOT LIFT anything 10 lbs or heavier ( on the side of your impalnt)   - certain activities may be limited longer, those that involve swinging your arm, and will be discuseed with your EP doctor  DO NOT DRIVE until your EP Doctor or nurse practitioner/ physician assistant states it is safe to do so  A follow up appointment in 7-14 days will be arranged before your discharge    ID CARD:   you will receive an ID CARD and device company booklet   - please carry that card with you at all times    ***CALL YOUR DOCTOR ***  IF you have fever, chils, body aches, or severe pain, swelling, redness, heat, yellow drainage from your incision site  IF bleeding  or significnat new swelling from your puncture site  IF your experience lightheadness, dizziness, or fainting spell.  IF unable to ge tin contact with yout doctor, you may call the Cardiology Office at [facility] at [phone]

## 2021-09-17 NOTE — DISCHARGE NOTE NURSING/CASE MANAGEMENT/SOCIAL WORK - NSDCFUADDAPPT_GEN_ALL_CORE_FT
Call the [facility] Cardiology office, [phone], to schedule a follow up Electrophysiology appointment

## 2021-09-17 NOTE — DISCHARGE NOTE NURSING/CASE MANAGEMENT/SOCIAL WORK - NSDCVIVACCINE_GEN_ALL_CORE_FT
Tdap; 02-Sep-2021 11:23; Chelsi Barajas (RN); [employer]; [id number] (Exp. Date: 01-Oct-2022); IntraMuscular; Deltoid Left.; 0.5 milliLiter(s); VIS (VIS Published: 09-May-2013, VIS Presented: 02-Sep-2021);

## 2021-09-17 NOTE — DISCHARGE NOTE PROVIDER - NSDCFUSCHEDAPPT_GEN_ALL_CORE_FT
ROSA BURGOS ; 11/02/2021 ; NPP Cardio [address] [facility]  ROSA BURGOS ; 11/03/2021 ; NPP Med GenInt [address] ROSA BURGOS ; 09/29/2021 ; [facility] Cardio Electro [address] ROSA Sam ; 11/02/2021 ; [facility] Cardio [address] [facility]  ROSA BURGOS ; 11/03/2021 ; [facility] Med GenInt [address]

## 2021-09-17 NOTE — DISCHARGE NOTE PROVIDER - NSDCFUADDAPPT_GEN_ALL_CORE_FT
Call the [facility] Cardiology office, [phone], to schedule a follow up Electrophysiology appointment

## 2021-09-17 NOTE — DISCHARGE NOTE PROVIDER - PROVIDER TOKENS
PROVIDER:[TOKEN:[[id number]]] PROVIDER:[TOKEN:[[id number]]],PROVIDER:[TOKEN:[[id number]],SCHEDULEDAPPT:[11/02/2021],SCHEDULEDAPPTTIME:[10:30 AM]] PROVIDER:[TOKEN:[[id number]],SCHEDULEDAPPT:[09/29/2021],SCHEDULEDAPPTTIME:[01:40 PM]],PROVIDER:[TOKEN:[[id number]],SCHEDULEDAPPT:[11/02/2021],SCHEDULEDAPPTTIME:[10:30 AM]]

## 2021-09-17 NOTE — DISCHARGE NOTE PROVIDER - CARE PROVIDERS DIRECT ADDRESSES
,DirectAddress_Unknown ,DirectAddress_Unknown,[email or web address]

## 2021-09-17 NOTE — PROGRESS NOTE ADULT - ASSESSMENT
75 y/o M history of type 2 DM on oral medications, chronic kidney disease stage 3, essential HTN, CAD with CALLUM x 2 to the RCA in March 2021, S/P TAVR in 5/2021, recent admit for a syncopal episode, now referred to the ED by Dr. Miller in the setting of possible CHB noted on Zio patch correlating with symptoms of dizziness and hx of prior syncopal episodes.     -S/p dual chamber MDT PPM implant 9/17   -CXR PA/LAT revealing good positioning of leads, no ptx   -MDT interrogated device this AM revealing function WNL  -Device teaching, activity level and wound care instructions provided to patient   -Post op appt 9/29 1:40 PM for wound check     EP to sign off, cleared for discharge     [id number]   
Patient is now s/p PPM for complete heart block seen on ZioPatch. Insertion unremarkable and no post-op complication.    Case discussed with cardiology. No objection to discharge. Outpatient f/u with EP for wound check    Discharge Time: 35 minutes

## 2021-09-17 NOTE — DISCHARGE NOTE PROVIDER - HOSPITAL COURSE
HPI:  76 y.o M with PMHx of  DMII, CKD stage 3, HTN, CAD with CALLUM x 2 3/ 2021 and TAVR in May 2021 who was recently admitted for a syncopal episode discharged 9/5 sent in by cardiologist for [facility]    Patient states for the past month he has been experiencing episodes of dizzines both with exertion and at rest lasting up to a minute at a time. Has also experienced 5-6 episodes of syncope with facial trauma over this time. during recent admission all work up was negative and no tele events noted. Patient followed up cardiology Dr Miller yesterday. During this visit he was noted to have soft BPs,, EKGw/, first degree AVB, ACE held and zio placed.     Patient had an additional episode of dizziness. He was subsequently called by Dr. Miller to go to [facility] for a pacemaker placement for complete heart block.       9/16 s/p Medtronic PPM placement, DDD . Left chest wall site without swelling, bleeding.   HPI:  76 y.o M with PMHx of  DMII, CKD stage 3, HTN, CAD with CALLUM x 2 3/ 2021 and TAVR in May 2021 who was recently admitted for a syncopal episode discharged 9/5 sent in by cardiologist for [facility]    Patient states for the past month he has been experiencing episodes of dizzines both with exertion and at rest lasting up to a minute at a time. Has also experienced 5-6 episodes of syncope with facial trauma over this time. during recent admission all work up was negative and no tele events noted. Patient followed up cardiology Dr Miller yesterday. During this visit he was noted to have soft BPs,, EKGw/, first degree AVB, ACE held and zio placed.     Patient had an additional episode of dizziness. He was subsequently called by Dr. Miller to go to [facility] for a pacemaker placement for complete heart block.       9/16 s/p Medtronic PPM placement, DDD . Left chest wall site without swelling, bleeding.  9/17 LCW stable w/o bleeding or hematoma   morning CXR w/o pneumo   f/u appt for wound check on 9/29 at 1:40pm    < from: Xray Chest 2 Views PA/Lat (09.17.21 @ 06:26) >    EXAM:  XR CHEST PA LAT 2V                         PROCEDURE DATE:  09/17/2021    INTERPRETATION:  CLINICAL INFORMATION: Status post implantable pacemaker placement..    TECHNIQUE: PA and lateral radiographs of the chest.    COMPARISON: Chest x-ray from 5/11/2021.    FINDINGS/  IMPRESSION:    Clear lungs. No pleural effusions or pneumothorax. Cardiac silhouette is unremarkable. No acute osseous pathology. Status post TAVR. Dual-lead pacemaker overlies left hemithorax.    < end of copied text >

## 2021-09-17 NOTE — DISCHARGE NOTE PROVIDER - NSCORESITESY/N_GEN_A_CORE_RD
Left leg swelling. Left calf pain at night. Hx of blood clots. Currently taking warfarin. Due for next INR on Monday.    No

## 2021-09-17 NOTE — DISCHARGE NOTE PROVIDER - NSDCMRMEDTOKEN_GEN_ALL_CORE_FT
aspirin 81 mg oral delayed release tablet: 1 tab(s) orally once a day  Jardiance 25 mg oral tablet: 1 tab(s) orally once a day (in the morning)  Lipitor 80 mg oral tablet: 1 tab(s) orally once a day (at bedtime)  metFORMIN 500 mg oral tablet, extended release: 2 tab(s) orally once a day (at bedtime)  Multiple Vitamins oral tablet: 1 tab(s) orally once a day, every other day   omeprazole 20 mg oral delayed release capsule: 2 cap(s) orally, As Needed  Steglatro 15 mg oral tablet: 1 tab(s) orally once a day (in the morning)  Vitamin D3 50 mcg (2000 intl units) oral tablet: 1 tab(s) orally once a day, every other day   acetaminophen 325 mg oral tablet: 2 tab(s) orally every 6 hours, As needed, Mild Pain (1 - 3)  aspirin 81 mg oral delayed release tablet: 1 tab(s) orally once a day  Jardiance 25 mg oral tablet: 1 tab(s) orally once a day (in the morning)  Lipitor 80 mg oral tablet: 1 tab(s) orally once a day (at bedtime)  metFORMIN 500 mg oral tablet, extended release: 2 tab(s) orally once a day (at bedtime)  Multiple Vitamins oral tablet: 1 tab(s) orally once a day, every other day   omeprazole 20 mg oral delayed release capsule: 2 cap(s) orally, As Needed  Steglatro 15 mg oral tablet: 1 tab(s) orally once a day (in the morning)  Vitamin D3 50 mcg (2000 intl units) oral tablet: 1 tab(s) orally once a day, every other day

## 2021-09-17 NOTE — DISCHARGE NOTE PROVIDER - CARE PROVIDER_API CALL
Verna Wong (MD; PhD)  Cardiac Electrophysiology; Cardiovascular Disease; Internal Medicine  [facility] - Dept of Cardiology, [address] [place] [address]  Phone: [phone]  Fax: [phone]  Follow Up Time:    Verna Wong; PhD)  Cardiac Electrophysiology; Cardiovascular Disease; Internal Medicine  [facility] - Dept of Cardiology, [address] [place] [address]  Phone: [phone]  Fax: [phone]  Follow Up Time:     Adam Miller (MD)  Cardiovascular Disease; Internal Medicine  [address] [facility], [address]  [place] [address]  Phone: [phone]  Fax: [phone]  Scheduled Appointment: 11/02/2021 10:30 AM   Verna Wong; PhD)  Cardiac Electrophysiology; Cardiovascular Disease; Internal Medicine  [facility] - Dept of Cardiology, [address] [place] [address]  Phone: [phone]  Fax: [phone]  Scheduled Appointment: 09/29/2021 01:40 PM    Adam Miller)  Cardiovascular Disease; Internal Medicine  [facility], [facility] [address]  [place] [address]  Phone: [phone]  Fax: [phone]  Scheduled Appointment: 11/02/2021 10:30 AM

## 2021-09-17 NOTE — DISCHARGE NOTE NURSING/CASE MANAGEMENT/SOCIAL WORK - PATIENT PORTAL LINK FT
You can access the FollowMyHealth Patient Portal offered by [facility] by registering at the following website: [email or web address]. By joining [facility]’s FollowMyHealth portal, you will also be able to view your health information using other applications (apps) compatible with our system.

## 2021-09-17 NOTE — PROGRESS NOTE ADULT - SUBJECTIVE AND OBJECTIVE BOX
24H hour events: SR 70's     MEDICATIONS:  aspirin enteric coated 81 milliGRAM(s) Oral daily        acetaminophen   Tablet .. 650 milliGRAM(s) Oral every 6 hours PRN  oxycodone    5 mG/acetaminophen 325 mG 1 Tablet(s) Oral every 4 hours PRN    pantoprazole    Tablet 40 milliGRAM(s) Oral before breakfast    atorvastatin 80 milliGRAM(s) Oral at bedtime  dextrose 40% Gel 15 Gram(s) Oral once  dextrose 50% Injectable 25 Gram(s) IV Push once  dextrose 50% Injectable 12.5 Gram(s) IV Push once  dextrose 50% Injectable 25 Gram(s) IV Push once  glucagon  Injectable 1 milliGRAM(s) IntraMuscular once  insulin lispro (ADMELOG) corrective regimen sliding scale   SubCutaneous three times a day before meals    cholecalciferol 2000 Unit(s) Oral <User Schedule>  dextrose 5%. 1000 milliLiter(s) IV Continuous <Continuous>  dextrose 5%. 1000 milliLiter(s) IV Continuous <Continuous>      REVIEW OF SYSTEMS:  See HPI, otherwise ROS negative.    PHYSICAL EXAM:  T(C): 36.5 (09-17-21 @ 09:00), Max: 36.7 (09-16-21 @ 13:18)  HR: 75 (09-17-21 @ 09:00) (66 - 86)  BP: 138/61 (09-17-21 @ 09:00) (99/54 - 143/64)  RR: 17 (09-17-21 @ 09:00) (12 - 24)  SpO2: 97% (09-17-21 @ 09:00) (96% - 100%)  Wt(kg): --  I&O's Summary    16 Sep 2021 07:01  -  17 Sep 2021 07:00  --------------------------------------------------------  IN: 0 mL / OUT: 1250 mL / NET: -1250 mL    17 Sep 2021 07:01  -  17 Sep 2021 11:33  --------------------------------------------------------  IN: 480 mL / OUT: 0 mL / NET: 480 mL        Appearance: Alert in NAD   Cardiovascular: +S1S2 RRR no m/g/r  Respiratory: CTA B/L	  Gastrointestinal:  Soft, nontender, nondistended. +BS	  Skin: No rashes	  L infraclavicular site c/d/i w/o hematoma   Extremities: No edema B/L LE   Vascular: Peripheral pulses palpable 2+ bilaterally      LABS:	 	    CBC Full  -  ( 17 Sep 2021 01:18 )  WBC Count : 6.85 K/uL  Hemoglobin : 11.0 g/dL  Hematocrit : 33.8 %  Platelet Count - Automated : 159 K/uL  Mean Cell Volume : 87.6 fl  Mean Cell Hemoglobin : 28.5 pg  Mean Cell Hemoglobin Concentration : 32.5 gm/dL  Auto Neutrophil # : 5.05 K/uL  Auto Lymphocyte # : 0.89 K/uL  Auto Monocyte # : 0.71 K/uL  Auto Eosinophil # : 0.15 K/uL  Auto Basophil # : 0.03 K/uL  Auto Neutrophil % : 73.7 %  Auto Lymphocyte % : 13.0 %  Auto Monocyte % : 10.4 %  Auto Eosinophil % : 2.2 %  Auto Basophil % : 0.4 %    09-17    138  |  107  |  46<H>  ----------------------------<  136<H>  4.5   |  18<L>  |  1.62<H>  09-16    133<L>  |  103  |  56<H>  ----------------------------<  122<H>  4.4   |  16<L>  |  1.70<H>    Ca    9.3      17 Sep 2021 01:18  Ca    10.0      16 Sep 2021 11:01    TPro  7.7  /  Alb  4.6  /  TBili  0.3  /  DBili  x   /  AST  20  /  ALT  22  /  AlkPhos  104  09-16    CARDIAC MARKERS:    TELEMETRY: SR 70's   	    ECG: SR 73 bpm, AK 222ms, QRS narrow 114ms, QT//438    RADIOLOGY:  CXR PA/LAT with atrial and ventricular leads in good positioning, no ptx 
Patient seen & examined at bedside after PPM placement. He endorses some soreness, but otherwise no shortness of breath. He feels ready to go home    VSS  Dressing: C/D/I  +s1/s2, no significant murmurs  Chest clear  Abd Soft NTND    CXR showing no PTX

## 2021-09-18 ENCOUNTER — TRANSCRIPTION ENCOUNTER (OUTPATIENT)
Age: 76
End: 2021-09-18

## 2021-09-20 ENCOUNTER — NON-APPOINTMENT (OUTPATIENT)
Age: 76
End: 2021-09-20

## 2021-09-20 ENCOUNTER — RX RENEWAL (OUTPATIENT)
Age: 76
End: 2021-09-20

## 2021-09-23 ENCOUNTER — NON-APPOINTMENT (OUTPATIENT)
Age: 76
End: 2021-09-23

## 2021-09-28 ENCOUNTER — RESULT CHARGE (OUTPATIENT)
Age: 76
End: 2021-09-28

## 2021-09-29 ENCOUNTER — APPOINTMENT (OUTPATIENT)
Dept: ELECTROPHYSIOLOGY | Facility: CLINIC | Age: 76
End: 2021-09-29
Payer: MEDICARE

## 2021-09-29 ENCOUNTER — NON-APPOINTMENT (OUTPATIENT)
Age: 76
End: 2021-09-29

## 2021-09-29 VITALS
BODY MASS INDEX: 24.88 KG/M2 | HEART RATE: 82 BPM | HEIGHT: 69 IN | SYSTOLIC BLOOD PRESSURE: 117 MMHG | WEIGHT: 168 LBS | OXYGEN SATURATION: 98 % | DIASTOLIC BLOOD PRESSURE: 68 MMHG

## 2021-09-29 PROCEDURE — 93000 ELECTROCARDIOGRAM COMPLETE: CPT

## 2021-09-29 PROCEDURE — 99024 POSTOP FOLLOW-UP VISIT: CPT

## 2021-09-29 RX ORDER — MECLIZINE HYDROCHLORIDE 12.5 MG/1
12.5 TABLET ORAL 3 TIMES DAILY
Qty: 30 | Refills: 1 | Status: DISCONTINUED | COMMUNITY
Start: 2021-09-08 | End: 2021-09-29

## 2021-09-30 ENCOUNTER — APPOINTMENT (OUTPATIENT)
Dept: INTERNAL MEDICINE | Facility: CLINIC | Age: 76
End: 2021-09-30
Payer: MEDICARE

## 2021-09-30 VITALS
BODY MASS INDEX: 25.03 KG/M2 | HEIGHT: 69 IN | WEIGHT: 169 LBS | HEART RATE: 72 BPM | SYSTOLIC BLOOD PRESSURE: 96 MMHG | TEMPERATURE: 97.8 F | DIASTOLIC BLOOD PRESSURE: 64 MMHG | RESPIRATION RATE: 16 BRPM

## 2021-09-30 PROCEDURE — 99214 OFFICE O/P EST MOD 30 MIN: CPT | Mod: 25

## 2021-09-30 PROCEDURE — 36415 COLL VENOUS BLD VENIPUNCTURE: CPT

## 2021-09-30 NOTE — HISTORY OF PRESENT ILLNESS
[FreeTextEntry1] : 77 yo male presents after having a pacemaker done-major complaint no energy, UPTON (without chest pain), head fuzzy at times. [de-identified] : 75 yo male s/p a pacemaker insertion for sick Sinus syndrome which was causing "drop attacks".  Now presents complaining of fatigue, UPTON, and general malaise which he states has gradually been getting worse since he went through the Valve Replacement.\par He notes recurrent anginal symptoms post prandially.\par He has not passed out since having the pacemaker.\par His wife feels he is depressed and indeed, he cannot do anything that he used to get pleasure from.\par His BP has been low especially at night.\par He continues to have night sweats 2 months after stopping Leupron.

## 2021-09-30 NOTE — PHYSICAL EXAM
[Well Developed] : well developed [Normal Oropharynx] : the oropharynx was normal [No JVD] : no jugular venous distention [No Respiratory Distress] : no respiratory distress  [Normal Rate] : normal rate  [No Edema] : there was no peripheral edema [de-identified] : Pale male looking forlawn and pale [de-identified] : Kayceker pocket OK and not inflammed

## 2021-10-01 LAB
ALBUMIN SERPL ELPH-MCNC: 4.5 G/DL
ALP BLD-CCNC: 140 U/L
ALT SERPL-CCNC: 68 U/L
ANION GAP SERPL CALC-SCNC: 15 MMOL/L
AST SERPL-CCNC: 52 U/L
BASOPHILS # BLD AUTO: 0.02 K/UL
BASOPHILS NFR BLD AUTO: 0.4 %
BILIRUB SERPL-MCNC: 0.2 MG/DL
BUN SERPL-MCNC: 45 MG/DL
CALCIUM SERPL-MCNC: 9.7 MG/DL
CHLORIDE SERPL-SCNC: 103 MMOL/L
CO2 SERPL-SCNC: 20 MMOL/L
CORTIS SERPL-MCNC: 12 UG/DL
CREAT SERPL-MCNC: 1.73 MG/DL
EOSINOPHIL # BLD AUTO: 0.07 K/UL
EOSINOPHIL NFR BLD AUTO: 1.5 %
ERYTHROCYTE [SEDIMENTATION RATE] IN BLOOD BY WESTERGREN METHOD: 28 MM/HR
GLUCOSE SERPL-MCNC: 112 MG/DL
HCT VFR BLD CALC: 31 %
HGB BLD-MCNC: 9.6 G/DL
IMM GRANULOCYTES NFR BLD AUTO: 0.4 %
LYMPHOCYTES # BLD AUTO: 0.63 K/UL
LYMPHOCYTES NFR BLD AUTO: 13.2 %
MAN DIFF?: NORMAL
MCHC RBC-ENTMCNC: 28.2 PG
MCHC RBC-ENTMCNC: 31 GM/DL
MCV RBC AUTO: 91.2 FL
MONOCYTES # BLD AUTO: 0.53 K/UL
MONOCYTES NFR BLD AUTO: 11.1 %
NEUTROPHILS # BLD AUTO: 3.49 K/UL
NEUTROPHILS NFR BLD AUTO: 73.4 %
PLATELET # BLD AUTO: 161 K/UL
POTASSIUM SERPL-SCNC: 5 MMOL/L
PROT SERPL-MCNC: 6.7 G/DL
RBC # BLD: 3.4 M/UL
RBC # FLD: 14.1 %
SODIUM SERPL-SCNC: 138 MMOL/L
TSH SERPL-ACNC: 1.19 UIU/ML
WBC # FLD AUTO: 4.76 K/UL

## 2021-10-12 ENCOUNTER — NON-APPOINTMENT (OUTPATIENT)
Age: 76
End: 2021-10-12

## 2021-10-14 ENCOUNTER — LABORATORY RESULT (OUTPATIENT)
Age: 76
End: 2021-10-14

## 2021-10-14 ENCOUNTER — APPOINTMENT (OUTPATIENT)
Dept: INTERNAL MEDICINE | Facility: CLINIC | Age: 76
End: 2021-10-14
Payer: MEDICARE

## 2021-10-14 VITALS — WEIGHT: 177 LBS | BODY MASS INDEX: 26.22 KG/M2 | HEIGHT: 69 IN

## 2021-10-14 LAB
APPEARANCE: CLEAR
BACTERIA: NEGATIVE
BILIRUBIN URINE: NEGATIVE
BLOOD URINE: ABNORMAL
COLOR: NORMAL
GLUCOSE QUALITATIVE U: ABNORMAL
HYALINE CASTS: 3 /LPF
KETONES URINE: NEGATIVE
LEUKOCYTE ESTERASE URINE: NEGATIVE
MICROSCOPIC-UA: NORMAL
NITRITE URINE: NEGATIVE
PH URINE: 5.5
PROTEIN URINE: ABNORMAL
RED BLOOD CELLS URINE: 5 /HPF
SPECIFIC GRAVITY URINE: 1.02
SQUAMOUS EPITHELIAL CELLS: 1 /HPF
UROBILINOGEN URINE: NORMAL
WHITE BLOOD CELLS URINE: 1 /HPF

## 2021-10-14 PROCEDURE — 36415 COLL VENOUS BLD VENIPUNCTURE: CPT

## 2021-10-14 PROCEDURE — 99214 OFFICE O/P EST MOD 30 MIN: CPT | Mod: 25

## 2021-10-14 NOTE — PHYSICAL EXAM
[No Acute Distress] : no acute distress [Clear to Auscultation] : lungs were clear to auscultation bilaterally [Normal Rate] : normal rate  [Regular Rhythm] : with a regular rhythm [de-identified] : No rub [de-identified] : 3+ edema on the right to the knee and 1-2+ on the left [de-identified] : Protruberant, BS Dull to percussion [de-identified] : ? minmal presacral edema [de-identified] : Worreid

## 2021-10-14 NOTE — HISTORY OF PRESENT ILLNESS
[FreeTextEntry1] : 75 yo male presents because he is developing edema of his legs.  He notices fatigue and dyspnea as well especially with exertion. [de-identified] : 75 yo malew s/p stent in Aprils/p TAVR (May 13) and s/p Pacemaker 9/16, now with progressive dyspnea, fatigue , and edema.  He presents today because of the swelling in the legs which started in the right but now has begun in the left as well.

## 2021-10-15 LAB
ALBUMIN SERPL ELPH-MCNC: 4.4 G/DL
ALP BLD-CCNC: 129 U/L
ALT SERPL-CCNC: 35 U/L
ANION GAP SERPL CALC-SCNC: 14 MMOL/L
AST SERPL-CCNC: 32 U/L
BASOPHILS # BLD AUTO: 0.03 K/UL
BASOPHILS NFR BLD AUTO: 0.6 %
BILIRUB SERPL-MCNC: 0.3 MG/DL
BUN SERPL-MCNC: 23 MG/DL
CALCIUM SERPL-MCNC: 9.5 MG/DL
CHLORIDE SERPL-SCNC: 107 MMOL/L
CO2 SERPL-SCNC: 22 MMOL/L
CREAT SERPL-MCNC: 1.47 MG/DL
EOSINOPHIL # BLD AUTO: 0.14 K/UL
EOSINOPHIL NFR BLD AUTO: 2.9 %
GLUCOSE SERPL-MCNC: 88 MG/DL
HCT VFR BLD CALC: 31.3 %
HGB BLD-MCNC: 9.6 G/DL
IMM GRANULOCYTES NFR BLD AUTO: 0.4 %
LYMPHOCYTES # BLD AUTO: 0.75 K/UL
LYMPHOCYTES NFR BLD AUTO: 15.5 %
MAN DIFF?: NORMAL
MCHC RBC-ENTMCNC: 27.8 PG
MCHC RBC-ENTMCNC: 30.7 GM/DL
MCV RBC AUTO: 90.7 FL
MONOCYTES # BLD AUTO: 0.61 K/UL
MONOCYTES NFR BLD AUTO: 12.6 %
NEUTROPHILS # BLD AUTO: 3.3 K/UL
NEUTROPHILS NFR BLD AUTO: 68 %
NT-PROBNP SERPL-MCNC: 3703 PG/ML
PLATELET # BLD AUTO: 168 K/UL
POTASSIUM SERPL-SCNC: 4.2 MMOL/L
PROT SERPL-MCNC: 6.6 G/DL
RBC # BLD: 3.45 M/UL
RBC # FLD: 14.6 %
SODIUM SERPL-SCNC: 144 MMOL/L
WBC # FLD AUTO: 4.85 K/UL

## 2021-10-18 ENCOUNTER — APPOINTMENT (OUTPATIENT)
Dept: ULTRASOUND IMAGING | Facility: CLINIC | Age: 76
End: 2021-10-18
Payer: MEDICARE

## 2021-10-18 PROCEDURE — 76700 US EXAM ABDOM COMPLETE: CPT

## 2021-10-20 ENCOUNTER — LABORATORY RESULT (OUTPATIENT)
Age: 76
End: 2021-10-20

## 2021-10-20 ENCOUNTER — APPOINTMENT (OUTPATIENT)
Dept: CARDIOLOGY | Facility: CLINIC | Age: 76
End: 2021-10-20
Payer: MEDICARE

## 2021-10-20 VITALS
RESPIRATION RATE: 17 BRPM | DIASTOLIC BLOOD PRESSURE: 69 MMHG | BODY MASS INDEX: 24.59 KG/M2 | WEIGHT: 166 LBS | HEART RATE: 76 BPM | HEIGHT: 69 IN | OXYGEN SATURATION: 99 % | SYSTOLIC BLOOD PRESSURE: 136 MMHG

## 2021-10-20 PROCEDURE — 99215 OFFICE O/P EST HI 40 MIN: CPT | Mod: 25

## 2021-10-20 PROCEDURE — 93000 ELECTROCARDIOGRAM COMPLETE: CPT

## 2021-10-20 PROCEDURE — 93306 TTE W/DOPPLER COMPLETE: CPT

## 2021-10-20 PROCEDURE — 36415 COLL VENOUS BLD VENIPUNCTURE: CPT

## 2021-10-20 NOTE — REVIEW OF SYSTEMS
[Weight Loss (___ Lbs)] : [unfilled] ~Ulb weight loss [Negative] : Heme/Lymph [Feeling Fatigued] : not feeling fatigued [SOB] : no shortness of breath [Dyspnea on exertion] : dyspnea during exertion [Chest Discomfort] : no chest discomfort [Lower Ext Edema] : lower extremity edema [Palpitations] : no palpitations [Syncope] : no syncope [Dizziness] : no dizziness [Convulsions] : no convulsions [FreeTextEntry5] : see HPI [de-identified] : Severe ecchymoses [de-identified] : No more shaking since the one episode in the hospital

## 2021-10-20 NOTE — REASON FOR VISIT
[FreeTextEntry1] : 76-year-old with known coronary disease now status post stent to right coronary artery as part of work-up for TAVR, and now s/p TAVR with new fluid retention and dyspnea and a very abnormal BNP.

## 2021-10-20 NOTE — HISTORY OF PRESENT ILLNESS
[FreeTextEntry1] : April 3, 2019. Patient has been followed by Dr. Larry Toth and was followed by cardiology at [facility]. In June of 2011 after a positive stress test had cardiac angiography, which had a distal 90% RCA lesion and only nonobstructive disease in the LAD and circumflex. This was stented and his symptoms were improved. His symptoms then recurred. They have been exertional chest pressure and shortness of breath when he walks after eating a meal, especially a large meal. Never gets it at rest and does not get it when he exerts himself if he has not just eaten. Did well for a while, but then the symptoms returned and in October of 2016 after another positive ECG treadmill test he had repeat cardiac catheterization. There was an 80% mid RCA lesion and again no significant disease in the LAD was circumflex and he had 2 stents to the RCA . The difference was this time his symptoms did not change. He has been placed on Omeprazole and this has not seemed to make a difference, or perhaps it did initially. He has not seen GI. He otherwise seems to be doing well. In July of 2014. He had bilateral carotid endarterectomies. His most recent echo showed normal LV function. His EKG is normal sinus rhythm and a normal tracing. He has hypertension and hyperlipidemia.He is a former smoker. His father had hypertension and CVA, but there does not seem to be a family history of coronary artery disease. \par April 24, 2019.  Patient tried to increase his omeprazole, and then said he got a stomach ache and stopped it. He went for a CTA of the coronary arteries. Minor disease in the LAD and borderline severe 60-69% in the proximal circumflex and 60-70% in the RCA. Problem with him is whether his symptoms are related to ischemia or not. After long discussion I recommended that he have the angiogram. If non-obstructive disease, try Ranexa and if no help, consider noncardiac causes. If significant disease then he should have stents and again, we can see if this relieves his symptoms or not. Patient to discuss with his wife and then let me know   \par March 1, 2021.Patient called.  His exertional chest pain is getting worse and coming more frequently and more easier for the last few months.  I have not seen him since April 2019 and back then when we did a CTA it was mostly unrevealing despite his previous 3 stents in his RCA.  I will schedule him for a stress echo as soon as possible. \par March 9, 2021.  Patient came for stress echocardiogram.  Resting echo with borderline severe though not yet critical aortic stenosis and totally normal LV function.  On treadmill within the first minute STs started to go down and heart rate shot up to 140.  I stopped the treadmill at 2 minutes with severe ST depressions and shortness of breath but no chest pain.  Post exercise echo shows new wall motion abnormality in the entire anterior wall and may be some hypokinesis in the lateral wall.  Long discussion with patient and wife and they will be booked for coronary angiography and left and right heart cath as soon as he can have a Covid swab done.  (He had the first vaccine already and the second vaccine was 5 days ago)\par March 16, 2021.Cath only showed 80% RCA lesion and the rest was nonobstructive.  The RCA was stented and the patient was sent home that evening.  He is on aspirin and Plavix.  He will continue metoprolol ER 50 daily.  He has a follow-up with me in 2 weeks and then will be evaluated by structural heart for a TAVR \par March 30, 2021.  Patient here in follow-up.  Had stent to his RCA and is now on a beta-blocker but no change in his exertional symptoms.  In addition somewhat anxious about upcoming procedure; has evaluation appointment 4/6.\par May 28, 2021.  Patient had TAVR on May 13, uncomplicated except his glucoses were high post procedure.  Since he has been home he has stopped eating cakes and bagels and has lost 7 or 8 pounds.  In terms of his angina he is "95% better".  His only complaint is severe ecchymoses once again from his dual antiplatelet therapy..  Has seen Dr. Johnston and Dr. Toth in follow-up.  Remains in sinus rhythm with an unchanged ECG.  Had a monitor with no significant arrhythmias.  Is off metoprolol and currently only on lisinopril 10 mg.  \par July 19, 2021.  Patient here in follow-up.  He had seen structural heart and an echo was done on July 1 which showed mild MR, normally functioning TAVR with no paravalvular AI.  Moderate LAE.  Normal LV size and function and normal RV size and function.  He had an ECG on July 1 that was sinus rhythm and unremarkable.  Since his TAVR he has had no episodes of his exertional chest pressure until 1 day after making sure that he mowed his lawn on an empty stomach and had no symptoms, he then went and had a big meal and then remembered he had not swept the street.  When he swept the street he got the chest tightness again and it was relieved with rest.  That was the only episode since and has not recurred.  Otherwise no real complaints initial blood test by structural heart had a potassium of 6.1 and his creatinine had gone up to 2.0.  These were repeated 5 days later and his potassium was 5.1 and his creatinine was 1.43.  He remains on lisinopril just 10 mg daily.\par September 15, 2021.  Urgent visit today because of multiple falls with trauma.  I reviewed the notes from Dr. COTTO and Dr. Toth.  EKG here is sinus rhythm with sinus arrhythmia around 76, mild first-degree AV block otherwise normal tracing.  Labs September 10 with worsening creatinine back up to 1.92 and BUN 64 which he thinks is from the dye for the head CT in the hospital but also his LFTs were up.  Echo in the hospital September 2 mostly unrevealing.  The history from the patient and his wife seems to be the 2 falls were when he was erect and then bent over and then went down without warning.  Has never had a true syncopal episode when lying down or sitting.  He is not sure if he has gotten dizzy briefly while sitting.  His blood pressure has been running low despite adjustments in his lisinopril and for now I would just hold it.  The symptoms do not sound like hypoglycemia but he has had his meds adjusted and he is only on metformin and an SGLT2 inhibitor.  Last A1c was 6.2 in August.  With Dr. Barroso and again here in the office not really orthostatic.  ZIO AT monitor was placed.\par September 17, 2021.  A few hours after putting on ZIO monitor, patient had 4.3 seconds of asystole with P waves but no QRS.  Was sent to the emergency room and had a pacemaker placed by Dr. Wong.  Patient being discharged later this morning. \par September 23, 2021. Patient was still feeling weak and dizzy and fatigued but ruled out any problem with the pacemaker.  Blood pressure has been running 145 and 150 which makes him concerned.  I reassured him that a blood pressure of 150 would not give him any symptoms but I have no problem with him going back on the low-dose lisinopril and following up with Dr. Toth.  If he is still feeling bad at Dr. Toth has no answer I will be happy to see him next week as well. \par October 20, 2021.  Urgent visit an urgent echo for this patient.  Difficult history to pin down as discussing with patient and his wife.  He thinks worsening shortness of breath with exertion for about a month now and she thinks it goes back at least until August meanwhile it has been edema and fluid retention more recently.  Dr. Toth put him on Lasix and he has had improvement in just 5 days so far.  Reviewing his interval records there is 1 EKG when he saw Dr. COTTO on September 8 and had new ST depressions in V5 and V6.  I do not believe he was complaining of any chest pain or shortness of breath at the time.  All his other EKGs seem to be the same including today and they are sinus rhythm.  His echo today especially compared to his post TAVR echoes in May and September does seem to have a new wall motion abnormality in his septum which seems to be more than just possible paradoxical septal motion seen after open heart surgery which he did not have just the past..  He did have a stent to his RCA pre-TAVR and there was some moderate nonobstructive lesions.  No episode of chest pain per se.  His BNP use which had been in the 300 400 range on October 14 was 3740.  No documentation of atrial fibrillation.  Does not seem to be using his pacemaker very often so pacemaker syndrome less likely as well.  LVEF on echo today is in the 40% to 50% range the patient has always seem to have symptoms a little bit out of proportion to objective findings.  Repeat labs including BNP was sent and consideration of repeat angiogram was discussed although patient is reluctant because of his kidney issues whenever he gets dye.\par \par \par \par

## 2021-10-21 ENCOUNTER — APPOINTMENT (OUTPATIENT)
Dept: INTERNAL MEDICINE | Facility: CLINIC | Age: 76
End: 2021-10-21
Payer: MEDICARE

## 2021-10-21 VITALS
WEIGHT: 165 LBS | DIASTOLIC BLOOD PRESSURE: 76 MMHG | HEIGHT: 69 IN | BODY MASS INDEX: 24.44 KG/M2 | SYSTOLIC BLOOD PRESSURE: 130 MMHG | HEART RATE: 72 BPM

## 2021-10-21 LAB
ALBUMIN SERPL ELPH-MCNC: 4.7 G/DL
ALP BLD-CCNC: 126 U/L
ALT SERPL-CCNC: 24 U/L
ANION GAP SERPL CALC-SCNC: 14 MMOL/L
AST SERPL-CCNC: 28 U/L
BILIRUB SERPL-MCNC: 0.3 MG/DL
BUN SERPL-MCNC: 26 MG/DL
CALCIUM SERPL-MCNC: 9.9 MG/DL
CHLORIDE SERPL-SCNC: 103 MMOL/L
CO2 SERPL-SCNC: 25 MMOL/L
CREAT SERPL-MCNC: 1.41 MG/DL
NT-PROBNP SERPL-MCNC: 2824 PG/ML
POTASSIUM SERPL-SCNC: 4.4 MMOL/L
PROT SERPL-MCNC: 6.8 G/DL
SODIUM SERPL-SCNC: 142 MMOL/L

## 2021-10-21 PROCEDURE — 99214 OFFICE O/P EST MOD 30 MIN: CPT

## 2021-10-21 NOTE — HISTORY OF PRESENT ILLNESS
[FreeTextEntry1] : 75 yo male presents for a f/u vist-he is feeling well but uprset with the report from Dr. Miller yesterday [de-identified] : 75 yo male with multiple conditions, who suddenly after going through multiple procedures including angioplasty, TAVR and permanent pacemaker implantation, seems to have developed acutely segmental LV dysfunction and CHF for unknown reasons.  The patient is questioning Myocarditis due to Covid Vaccination.  An echocardiogram has not found evidence of valvular dysfunction but notes some evidence of Mitral valve dysfunction and segmental LV dysfunction.\par Symptomatically, the patient has improved dramatically with a small dose of Furosemide.

## 2021-10-21 NOTE — PHYSICAL EXAM
[No Acute Distress] : no acute distress [No JVD] : no jugular venous distention [No Respiratory Distress] : no respiratory distress  [Normal Rate] : normal rate  [Soft] : abdomen soft [Non Tender] : non-tender [de-identified] : trace edema persists in the left leg [de-identified] : Much less distended than last visit

## 2021-10-28 ENCOUNTER — APPOINTMENT (OUTPATIENT)
Dept: ELECTROPHYSIOLOGY | Facility: CLINIC | Age: 76
End: 2021-10-28
Payer: MEDICARE

## 2021-10-28 VITALS
HEIGHT: 69 IN | BODY MASS INDEX: 24.44 KG/M2 | HEART RATE: 81 BPM | DIASTOLIC BLOOD PRESSURE: 70 MMHG | SYSTOLIC BLOOD PRESSURE: 137 MMHG | OXYGEN SATURATION: 99 % | WEIGHT: 165 LBS

## 2021-10-28 PROCEDURE — 93000 ELECTROCARDIOGRAM COMPLETE: CPT | Mod: 59

## 2021-10-28 PROCEDURE — 93280 PM DEVICE PROGR EVAL DUAL: CPT

## 2021-10-29 ENCOUNTER — NON-APPOINTMENT (OUTPATIENT)
Age: 76
End: 2021-10-29

## 2021-11-02 ENCOUNTER — APPOINTMENT (OUTPATIENT)
Dept: CARDIOLOGY | Facility: CLINIC | Age: 76
End: 2021-11-02
Payer: MEDICARE

## 2021-11-02 VITALS
OXYGEN SATURATION: 98 % | HEIGHT: 69 IN | SYSTOLIC BLOOD PRESSURE: 133 MMHG | DIASTOLIC BLOOD PRESSURE: 66 MMHG | WEIGHT: 162 LBS | HEART RATE: 86 BPM | BODY MASS INDEX: 23.99 KG/M2

## 2021-11-02 PROCEDURE — 36415 COLL VENOUS BLD VENIPUNCTURE: CPT

## 2021-11-02 PROCEDURE — 99214 OFFICE O/P EST MOD 30 MIN: CPT

## 2021-11-02 NOTE — REVIEW OF SYSTEMS
[Weight Loss (___ Lbs)] : [unfilled] ~Ulb weight loss [Negative] : Heme/Lymph [Feeling Fatigued] : not feeling fatigued [SOB] : no shortness of breath [Dyspnea on exertion] : not dyspnea during exertion [Chest Discomfort] : no chest discomfort [Lower Ext Edema] : no extremity edema [Palpitations] : no palpitations [Syncope] : no syncope [Dizziness] : no dizziness [Convulsions] : no convulsions [FreeTextEntry5] : see HPI [de-identified] : Severe ecchymoses [de-identified] : No more shaking since the one episode in the hospital

## 2021-11-02 NOTE — HISTORY OF PRESENT ILLNESS
[FreeTextEntry1] : April 3, 2019. Patient has been followed by Dr. Larry Toth and was followed by cardiology at [facility]. In June of 2011 after a positive stress test had cardiac angiography, which had a distal 90% RCA lesion and only nonobstructive disease in the LAD and circumflex. This was stented and his symptoms were improved. His symptoms then recurred. They have been exertional chest pressure and shortness of breath when he walks after eating a meal, especially a large meal. Never gets it at rest and does not get it when he exerts himself if he has not just eaten. Did well for a while, but then the symptoms returned and in October of 2016 after another positive ECG treadmill test he had repeat cardiac catheterization. There was an 80% mid RCA lesion and again no significant disease in the LAD was circumflex and he had 2 stents to the RCA . The difference was this time his symptoms did not change. He has been placed on Omeprazole and this has not seemed to make a difference, or perhaps it did initially. He has not seen GI. He otherwise seems to be doing well. In July of 2014. He had bilateral carotid endarterectomies. His most recent echo showed normal LV function. His EKG is normal sinus rhythm and a normal tracing. He has hypertension and hyperlipidemia.He is a former smoker. His father had hypertension and CVA, but there does not seem to be a family history of coronary artery disease. \par April 24, 2019.  Patient tried to increase his omeprazole, and then said he got a stomach ache and stopped it. He went for a CTA of the coronary arteries. Minor disease in the LAD and borderline severe 60-69% in the proximal circumflex and 60-70% in the RCA. Problem with him is whether his symptoms are related to ischemia or not. After long discussion I recommended that he have the angiogram. If non-obstructive disease, try Ranexa and if no help, consider noncardiac causes. If significant disease then he should have stents and again, we can see if this relieves his symptoms or not. Patient to discuss with his wife and then let me know   \par March 1, 2021.Patient called.  His exertional chest pain is getting worse and coming more frequently and more easier for the last few months.  I have not seen him since April 2019 and back then when we did a CTA it was mostly unrevealing despite his previous 3 stents in his RCA.  I will schedule him for a stress echo as soon as possible. \par March 9, 2021.  Patient came for stress echocardiogram.  Resting echo with borderline severe though not yet critical aortic stenosis and totally normal LV function.  On treadmill within the first minute STs started to go down and heart rate shot up to 140.  I stopped the treadmill at 2 minutes with severe ST depressions and shortness of breath but no chest pain.  Post exercise echo shows new wall motion abnormality in the entire anterior wall and may be some hypokinesis in the lateral wall.  Long discussion with patient and wife and they will be booked for coronary angiography and left and right heart cath as soon as he can have a Covid swab done.  (He had the first vaccine already and the second vaccine was 5 days ago)\par March 16, 2021.Cath only showed 80% RCA lesion and the rest was nonobstructive.  The RCA was stented and the patient was sent home that evening.  He is on aspirin and Plavix.  He will continue metoprolol ER 50 daily.  He has a follow-up with me in 2 weeks and then will be evaluated by structural heart for a TAVR \par March 30, 2021.  Patient here in follow-up.  Had stent to his RCA and is now on a beta-blocker but no change in his exertional symptoms.  In addition somewhat anxious about upcoming procedure; has evaluation appointment 4/6.\par May 28, 2021.  Patient had TAVR on May 13, uncomplicated except his glucoses were high post procedure.  Since he has been home he has stopped eating cakes and bagels and has lost 7 or 8 pounds.  In terms of his angina he is "95% better".  His only complaint is severe ecchymoses once again from his dual antiplatelet therapy..  Has seen Dr. Johnston and Dr. Toth in follow-up.  Remains in sinus rhythm with an unchanged ECG.  Had a monitor with no significant arrhythmias.  Is off metoprolol and currently only on lisinopril 10 mg.  \par July 19, 2021.  Patient here in follow-up.  He had seen structural heart and an echo was done on July 1 which showed mild MR, normally functioning TAVR with no paravalvular AI.  Moderate LAE.  Normal LV size and function and normal RV size and function.  He had an ECG on July 1 that was sinus rhythm and unremarkable.  Since his TAVR he has had no episodes of his exertional chest pressure until 1 day after making sure that he mowed his lawn on an empty stomach and had no symptoms, he then went and had a big meal and then remembered he had not swept the street.  When he swept the street he got the chest tightness again and it was relieved with rest.  That was the only episode since and has not recurred.  Otherwise no real complaints initial blood test by structural heart had a potassium of 6.1 and his creatinine had gone up to 2.0.  These were repeated 5 days later and his potassium was 5.1 and his creatinine was 1.43.  He remains on lisinopril just 10 mg daily.\par September 15, 2021.  Urgent visit today because of multiple falls with trauma.  I reviewed the notes from Dr. COTTO and Dr. Toth.  EKG here is sinus rhythm with sinus arrhythmia around 76, mild first-degree AV block otherwise normal tracing.  Labs September 10 with worsening creatinine back up to 1.92 and BUN 64 which he thinks is from the dye for the head CT in the hospital but also his LFTs were up.  Echo in the hospital September 2 mostly unrevealing.  The history from the patient and his wife seems to be the 2 falls were when he was erect and then bent over and then went down without warning.  Has never had a true syncopal episode when lying down or sitting.  He is not sure if he has gotten dizzy briefly while sitting.  His blood pressure has been running low despite adjustments in his lisinopril and for now I would just hold it.  The symptoms do not sound like hypoglycemia but he has had his meds adjusted and he is only on metformin and an SGLT2 inhibitor.  Last A1c was 6.2 in August.  With Dr. Barroso and again here in the office not really orthostatic.  ZIO AT monitor was placed.\par September 17, 2021.  A few hours after putting on ZIO monitor, patient had 4.3 seconds of asystole with P waves but no QRS.  Was sent to the emergency room and had a pacemaker placed by Dr. Wong.  Patient being discharged later this morning. \par September 23, 2021. Patient was still feeling weak and dizzy and fatigued but ruled out any problem with the pacemaker.  Blood pressure has been running 145 and 150 which makes him concerned.  I reassured him that a blood pressure of 150 would not give him any symptoms but I have no problem with him going back on the low-dose lisinopril and following up with Dr. Toth.  If he is still feeling bad at Dr. Toth has no answer I will be happy to see him next week as well. \par October 20, 2021.  Urgent visit an urgent echo for this patient.  Difficult history to pin down as discussing with patient and his wife.  He thinks worsening shortness of breath with exertion for about a month now and she thinks it goes back at least until August meanwhile it has been edema and fluid retention more recently.  Dr. Toth put him on Lasix and he has had improvement in just 5 days so far.  Reviewing his interval records there is 1 EKG when he saw Dr. COTTO on September 8 and had new ST depressions in V5 and V6.  I do not believe he was complaining of any chest pain or shortness of breath at the time.  All his other EKGs seem to be the same including today and they are sinus rhythm.  His echo today especially compared to his post TAVR echoes in May and September does seem to have a new wall motion abnormality in his septum which seems to be more than just possible paradoxical septal motion seen after open heart surgery which he did not have just the past..  He did have a stent to his RCA pre-TAVR and there was some moderate nonobstructive lesions.  No episode of chest pain per se.  His BNP use which had been in the 300 400 range on October 14 was 3740.  No documentation of atrial fibrillation.  Does not seem to be using his pacemaker very often so pacemaker syndrome less likely as well.  LVEF on echo today is in the 40% to 50% range the patient has always seem to have symptoms a little bit out of proportion to objective findings.  Repeat labs including BNP was sent and consideration of repeat angiogram was discussed although patient is reluctant because of his kidney issues whenever he gets dye.\par November 2, 2021.  Patient returns today, just wants to keep telling me how much better he feels.  Was able to mow his whole lawn although he did it over 2 days without getting burning, shortness of breath, chest pain.  His weight is down at least 4 pounds and he no longer has edema so is responding well to the medical therapy.  Once again he would like to avoid an angiogram and is asking to just have his echo repeated.  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

## 2021-11-02 NOTE — PHYSICAL EXAM
[General Appearance - Well Developed] : well developed [Normal Appearance] : normal appearance [Well Groomed] : well groomed [General Appearance - Well Nourished] : well nourished [No Deformities] : no deformities [General Appearance - In No Acute Distress] : no acute distress [Normal Oral Mucosa] : normal oral mucosa [No Oral Pallor] : no oral pallor [No Oral Cyanosis] : no oral cyanosis [Normal Jugular Venous A Waves Present] : normal jugular venous A waves present [Normal Jugular Venous V Waves Present] : normal jugular venous V waves present [No Jugular Venous Navas A Waves] : no jugular venous navas A waves [Respiration, Rhythm And Depth] : normal respiratory rhythm and effort [Exaggerated Use Of Accessory Muscles For Inspiration] : no accessory muscle use [Auscultation Breath Sounds / Voice Sounds] : lungs were clear to auscultation bilaterally [Heart Rate And Rhythm] : heart rate and rhythm were normal [Heart Sounds] : normal S1 and S2 [Abdomen Soft] : soft [Abdomen Tenderness] : non-tender [Abdomen Mass (___ Cm)] : no abdominal mass palpated [Abnormal Walk] : normal gait [Gait - Sufficient For Exercise Testing] : the gait was sufficient for exercise testing [Nail Clubbing] : no clubbing of the fingernails [Cyanosis, Localized] : no localized cyanosis [Petechial Hemorrhages (___cm)] : no petechial hemorrhages [Skin Color & Pigmentation] : normal skin color and pigmentation [] : no rash [No Venous Stasis] : no venous stasis [Skin Lesions] : no skin lesions [No Skin Ulcers] : no skin ulcer [No Xanthoma] : no  xanthoma was observed [Oriented To Time, Place, And Person] : oriented to person, place, and time [Affect] : the affect was normal [Mood] : the mood was normal [FreeTextEntry1] : Less anxious

## 2021-11-03 ENCOUNTER — APPOINTMENT (OUTPATIENT)
Dept: INTERNAL MEDICINE | Facility: CLINIC | Age: 76
End: 2021-11-03
Payer: MEDICARE

## 2021-11-03 LAB
ALBUMIN SERPL ELPH-MCNC: 5 G/DL
ALP BLD-CCNC: 122 U/L
ALT SERPL-CCNC: 16 U/L
ANION GAP SERPL CALC-SCNC: 19 MMOL/L
AST SERPL-CCNC: 21 U/L
BASOPHILS # BLD AUTO: 0.03 K/UL
BASOPHILS NFR BLD AUTO: 0.6 %
BILIRUB SERPL-MCNC: 0.4 MG/DL
BUN SERPL-MCNC: 33 MG/DL
CALCIUM SERPL-MCNC: 10.5 MG/DL
CHLORIDE SERPL-SCNC: 100 MMOL/L
CO2 SERPL-SCNC: 22 MMOL/L
CREAT SERPL-MCNC: 1.47 MG/DL
EOSINOPHIL # BLD AUTO: 0.14 K/UL
EOSINOPHIL NFR BLD AUTO: 3 %
ERYTHROCYTE [SEDIMENTATION RATE] IN BLOOD BY WESTERGREN METHOD: 53 MM/HR
GLUCOSE SERPL-MCNC: 104 MG/DL
HCT VFR BLD CALC: 38.9 %
HGB BLD-MCNC: 11.7 G/DL
IMM GRANULOCYTES NFR BLD AUTO: 0.4 %
LYMPHOCYTES # BLD AUTO: 0.79 K/UL
LYMPHOCYTES NFR BLD AUTO: 16.8 %
MAN DIFF?: NORMAL
MCHC RBC-ENTMCNC: 26.4 PG
MCHC RBC-ENTMCNC: 30.1 GM/DL
MCV RBC AUTO: 87.8 FL
MONOCYTES # BLD AUTO: 0.62 K/UL
MONOCYTES NFR BLD AUTO: 13.2 %
NEUTROPHILS # BLD AUTO: 3.11 K/UL
NEUTROPHILS NFR BLD AUTO: 66 %
NT-PROBNP SERPL-MCNC: 2563 PG/ML
PLATELET # BLD AUTO: 203 K/UL
POTASSIUM SERPL-SCNC: 4.2 MMOL/L
PROT SERPL-MCNC: 7.6 G/DL
RBC # BLD: 4.43 M/UL
RBC # FLD: 14.1 %
SODIUM SERPL-SCNC: 140 MMOL/L
WBC # FLD AUTO: 4.71 K/UL

## 2021-11-03 PROCEDURE — 99214 OFFICE O/P EST MOD 30 MIN: CPT

## 2021-11-03 RX ORDER — ERTUGLIFLOZIN 15 MG/1
15 TABLET, FILM COATED ORAL
Qty: 90 | Refills: 1 | Status: DISCONTINUED | COMMUNITY
Start: 2021-10-14 | End: 2021-11-03

## 2021-11-03 NOTE — PHYSICAL EXAM
[No Respiratory Distress] : no respiratory distress  [No Accessory Muscle Use] : no accessory muscle use [Normal] : normal rate, regular rhythm, normal S1 and S2 and no murmur heard [No Edema] : there was no peripheral edema [Non Tender] : non-tender [de-identified] : Scant end expiratory wheeze upper airway without rales [de-identified] : RR without rubs.

## 2021-11-03 NOTE — HISTORY OF PRESENT ILLNESS
[FreeTextEntry1] : 77 yo male presents for a f/u-he is feeling better. [de-identified] : 75 yo male who several hours after leaving last week began to feel better.  His exercise tolerance has improved and he is back to mowing the lawn.\par He has lost weight and has energy again.

## 2021-11-22 ENCOUNTER — RESULT REVIEW (OUTPATIENT)
Age: 76
End: 2021-11-22

## 2021-11-22 ENCOUNTER — OUTPATIENT (OUTPATIENT)
Dept: OUTPATIENT SERVICES | Facility: HOSPITAL | Age: 76
LOS: 1 days | End: 2021-11-22
Payer: MEDICARE

## 2021-11-22 ENCOUNTER — APPOINTMENT (OUTPATIENT)
Dept: CT IMAGING | Facility: IMAGING CENTER | Age: 76
End: 2021-11-22
Payer: MEDICARE

## 2021-11-22 ENCOUNTER — APPOINTMENT (OUTPATIENT)
Dept: INTERNAL MEDICINE | Facility: CLINIC | Age: 76
End: 2021-11-22
Payer: MEDICARE

## 2021-11-22 VITALS
HEART RATE: 91 BPM | HEIGHT: 69 IN | SYSTOLIC BLOOD PRESSURE: 130 MMHG | DIASTOLIC BLOOD PRESSURE: 70 MMHG | BODY MASS INDEX: 24.73 KG/M2 | WEIGHT: 167 LBS | OXYGEN SATURATION: 97 %

## 2021-11-22 VITALS — TEMPERATURE: 98 F

## 2021-11-22 DIAGNOSIS — R10.9 UNSPECIFIED ABDOMINAL PAIN: ICD-10-CM

## 2021-11-22 DIAGNOSIS — Z98.890 OTHER SPECIFIED POSTPROCEDURAL STATES: Chronic | ICD-10-CM

## 2021-11-22 DIAGNOSIS — Z95.5 PRESENCE OF CORONARY ANGIOPLASTY IMPLANT AND GRAFT: Chronic | ICD-10-CM

## 2021-11-22 DIAGNOSIS — N20.0 CALCULUS OF KIDNEY: ICD-10-CM

## 2021-11-22 PROCEDURE — 74176 CT ABD & PELVIS W/O CONTRAST: CPT | Mod: 26

## 2021-11-22 PROCEDURE — 36415 COLL VENOUS BLD VENIPUNCTURE: CPT

## 2021-11-22 PROCEDURE — 81003 URINALYSIS AUTO W/O SCOPE: CPT | Mod: QW

## 2021-11-22 PROCEDURE — 99213 OFFICE O/P EST LOW 20 MIN: CPT | Mod: 25

## 2021-11-22 PROCEDURE — 74176 CT ABD & PELVIS W/O CONTRAST: CPT

## 2021-11-22 RX ORDER — AMOXICILLIN 500 MG/1
500 TABLET, FILM COATED ORAL
Qty: 28 | Refills: 4 | Status: DISCONTINUED | COMMUNITY
Start: 2021-05-28 | End: 2021-11-22

## 2021-11-23 ENCOUNTER — NON-APPOINTMENT (OUTPATIENT)
Age: 76
End: 2021-11-23

## 2021-11-23 LAB
ALBUMIN SERPL ELPH-MCNC: 5.2 G/DL
ALP BLD-CCNC: 118 U/L
ALT SERPL-CCNC: 11 U/L
ANION GAP SERPL CALC-SCNC: 18 MMOL/L
AST SERPL-CCNC: 20 U/L
BASOPHILS # BLD AUTO: 0.03 K/UL
BASOPHILS NFR BLD AUTO: 0.3 %
BILIRUB SERPL-MCNC: 0.4 MG/DL
BUN SERPL-MCNC: 45 MG/DL
CALCIUM SERPL-MCNC: 10.3 MG/DL
CHLORIDE SERPL-SCNC: 97 MMOL/L
CLARITY UR: CLEAR
CO2 SERPL-SCNC: 22 MMOL/L
COLLECTION METHOD: NORMAL
CREAT SERPL-MCNC: 1.88 MG/DL
EOSINOPHIL # BLD AUTO: 0.07 K/UL
EOSINOPHIL NFR BLD AUTO: 0.7 %
GLUCOSE SERPL-MCNC: 132 MG/DL
GLUCOSE UR-MCNC: >=1000
HCG UR QL: 0.2 EU/DL
HCT VFR BLD CALC: 39.6 %
HGB BLD-MCNC: 11.7 G/DL
HGB UR QL STRIP.AUTO: ABNORMAL
IMM GRANULOCYTES NFR BLD AUTO: 0.8 %
KETONES UR-MCNC: NEGATIVE
LEUKOCYTE ESTERASE UR QL STRIP: NEGATIVE
LYMPHOCYTES # BLD AUTO: 0.76 K/UL
LYMPHOCYTES NFR BLD AUTO: 7.4 %
MAN DIFF?: NORMAL
MCHC RBC-ENTMCNC: 25.5 PG
MCHC RBC-ENTMCNC: 29.5 GM/DL
MCV RBC AUTO: 86.5 FL
MONOCYTES # BLD AUTO: 1.16 K/UL
MONOCYTES NFR BLD AUTO: 11.3 %
NEUTROPHILS # BLD AUTO: 8.19 K/UL
NEUTROPHILS NFR BLD AUTO: 79.5 %
NITRITE UR QL STRIP: NEGATIVE
PH UR STRIP: 5
PLATELET # BLD AUTO: 213 K/UL
POTASSIUM SERPL-SCNC: 4.7 MMOL/L
PROT SERPL-MCNC: 7.7 G/DL
PROT UR STRIP-MCNC: ABNORMAL
RBC # BLD: 4.58 M/UL
RBC # FLD: 15 %
SODIUM SERPL-SCNC: 136 MMOL/L
SP GR UR STRIP: 1.02
WBC # FLD AUTO: 10.29 K/UL

## 2021-11-23 RX ORDER — CIPROFLOXACIN HYDROCHLORIDE 250 MG/1
250 TABLET, FILM COATED ORAL
Qty: 14 | Refills: 0 | Status: DISCONTINUED | COMMUNITY
Start: 2021-11-23 | End: 2021-11-23

## 2021-11-23 NOTE — PHYSICAL EXAM
Spoke with mom and patient is scheduled on February 11th with Dr. Rene   [No Acute Distress] : no acute distress [Well Nourished] : well nourished [Normal Outer Ear/Nose] : the outer ears and nose were normal in appearance [No JVD] : no jugular venous distention [No Respiratory Distress] : no respiratory distress  [No Accessory Muscle Use] : no accessory muscle use [Clear to Auscultation] : lungs were clear to auscultation bilaterally [Normal Rate] : normal rate  [No Edema] : there was no peripheral edema [Soft] : abdomen soft [Non Tender] : non-tender [Normal] : no CVA or spinal tenderness [de-identified] : Pain in right buttocks sciatic notch

## 2021-11-24 ENCOUNTER — RX CHANGE (OUTPATIENT)
Age: 76
End: 2021-11-24

## 2021-11-24 ENCOUNTER — APPOINTMENT (OUTPATIENT)
Dept: UROLOGY | Facility: CLINIC | Age: 76
End: 2021-11-24
Payer: MEDICARE

## 2021-11-24 ENCOUNTER — NON-APPOINTMENT (OUTPATIENT)
Age: 76
End: 2021-11-24

## 2021-11-24 VITALS
SYSTOLIC BLOOD PRESSURE: 125 MMHG | TEMPERATURE: 98 F | DIASTOLIC BLOOD PRESSURE: 68 MMHG | RESPIRATION RATE: 17 BRPM | HEART RATE: 88 BPM

## 2021-11-24 LAB
BACTERIA UR CULT: NORMAL
ESTIMATED AVERAGE GLUCOSE: 140 MG/DL
HBA1C MFR BLD HPLC: 6.5 %

## 2021-11-24 PROCEDURE — 99204 OFFICE O/P NEW MOD 45 MIN: CPT

## 2021-11-29 ENCOUNTER — APPOINTMENT (OUTPATIENT)
Dept: ULTRASOUND IMAGING | Facility: CLINIC | Age: 76
End: 2021-11-29
Payer: MEDICARE

## 2021-11-29 LAB
ANION GAP SERPL CALC-SCNC: 18 MMOL/L
BUN SERPL-MCNC: 48 MG/DL
CALCIUM SERPL-MCNC: 9.8 MG/DL
CALCIUM SERPL-MCNC: 9.8 MG/DL
CHLORIDE SERPL-SCNC: 97 MMOL/L
CO2 SERPL-SCNC: 23 MMOL/L
CREAT SERPL-MCNC: 2.04 MG/DL
GLUCOSE SERPL-MCNC: 81 MG/DL
PARATHYROID HORMONE INTACT: 21 PG/ML
POTASSIUM SERPL-SCNC: 4 MMOL/L
SODIUM SERPL-SCNC: 138 MMOL/L
URATE SERPL-MCNC: 5.4 MG/DL

## 2021-11-29 PROCEDURE — 76775 US EXAM ABDO BACK WALL LIM: CPT

## 2021-11-29 NOTE — HISTORY OF PRESENT ILLNESS
[FreeTextEntry1] : f/u from recent trip to ER.\par He noted right flank beginning earlier in the week though no associated N/V, fevers, chills or hematuria. PAin continued and PCP sent him for a CT scan which noted a 4mm stone distal ureter with some hydronephrosis and perinephric stranding and ? forniceal rupture. BMP also noted elevated creatinine over baseline. \par he last had pain last night ans he thinks he passed the stone, noting a back stone in the toilet.\par he has prior h/o stones: open lithotomy 1999 and ureteroscopy laser lithotripsy 6/20. May have passed stones between.\par reviewed CT scan - see no other stones and not sure there is a forniceal rupture.\par \par he also notes h/o prostate cancer  - recollects had Fabricio 90 but doesn't recall PSA. Treated with ADT (4 3 month shots) last 3/21. Still has fatigue and hot flashes especially at night. Has a recent PSA which was essentially 0.

## 2021-11-29 NOTE — ASSESSMENT
[FreeTextEntry1] : will repeat BMP today to ensure his ARF improved - if not needs ULS\par given h/o recurrent stones needs evalaution including Litholink.\par noted that he may still be under the influence of the ADT therapy for 12-18 months after the last injection wears off.

## 2021-12-02 ENCOUNTER — OUTPATIENT (OUTPATIENT)
Dept: OUTPATIENT SERVICES | Facility: HOSPITAL | Age: 76
LOS: 1 days | End: 2021-12-02

## 2021-12-02 ENCOUNTER — APPOINTMENT (OUTPATIENT)
Dept: ULTRASOUND IMAGING | Facility: CLINIC | Age: 76
End: 2021-12-02
Payer: MEDICARE

## 2021-12-02 ENCOUNTER — APPOINTMENT (OUTPATIENT)
Dept: INTERNAL MEDICINE | Facility: CLINIC | Age: 76
End: 2021-12-02
Payer: MEDICARE

## 2021-12-02 VITALS
SYSTOLIC BLOOD PRESSURE: 140 MMHG | DIASTOLIC BLOOD PRESSURE: 80 MMHG | TEMPERATURE: 97 F | HEART RATE: 80 BPM | WEIGHT: 164.91 LBS | RESPIRATION RATE: 16 BRPM | HEIGHT: 66 IN | OXYGEN SATURATION: 98 %

## 2021-12-02 DIAGNOSIS — Z95.2 PRESENCE OF PROSTHETIC HEART VALVE: Chronic | ICD-10-CM

## 2021-12-02 DIAGNOSIS — Z98.890 OTHER SPECIFIED POSTPROCEDURAL STATES: Chronic | ICD-10-CM

## 2021-12-02 DIAGNOSIS — N20.1 CALCULUS OF URETER: ICD-10-CM

## 2021-12-02 DIAGNOSIS — I25.10 ATHEROSCLEROTIC HEART DISEASE OF NATIVE CORONARY ARTERY WITHOUT ANGINA PECTORIS: ICD-10-CM

## 2021-12-02 DIAGNOSIS — Z95.5 PRESENCE OF CORONARY ANGIOPLASTY IMPLANT AND GRAFT: Chronic | ICD-10-CM

## 2021-12-02 DIAGNOSIS — Z95.0 PRESENCE OF CARDIAC PACEMAKER: Chronic | ICD-10-CM

## 2021-12-02 DIAGNOSIS — E11.9 TYPE 2 DIABETES MELLITUS WITHOUT COMPLICATIONS: ICD-10-CM

## 2021-12-02 DIAGNOSIS — I10 ESSENTIAL (PRIMARY) HYPERTENSION: ICD-10-CM

## 2021-12-02 LAB
ANION GAP SERPL CALC-SCNC: 12 MMOL/L — SIGNIFICANT CHANGE UP (ref 7–14)
ANION GAP SERPL CALC-SCNC: 14 MMOL/L
BUN SERPL-MCNC: 34 MG/DL — HIGH (ref 7–23)
BUN SERPL-MCNC: 37 MG/DL
CALCIUM SERPL-MCNC: 10 MG/DL — SIGNIFICANT CHANGE UP (ref 8.4–10.5)
CALCIUM SERPL-MCNC: 10.1 MG/DL
CHLORIDE SERPL-SCNC: 101 MMOL/L — SIGNIFICANT CHANGE UP (ref 98–107)
CHLORIDE SERPL-SCNC: 103 MMOL/L
CO2 SERPL-SCNC: 26 MMOL/L
CO2 SERPL-SCNC: 26 MMOL/L — SIGNIFICANT CHANGE UP (ref 22–31)
CREAT SERPL-MCNC: 1.27 MG/DL — SIGNIFICANT CHANGE UP (ref 0.5–1.3)
CREAT SERPL-MCNC: 1.38 MG/DL
GLUCOSE SERPL-MCNC: 121 MG/DL
GLUCOSE SERPL-MCNC: 81 MG/DL — SIGNIFICANT CHANGE UP (ref 70–99)
HCT VFR BLD CALC: 38 % — LOW (ref 39–50)
HGB BLD-MCNC: 11.2 G/DL — LOW (ref 13–17)
MCHC RBC-ENTMCNC: 25.6 PG — LOW (ref 27–34)
MCHC RBC-ENTMCNC: 29.5 GM/DL — LOW (ref 32–36)
MCV RBC AUTO: 86.8 FL — SIGNIFICANT CHANGE UP (ref 80–100)
NRBC # BLD: 0 /100 WBCS — SIGNIFICANT CHANGE UP
NRBC # FLD: 0 K/UL — SIGNIFICANT CHANGE UP
PLATELET # BLD AUTO: 193 K/UL — SIGNIFICANT CHANGE UP (ref 150–400)
POTASSIUM SERPL-MCNC: 4.3 MMOL/L — SIGNIFICANT CHANGE UP (ref 3.5–5.3)
POTASSIUM SERPL-SCNC: 4.3 MMOL/L — SIGNIFICANT CHANGE UP (ref 3.5–5.3)
POTASSIUM SERPL-SCNC: 4.7 MMOL/L
RBC # BLD: 4.38 M/UL — SIGNIFICANT CHANGE UP (ref 4.2–5.8)
RBC # FLD: 15.1 % — HIGH (ref 10.3–14.5)
SODIUM SERPL-SCNC: 139 MMOL/L — SIGNIFICANT CHANGE UP (ref 135–145)
SODIUM SERPL-SCNC: 143 MMOL/L
WBC # BLD: 5.37 K/UL — SIGNIFICANT CHANGE UP (ref 3.8–10.5)
WBC # FLD AUTO: 5.37 K/UL — SIGNIFICANT CHANGE UP (ref 3.8–10.5)

## 2021-12-02 PROCEDURE — 76775 US EXAM ABDO BACK WALL LIM: CPT

## 2021-12-02 PROCEDURE — 99214 OFFICE O/P EST MOD 30 MIN: CPT

## 2021-12-02 RX ORDER — FUROSEMIDE 40 MG
1 TABLET ORAL
Qty: 0 | Refills: 0 | DISCHARGE

## 2021-12-02 RX ORDER — OMEPRAZOLE 10 MG/1
2 CAPSULE, DELAYED RELEASE ORAL
Qty: 0 | Refills: 0 | DISCHARGE

## 2021-12-02 RX ORDER — ERTUGLIFLOZIN 5 MG/1
1 TABLET, FILM COATED ORAL
Qty: 0 | Refills: 0 | DISCHARGE

## 2021-12-02 RX ORDER — METFORMIN HYDROCHLORIDE 850 MG/1
1 TABLET ORAL
Qty: 0 | Refills: 0 | DISCHARGE

## 2021-12-02 RX ORDER — EMPAGLIFLOZIN 10 MG/1
1 TABLET, FILM COATED ORAL
Qty: 0 | Refills: 0 | DISCHARGE

## 2021-12-02 RX ORDER — METFORMIN HYDROCHLORIDE 850 MG/1
2 TABLET ORAL
Qty: 0 | Refills: 0 | DISCHARGE

## 2021-12-02 RX ORDER — TAMSULOSIN HYDROCHLORIDE 0.4 MG/1
1 CAPSULE ORAL
Qty: 0 | Refills: 0 | DISCHARGE

## 2021-12-02 RX ORDER — ROSUVASTATIN CALCIUM 5 MG/1
1 TABLET ORAL
Qty: 0 | Refills: 0 | DISCHARGE

## 2021-12-02 RX ORDER — CHOLECALCIFEROL (VITAMIN D3) 125 MCG
1 CAPSULE ORAL
Qty: 0 | Refills: 0 | DISCHARGE

## 2021-12-02 RX ORDER — ASPIRIN/CALCIUM CARB/MAGNESIUM 324 MG
1 TABLET ORAL
Qty: 0 | Refills: 0 | DISCHARGE

## 2021-12-02 RX ORDER — CIPROFLOXACIN HYDROCHLORIDE 250 MG/1
250 TABLET, FILM COATED ORAL
Qty: 14 | Refills: 0 | Status: DISCONTINUED | COMMUNITY
Start: 2021-11-23 | End: 2021-12-02

## 2021-12-02 NOTE — PHYSICAL EXAM
[No Acute Distress] : no acute distress [No JVD] : no jugular venous distention [Clear to Auscultation] : lungs were clear to auscultation bilaterally [Soft] : abdomen soft [Non Tender] : non-tender

## 2021-12-02 NOTE — H&P PST ADULT - HISTORY OF PRESENT ILLNESS
74 yo male, PMH , HLD, DM2, Carotid stenosis s/p bilateral endarterectomy, CAD CALLUM x 3 to RCA, most recent 3/15/21., s/p TAVR, , PPM (Sept 2021). Patient with right flank pain for the last one month. CT scan revealed renal stones.  Patient scheduled for right ureteroscopy laser lithotripsy JJ stent

## 2021-12-02 NOTE — H&P PST ADULT - PROBLEM SELECTOR PLAN 3
76 year old male with h/o HTN, CAD, DM ,HLD   Patient scheduled for medical evaluation  Copy requested.

## 2021-12-02 NOTE — H&P PST ADULT - PROBLEM SELECTOR PLAN 4
PMH of DM.  Patient instructed to hold Metformin the night before and the morning of procedure. Pt stated understanding.   Last day of Jardiance 12/2/21

## 2021-12-02 NOTE — HISTORY OF PRESENT ILLNESS
[FreeTextEntry1] : 77 yo male presents after suffering right sided renal colic.  He feels much better but still has some nagging pain on the right.\par He thinks he passed the stone. [de-identified] : 75 yo male with complex medical history including AODM, hypertension, ASHD, S/P TAVR, and Coronary stenting-with recent episode of CHF which is now compensated.  Last week, he developed right sided renal colic.  He thinks his stone has past but still has some nagging right flank pain.  He denies fever and is scheduled for an extraction on MOnday.  HOwever, his kidney function has returned tobaseline\par

## 2021-12-02 NOTE — H&P PST ADULT - PROBLEM SELECTOR PLAN 1
Patient tentatively scheduled for right ureteroscopy laser lithotripsy JJ stent on 12/6/21.  Pre-op instructions provided. Pt given verbal and written instructions with teach back on pepcid. Pt verbalized understanding with return demonstration.   Covid testing scheduled prior to surgery as per patient.  MARCO A precautions,

## 2021-12-02 NOTE — H&P PST ADULT - PROBLEM SELECTOR PLAN 2
Patient with h/o TAVR, CAD (stents x 3 last stent 3/2021) , Pacemaker  Patient instructed to  obtain Cardiac eval . Email send to surgeon. Copy in chart

## 2021-12-02 NOTE — H&P PST ADULT - NSICDXPASTSURGICALHX_GEN_ALL_CORE_FT
PAST SURGICAL HISTORY:  H/O carotid endarterectomy bilateral 7/2014    H/O hernia repair 1966, left inguinal    H/O lithotripsy 6/2020    History of transcatheter aortic valve replacement (TAVR)     Pacemaker Implant date 9/16/21  Medtronic   Sera number [id number]  Model Number [id number]    Stented coronary artery X 3 in RCA, last one 3/15/21

## 2021-12-03 LAB
CULTURE RESULTS: NO GROWTH — SIGNIFICANT CHANGE UP
SPECIMEN SOURCE: SIGNIFICANT CHANGE UP

## 2021-12-06 ENCOUNTER — APPOINTMENT (OUTPATIENT)
Dept: UROLOGY | Facility: HOSPITAL | Age: 76
End: 2021-12-06

## 2021-12-07 ENCOUNTER — APPOINTMENT (OUTPATIENT)
Dept: INTERNAL MEDICINE | Facility: CLINIC | Age: 76
End: 2021-12-07
Payer: MEDICARE

## 2021-12-07 VITALS
RESPIRATION RATE: 16 BRPM | HEIGHT: 69 IN | WEIGHT: 162 LBS | SYSTOLIC BLOOD PRESSURE: 120 MMHG | DIASTOLIC BLOOD PRESSURE: 74 MMHG | HEART RATE: 66 BPM | BODY MASS INDEX: 23.99 KG/M2

## 2021-12-07 PROCEDURE — 99214 OFFICE O/P EST MOD 30 MIN: CPT | Mod: 25

## 2021-12-07 PROCEDURE — 36415 COLL VENOUS BLD VENIPUNCTURE: CPT

## 2021-12-07 RX ORDER — FUROSEMIDE 20 MG/1
20 TABLET ORAL
Qty: 30 | Refills: 3 | Status: DISCONTINUED | COMMUNITY
Start: 2021-10-14 | End: 2021-12-07

## 2021-12-07 NOTE — HISTORY OF PRESENT ILLNESS
[FreeTextEntry1] : 75 yo male presents for a follow up after passing a kidney stone.  However, he notes that his BP is elevated in the AMs [de-identified] : 75 yo man with multiple problems who just had a kidney stone that passed.  He is feeling better but his BP is not controlled in the AM

## 2021-12-07 NOTE — PHYSICAL EXAM
[No Acute Distress] : no acute distress [Well Developed] : well developed [No JVD] : no jugular venous distention [Clear to Auscultation] : lungs were clear to auscultation bilaterally [Normal Percussion] : the chest was normal to percussion [Normal Rate] : normal rate  [Regular Rhythm] : with a regular rhythm [Normal S1, S2] : normal S1 and S2 [No Edema] : there was no peripheral edema [Soft] : abdomen soft [Non Tender] : non-tender

## 2021-12-08 LAB
ANION GAP SERPL CALC-SCNC: 12 MMOL/L
BUN SERPL-MCNC: 32 MG/DL
CALCIUM SERPL-MCNC: 9.9 MG/DL
CHLORIDE SERPL-SCNC: 101 MMOL/L
CO2 SERPL-SCNC: 29 MMOL/L
CREAT SERPL-MCNC: 1.46 MG/DL
GLUCOSE SERPL-MCNC: 101 MG/DL
POTASSIUM SERPL-SCNC: 4.8 MMOL/L
SODIUM SERPL-SCNC: 142 MMOL/L

## 2021-12-15 ENCOUNTER — NON-APPOINTMENT (OUTPATIENT)
Age: 76
End: 2021-12-15

## 2021-12-15 ENCOUNTER — APPOINTMENT (OUTPATIENT)
Dept: CARDIOLOGY | Facility: CLINIC | Age: 76
End: 2021-12-15
Payer: MEDICARE

## 2021-12-15 VITALS
BODY MASS INDEX: 24.07 KG/M2 | HEART RATE: 84 BPM | SYSTOLIC BLOOD PRESSURE: 130 MMHG | WEIGHT: 163 LBS | DIASTOLIC BLOOD PRESSURE: 66 MMHG | OXYGEN SATURATION: 99 %

## 2021-12-15 DIAGNOSIS — I05.9 RHEUMATIC MITRAL VALVE DISEASE, UNSPECIFIED: ICD-10-CM

## 2021-12-15 PROCEDURE — 93000 ELECTROCARDIOGRAM COMPLETE: CPT

## 2021-12-15 PROCEDURE — 93306 TTE W/DOPPLER COMPLETE: CPT

## 2021-12-15 PROCEDURE — 99214 OFFICE O/P EST MOD 30 MIN: CPT | Mod: 25

## 2021-12-15 PROCEDURE — 36415 COLL VENOUS BLD VENIPUNCTURE: CPT

## 2021-12-15 NOTE — REVIEW OF SYSTEMS
[Feeling Fatigued] : not feeling fatigued [Weight Loss (___ Lbs)] : [unfilled] ~Ulb weight loss [SOB] : no shortness of breath [Dyspnea on exertion] : not dyspnea during exertion [Chest Discomfort] : no chest discomfort [Lower Ext Edema] : no extremity edema [Palpitations] : no palpitations [Syncope] : no syncope [Dizziness] : no dizziness [Convulsions] : no convulsions [Negative] : Heme/Lymph [FreeTextEntry5] : see HPI [de-identified] : Severe ecchymoses [de-identified] : No more shaking since the one episode in the hospital

## 2021-12-15 NOTE — HISTORY OF PRESENT ILLNESS
[FreeTextEntry1] : April 3, 2019. Patient has been followed by Dr. Larry Toth and was followed by cardiology at [facility]. In June of 2011 after a positive stress test had cardiac angiography, which had a distal 90% RCA lesion and only nonobstructive disease in the LAD and circumflex. This was stented and his symptoms were improved. His symptoms then recurred. They have been exertional chest pressure and shortness of breath when he walks after eating a meal, especially a large meal. Never gets it at rest and does not get it when he exerts himself if he has not just eaten. Did well for a while, but then the symptoms returned and in October of 2016 after another positive ECG treadmill test he had repeat cardiac catheterization. There was an 80% mid RCA lesion and again no significant disease in the LAD was circumflex and he had 2 stents to the RCA . The difference was this time his symptoms did not change. He has been placed on Omeprazole and this has not seemed to make a difference, or perhaps it did initially. He has not seen GI. He otherwise seems to be doing well. In July of 2014. He had bilateral carotid endarterectomies. His most recent echo showed normal LV function. His EKG is normal sinus rhythm and a normal tracing. He has hypertension and hyperlipidemia.He is a former smoker. His father had hypertension and CVA, but there does not seem to be a family history of coronary artery disease. \par April 24, 2019.  Patient tried to increase his omeprazole, and then said he got a stomach ache and stopped it. He went for a CTA of the coronary arteries. Minor disease in the LAD and borderline severe 60-69% in the proximal circumflex and 60-70% in the RCA. Problem with him is whether his symptoms are related to ischemia or not. After long discussion I recommended that he have the angiogram. If non-obstructive disease, try Ranexa and if no help, consider noncardiac causes. If significant disease then he should have stents and again, we can see if this relieves his symptoms or not. Patient to discuss with his wife and then let me know   \par March 1, 2021.Patient called.  His exertional chest pain is getting worse and coming more frequently and more easier for the last few months.  I have not seen him since April 2019 and back then when we did a CTA it was mostly unrevealing despite his previous 3 stents in his RCA.  I will schedule him for a stress echo as soon as possible. \par March 9, 2021.  Patient came for stress echocardiogram.  Resting echo with borderline severe though not yet critical aortic stenosis and totally normal LV function.  On treadmill within the first minute STs started to go down and heart rate shot up to 140.  I stopped the treadmill at 2 minutes with severe ST depressions and shortness of breath but no chest pain.  Post exercise echo shows new wall motion abnormality in the entire anterior wall and may be some hypokinesis in the lateral wall.  Long discussion with patient and wife and they will be booked for coronary angiography and left and right heart cath as soon as he can have a Covid swab done.  (He had the first vaccine already and the second vaccine was 5 days ago)\par March 16, 2021.Cath only showed 80% RCA lesion and the rest was nonobstructive.  The RCA was stented and the patient was sent home that evening.  He is on aspirin and Plavix.  He will continue metoprolol ER 50 daily.  He has a follow-up with me in 2 weeks and then will be evaluated by structural heart for a TAVR \par March 30, 2021.  Patient here in follow-up.  Had stent to his RCA and is now on a beta-blocker but no change in his exertional symptoms.  In addition somewhat anxious about upcoming procedure; has evaluation appointment 4/6.\par May 28, 2021.  Patient had TAVR on May 13, uncomplicated except his glucoses were high post procedure.  Since he has been home he has stopped eating cakes and bagels and has lost 7 or 8 pounds.  In terms of his angina he is "95% better".  His only complaint is severe ecchymoses once again from his dual antiplatelet therapy..  Has seen Dr. Johnston and Dr. Toth in follow-up.  Remains in sinus rhythm with an unchanged ECG.  Had a monitor with no significant arrhythmias.  Is off metoprolol and currently only on lisinopril 10 mg.  \par July 19, 2021.  Patient here in follow-up.  He had seen structural heart and an echo was done on July 1 which showed mild MR, normally functioning TAVR with no paravalvular AI.  Moderate LAE.  Normal LV size and function and normal RV size and function.  He had an ECG on July 1 that was sinus rhythm and unremarkable.  Since his TAVR he has had no episodes of his exertional chest pressure until 1 day after making sure that he mowed his lawn on an empty stomach and had no symptoms, he then went and had a big meal and then remembered he had not swept the street.  When he swept the street he got the chest tightness again and it was relieved with rest.  That was the only episode since and has not recurred.  Otherwise no real complaints initial blood test by structural heart had a potassium of 6.1 and his creatinine had gone up to 2.0.  These were repeated 5 days later and his potassium was 5.1 and his creatinine was 1.43.  He remains on lisinopril just 10 mg daily.\par September 15, 2021.  Urgent visit today because of multiple falls with trauma.  I reviewed the notes from Dr. COTTO and Dr. Toth.  EKG here is sinus rhythm with sinus arrhythmia around 76, mild first-degree AV block otherwise normal tracing.  Labs September 10 with worsening creatinine back up to 1.92 and BUN 64 which he thinks is from the dye for the head CT in the hospital but also his LFTs were up.  Echo in the hospital September 2 mostly unrevealing.  The history from the patient and his wife seems to be the 2 falls were when he was erect and then bent over and then went down without warning.  Has never had a true syncopal episode when lying down or sitting.  He is not sure if he has gotten dizzy briefly while sitting.  His blood pressure has been running low despite adjustments in his lisinopril and for now I would just hold it.  The symptoms do not sound like hypoglycemia but he has had his meds adjusted and he is only on metformin and an SGLT2 inhibitor.  Last A1c was 6.2 in August.  With Dr. Barroso and again here in the office not really orthostatic.  ZIO AT monitor was placed.\par September 17, 2021.  A few hours after putting on ZIO monitor, patient had 4.3 seconds of asystole with P waves but no QRS.  Was sent to the emergency room and had a pacemaker placed by Dr. Wong.  Patient being discharged later this morning. \par September 23, 2021. Patient was still feeling weak and dizzy and fatigued but ruled out any problem with the pacemaker.  Blood pressure has been running 145 and 150 which makes him concerned.  I reassured him that a blood pressure of 150 would not give him any symptoms but I have no problem with him going back on the low-dose lisinopril and following up with Dr. Toth.  If he is still feeling bad at Dr. Toth has no answer I will be happy to see him next week as well. \par October 20, 2021.  Urgent visit an urgent echo for this patient.  Difficult history to pin down as discussing with patient and his wife.  He thinks worsening shortness of breath with exertion for about a month now and she thinks it goes back at least until August meanwhile it has been edema and fluid retention more recently.  Dr. Toth put him on Lasix and he has had improvement in just 5 days so far.  Reviewing his interval records there is 1 EKG when he saw Dr. COTTO on September 8 and had new ST depressions in V5 and V6.  I do not believe he was complaining of any chest pain or shortness of breath at the time.  All his other EKGs seem to be the same including today and they are sinus rhythm.  His echo today especially compared to his post TAVR echoes in May and September does seem to have a new wall motion abnormality in his septum which seems to be more than just possible paradoxical septal motion seen after open heart surgery which he did not have just the past..  He did have a stent to his RCA pre-TAVR and there was some moderate nonobstructive lesions.  No episode of chest pain per se.  His BNP use which had been in the 300 400 range on October 14 was 3740.  No documentation of atrial fibrillation.  Does not seem to be using his pacemaker very often so pacemaker syndrome less likely as well.  LVEF on echo today is in the 40% to 50% range the patient has always seem to have symptoms a little bit out of proportion to objective findings.  Repeat labs including BNP was sent and consideration of repeat angiogram was discussed although patient is reluctant because of his kidney issues whenever he gets dye.\par November 2, 2021.  Patient returns today, just wants to keep telling me how much better he feels.  Was able to mow his whole lawn although he did it over 2 days without getting burning, shortness of breath, chest pain.  His weight is down at least 4 pounds and he no longer has edema so is responding well to the medical therapy.  Once again he would like to avoid an angiogram and is asking to just have his echo repeated.  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  Patient returns with his wife and for echocardiogram.  Continues to improve and thinks he is pretty much back to himself able to work in his yard etc. just like he mentioned above.  Echocardiogram done and in fact septal wall motion does seem to be much better and overall LVEF is between 50 and 55%.  Still with mild to moderate MR. \par \par

## 2021-12-16 LAB
ALBUMIN SERPL ELPH-MCNC: 4.9 G/DL
ALP BLD-CCNC: 120 U/L
ALT SERPL-CCNC: 14 U/L
ANION GAP SERPL CALC-SCNC: 15 MMOL/L
AST SERPL-CCNC: 19 U/L
BASOPHILS # BLD AUTO: 0.04 K/UL
BASOPHILS NFR BLD AUTO: 0.7 %
BILIRUB SERPL-MCNC: 0.3 MG/DL
BUN SERPL-MCNC: 38 MG/DL
CALCIUM SERPL-MCNC: 10.4 MG/DL
CHLORIDE SERPL-SCNC: 96 MMOL/L
CHOLEST SERPL-MCNC: 175 MG/DL
CO2 SERPL-SCNC: 27 MMOL/L
CREAT SERPL-MCNC: 1.36 MG/DL
EOSINOPHIL # BLD AUTO: 0.21 K/UL
EOSINOPHIL NFR BLD AUTO: 3.7 %
HCT VFR BLD CALC: 38.1 %
HDLC SERPL-MCNC: 72 MG/DL
HGB BLD-MCNC: 11.8 G/DL
IMM GRANULOCYTES NFR BLD AUTO: 0.2 %
LDLC SERPL CALC-MCNC: 85 MG/DL
LYMPHOCYTES # BLD AUTO: 0.94 K/UL
LYMPHOCYTES NFR BLD AUTO: 16.7 %
MAN DIFF?: NORMAL
MCHC RBC-ENTMCNC: 25.3 PG
MCHC RBC-ENTMCNC: 31 GM/DL
MCV RBC AUTO: 81.6 FL
MONOCYTES # BLD AUTO: 0.74 K/UL
MONOCYTES NFR BLD AUTO: 13.1 %
NEUTROPHILS # BLD AUTO: 3.69 K/UL
NEUTROPHILS NFR BLD AUTO: 65.6 %
NONHDLC SERPL-MCNC: 103 MG/DL
NT-PROBNP SERPL-MCNC: 1529 PG/ML
PLATELET # BLD AUTO: 187 K/UL
POTASSIUM SERPL-SCNC: 3.7 MMOL/L
PROT SERPL-MCNC: 7.7 G/DL
RBC # BLD: 4.67 M/UL
RBC # FLD: 15.5 %
SODIUM SERPL-SCNC: 138 MMOL/L
TRIGL SERPL-MCNC: 91 MG/DL
WBC # FLD AUTO: 5.63 K/UL

## 2021-12-17 ENCOUNTER — APPOINTMENT (OUTPATIENT)
Dept: UROLOGY | Facility: CLINIC | Age: 76
End: 2021-12-17
Payer: MEDICARE

## 2021-12-17 VITALS
WEIGHT: 163 LBS | RESPIRATION RATE: 17 BRPM | OXYGEN SATURATION: 99 % | DIASTOLIC BLOOD PRESSURE: 70 MMHG | SYSTOLIC BLOOD PRESSURE: 136 MMHG | HEART RATE: 80 BPM | HEIGHT: 69 IN | BODY MASS INDEX: 24.14 KG/M2 | TEMPERATURE: 98.1 F

## 2021-12-17 LAB — TESTOST SERPL-MCNC: 75 NG/DL

## 2021-12-17 PROCEDURE — 99214 OFFICE O/P EST MOD 30 MIN: CPT

## 2021-12-17 NOTE — ASSESSMENT
[FreeTextEntry1] : add K Citrate and natural citrate - reimage 3 months.\par megace to hot flashes - check T today

## 2021-12-17 NOTE — HISTORY OF PRESENT ILLNESS
[FreeTextEntry1] : f/u from recent trip to ER.\par He noted right flank beginning earlier in the week though no associated N/V, fevers, chills or hematuria. PAin continued and PCP sent him for a CT scan which noted a 4mm stone distal ureter with some hydronephrosis and perinephric stranding and ? forniceal rupture. BMP also noted elevated creatinine over baseline. \par he last had pain last night ans he thinks he passed the stone, noting a back stone in the toilet.\par he has prior h/o stones: open lithotomy 1999 and ureteroscopy laser lithotripsy 6/20. May have passed stones between.\par reviewed CT scan - see no other stones and not sure there is a forniceal rupture.\par \par he also notes h/o prostate cancer  - recollects had Fabricio 9 but doesn't recall PSA. Treated with ADT (4 3 month shots) last 3/21. Still has fatigue and hot flashes especially at night. Has a recent PSA which was essentially 0. \par \par 12/21 - in the interim ended up passing stone before the scheduled ureteroscopy and GFR back to his normal. \par reviewed his Litholink: low citrate.PH and high UA but serum was normal.

## 2021-12-21 ENCOUNTER — NON-APPOINTMENT (OUTPATIENT)
Age: 76
End: 2021-12-21

## 2021-12-27 ENCOUNTER — APPOINTMENT (OUTPATIENT)
Dept: ELECTROPHYSIOLOGY | Facility: CLINIC | Age: 76
End: 2021-12-27
Payer: MEDICARE

## 2021-12-27 ENCOUNTER — NON-APPOINTMENT (OUTPATIENT)
Age: 76
End: 2021-12-27

## 2021-12-27 PROCEDURE — 93296 REM INTERROG EVL PM/IDS: CPT | Mod: NC

## 2021-12-27 PROCEDURE — 93294 REM INTERROG EVL PM/LDLS PM: CPT | Mod: NC

## 2022-01-05 ENCOUNTER — RX RENEWAL (OUTPATIENT)
Age: 77
End: 2022-01-05

## 2022-01-12 ENCOUNTER — APPOINTMENT (OUTPATIENT)
Dept: ELECTROPHYSIOLOGY | Facility: CLINIC | Age: 77
End: 2022-01-12
Payer: MEDICARE

## 2022-01-12 ENCOUNTER — NON-APPOINTMENT (OUTPATIENT)
Age: 77
End: 2022-01-12

## 2022-01-12 VITALS
HEIGHT: 69 IN | SYSTOLIC BLOOD PRESSURE: 146 MMHG | BODY MASS INDEX: 24.29 KG/M2 | WEIGHT: 164 LBS | OXYGEN SATURATION: 99 % | DIASTOLIC BLOOD PRESSURE: 64 MMHG | HEART RATE: 75 BPM

## 2022-01-12 DIAGNOSIS — I65.29 OCCLUSION AND STENOSIS OF UNSPECIFIED CAROTID ARTERY: ICD-10-CM

## 2022-01-12 PROCEDURE — 93280 PM DEVICE PROGR EVAL DUAL: CPT

## 2022-01-12 PROCEDURE — 93000 ELECTROCARDIOGRAM COMPLETE: CPT | Mod: 59

## 2022-01-12 PROCEDURE — 99214 OFFICE O/P EST MOD 30 MIN: CPT

## 2022-01-12 RX ORDER — TAMSULOSIN HYDROCHLORIDE 0.4 MG/1
0.4 CAPSULE ORAL
Qty: 90 | Refills: 3 | Status: DISCONTINUED | COMMUNITY
Start: 2021-11-23 | End: 2022-01-12

## 2022-01-12 NOTE — HISTORY OF PRESENT ILLNESS
[FreeTextEntry1] : Mr. Harper has been feeling very well since his last visit in October.  During that visit, he was found with intermittent atrial undersensing, PVCs, and was having recurrent sudden syncopal episodes.  At that time, the atrial sensitivity was decreased to 0.15mV and the rate drop feature was switched on.  He has not experienced any syncopal events since, and has been able to return to his daily activities without any limitations.   Recent echocardiography on 12/15/21 with EF of 50-55%.

## 2022-01-12 NOTE — PHYSICAL EXAM
[Well Developed] : well developed [Well Nourished] : well nourished [No Acute Distress] : no acute distress [Normal Conjunctiva] : normal conjunctiva [Normal Venous Pressure] : normal venous pressure [No Carotid Bruit] : no carotid bruit [Normal S1, S2] : normal S1, S2 [No Gallop] : no gallop [Clear Lung Fields] : clear lung fields [Good Air Entry] : good air entry [No Respiratory Distress] : no respiratory distress  [Soft] : abdomen soft [Non Tender] : non-tender [No Masses/organomegaly] : no masses/organomegaly [Normal Bowel Sounds] : normal bowel sounds [Normal Gait] : normal gait [No Edema] : no edema [No Cyanosis] : no cyanosis [No Clubbing] : no clubbing [No Varicosities] : no varicosities [No Rash] : no rash [No Skin Lesions] : no skin lesions [Moves all extremities] : moves all extremities [No Focal Deficits] : no focal deficits [Normal Speech] : normal speech [Alert and Oriented] : alert and oriented [Normal memory] : normal memory [de-identified] : minor right carotid turbulence on auscultation, bilateral CEA scars

## 2022-01-12 NOTE — DISCUSSION/SUMMARY
[FreeTextEntry1] : Mr. Harper has been doing very well after the changes made during his last visit.  He will continue with remote monitoring and return for an evaluation in 1 year. ECG shows either sinus rhythm or atrial pacing.Most recent echo LVEF was 55%

## 2022-01-12 NOTE — REASON FOR VISIT
[FreeTextEntry3] : Adam Miller MD [FreeTextEntry1] : Reason for Visit: \par Complete AV Block\par Medtronic Dina dual chamber pacer\par Syncope

## 2022-02-14 NOTE — DISCHARGE NOTE NURSING/CASE MANAGEMENT/SOCIAL WORK - CAREGIVER ADDRESS
What Type Of Note Output Would You Prefer (Optional)?: Bullet Format
Hpi Title: Evaluation of Skin Lesions
n/a

## 2022-03-11 ENCOUNTER — APPOINTMENT (OUTPATIENT)
Dept: UROLOGY | Facility: CLINIC | Age: 77
End: 2022-03-11
Payer: MEDICARE

## 2022-03-11 ENCOUNTER — OUTPATIENT (OUTPATIENT)
Dept: OUTPATIENT SERVICES | Facility: HOSPITAL | Age: 77
LOS: 1 days | End: 2022-03-11
Payer: COMMERCIAL

## 2022-03-11 DIAGNOSIS — R35.0 FREQUENCY OF MICTURITION: ICD-10-CM

## 2022-03-11 DIAGNOSIS — N20.1 CALCULUS OF URETER: ICD-10-CM

## 2022-03-11 DIAGNOSIS — Z95.0 PRESENCE OF CARDIAC PACEMAKER: Chronic | ICD-10-CM

## 2022-03-11 DIAGNOSIS — Z95.2 PRESENCE OF PROSTHETIC HEART VALVE: Chronic | ICD-10-CM

## 2022-03-11 DIAGNOSIS — Z95.5 PRESENCE OF CORONARY ANGIOPLASTY IMPLANT AND GRAFT: Chronic | ICD-10-CM

## 2022-03-11 DIAGNOSIS — Z98.890 OTHER SPECIFIED POSTPROCEDURAL STATES: Chronic | ICD-10-CM

## 2022-03-11 PROCEDURE — 99213 OFFICE O/P EST LOW 20 MIN: CPT

## 2022-03-11 PROCEDURE — 76775 US EXAM ABDO BACK WALL LIM: CPT

## 2022-03-11 PROCEDURE — 76775 US EXAM ABDO BACK WALL LIM: CPT | Mod: 26

## 2022-03-11 NOTE — HISTORY OF PRESENT ILLNESS
[FreeTextEntry1] : f/u from recent trip to ER.\par He noted right flank beginning earlier in the week though no associated N/V, fevers, chills or hematuria. PAin continued and PCP sent him for a CT scan which noted a 4mm stone distal ureter with some hydronephrosis and perinephric stranding and ? forniceal rupture. BMP also noted elevated creatinine over baseline. \par he last had pain last night ans he thinks he passed the stone, noting a back stone in the toilet.\par he has prior h/o stones: open lithotomy 1999 and ureteroscopy laser lithotripsy 6/20. May have passed stones between.\par reviewed CT scan - see no other stones and not sure there is a forniceal rupture.\par \par he also notes h/o prostate cancer  - recollects had Fabricio 9 but doesn't recall PSA. Treated with 45 XRTs ADT (4 3 month shots) last 3/21. Still has fatigue and hot flashes especially at night. Has a recent PSA which was essentially 0. \par \par 12/21 - in the interim ended up passing stone before the scheduled ureteroscopy and GFR back to his normal. \par reviewed his Litholink: low citrate.PH and high UA but serum was normal. \par \par 3/22 - no stones pains or hematuria. Not taking the KCitrate as concerned about the K. on lemon juice. \par still having hot flashes. \par ULS notes 2 stones - one new.

## 2022-03-12 LAB
ANION GAP SERPL CALC-SCNC: 17 MMOL/L
BASOPHILS # BLD AUTO: 0.03 K/UL
BASOPHILS NFR BLD AUTO: 0.5 %
BUN SERPL-MCNC: 28 MG/DL
CALCIUM SERPL-MCNC: 9.7 MG/DL
CHLORIDE SERPL-SCNC: 99 MMOL/L
CO2 SERPL-SCNC: 24 MMOL/L
CREAT SERPL-MCNC: 1.35 MG/DL
EGFR: 54 ML/MIN/1.73M2
EOSINOPHIL # BLD AUTO: 0.19 K/UL
EOSINOPHIL NFR BLD AUTO: 3.5 %
ESTIMATED AVERAGE GLUCOSE: 169 MG/DL
GLUCOSE SERPL-MCNC: 133 MG/DL
HBA1C MFR BLD HPLC: 7.5 %
HCT VFR BLD CALC: 42.4 %
HGB BLD-MCNC: 13 G/DL
IMM GRANULOCYTES NFR BLD AUTO: 0.7 %
LYMPHOCYTES # BLD AUTO: 0.96 K/UL
LYMPHOCYTES NFR BLD AUTO: 17.5 %
MAN DIFF?: NORMAL
MCHC RBC-ENTMCNC: 26.2 PG
MCHC RBC-ENTMCNC: 30.7 GM/DL
MCV RBC AUTO: 85.3 FL
MONOCYTES # BLD AUTO: 0.66 K/UL
MONOCYTES NFR BLD AUTO: 12 %
NEUTROPHILS # BLD AUTO: 3.61 K/UL
NEUTROPHILS NFR BLD AUTO: 65.8 %
PLATELET # BLD AUTO: 171 K/UL
POTASSIUM SERPL-SCNC: 3.3 MMOL/L
PSA SERPL-MCNC: 0.02 NG/ML
RBC # BLD: 4.97 M/UL
RBC # FLD: 16.6 %
SODIUM SERPL-SCNC: 140 MMOL/L
TESTOST SERPL-MCNC: 114 NG/DL
TSH SERPL-ACNC: 1.24 UIU/ML
WBC # FLD AUTO: 5.49 K/UL

## 2022-03-14 DIAGNOSIS — N20.0 CALCULUS OF KIDNEY: ICD-10-CM

## 2022-03-14 DIAGNOSIS — C61 MALIGNANT NEOPLASM OF PROSTATE: ICD-10-CM

## 2022-03-18 ENCOUNTER — APPOINTMENT (OUTPATIENT)
Dept: CARDIOLOGY | Facility: CLINIC | Age: 77
End: 2022-03-18
Payer: MEDICARE

## 2022-03-18 ENCOUNTER — APPOINTMENT (OUTPATIENT)
Dept: UROLOGY | Facility: CLINIC | Age: 77
End: 2022-03-18

## 2022-03-18 ENCOUNTER — NON-APPOINTMENT (OUTPATIENT)
Age: 77
End: 2022-03-18

## 2022-03-18 VITALS
DIASTOLIC BLOOD PRESSURE: 60 MMHG | BODY MASS INDEX: 24.59 KG/M2 | OXYGEN SATURATION: 97 % | HEIGHT: 69 IN | HEART RATE: 84 BPM | WEIGHT: 166 LBS | SYSTOLIC BLOOD PRESSURE: 130 MMHG

## 2022-03-18 PROCEDURE — 93000 ELECTROCARDIOGRAM COMPLETE: CPT

## 2022-03-18 PROCEDURE — 36415 COLL VENOUS BLD VENIPUNCTURE: CPT

## 2022-03-18 PROCEDURE — 99215 OFFICE O/P EST HI 40 MIN: CPT

## 2022-03-18 NOTE — HISTORY OF PRESENT ILLNESS
[FreeTextEntry1] : April 3, 2019. Patient has been followed by Dr. Larry Toth and was followed by cardiology at [facility]. In June of 2011 after a positive stress test had cardiac angiography, which had a distal 90% RCA lesion and only nonobstructive disease in the LAD and circumflex. This was stented and his symptoms were improved. His symptoms then recurred. They have been exertional chest pressure and shortness of breath when he walks after eating a meal, especially a large meal. Never gets it at rest and does not get it when he exerts himself if he has not just eaten. Did well for a while, but then the symptoms returned and in October of 2016 after another positive ECG treadmill test he had repeat cardiac catheterization. There was an 80% mid RCA lesion and again no significant disease in the LAD was circumflex and he had 2 stents to the RCA . The difference was this time his symptoms did not change. He has been placed on Omeprazole and this has not seemed to make a difference, or perhaps it did initially. He has not seen GI. He otherwise seems to be doing well. In July of 2014. He had bilateral carotid endarterectomies. His most recent echo showed normal LV function. His EKG is normal sinus rhythm and a normal tracing. He has hypertension and hyperlipidemia.He is a former smoker. His father had hypertension and CVA, but there does not seem to be a family history of coronary artery disease. \par April 24, 2019.  Patient tried to increase his omeprazole, and then said he got a stomach ache and stopped it. He went for a CTA of the coronary arteries. Minor disease in the LAD and borderline severe 60-69% in the proximal circumflex and 60-70% in the RCA. Problem with him is whether his symptoms are related to ischemia or not. After long discussion I recommended that he have the angiogram. If non-obstructive disease, try Ranexa and if no help, consider noncardiac causes. If significant disease then he should have stents and again, we can see if this relieves his symptoms or not. Patient to discuss with his wife and then let me know   \par March 1, 2021.Patient called.  His exertional chest pain is getting worse and coming more frequently and more easier for the last few months.  I have not seen him since April 2019 and back then when we did a CTA it was mostly unrevealing despite his previous 3 stents in his RCA.  I will schedule him for a stress echo as soon as possible. \par March 9, 2021.  Patient came for stress echocardiogram.  Resting echo with borderline severe though not yet critical aortic stenosis and totally normal LV function.  On treadmill within the first minute STs started to go down and heart rate shot up to 140.  I stopped the treadmill at 2 minutes with severe ST depressions and shortness of breath but no chest pain.  Post exercise echo shows new wall motion abnormality in the entire anterior wall and may be some hypokinesis in the lateral wall.  Long discussion with patient and wife and they will be booked for coronary angiography and left and right heart cath as soon as he can have a Covid swab done.  (He had the first vaccine already and the second vaccine was 5 days ago)\par March 16, 2021.Cath only showed 80% RCA lesion and the rest was nonobstructive.  The RCA was stented and the patient was sent home that evening.  He is on aspirin and Plavix.  He will continue metoprolol ER 50 daily.  He has a follow-up with me in 2 weeks and then will be evaluated by structural heart for a TAVR \par March 30, 2021.  Patient here in follow-up.  Had stent to his RCA and is now on a beta-blocker but no change in his exertional symptoms.  In addition somewhat anxious about upcoming procedure; has evaluation appointment 4/6.\par May 28, 2021.  Patient had TAVR on May 13, uncomplicated except his glucoses were high post procedure.  Since he has been home he has stopped eating cakes and bagels and has lost 7 or 8 pounds.  In terms of his angina he is "95% better".  His only complaint is severe ecchymoses once again from his dual antiplatelet therapy..  Has seen Dr. Johnston and Dr. Toth in follow-up.  Remains in sinus rhythm with an unchanged ECG.  Had a monitor with no significant arrhythmias.  Is off metoprolol and currently only on lisinopril 10 mg.  \par July 19, 2021.  Patient here in follow-up.  He had seen structural heart and an echo was done on July 1 which showed mild MR, normally functioning TAVR with no paravalvular AI.  Moderate LAE.  Normal LV size and function and normal RV size and function.  He had an ECG on July 1 that was sinus rhythm and unremarkable.  Since his TAVR he has had no episodes of his exertional chest pressure until 1 day after making sure that he mowed his lawn on an empty stomach and had no symptoms, he then went and had a big meal and then remembered he had not swept the street.  When he swept the street he got the chest tightness again and it was relieved with rest.  That was the only episode since and has not recurred.  Otherwise no real complaints initial blood test by structural heart had a potassium of 6.1 and his creatinine had gone up to 2.0.  These were repeated 5 days later and his potassium was 5.1 and his creatinine was 1.43.  He remains on lisinopril just 10 mg daily.\par September 15, 2021.  Urgent visit today because of multiple falls with trauma.  I reviewed the notes from Dr. COTTO and Dr. Toth.  EKG here is sinus rhythm with sinus arrhythmia around 76, mild first-degree AV block otherwise normal tracing.  Labs September 10 with worsening creatinine back up to 1.92 and BUN 64 which he thinks is from the dye for the head CT in the hospital but also his LFTs were up.  Echo in the hospital September 2 mostly unrevealing.  The history from the patient and his wife seems to be the 2 falls were when he was erect and then bent over and then went down without warning.  Has never had a true syncopal episode when lying down or sitting.  He is not sure if he has gotten dizzy briefly while sitting.  His blood pressure has been running low despite adjustments in his lisinopril and for now I would just hold it.  The symptoms do not sound like hypoglycemia but he has had his meds adjusted and he is only on metformin and an SGLT2 inhibitor.  Last A1c was 6.2 in August.  With Dr. Barroso and again here in the office not really orthostatic.  ZIO AT monitor was placed.\par September 17, 2021.  A few hours after putting on ZIO monitor, patient had 4.3 seconds of asystole with P waves but no QRS.  Was sent to the emergency room and had a pacemaker placed by Dr. Wong.  Patient being discharged later this morning. \par September 23, 2021. Patient was still feeling weak and dizzy and fatigued but ruled out any problem with the pacemaker.  Blood pressure has been running 145 and 150 which makes him concerned.  I reassured him that a blood pressure of 150 would not give him any symptoms but I have no problem with him going back on the low-dose lisinopril and following up with Dr. Toth.  If he is still feeling bad at Dr. Toth has no answer I will be happy to see him next week as well. \par October 20, 2021.  Urgent visit an urgent echo for this patient.  Difficult history to pin down as discussing with patient and his wife.  He thinks worsening shortness of breath with exertion for about a month now and she thinks it goes back at least until August meanwhile it has been edema and fluid retention more recently.  Dr. Toth put him on Lasix and he has had improvement in just 5 days so far.  Reviewing his interval records there is 1 EKG when he saw Dr. COTTO on September 8 and had new ST depressions in V5 and V6.  I do not believe he was complaining of any chest pain or shortness of breath at the time.  All his other EKGs seem to be the same including today and they are sinus rhythm.  His echo today especially compared to his post TAVR echoes in May and September does seem to have a new wall motion abnormality in his septum which seems to be more than just possible paradoxical septal motion seen after open heart surgery which he did not have just the past..  He did have a stent to his RCA pre-TAVR and there was some moderate nonobstructive lesions.  No episode of chest pain per se.  His BNP use which had been in the 300 400 range on October 14 was 3740.  No documentation of atrial fibrillation.  Does not seem to be using his pacemaker very often so pacemaker syndrome less likely as well.  LVEF on echo today is in the 40% to 50% range the patient has always seem to have symptoms a little bit out of proportion to objective findings.  Repeat labs including BNP was sent and consideration of repeat angiogram was discussed although patient is reluctant because of his kidney issues whenever he gets dye.\par November 2, 2021.  Patient returns today, just wants to keep telling me how much better he feels.  Was able to mow his whole lawn although he did it over 2 days without getting burning, shortness of breath, chest pain.  His weight is down at least 4 pounds and he no longer has edema so is responding well to the medical therapy.  Once again he would like to avoid an angiogram and is asking to just have his echo repeated.  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  Patient returns with his wife and for echocardiogram.  Continues to improve and thinks he is pretty much back to himself able to work in his yard etc. just like he mentioned above.  Echocardiogram done and in fact septal wall motion does seem to be much better and overall LVEF is between 50 and 55%.  Still with mild to moderate MR. \par March 18, 2022.  Here in follow-up.  Has not yet decided on an internist to a place Dr. Toth.  Had some labs with his urologist.  Potassium only 3.3 but no change in BUN and creatinine (patient unaware and does not think he is taking potassium citrate 10 mEq.  Urologist also had him stop his multivitamins.)  He is also having a lot of dental work and bridge work coming up so will need antibiotic prophylaxis.  The urologist also checked his A1c which went up to 7.5.  He is taking Metformin 1000 and Jardiance 25 but admits to being off his diet so hopefully he will go back on his diet first.  He saw Dr. Vyas for pacemaker follow-up together with his wife and Dr. Wong was very happy with how he is doing.  He remains in sinus rhythm with one C.  He has follow-up coming up with Dr. Membreno about his TAVR and he thinks he is going to have an echo at that time as well.\par

## 2022-03-18 NOTE — REVIEW OF SYSTEMS
[Negative] : Heme/Lymph [Weight Gain (___ Lbs)] : [unfilled] ~Ulb weight gain [Feeling Fatigued] : not feeling fatigued [Weight Loss (___ Lbs)] : no recent weight loss [SOB] : no shortness of breath [Dyspnea on exertion] : not dyspnea during exertion [Chest Discomfort] : no chest discomfort [Lower Ext Edema] : no extremity edema [Palpitations] : no palpitations [Syncope] : no syncope [Dizziness] : no dizziness [Convulsions] : no convulsions [FreeTextEntry5] : see HPI [de-identified] : Severe ecchymoses [de-identified] : No more shaking since the one episode in the hospital

## 2022-03-18 NOTE — PHYSICAL EXAM
[General Appearance - Well Developed] : well developed [Normal Appearance] : normal appearance [General Appearance - Well Nourished] : well nourished [Well Groomed] : well groomed [No Deformities] : no deformities [General Appearance - In No Acute Distress] : no acute distress [Normal Oral Mucosa] : normal oral mucosa [No Oral Pallor] : no oral pallor [No Oral Cyanosis] : no oral cyanosis [Normal Jugular Venous A Waves Present] : normal jugular venous A waves present [Normal Jugular Venous V Waves Present] : normal jugular venous V waves present [No Jugular Venous Navas A Waves] : no jugular venous navas A waves [Respiration, Rhythm And Depth] : normal respiratory rhythm and effort [Exaggerated Use Of Accessory Muscles For Inspiration] : no accessory muscle use [Auscultation Breath Sounds / Voice Sounds] : lungs were clear to auscultation bilaterally [Heart Rate And Rhythm] : heart rate and rhythm were normal [Heart Sounds] : normal S1 and S2 [Abdomen Soft] : soft [Abdomen Tenderness] : non-tender [Abdomen Mass (___ Cm)] : no abdominal mass palpated [Abnormal Walk] : normal gait [Gait - Sufficient For Exercise Testing] : the gait was sufficient for exercise testing [Nail Clubbing] : no clubbing of the fingernails [Cyanosis, Localized] : no localized cyanosis [Petechial Hemorrhages (___cm)] : no petechial hemorrhages [Skin Color & Pigmentation] : normal skin color and pigmentation [] : no rash [No Venous Stasis] : no venous stasis [Skin Lesions] : no skin lesions [No Skin Ulcers] : no skin ulcer [No Xanthoma] : no  xanthoma was observed [Oriented To Time, Place, And Person] : oriented to person, place, and time [Affect] : the affect was normal [Mood] : the mood was normal [FreeTextEntry1] : Still anxious

## 2022-03-22 LAB
ALBUMIN SERPL ELPH-MCNC: 4.8 G/DL
ALP BLD-CCNC: 98 U/L
ALT SERPL-CCNC: 15 U/L
ANION GAP SERPL CALC-SCNC: 16 MMOL/L
AST SERPL-CCNC: 19 U/L
BILIRUB SERPL-MCNC: 0.3 MG/DL
BUN SERPL-MCNC: 38 MG/DL
CALCIUM SERPL-MCNC: 10.2 MG/DL
CHLORIDE SERPL-SCNC: 99 MMOL/L
CHOLEST SERPL-MCNC: 173 MG/DL
CO2 SERPL-SCNC: 24 MMOL/L
CREAT SERPL-MCNC: 1.29 MG/DL
EGFR: 57 ML/MIN/1.73M2
HDLC SERPL-MCNC: 64 MG/DL
LDLC SERPL CALC-MCNC: 72 MG/DL
NONHDLC SERPL-MCNC: 109 MG/DL
NT-PROBNP SERPL-MCNC: 634 PG/ML
POTASSIUM SERPL-SCNC: 3.6 MMOL/L
PROT SERPL-MCNC: 7.3 G/DL
SODIUM SERPL-SCNC: 139 MMOL/L
TRIGL SERPL-MCNC: 185 MG/DL

## 2022-03-28 ENCOUNTER — APPOINTMENT (OUTPATIENT)
Dept: ELECTROPHYSIOLOGY | Facility: CLINIC | Age: 77
End: 2022-03-28
Payer: MEDICARE

## 2022-03-28 ENCOUNTER — NON-APPOINTMENT (OUTPATIENT)
Age: 77
End: 2022-03-28

## 2022-03-28 PROCEDURE — 93294 REM INTERROG EVL PM/LDLS PM: CPT

## 2022-03-28 PROCEDURE — 93296 REM INTERROG EVL PM/IDS: CPT

## 2022-04-01 RX ORDER — APIXABAN 5 MG/1
5 TABLET, FILM COATED ORAL
Qty: 120 | Refills: 3 | Status: ACTIVE | COMMUNITY
Start: 2022-04-01 | End: 1900-01-01

## 2022-04-13 RX ORDER — AMLODIPINE BESYLATE 5 MG/1
5 TABLET ORAL
Qty: 90 | Refills: 3 | Status: DISCONTINUED | COMMUNITY
Start: 2021-12-02 | End: 2022-04-13

## 2022-04-25 ENCOUNTER — APPOINTMENT (OUTPATIENT)
Dept: INTERNAL MEDICINE | Facility: CLINIC | Age: 77
End: 2022-04-25
Payer: MEDICARE

## 2022-04-25 VITALS
SYSTOLIC BLOOD PRESSURE: 136 MMHG | HEART RATE: 71 BPM | DIASTOLIC BLOOD PRESSURE: 72 MMHG | OXYGEN SATURATION: 96 % | TEMPERATURE: 97.8 F | RESPIRATION RATE: 12 BRPM

## 2022-04-25 DIAGNOSIS — I20.9 ANGINA PECTORIS, UNSPECIFIED: ICD-10-CM

## 2022-04-25 PROCEDURE — 99213 OFFICE O/P EST LOW 20 MIN: CPT

## 2022-04-25 RX ORDER — METFORMIN ER 500 MG 500 MG/1
500 TABLET ORAL
Qty: 180 | Refills: 3 | Status: DISCONTINUED | COMMUNITY
Start: 2021-09-14 | End: 2022-04-25

## 2022-04-25 NOTE — HISTORY OF PRESENT ILLNESS
[FreeTextEntry1] : followup/transfer care, sugars are high [de-identified] : ROSA BURGOS is a 76 year old male with multiple comorbid conditions including CAD s/p stents, s/p TAVR, s/p PPM, Prostate CA, renal stones who presents for medical followup/transfer care. \par His BP has been under better control on Lisinopril, recently restarted by cardiologist, off Amlodipine. Sometimes takes 1/2 tablet of Lisinopril if AM BP readings is <130/80.\par Blood sugars have been running high in 180-200s, he wishes to come off Metformin as he feels it is not working well. Admits not controlling diet, does eat pasta, bread, etc.\par On Eliquis and Aspirin, bruising a lot on arms, s/p recent dental work, taking a while to heal.

## 2022-04-25 NOTE — PHYSICAL EXAM
[No JVD] : no jugular venous distention [Normal Rate] : normal rate  [Regular Rhythm] : with a regular rhythm [Normal S1, S2] : normal S1 and S2 [No Carotid Bruits] : no carotid bruits [No Edema] : there was no peripheral edema [Coordination Grossly Intact] : coordination grossly intact [No Focal Deficits] : no focal deficits [Normal Gait] : normal gait [Normal] : affect was normal and insight and judgment were intact [de-identified] : 1/6 systolic murmur

## 2022-05-12 ENCOUNTER — APPOINTMENT (OUTPATIENT)
Dept: CARDIOLOGY | Facility: CLINIC | Age: 77
End: 2022-05-12
Payer: MEDICARE

## 2022-05-12 ENCOUNTER — NON-APPOINTMENT (OUTPATIENT)
Age: 77
End: 2022-05-12

## 2022-05-12 VITALS
WEIGHT: 168 LBS | OXYGEN SATURATION: 98 % | HEART RATE: 74 BPM | BODY MASS INDEX: 24.81 KG/M2 | DIASTOLIC BLOOD PRESSURE: 55 MMHG | SYSTOLIC BLOOD PRESSURE: 132 MMHG

## 2022-05-12 PROCEDURE — 36415 COLL VENOUS BLD VENIPUNCTURE: CPT

## 2022-05-12 PROCEDURE — 99214 OFFICE O/P EST MOD 30 MIN: CPT

## 2022-05-12 PROCEDURE — 93000 ELECTROCARDIOGRAM COMPLETE: CPT

## 2022-05-12 NOTE — PHYSICAL EXAM
[General Appearance - Well Developed] : well developed [Normal Appearance] : normal appearance [Well Groomed] : well groomed [General Appearance - Well Nourished] : well nourished [No Deformities] : no deformities [General Appearance - In No Acute Distress] : no acute distress [Normal Oral Mucosa] : normal oral mucosa [No Oral Pallor] : no oral pallor [No Oral Cyanosis] : no oral cyanosis [Normal Jugular Venous A Waves Present] : normal jugular venous A waves present [Normal Jugular Venous V Waves Present] : normal jugular venous V waves present [No Jugular Venous Navas A Waves] : no jugular venous navas A waves [Respiration, Rhythm And Depth] : normal respiratory rhythm and effort [Exaggerated Use Of Accessory Muscles For Inspiration] : no accessory muscle use [Auscultation Breath Sounds / Voice Sounds] : lungs were clear to auscultation bilaterally [Heart Rate And Rhythm] : heart rate and rhythm were normal [Heart Sounds] : normal S1 and S2 [Abdomen Tenderness] : non-tender [Abdomen Soft] : soft [Abdomen Mass (___ Cm)] : no abdominal mass palpated [Abnormal Walk] : normal gait [Gait - Sufficient For Exercise Testing] : the gait was sufficient for exercise testing [Nail Clubbing] : no clubbing of the fingernails [Cyanosis, Localized] : no localized cyanosis [Petechial Hemorrhages (___cm)] : no petechial hemorrhages [Skin Color & Pigmentation] : normal skin color and pigmentation [] : no rash [No Venous Stasis] : no venous stasis [Skin Lesions] : no skin lesions [No Skin Ulcers] : no skin ulcer [No Xanthoma] : no  xanthoma was observed [Oriented To Time, Place, And Person] : oriented to person, place, and time [Affect] : the affect was normal [Mood] : the mood was normal [FreeTextEntry1] : Still anxious

## 2022-05-12 NOTE — REVIEW OF SYSTEMS
[Negative] : Heme/Lymph [Weight Gain (___ Lbs)] : [unfilled] ~Ulb weight gain [Feeling Fatigued] : not feeling fatigued [Weight Loss (___ Lbs)] : no recent weight loss [SOB] : no shortness of breath [Dyspnea on exertion] : not dyspnea during exertion [Chest Discomfort] : no chest discomfort [Lower Ext Edema] : no extremity edema [Palpitations] : no palpitations [Syncope] : no syncope [Dizziness] : dizziness [Convulsions] : no convulsions [de-identified] : Severe ecchymoses [FreeTextEntry5] : see HPI [de-identified] : No more shaking since the one episode in the hospital

## 2022-05-12 NOTE — HISTORY OF PRESENT ILLNESS
[FreeTextEntry1] : April 3, 2019. Patient has been followed by Dr. Larry Toth and was followed by cardiology at [facility]. In June of 2011 after a positive stress test had cardiac angiography, which had a distal 90% RCA lesion and only nonobstructive disease in the LAD and circumflex. This was stented and his symptoms were improved. His symptoms then recurred. They have been exertional chest pressure and shortness of breath when he walks after eating a meal, especially a large meal. Never gets it at rest and does not get it when he exerts himself if he has not just eaten. Did well for a while, but then the symptoms returned and in October of 2016 after another positive ECG treadmill test he had repeat cardiac catheterization. There was an 80% mid RCA lesion and again no significant disease in the LAD was circumflex and he had 2 stents to the RCA . The difference was this time his symptoms did not change. He has been placed on Omeprazole and this has not seemed to make a difference, or perhaps it did initially. He has not seen GI. He otherwise seems to be doing well. In July of 2014. He had bilateral carotid endarterectomies. His most recent echo showed normal LV function. His EKG is normal sinus rhythm and a normal tracing. He has hypertension and hyperlipidemia.He is a former smoker. His father had hypertension and CVA, but there does not seem to be a family history of coronary artery disease. \par April 24, 2019.  Patient tried to increase his omeprazole, and then said he got a stomach ache and stopped it. He went for a CTA of the coronary arteries. Minor disease in the LAD and borderline severe 60-69% in the proximal circumflex and 60-70% in the RCA. Problem with him is whether his symptoms are related to ischemia or not. After long discussion I recommended that he have the angiogram. If non-obstructive disease, try Ranexa and if no help, consider noncardiac causes. If significant disease then he should have stents and again, we can see if this relieves his symptoms or not. Patient to discuss with his wife and then let me know   \par March 1, 2021.Patient called.  His exertional chest pain is getting worse and coming more frequently and more easier for the last few months.  I have not seen him since April 2019 and back then when we did a CTA it was mostly unrevealing despite his previous 3 stents in his RCA.  I will schedule him for a stress echo as soon as possible. \par March 9, 2021.  Patient came for stress echocardiogram.  Resting echo with borderline severe though not yet critical aortic stenosis and totally normal LV function.  On treadmill within the first minute STs started to go down and heart rate shot up to 140.  I stopped the treadmill at 2 minutes with severe ST depressions and shortness of breath but no chest pain.  Post exercise echo shows new wall motion abnormality in the entire anterior wall and may be some hypokinesis in the lateral wall.  Long discussion with patient and wife and they will be booked for coronary angiography and left and right heart cath as soon as he can have a Covid swab done.  (He had the first vaccine already and the second vaccine was 5 days ago)\par March 16, 2021.Cath only showed 80% RCA lesion and the rest was nonobstructive.  The RCA was stented and the patient was sent home that evening.  He is on aspirin and Plavix.  He will continue metoprolol ER 50 daily.  He has a follow-up with me in 2 weeks and then will be evaluated by structural heart for a TAVR \par March 30, 2021.  Patient here in follow-up.  Had stent to his RCA and is now on a beta-blocker but no change in his exertional symptoms.  In addition somewhat anxious about upcoming procedure; has evaluation appointment 4/6.\par May 28, 2021.  Patient had TAVR on May 13, uncomplicated except his glucoses were high post procedure.  Since he has been home he has stopped eating cakes and bagels and has lost 7 or 8 pounds.  In terms of his angina he is "95% better".  His only complaint is severe ecchymoses once again from his dual antiplatelet therapy..  Has seen Dr. Johnston and Dr. Toth in follow-up.  Remains in sinus rhythm with an unchanged ECG.  Had a monitor with no significant arrhythmias.  Is off metoprolol and currently only on lisinopril 10 mg.  \par July 19, 2021.  Patient here in follow-up.  He had seen structural heart and an echo was done on July 1 which showed mild MR, normally functioning TAVR with no paravalvular AI.  Moderate LAE.  Normal LV size and function and normal RV size and function.  He had an ECG on July 1 that was sinus rhythm and unremarkable.  Since his TAVR he has had no episodes of his exertional chest pressure until 1 day after making sure that he mowed his lawn on an empty stomach and had no symptoms, he then went and had a big meal and then remembered he had not swept the street.  When he swept the street he got the chest tightness again and it was relieved with rest.  That was the only episode since and has not recurred.  Otherwise no real complaints initial blood test by structural heart had a potassium of 6.1 and his creatinine had gone up to 2.0.  These were repeated 5 days later and his potassium was 5.1 and his creatinine was 1.43.  He remains on lisinopril just 10 mg daily.\par September 15, 2021.  Urgent visit today because of multiple falls with trauma.  I reviewed the notes from Dr. COTTO and Dr. Toth.  EKG here is sinus rhythm with sinus arrhythmia around 76, mild first-degree AV block otherwise normal tracing.  Labs September 10 with worsening creatinine back up to 1.92 and BUN 64 which he thinks is from the dye for the head CT in the hospital but also his LFTs were up.  Echo in the hospital September 2 mostly unrevealing.  The history from the patient and his wife seems to be the 2 falls were when he was erect and then bent over and then went down without warning.  Has never had a true syncopal episode when lying down or sitting.  He is not sure if he has gotten dizzy briefly while sitting.  His blood pressure has been running low despite adjustments in his lisinopril and for now I would just hold it.  The symptoms do not sound like hypoglycemia but he has had his meds adjusted and he is only on metformin and an SGLT2 inhibitor.  Last A1c was 6.2 in August.  With Dr. Barroso and again here in the office not really orthostatic.  ZIO AT monitor was placed.\par September 17, 2021.  A few hours after putting on ZIO monitor, patient had 4.3 seconds of asystole with P waves but no QRS.  Was sent to the emergency room and had a pacemaker placed by Dr. Wong.  Patient being discharged later this morning. \par September 23, 2021. Patient was still feeling weak and dizzy and fatigued but ruled out any problem with the pacemaker.  Blood pressure has been running 145 and 150 which makes him concerned.  I reassured him that a blood pressure of 150 would not give him any symptoms but I have no problem with him going back on the low-dose lisinopril and following up with Dr. Toth.  If he is still feeling bad at Dr. Toth has no answer I will be happy to see him next week as well. \par October 20, 2021.  Urgent visit an urgent echo for this patient.  Difficult history to pin down as discussing with patient and his wife.  He thinks worsening shortness of breath with exertion for about a month now and she thinks it goes back at least until August meanwhile it has been edema and fluid retention more recently.  Dr. Toth put him on Lasix and he has had improvement in just 5 days so far.  Reviewing his interval records there is 1 EKG when he saw Dr. COTTO on September 8 and had new ST depressions in V5 and V6.  I do not believe he was complaining of any chest pain or shortness of breath at the time.  All his other EKGs seem to be the same including today and they are sinus rhythm.  His echo today especially compared to his post TAVR echoes in May and September does seem to have a new wall motion abnormality in his septum which seems to be more than just possible paradoxical septal motion seen after open heart surgery which he did not have just the past..  He did have a stent to his RCA pre-TAVR and there was some moderate nonobstructive lesions.  No episode of chest pain per se.  His BNP use which had been in the 300 400 range on October 14 was 3740.  No documentation of atrial fibrillation.  Does not seem to be using his pacemaker very often so pacemaker syndrome less likely as well.  LVEF on echo today is in the 40% to 50% range the patient has always seem to have symptoms a little bit out of proportion to objective findings.  Repeat labs including BNP was sent and consideration of repeat angiogram was discussed although patient is reluctant because of his kidney issues whenever he gets dye.\par November 2, 2021.  Patient returns today, just wants to keep telling me how much better he feels.  Was able to mow his whole lawn although he did it over 2 days without getting burning, shortness of breath, chest pain.  His weight is down at least 4 pounds and he no longer has edema so is responding well to the medical therapy.  Once again he would like to avoid an angiogram and is asking to just have his echo repeated.  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  Patient returns with his wife and for echocardiogram.  Continues to improve and thinks he is pretty much back to himself able to work in his yard etc. just like he mentioned above.  Echocardiogram done and in fact septal wall motion does seem to be much better and overall LVEF is between 50 and 55%.  Still with mild to moderate MR. \par March 18, 2022.  Here in follow-up.  Has not yet decided on an internist to a place Dr. Toth.  Had some labs with his urologist.  Potassium only 3.3 but no change in BUN and creatinine (patient unaware and does not think he is taking potassium citrate 10 mEq.  Urologist also had him stop his multivitamins.)  He is also having a lot of dental work and bridge work coming up so will need antibiotic prophylaxis.  The urologist also checked his A1c which went up to 7.5.  He is taking Metformin 1000 and Jardiance 25 but admits to being off his diet so hopefully he will go back on his diet first.  He saw Dr. Vyas for pacemaker follow-up together with his wife and Dr. Wong was very happy with how he is doing.  He remains in sinus rhythm with one VPC.  He has follow-up coming up with Dr. Membreno about his TAVR and he thinks he is going to have an echo at that time as well.\par April 1, 2022. Long discussion with patient today after Dr. Wong called me the other day about finding of up to 3-minute run of atrial fibrillation on Paceart monitoring.  Overall AF burden is very low but they may be under sensing. Patient always concerned because he has had issues with ecchymoses that were pretty severe in the past.  For now is willing to take the Eliquis and we will follow-up in terms of further monitoring.  Whether in the future to try antiarrhythmic medication perhaps even amiodarone to eliminate atrial fibrillation and continue to monitor will be discussed in the future if anticoagulation becomes a major problem.  Other options could always be discussed and as well including ablation, Watchman device etc.  For now we will start Eliquis 5 mg twice daily.  Meanwhile he will remain on aspirin 81 mg for his TAVR \par April 13, 2022. Patient called.  Not feeling well, dizzy at times.  First thought it was the Eliquis but no signs of bleeding.  Now since his blood pressure is usually in the 150s and would like to change back to lisinopril but maybe only 5 mg and stop the amlodipine.  I am okay with that and he will make a follow-up appointment in 3 weeks together with his wife's appointment on May 5.  Continue Eliquis for now.  Urged him to find new primary care still. \par May 12, 2022.  Patient here in follow-up together with his wife.  Always somewhat difficult to manage with unusual symptoms and a poor descriptor.  Both he and his wife seem happy with the new internist Dr. Landin.  He is off metformin and she gave him glimepiride at 2 mg but he was having hypoglycemic episodes so is now on 1 mg.  He is still on Jardiance without change.  On his own he cut back the lisinopril to 2.5 mg and he would like to stop the chlorthalidone altogether.  He is not taking any potassium.  He denies exertional chest pain or shortness of breath.  Still occasional although rare A. fib on his remote pacemaker monitoring although he complains of severe ecchymoses from the Eliquis as well as the cost.  He has an upcoming follow-up with Dr. Membreno of structural heart and this will include an echocardiogram.

## 2022-05-13 LAB
ALBUMIN SERPL ELPH-MCNC: 4.9 G/DL
ALP BLD-CCNC: 78 U/L
ALT SERPL-CCNC: 21 U/L
ANION GAP SERPL CALC-SCNC: 16 MMOL/L
AST SERPL-CCNC: 20 U/L
BASOPHILS # BLD AUTO: 0.05 K/UL
BASOPHILS NFR BLD AUTO: 0.6 %
BILIRUB SERPL-MCNC: 0.3 MG/DL
BUN SERPL-MCNC: 78 MG/DL
CALCIUM SERPL-MCNC: 9.8 MG/DL
CHLORIDE SERPL-SCNC: 102 MMOL/L
CO2 SERPL-SCNC: 20 MMOL/L
CREAT SERPL-MCNC: 1.66 MG/DL
EGFR: 42 ML/MIN/1.73M2
EOSINOPHIL # BLD AUTO: 0.15 K/UL
EOSINOPHIL NFR BLD AUTO: 1.9 %
GLUCOSE SERPL-MCNC: 57 MG/DL
HCT VFR BLD CALC: 36.7 %
HGB BLD-MCNC: 12 G/DL
IMM GRANULOCYTES NFR BLD AUTO: 0.5 %
LYMPHOCYTES # BLD AUTO: 1.1 K/UL
LYMPHOCYTES NFR BLD AUTO: 14 %
MAN DIFF?: NORMAL
MCHC RBC-ENTMCNC: 28.6 PG
MCHC RBC-ENTMCNC: 32.7 GM/DL
MCV RBC AUTO: 87.6 FL
MONOCYTES # BLD AUTO: 0.86 K/UL
MONOCYTES NFR BLD AUTO: 11 %
NEUTROPHILS # BLD AUTO: 5.65 K/UL
NEUTROPHILS NFR BLD AUTO: 72 %
NT-PROBNP SERPL-MCNC: 1044 PG/ML
PLATELET # BLD AUTO: 165 K/UL
POTASSIUM SERPL-SCNC: 4.7 MMOL/L
PROT SERPL-MCNC: 7.4 G/DL
RBC # BLD: 4.19 M/UL
RBC # FLD: 15.1 %
SODIUM SERPL-SCNC: 138 MMOL/L
WBC # FLD AUTO: 7.85 K/UL

## 2022-05-17 ENCOUNTER — APPOINTMENT (OUTPATIENT)
Dept: ELECTROPHYSIOLOGY | Facility: CLINIC | Age: 77
End: 2022-05-17
Payer: MEDICARE

## 2022-05-17 ENCOUNTER — APPOINTMENT (OUTPATIENT)
Dept: CARDIOTHORACIC SURGERY | Facility: CLINIC | Age: 77
End: 2022-05-17
Payer: MEDICARE

## 2022-05-17 ENCOUNTER — OUTPATIENT (OUTPATIENT)
Dept: OUTPATIENT SERVICES | Facility: HOSPITAL | Age: 77
LOS: 1 days | End: 2022-05-17
Payer: COMMERCIAL

## 2022-05-17 VITALS
DIASTOLIC BLOOD PRESSURE: 63 MMHG | SYSTOLIC BLOOD PRESSURE: 147 MMHG | HEIGHT: 69 IN | BODY MASS INDEX: 24.81 KG/M2 | HEART RATE: 70 BPM | OXYGEN SATURATION: 99 %

## 2022-05-17 VITALS
DIASTOLIC BLOOD PRESSURE: 67 MMHG | TEMPERATURE: 97.6 F | RESPIRATION RATE: 18 BRPM | BODY MASS INDEX: 24.88 KG/M2 | OXYGEN SATURATION: 99 % | WEIGHT: 168 LBS | SYSTOLIC BLOOD PRESSURE: 141 MMHG | HEIGHT: 69 IN | HEART RATE: 70 BPM

## 2022-05-17 DIAGNOSIS — Z98.890 OTHER SPECIFIED POSTPROCEDURAL STATES: Chronic | ICD-10-CM

## 2022-05-17 DIAGNOSIS — I35.0 NONRHEUMATIC AORTIC (VALVE) STENOSIS: ICD-10-CM

## 2022-05-17 DIAGNOSIS — Z95.0 PRESENCE OF CARDIAC PACEMAKER: Chronic | ICD-10-CM

## 2022-05-17 DIAGNOSIS — Z95.5 PRESENCE OF CORONARY ANGIOPLASTY IMPLANT AND GRAFT: Chronic | ICD-10-CM

## 2022-05-17 DIAGNOSIS — Z95.2 PRESENCE OF PROSTHETIC HEART VALVE: Chronic | ICD-10-CM

## 2022-05-17 PROCEDURE — 99214 OFFICE O/P EST MOD 30 MIN: CPT

## 2022-05-17 PROCEDURE — 93306 TTE W/DOPPLER COMPLETE: CPT

## 2022-05-17 PROCEDURE — 93306 TTE W/DOPPLER COMPLETE: CPT | Mod: 26

## 2022-05-17 PROCEDURE — 93280 PM DEVICE PROGR EVAL DUAL: CPT

## 2022-05-17 RX ORDER — AMOXICILLIN 500 MG/1
500 CAPSULE ORAL
Qty: 20 | Refills: 2 | Status: DISCONTINUED | COMMUNITY
Start: 2021-12-20 | End: 2022-05-17

## 2022-05-17 RX ORDER — MEGESTROL ACETATE 20 MG/1
20 TABLET ORAL DAILY
Qty: 30 | Refills: 5 | Status: DISCONTINUED | COMMUNITY
Start: 2021-12-17 | End: 2022-05-17

## 2022-05-17 NOTE — REVIEW OF SYSTEMS
[Fever] : no fever [Chills] : no chills [Dyspnea on exertion] : not dyspnea during exertion [Chest Discomfort] : no chest discomfort [Lower Ext Edema] : no extremity edema [Palpitations] : no palpitations [Orthopnea] : no orthopnea [PND] : no PND [Abdominal Pain] : no abdominal pain [Change in Appetite] : no change in appetite [Dizziness] : no dizziness [FreeTextEntry3] : eyeglasses

## 2022-05-17 NOTE — HISTORY OF PRESENT ILLNESS
[FreeTextEntry1] : Mr. Harper returns to clinic today one year s/p transfemoral TAVR (29 Evolut Pro Plus) on 5/13/21. His postoperative course was uneventful and he was discharged home on POD #2. \par \par He reports feeling well initially in May and June of last year. He is active at baseline and found in July he began getting fatigued and dyspneic when doing his usual activities having to stop and rest (when cutting his lawn). In August and September he suffered 5-6 syncopal episodes. He was hospitalized here in August for five days following the first episode, neurological and cardiac evaluation were negative. He had a Holter monitor placed by Dr. Miller and was found to have 4.3 seconds of asystole within several hours of initiating monitoring. He was referred to the ED and had a PPM placed by Dr. Wong. He initially felt well after PPM placement but then began developing LE edema and anasarca that was treated with outpatient diuresis. He developed ANTONIO/CKD and his diuretics have been discontinued. In February he was diagnosed with AFib and has been started on Eliquis. \par \par For the past month he is feeling fatigued with decreased activity tolerance "I feel like I run out of steam". He has no dyspnea, angina, PND, orthopnea, dizziness, syncope, or edema.   \par \par Past medical history includes HTN, HLD, Prostate Cancer 2019  (completed treatment last month), GERD, BPH, and Carotid Disease s/p bilateral CEA in 2004.  He has had multiple interventions to his RCA over the years (2 stents in 2011, 2 stents in 2016, and in March 2021 with Dr. Beaulieu). \par \par He states he is "an active person" and likes to do things around the house, in his yard, and work on cars. He thinks that the PAF is causing him trouble. He notes "I don't have a lot of steam, a lot of energy" for the past few months. He notes "when you first did the valve, I felt fine" and noted a marked improvement in his functional capacity. He used to have to stop and rest while mowing his lawn, and after the valve he no longer needed to stop and rest. \par \par He notes "in August, I had my 3rd shot" referring to the Moderna vaccine. He notes "I was filling up with water after that" and he thinks he had myocarditis. He notes a prior PPM adjustment initially made him feel better. His last interrogation was remotely in March. He reports no angina, dyspnea, or syncope. He notes "when I am mowing the lawn, sometimes I have to stop and compose myself". He reports no syncope since having the PPM. He notes Lasix was stopped just this past Saturday.

## 2022-05-17 NOTE — DISCUSSION/SUMMARY
[FreeTextEntry1] : Mr. Harper presents for 1 year post JESSICA follow up and TTE. His THV is functioning well and his overall LV function is normal--with some inferior wall hypokinesis that is related to his multiple prior RCA interventions. He is not happy with how he is feeling, specifically his energy and stamina. I explained that it is not related to valvular or LV dysfunction. His recent labs suggest he may have been volume depleted (BUN/Cr 78/1.6), and he stopped his Lasix a few days ago--I explained that his may have been contributing and he admits to already feeling a little better. He had a PPM in September 2021 and felt better after some adjustments were made to his atrial sensing in October 2021. I am asking our EPS team to interrogate his device today to whatever extent underlying arrhythmia or pacer settings are contributing to his malaise. He has no anginal symptoms consistent with his prior, however angiography should also be a consideration (given his extensive history of RCA interventions and restenosis) if his symptoms persist and remain unexplained.

## 2022-05-17 NOTE — CARDIOLOGY SUMMARY
[de-identified] : NSR 86\par PVC [de-identified] : 5/15/2021 - 6/13/2021 Post-JESSICA MCOT demonstrated 1 episode of VT with fastest run at 123 BPM, 1% PAC burden, and <1% PVC burden. There was no AF, SVT, heart block, or pauses.\par  [de-identified] : 12/15/21\par EF 50-55%\par MV: mild MS mGr 3 mmHg\par AV: s/p TAVR, mGr 4 mmHg, pGr 9 mmHg, no AI\par TV: mild TR  [de-identified] : 5/15/2021 - 6/13/2021 Post-JESSICA MCOT demonstrated 1 episode of VT with fastest run at 123 BPM, 1% PAC burden, and <1% PVC burden. There was no AF, SVT, heart block, or pauses.\par  [de-identified] : 5/15/2021 - 6/13/2021 Post-JESSICA MCOT demonstrated 1 episode of VT with fastest run at 123 BPM, 1% PAC burden, and <1% PVC burden. There was no AF, SVT, heart block, or pauses.\par

## 2022-05-25 ENCOUNTER — NON-APPOINTMENT (OUTPATIENT)
Age: 77
End: 2022-05-25

## 2022-06-27 NOTE — DISCHARGE NOTE NURSING/CASE MANAGEMENT/SOCIAL WORK - NSDCVIVACCINE_GEN_ALL_CORE_FT
Tdap; 02-Sep-2021 11:23; Chelsi Barajas (RN); [employer]; [id number] (Exp. Date: 01-Oct-2022); IntraMuscular; Deltoid Left.; 0.5 milliLiter(s); VIS (VIS Published: 09-May-2013, VIS Presented: 02-Sep-2021);   
No

## 2022-07-06 NOTE — H&P PST ADULT - LAB RESULTS AND INTERPRETATION
CBC, BMP, Urine C/S -pending   A1C in chart from 11/24/21 Cephalexin Counseling: I counseled the patient regarding use of cephalexin as an antibiotic for prophylactic and/or therapeutic purposes. Cephalexin (commonly prescribed under brand name Keflex) is a cephalosporin antibiotic which is active against numerous classes of bacteria, including most skin bacteria. Side effects may include nausea, diarrhea, gastrointestinal upset, rash, hives, yeast infections, and in rare cases, hepatitis, kidney disease, seizures, fever, confusion, neurologic symptoms, and others. Patients with severe allergies to penicillin medications are cautioned that there is about a 10% incidence of cross-reactivity with cephalosporins. When possible, patients with penicillin allergies should use alternatives to cephalosporins for antibiotic therapy.

## 2022-07-18 ENCOUNTER — APPOINTMENT (OUTPATIENT)
Dept: CARDIOLOGY | Facility: CLINIC | Age: 77
End: 2022-07-18

## 2022-07-23 NOTE — DISCHARGE NOTE NURSING/CASE MANAGEMENT/SOCIAL WORK - CAREGIVER RELATION TO PATIENT
[FreeTextEntry1] : 63 y.o. Female with obesity and PMHx of HLD and Hyperkalemia (on Spironolactone and K+ supplement), presents for evaluation and management of DM. PCP noticed A1C of  7.1% in 3/2022. No previous labs available because PCP is from [place] but CMP form 2021 shows normal fasting BG. Patient denies polyuria or polydipsia. Admits eating high carb diet, especially soda. Family Hx - brother with DM. Currently not taking any diabetic medications. \par Patient reports Hx of thyroid nodules and unclear Hx of ?right sided FNA. Denies thyroid cancer or thyroid function abnormality. Does not take any thyroid medications.
Spouse

## 2022-07-26 ENCOUNTER — APPOINTMENT (OUTPATIENT)
Dept: INTERNAL MEDICINE | Facility: CLINIC | Age: 77
End: 2022-07-26

## 2022-07-26 PROCEDURE — 36415 COLL VENOUS BLD VENIPUNCTURE: CPT

## 2022-07-27 ENCOUNTER — RX RENEWAL (OUTPATIENT)
Age: 77
End: 2022-07-27

## 2022-07-27 RX ORDER — BLOOD SUGAR DIAGNOSTIC
STRIP MISCELLANEOUS
Qty: 300 | Refills: 1 | Status: ACTIVE | COMMUNITY
Start: 2022-07-27 | End: 1900-01-01

## 2022-08-01 ENCOUNTER — APPOINTMENT (OUTPATIENT)
Dept: INTERNAL MEDICINE | Facility: CLINIC | Age: 77
End: 2022-08-01

## 2022-08-01 VITALS
HEART RATE: 78 BPM | SYSTOLIC BLOOD PRESSURE: 140 MMHG | OXYGEN SATURATION: 98 % | RESPIRATION RATE: 12 BRPM | DIASTOLIC BLOOD PRESSURE: 78 MMHG

## 2022-08-01 VITALS — HEIGHT: 69 IN | WEIGHT: 173 LBS | BODY MASS INDEX: 25.62 KG/M2

## 2022-08-01 LAB
25(OH)D3 SERPL-MCNC: 55.4 NG/ML
ALBUMIN SERPL ELPH-MCNC: 4.9 G/DL
ALP BLD-CCNC: 92 U/L
ALT SERPL-CCNC: 22 U/L
ANION GAP SERPL CALC-SCNC: 12 MMOL/L
APPEARANCE: CLEAR
AST SERPL-CCNC: 22 U/L
BASOPHILS # BLD AUTO: 0.03 K/UL
BASOPHILS NFR BLD AUTO: 0.6 %
BILIRUB SERPL-MCNC: 0.2 MG/DL
BILIRUBIN URINE: NEGATIVE
BLOOD URINE: NEGATIVE
BUN SERPL-MCNC: 38 MG/DL
CALCIUM SERPL-MCNC: 9.9 MG/DL
CHLORIDE SERPL-SCNC: 109 MMOL/L
CHOLEST SERPL-MCNC: 164 MG/DL
CO2 SERPL-SCNC: 20 MMOL/L
COLOR: NORMAL
CREAT SERPL-MCNC: 1.22 MG/DL
EGFR: 60 ML/MIN/1.73M2
EOSINOPHIL # BLD AUTO: 0.2 K/UL
EOSINOPHIL NFR BLD AUTO: 4 %
ESTIMATED AVERAGE GLUCOSE: 148 MG/DL
GLUCOSE QUALITATIVE U: ABNORMAL
GLUCOSE SERPL-MCNC: 132 MG/DL
HBA1C MFR BLD HPLC: 6.8 %
HCT VFR BLD CALC: 43 %
HDLC SERPL-MCNC: 63 MG/DL
HGB BLD-MCNC: 13.4 G/DL
IMM GRANULOCYTES NFR BLD AUTO: 0.6 %
KETONES URINE: NEGATIVE
LDLC SERPL CALC-MCNC: 84 MG/DL
LEUKOCYTE ESTERASE URINE: NEGATIVE
LYMPHOCYTES # BLD AUTO: 0.93 K/UL
LYMPHOCYTES NFR BLD AUTO: 18.7 %
MAN DIFF?: NORMAL
MCHC RBC-ENTMCNC: 28 PG
MCHC RBC-ENTMCNC: 31.2 GM/DL
MCV RBC AUTO: 89.8 FL
MONOCYTES # BLD AUTO: 0.58 K/UL
MONOCYTES NFR BLD AUTO: 11.7 %
NEUTROPHILS # BLD AUTO: 3.2 K/UL
NEUTROPHILS NFR BLD AUTO: 64.4 %
NITRITE URINE: NEGATIVE
NONHDLC SERPL-MCNC: 100 MG/DL
PH URINE: 5.5
PLATELET # BLD AUTO: 149 K/UL
POTASSIUM SERPL-SCNC: 5.2 MMOL/L
PROT SERPL-MCNC: 7.4 G/DL
PROTEIN URINE: NEGATIVE
PSA SERPL-MCNC: 0.05 NG/ML
RBC # BLD: 4.79 M/UL
RBC # FLD: 13.9 %
SODIUM SERPL-SCNC: 141 MMOL/L
SPECIFIC GRAVITY URINE: 1.02
T4 SERPL-MCNC: 5.7 UG/DL
TRIGL SERPL-MCNC: 83 MG/DL
TSH SERPL-ACNC: 1.85 UIU/ML
UROBILINOGEN URINE: NORMAL
WBC # FLD AUTO: 4.97 K/UL

## 2022-08-01 PROCEDURE — 99213 OFFICE O/P EST LOW 20 MIN: CPT

## 2022-08-01 NOTE — PLAN
[FreeTextEntry1] : #DM II: A1C controlled <7%, c/e current meds, Off Metformin per patient's request as he did not feel it was controlled his sugars well. Cautioned to monitor fingersticks regularly to ensure no hypoglycemia.\par #HTN: elevated in office, per patient is controlled on daily home BP checks; c/w current meds, low salt diet.\par #HLD: controlled, c/w current management\par #PSA: 0.05--h/o prostate cancer--advised to f/u with Dr. Peter urology\par #Ecchymosis, mild thrombocytopenia: d/w patient on this, could be 2/2 anticoagulation--he states he has not had any recent Afib--advised only if cardiologist advised can he come off Eliquis. No major sign/symptoms of bleeding. F/u 4m for repeat labs and followup--CBC, A1C, CMP, lipids

## 2022-08-01 NOTE — HISTORY OF PRESENT ILLNESS
[FreeTextEntry1] : to followup diabetes, hypertension [de-identified] : ROSA BURGOS is a 77 year old male with history as reviewed, presents for medical followup.\par He states that fingersticks have been "great" on Glimepiride, no hypoglycemic episodes. \par BP at home is controlled in high 120-130s/70s. He states he has white coat hypertension in office.\par Frustrated with bruising on his arms, want sto come off Eliquis if possible. Also on ASA. No blood in urine or gum bleediing.\par Had labs done recently, to review today.

## 2022-08-01 NOTE — PHYSICAL EXAM
[No Respiratory Distress] : no respiratory distress  [No Accessory Muscle Use] : no accessory muscle use [Normal Rate] : normal rate  [Regular Rhythm] : with a regular rhythm [Normal S1, S2] : normal S1 and S2 [No Edema] : there was no peripheral edema [Coordination Grossly Intact] : coordination grossly intact [Normal] : affect was normal and insight and judgment were intact [de-identified] : dark ecchymotic areas bilateral forearms--nontender, no swelling

## 2022-08-04 ENCOUNTER — RX RENEWAL (OUTPATIENT)
Age: 77
End: 2022-08-04

## 2022-08-05 ENCOUNTER — RX RENEWAL (OUTPATIENT)
Age: 77
End: 2022-08-05

## 2022-08-16 ENCOUNTER — NON-APPOINTMENT (OUTPATIENT)
Age: 77
End: 2022-08-16

## 2022-08-16 ENCOUNTER — APPOINTMENT (OUTPATIENT)
Dept: ELECTROPHYSIOLOGY | Facility: CLINIC | Age: 77
End: 2022-08-16

## 2022-08-16 PROCEDURE — 93294 REM INTERROG EVL PM/LDLS PM: CPT

## 2022-08-16 PROCEDURE — 93296 REM INTERROG EVL PM/IDS: CPT

## 2022-09-13 ENCOUNTER — APPOINTMENT (OUTPATIENT)
Dept: CARDIOLOGY | Facility: CLINIC | Age: 77
End: 2022-09-13

## 2022-09-13 ENCOUNTER — NON-APPOINTMENT (OUTPATIENT)
Age: 77
End: 2022-09-13

## 2022-09-13 VITALS
OXYGEN SATURATION: 99 % | BODY MASS INDEX: 26.58 KG/M2 | DIASTOLIC BLOOD PRESSURE: 66 MMHG | WEIGHT: 180 LBS | HEART RATE: 78 BPM | SYSTOLIC BLOOD PRESSURE: 132 MMHG

## 2022-09-13 PROCEDURE — 99215 OFFICE O/P EST HI 40 MIN: CPT | Mod: 25

## 2022-09-13 PROCEDURE — 93000 ELECTROCARDIOGRAM COMPLETE: CPT

## 2022-09-13 PROCEDURE — 36415 COLL VENOUS BLD VENIPUNCTURE: CPT

## 2022-09-13 NOTE — REASON FOR VISIT
[FreeTextEntry1] : 77-year-old with known coronary disease now status post stent to right coronary artery as part of work-up for TAVR, and now s/p TAVR with new fluid retention and dyspnea and a very abnormal BNP.

## 2022-09-13 NOTE — REVIEW OF SYSTEMS
[Dizziness] : dizziness [Negative] : Heme/Lymph [Weight Gain (___ Lbs)] : [unfilled] ~Ulb weight gain [Feeling Fatigued] : not feeling fatigued [Weight Loss (___ Lbs)] : no recent weight loss [SOB] : no shortness of breath [Dyspnea on exertion] : not dyspnea during exertion [Chest Discomfort] : no chest discomfort [Lower Ext Edema] : no extremity edema [Palpitations] : no palpitations [Syncope] : no syncope [Convulsions] : no convulsions [FreeTextEntry5] : see HPI [de-identified] : Severe ecchymoses [de-identified] : No more shaking since the one episode in the hospital

## 2022-09-13 NOTE — PHYSICAL EXAM
[General Appearance - Well Developed] : well developed [Normal Appearance] : normal appearance [Well Groomed] : well groomed [General Appearance - Well Nourished] : well nourished [No Deformities] : no deformities [General Appearance - In No Acute Distress] : no acute distress [Normal Oral Mucosa] : normal oral mucosa [No Oral Pallor] : no oral pallor [No Oral Cyanosis] : no oral cyanosis [Normal Jugular Venous A Waves Present] : normal jugular venous A waves present [Normal Jugular Venous V Waves Present] : normal jugular venous V waves present [No Jugular Venous Navas A Waves] : no jugular venous navas A waves [Respiration, Rhythm And Depth] : normal respiratory rhythm and effort [Exaggerated Use Of Accessory Muscles For Inspiration] : no accessory muscle use [Auscultation Breath Sounds / Voice Sounds] : lungs were clear to auscultation bilaterally [Heart Rate And Rhythm] : heart rate and rhythm were normal [Heart Sounds] : normal S1 and S2 [Abdomen Soft] : soft [Abdomen Tenderness] : non-tender [Abdomen Mass (___ Cm)] : no abdominal mass palpated [Abnormal Walk] : normal gait [Gait - Sufficient For Exercise Testing] : the gait was sufficient for exercise testing [Nail Clubbing] : no clubbing of the fingernails [Cyanosis, Localized] : no localized cyanosis [Petechial Hemorrhages (___cm)] : no petechial hemorrhages [Skin Color & Pigmentation] : normal skin color and pigmentation [] : no rash [No Venous Stasis] : no venous stasis [Skin Lesions] : no skin lesions [No Skin Ulcers] : no skin ulcer [No Xanthoma] : no  xanthoma was observed [Oriented To Time, Place, And Person] : oriented to person, place, and time [Affect] : the affect was normal [Mood] : the mood was normal [FreeTextEntry1] : Still anxious

## 2022-09-13 NOTE — HISTORY OF PRESENT ILLNESS
[FreeTextEntry1] : April 3, 2019. Patient has been followed by Dr. Larry Toth and was followed by cardiology at [facility]. In June of 2011 after a positive stress test had cardiac angiography, which had a distal 90% RCA lesion and only nonobstructive disease in the LAD and circumflex. This was stented and his symptoms were improved. His symptoms then recurred. They have been exertional chest pressure and shortness of breath when he walks after eating a meal, especially a large meal. Never gets it at rest and does not get it when he exerts himself if he has not just eaten. Did well for a while, but then the symptoms returned and in October of 2016 after another positive ECG treadmill test he had repeat cardiac catheterization. There was an 80% mid RCA lesion and again no significant disease in the LAD was circumflex and he had 2 stents to the RCA . The difference was this time his symptoms did not change. He has been placed on Omeprazole and this has not seemed to make a difference, or perhaps it did initially. He has not seen GI. He otherwise seems to be doing well. In July of 2014. He had bilateral carotid endarterectomies. His most recent echo showed normal LV function. His EKG is normal sinus rhythm and a normal tracing. He has hypertension and hyperlipidemia.He is a former smoker. His father had hypertension and CVA, but there does not seem to be a family history of coronary artery disease. \par April 24, 2019.  Patient tried to increase his omeprazole, and then said he got a stomach ache and stopped it. He went for a CTA of the coronary arteries. Minor disease in the LAD and borderline severe 60-69% in the proximal circumflex and 60-70% in the RCA. Problem with him is whether his symptoms are related to ischemia or not. After long discussion I recommended that he have the angiogram. If non-obstructive disease, try Ranexa and if no help, consider noncardiac causes. If significant disease then he should have stents and again, we can see if this relieves his symptoms or not. Patient to discuss with his wife and then let me know   \par March 1, 2021.Patient called.  His exertional chest pain is getting worse and coming more frequently and more easier for the last few months.  I have not seen him since April 2019 and back then when we did a CTA it was mostly unrevealing despite his previous 3 stents in his RCA.  I will schedule him for a stress echo as soon as possible. \par March 9, 2021.  Patient came for stress echocardiogram.  Resting echo with borderline severe though not yet critical aortic stenosis and totally normal LV function.  On treadmill within the first minute STs started to go down and heart rate shot up to 140.  I stopped the treadmill at 2 minutes with severe ST depressions and shortness of breath but no chest pain.  Post exercise echo shows new wall motion abnormality in the entire anterior wall and may be some hypokinesis in the lateral wall.  Long discussion with patient and wife and they will be booked for coronary angiography and left and right heart cath as soon as he can have a Covid swab done.  (He had the first vaccine already and the second vaccine was 5 days ago)\par March 16, 2021.Cath only showed 80% RCA lesion and the rest was nonobstructive.  The RCA was stented and the patient was sent home that evening.  He is on aspirin and Plavix.  He will continue metoprolol ER 50 daily.  He has a follow-up with me in 2 weeks and then will be evaluated by structural heart for a TAVR \par March 30, 2021.  Patient here in follow-up.  Had stent to his RCA and is now on a beta-blocker but no change in his exertional symptoms.  In addition somewhat anxious about upcoming procedure; has evaluation appointment 4/6.\par May 28, 2021.  Patient had TAVR on May 13, uncomplicated except his glucoses were high post procedure.  Since he has been home he has stopped eating cakes and bagels and has lost 7 or 8 pounds.  In terms of his angina he is "95% better".  His only complaint is severe ecchymoses once again from his dual antiplatelet therapy..  Has seen Dr. Johnston and Dr. Toth in follow-up.  Remains in sinus rhythm with an unchanged ECG.  Had a monitor with no significant arrhythmias.  Is off metoprolol and currently only on lisinopril 10 mg.  \par July 19, 2021.  Patient here in follow-up.  He had seen structural heart and an echo was done on July 1 which showed mild MR, normally functioning TAVR with no paravalvular AI.  Moderate LAE.  Normal LV size and function and normal RV size and function.  He had an ECG on July 1 that was sinus rhythm and unremarkable.  Since his TAVR he has had no episodes of his exertional chest pressure until 1 day after making sure that he mowed his lawn on an empty stomach and had no symptoms, he then went and had a big meal and then remembered he had not swept the street.  When he swept the street he got the chest tightness again and it was relieved with rest.  That was the only episode since and has not recurred.  Otherwise no real complaints initial blood test by structural heart had a potassium of 6.1 and his creatinine had gone up to 2.0.  These were repeated 5 days later and his potassium was 5.1 and his creatinine was 1.43.  He remains on lisinopril just 10 mg daily.\par September 15, 2021.  Urgent visit today because of multiple falls with trauma.  I reviewed the notes from Dr. COTTO and Dr. Toth.  EKG here is sinus rhythm with sinus arrhythmia around 76, mild first-degree AV block otherwise normal tracing.  Labs September 10 with worsening creatinine back up to 1.92 and BUN 64 which he thinks is from the dye for the head CT in the hospital but also his LFTs were up.  Echo in the hospital September 2 mostly unrevealing.  The history from the patient and his wife seems to be the 2 falls were when he was erect and then bent over and then went down without warning.  Has never had a true syncopal episode when lying down or sitting.  He is not sure if he has gotten dizzy briefly while sitting.  His blood pressure has been running low despite adjustments in his lisinopril and for now I would just hold it.  The symptoms do not sound like hypoglycemia but he has had his meds adjusted and he is only on metformin and an SGLT2 inhibitor.  Last A1c was 6.2 in August.  With Dr. Barroso and again here in the office not really orthostatic.  ZIO AT monitor was placed.\par September 17, 2021.  A few hours after putting on ZIO monitor, patient had 4.3 seconds of asystole with P waves but no QRS.  Was sent to the emergency room and had a pacemaker placed by Dr. Wong.  Patient being discharged later this morning. \par September 23, 2021. Patient was still feeling weak and dizzy and fatigued but ruled out any problem with the pacemaker.  Blood pressure has been running 145 and 150 which makes him concerned.  I reassured him that a blood pressure of 150 would not give him any symptoms but I have no problem with him going back on the low-dose lisinopril and following up with Dr. Toth.  If he is still feeling bad at Dr. Toth has no answer I will be happy to see him next week as well. \par October 20, 2021.  Urgent visit an urgent echo for this patient.  Difficult history to pin down as discussing with patient and his wife.  He thinks worsening shortness of breath with exertion for about a month now and she thinks it goes back at least until August meanwhile it has been edema and fluid retention more recently.  Dr. Toth put him on Lasix and he has had improvement in just 5 days so far.  Reviewing his interval records there is 1 EKG when he saw Dr. COTTO on September 8 and had new ST depressions in V5 and V6.  I do not believe he was complaining of any chest pain or shortness of breath at the time.  All his other EKGs seem to be the same including today and they are sinus rhythm.  His echo today especially compared to his post TAVR echoes in May and September does seem to have a new wall motion abnormality in his septum which seems to be more than just possible paradoxical septal motion seen after open heart surgery which he did not have just the past..  He did have a stent to his RCA pre-TAVR and there was some moderate nonobstructive lesions.  No episode of chest pain per se.  His BNP use which had been in the 300 400 range on October 14 was 3740.  No documentation of atrial fibrillation.  Does not seem to be using his pacemaker very often so pacemaker syndrome less likely as well.  LVEF on echo today is in the 40% to 50% range the patient has always seem to have symptoms a little bit out of proportion to objective findings.  Repeat labs including BNP was sent and consideration of repeat angiogram was discussed although patient is reluctant because of his kidney issues whenever he gets dye.\par November 2, 2021.  Patient returns today, just wants to keep telling me how much better he feels.  Was able to mow his whole lawn although he did it over 2 days without getting burning, shortness of breath, chest pain.  His weight is down at least 4 pounds and he no longer has edema so is responding well to the medical therapy.  Once again he would like to avoid an angiogram and is asking to just have his echo repeated.  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  Patient returns with his wife and for echocardiogram.  Continues to improve and thinks he is pretty much back to himself able to work in his yard etc. just like he mentioned above.  Echocardiogram done and in fact septal wall motion does seem to be much better and overall LVEF is between 50 and 55%.  Still with mild to moderate MR. \par March 18, 2022.  Here in follow-up.  Has not yet decided on an internist to a place Dr. Toth.  Had some labs with his urologist.  Potassium only 3.3 but no change in BUN and creatinine (patient unaware and does not think he is taking potassium citrate 10 mEq.  Urologist also had him stop his multivitamins.)  He is also having a lot of dental work and bridge work coming up so will need antibiotic prophylaxis.  The urologist also checked his A1c which went up to 7.5.  He is taking Metformin 1000 and Jardiance 25 but admits to being off his diet so hopefully he will go back on his diet first.  He saw Dr. Vyas for pacemaker follow-up together with his wife and Dr. Wong was very happy with how he is doing.  He remains in sinus rhythm with one VPC.  He has follow-up coming up with Dr. Membreno about his TAVR and he thinks he is going to have an echo at that time as well.\par April 1, 2022. Long discussion with patient today after Dr. Wong called me the other day about finding of up to 3-minute run of atrial fibrillation on Paceart monitoring.  Overall AF burden is very low but they may be under sensing. Patient always concerned because he has had issues with ecchymoses that were pretty severe in the past.  For now is willing to take the Eliquis and we will follow-up in terms of further monitoring.  Whether in the future to try antiarrhythmic medication perhaps even amiodarone to eliminate atrial fibrillation and continue to monitor will be discussed in the future if anticoagulation becomes a major problem.  Other options could always be discussed and as well including ablation, Watchman device etc.  For now we will start Eliquis 5 mg twice daily.  Meanwhile he will remain on aspirin 81 mg for his TAVR \par April 13, 2022. Patient called.  Not feeling well, dizzy at times.  First thought it was the Eliquis but no signs of bleeding.  Now since his blood pressure is usually in the 150s and would like to change back to lisinopril but maybe only 5 mg and stop the amlodipine.  I am okay with that and he will make a follow-up appointment in 3 weeks together with his wife's appointment on May 5.  Continue Eliquis for now.  Urged him to find new primary care still. \par May 12, 2022.  Patient here in follow-up together with his wife.  Always somewhat difficult to manage with unusual symptoms and a poor descriptor.  Both he and his wife seem happy with the new internist Dr. Landin.  He is off metformin and she gave him glimepiride at 2 mg but he was having hypoglycemic episodes so is now on 1 mg.  He is still on Jardiance without change.  On his own he cut back the lisinopril to 2.5 mg and he would like to stop the chlorthalidone altogether.  He is not taking any potassium.  He denies exertional chest pain or shortness of breath.  Still occasional although rare A. fib on his remote pacemaker monitoring although he complains of severe ecchymoses from the Eliquis as well as the cost.  He has an upcoming follow-up with Dr. Membreno of structural heart and this will include an echocardiogram.\par Patient saw Dr. Membreno May 17 for 1 year follow-up of his JESSICA.  LV function normal with some inferior hypokinesis.  BUN 78 creatinine 1.6 and the Lasix was stopped for a few days.  He explained to the patient that his symptoms were not cardiac related.\par September 13, 2022.  Patient here in follow-up.  Remains in sinus rhythm with occasional APCs.  On labs from July 26 BUN was 38 with creatinine of 1.2 and potassium 5.2.  Other labs okay but no BNP done.  Cholesterol 164, HDL 63, LDL 84.  No BNP was done.  Patient is complaining as above that he does not have the same energy etc.  Would like to come off Eliquis so we had a long discussion about possibly getting an apple watch etc.  He did have pacemaker interrogation in August that showed no atrial fibrillation.  He did gain weight because "I like to eat especially bagels".  I did discuss with him at least getting whole-wheat bagels and scooping out the inside etc. I tried to emphasize that clinically he is doing very well.  Reviewed his medications; he is on no diuretics.

## 2022-09-14 LAB
ALBUMIN SERPL ELPH-MCNC: 5 G/DL
ALP BLD-CCNC: 93 U/L
ALT SERPL-CCNC: 19 U/L
ANION GAP SERPL CALC-SCNC: 13 MMOL/L
AST SERPL-CCNC: 20 U/L
BASOPHILS # BLD AUTO: 0.04 K/UL
BASOPHILS NFR BLD AUTO: 0.7 %
BILIRUB SERPL-MCNC: 0.3 MG/DL
BUN SERPL-MCNC: 39 MG/DL
CALCIUM SERPL-MCNC: 10 MG/DL
CHLORIDE SERPL-SCNC: 110 MMOL/L
CHOLEST SERPL-MCNC: 182 MG/DL
CO2 SERPL-SCNC: 22 MMOL/L
CREAT SERPL-MCNC: 1.34 MG/DL
EGFR: 54 ML/MIN/1.73M2
EOSINOPHIL # BLD AUTO: 0.21 K/UL
EOSINOPHIL NFR BLD AUTO: 3.8 %
ESTIMATED AVERAGE GLUCOSE: 160 MG/DL
GLUCOSE SERPL-MCNC: 131 MG/DL
HBA1C MFR BLD HPLC: 7.2 %
HCT VFR BLD CALC: 44.4 %
HDLC SERPL-MCNC: 60 MG/DL
HGB BLD-MCNC: 13.9 G/DL
IMM GRANULOCYTES NFR BLD AUTO: 0.9 %
LDLC SERPL CALC-MCNC: 99 MG/DL
LYMPHOCYTES # BLD AUTO: 1.02 K/UL
LYMPHOCYTES NFR BLD AUTO: 18.4 %
MAN DIFF?: NORMAL
MCHC RBC-ENTMCNC: 28.5 PG
MCHC RBC-ENTMCNC: 31.3 GM/DL
MCV RBC AUTO: 91 FL
MONOCYTES # BLD AUTO: 0.72 K/UL
MONOCYTES NFR BLD AUTO: 13 %
NEUTROPHILS # BLD AUTO: 3.5 K/UL
NEUTROPHILS NFR BLD AUTO: 63.2 %
NONHDLC SERPL-MCNC: 121 MG/DL
NT-PROBNP SERPL-MCNC: 574 PG/ML
PLATELET # BLD AUTO: 155 K/UL
POTASSIUM SERPL-SCNC: 5.3 MMOL/L
PROT SERPL-MCNC: 7.3 G/DL
RBC # BLD: 4.88 M/UL
RBC # FLD: 13.6 %
SODIUM SERPL-SCNC: 144 MMOL/L
TRIGL SERPL-MCNC: 114 MG/DL
WBC # FLD AUTO: 5.54 K/UL

## 2022-09-16 ENCOUNTER — OUTPATIENT (OUTPATIENT)
Dept: OUTPATIENT SERVICES | Facility: HOSPITAL | Age: 77
LOS: 1 days | End: 2022-09-16
Payer: COMMERCIAL

## 2022-09-16 ENCOUNTER — APPOINTMENT (OUTPATIENT)
Dept: UROLOGY | Facility: CLINIC | Age: 77
End: 2022-09-16

## 2022-09-16 DIAGNOSIS — Z95.0 PRESENCE OF CARDIAC PACEMAKER: Chronic | ICD-10-CM

## 2022-09-16 DIAGNOSIS — Z98.890 OTHER SPECIFIED POSTPROCEDURAL STATES: Chronic | ICD-10-CM

## 2022-09-16 DIAGNOSIS — Z95.2 PRESENCE OF PROSTHETIC HEART VALVE: Chronic | ICD-10-CM

## 2022-09-16 DIAGNOSIS — R35.0 FREQUENCY OF MICTURITION: ICD-10-CM

## 2022-09-16 DIAGNOSIS — Z95.5 PRESENCE OF CORONARY ANGIOPLASTY IMPLANT AND GRAFT: Chronic | ICD-10-CM

## 2022-09-16 PROCEDURE — 76775 US EXAM ABDO BACK WALL LIM: CPT

## 2022-09-16 PROCEDURE — 76775 US EXAM ABDO BACK WALL LIM: CPT | Mod: 26

## 2022-09-16 PROCEDURE — 99213 OFFICE O/P EST LOW 20 MIN: CPT

## 2022-09-16 NOTE — ASSESSMENT
[FreeTextEntry1] : tamsulosin for his nocturia\par PSA good but needs T\par Stable stone - observation for now

## 2022-09-16 NOTE — HISTORY OF PRESENT ILLNESS
[FreeTextEntry1] : f/u from recent trip to ER.\par He noted right flank beginning earlier in the week though no associated N/V, fevers, chills or hematuria. PAin continued and PCP sent him for a CT scan which noted a 4mm stone distal ureter with some hydronephrosis and perinephric stranding and ? forniceal rupture. BMP also noted elevated creatinine over baseline. \par he last had pain last night ans he thinks he passed the stone, noting a back stone in the toilet.\par he has prior h/o stones: open lithotomy 1999 and ureteroscopy laser lithotripsy 6/20. May have passed stones between.\par reviewed CT scan - see no other stones and not sure there is a forniceal rupture.\par \par he also notes h/o prostate cancer  - recollects had Fabricio 9 but doesn't recall PSA. Treated with 45 XRTs ADT (4 3 month shots) last 3/21. Still has fatigue and hot flashes especially at night. Has a recent PSA which was essentially 0. \par \par 12/21 - in the interim ended up passing stone before the scheduled ureteroscopy and GFR back to his normal. \par reviewed his Litholink: low citrate.PH and high UA but serum was normal. \par \par 3/22 - no stones pains or hematuria. Not taking the KCitrate as concerned about the K. on lemon juice. \par still having hot flashes. \par ULS notes 2 stones - one new. \par \par 9/22 no stone symptoms.\par notes nocturia 3-4 times and has a slower FOS at night; daytime no frequency or urgency; no dysuria or hematuria. Also gets hot flashes. \par U:S - same stone with no hydro. PVR 21.

## 2022-09-17 LAB — TESTOST SERPL-MCNC: 165 NG/DL

## 2022-09-28 DIAGNOSIS — C61 MALIGNANT NEOPLASM OF PROSTATE: ICD-10-CM

## 2022-09-28 DIAGNOSIS — N40.0 BENIGN PROSTATIC HYPERPLASIA WITHOUT LOWER URINARY TRACT SYMPTOMS: ICD-10-CM

## 2022-09-28 DIAGNOSIS — N20.0 CALCULUS OF KIDNEY: ICD-10-CM

## 2022-10-03 ENCOUNTER — NON-APPOINTMENT (OUTPATIENT)
Age: 77
End: 2022-10-03

## 2022-10-29 ENCOUNTER — NON-APPOINTMENT (OUTPATIENT)
Age: 77
End: 2022-10-29

## 2022-10-31 ENCOUNTER — APPOINTMENT (OUTPATIENT)
Dept: INTERNAL MEDICINE | Facility: CLINIC | Age: 77
End: 2022-10-31

## 2022-11-01 ENCOUNTER — RX RENEWAL (OUTPATIENT)
Age: 77
End: 2022-11-01

## 2022-11-15 ENCOUNTER — APPOINTMENT (OUTPATIENT)
Dept: ELECTROPHYSIOLOGY | Facility: CLINIC | Age: 77
End: 2022-11-15

## 2022-11-15 ENCOUNTER — NON-APPOINTMENT (OUTPATIENT)
Age: 77
End: 2022-11-15

## 2022-11-16 ENCOUNTER — NON-APPOINTMENT (OUTPATIENT)
Age: 77
End: 2022-11-16

## 2022-11-16 ENCOUNTER — OUTPATIENT (OUTPATIENT)
Dept: OUTPATIENT SERVICES | Facility: HOSPITAL | Age: 77
LOS: 1 days | End: 2022-11-16
Payer: MEDICARE

## 2022-11-16 ENCOUNTER — APPOINTMENT (OUTPATIENT)
Dept: ULTRASOUND IMAGING | Facility: CLINIC | Age: 77
End: 2022-11-16

## 2022-11-16 ENCOUNTER — APPOINTMENT (OUTPATIENT)
Dept: INTERNAL MEDICINE | Facility: CLINIC | Age: 77
End: 2022-11-16

## 2022-11-16 VITALS — WEIGHT: 179 LBS | BODY MASS INDEX: 26.51 KG/M2 | HEIGHT: 69 IN

## 2022-11-16 VITALS
SYSTOLIC BLOOD PRESSURE: 138 MMHG | HEART RATE: 41 BPM | DIASTOLIC BLOOD PRESSURE: 60 MMHG | RESPIRATION RATE: 23 BRPM | OXYGEN SATURATION: 98 %

## 2022-11-16 VITALS — HEART RATE: 70 BPM

## 2022-11-16 DIAGNOSIS — Z98.890 OTHER SPECIFIED POSTPROCEDURAL STATES: Chronic | ICD-10-CM

## 2022-11-16 DIAGNOSIS — Z95.5 PRESENCE OF CORONARY ANGIOPLASTY IMPLANT AND GRAFT: Chronic | ICD-10-CM

## 2022-11-16 DIAGNOSIS — M79.662 PAIN IN LEFT LOWER LEG: ICD-10-CM

## 2022-11-16 DIAGNOSIS — Z95.0 PRESENCE OF CARDIAC PACEMAKER: Chronic | ICD-10-CM

## 2022-11-16 DIAGNOSIS — Z95.2 PRESENCE OF PROSTHETIC HEART VALVE: Chronic | ICD-10-CM

## 2022-11-16 PROCEDURE — 99213 OFFICE O/P EST LOW 20 MIN: CPT | Mod: 25

## 2022-11-16 PROCEDURE — 93971 EXTREMITY STUDY: CPT

## 2022-11-16 PROCEDURE — 93971 EXTREMITY STUDY: CPT | Mod: 26,LT

## 2022-11-16 PROCEDURE — 93000 ELECTROCARDIOGRAM COMPLETE: CPT | Mod: 59

## 2022-11-16 NOTE — PLAN
[FreeTextEntry1] : EKG in office reviewed--SR 81 bpm, PVCs, no Afib. Informed patient given his HR of 40 initially though with good chronotropic response, would still advise him to call Dr. Wong to update given he has a pacemaker, to ensure ok that his HR dropped to 40.\par LLE pain: no calf tenderness, check LE doppler to ensure no DVT though lower suspicion. + reduced DP/PT pulse as compared to contralateral leg--referral to vascular to evaluate for possible PVD, claudication.

## 2022-11-16 NOTE — HISTORY OF PRESENT ILLNESS
[FreeTextEntry1] : "issue with my left leg...and my heart beat has been slow" [de-identified] : ROSA BURGOS is a 77 year old male with history of DM II, HTN, CAD, Afib, s/p TAVR who presents for medical followup.\par -For past 4 days whenever he is on his feet/walking, left lower leg achy pain--sometimes reaches 7/10 when he has to sit and rest. Resting seems to resolve the pain. As he sits in office today no pain. No apparent swelling in legs. He also also noted when he checks his BP for the past week his "heart rate is sometimes in the 40s". Other times HR is up in 70-80s. He denies any dizziness, lightheadedness. He has a pacemaker.\par Interval: met with cardiologist (Dr. Miller) and in re: his desire to come off Eliquis, he was advised to consider getting Apple watch and then may come off Eliquis, but if any recurrence of irregular beat would need to restart Eliquis until seen by cardiologist.

## 2022-11-16 NOTE — PHYSICAL EXAM
[No JVD] : no jugular venous distention [Normal] : no respiratory distress, lungs were clear to auscultation bilaterally and no accessory muscle use [No Carotid Bruits] : no carotid bruits [No Abdominal Bruit] : a ~M bruit was not heard ~T in the abdomen [No Edema] : there was no peripheral edema [de-identified] : sinus rhythm with occasional extra beats [de-identified] : palpable L DP/PT pulses, right lowee extremity 2+ DP/PT pulses. No calf tenderness or edema in b/l lower legs.

## 2022-11-22 ENCOUNTER — APPOINTMENT (OUTPATIENT)
Dept: ELECTROPHYSIOLOGY | Facility: CLINIC | Age: 77
End: 2022-11-22

## 2022-11-22 VITALS — DIASTOLIC BLOOD PRESSURE: 55 MMHG | SYSTOLIC BLOOD PRESSURE: 113 MMHG | OXYGEN SATURATION: 99 % | HEART RATE: 53 BPM

## 2022-11-22 PROCEDURE — 93280 PM DEVICE PROGR EVAL DUAL: CPT

## 2022-11-22 PROCEDURE — 93000 ELECTROCARDIOGRAM COMPLETE: CPT | Mod: 59

## 2022-11-23 ENCOUNTER — APPOINTMENT (OUTPATIENT)
Dept: VASCULAR SURGERY | Facility: CLINIC | Age: 77
End: 2022-11-23

## 2022-11-23 VITALS
BODY MASS INDEX: 25.62 KG/M2 | WEIGHT: 173 LBS | TEMPERATURE: 98 F | HEIGHT: 69 IN | DIASTOLIC BLOOD PRESSURE: 62 MMHG | SYSTOLIC BLOOD PRESSURE: 143 MMHG | HEART RATE: 42 BPM

## 2022-11-23 DIAGNOSIS — Z86.79 PERSONAL HISTORY OF OTHER DISEASES OF THE CIRCULATORY SYSTEM: ICD-10-CM

## 2022-11-23 DIAGNOSIS — Z86.39 PERSONAL HISTORY OF OTHER ENDOCRINE, NUTRITIONAL AND METABOLIC DISEASE: ICD-10-CM

## 2022-11-23 DIAGNOSIS — Z87.448 PERSONAL HISTORY OF OTHER DISEASES OF URINARY SYSTEM: ICD-10-CM

## 2022-11-23 DIAGNOSIS — Z85.46 PERSONAL HISTORY OF MALIGNANT NEOPLASM OF PROSTATE: ICD-10-CM

## 2022-11-23 PROCEDURE — 93923 UPR/LXTR ART STDY 3+ LVLS: CPT

## 2022-11-23 PROCEDURE — 99203 OFFICE O/P NEW LOW 30 MIN: CPT

## 2022-11-23 RX ORDER — LISINOPRIL 5 MG/1
5 TABLET ORAL DAILY
Refills: 0 | Status: DISCONTINUED | COMMUNITY
Start: 2019-08-21 | End: 2022-11-23

## 2022-11-23 NOTE — PHYSICAL EXAM
[Respiratory Effort] : normal respiratory effort [Normal Rate and Rhythm] : normal rate and rhythm [2+] : left 2+ [0] : left 0 [Ankle Swelling (On Exam)] : not present [Varicose Veins Of Lower Extremities] : not present [] : not present [Abdomen Tenderness] : ~T ~M No abdominal tenderness [Skin Ulcer] : no ulcer [Alert] : alert [Oriented to Person] : oriented to person [Oriented to Place] : oriented to place [Oriented to Time] : oriented to time [Calm] : calm [de-identified] : appears stated age [de-identified] : normocephalic, atraumatic [de-identified] : supple

## 2022-11-23 NOTE — ASSESSMENT
[FreeTextEntry1] : Problem #1 Peripheral arterial disease\par - Hamilton 2, moderate claudication\par - continue aspirin and statin\par - not a good candidate for pletal given extensive cardiac history\par - encouraged daily walking at moderate pace of 30 min\par - follow up in 3 months with interval TONO/PVRs with toe pressures

## 2022-11-23 NOTE — HISTORY OF PRESENT ILLNESS
[FreeTextEntry1] : 77 year old male with  history of hypertension, DM, HLD, AI s/p TAVR, CKD, and BPH who presents for evaluation of pain with ambulation. He states he can walk anywhere from 1-4 blocks or so before he develops calf pain much worse on the left but he occasionally also feels it on the right. He denies rest pain or history of non healing wounds.

## 2022-11-26 ENCOUNTER — NON-APPOINTMENT (OUTPATIENT)
Age: 77
End: 2022-11-26

## 2023-01-17 ENCOUNTER — APPOINTMENT (OUTPATIENT)
Dept: CARDIOLOGY | Facility: CLINIC | Age: 78
End: 2023-01-17
Payer: MEDICARE

## 2023-01-17 ENCOUNTER — NON-APPOINTMENT (OUTPATIENT)
Age: 78
End: 2023-01-17

## 2023-01-17 VITALS
WEIGHT: 172 LBS | DIASTOLIC BLOOD PRESSURE: 68 MMHG | BODY MASS INDEX: 25.4 KG/M2 | SYSTOLIC BLOOD PRESSURE: 150 MMHG | HEART RATE: 74 BPM | OXYGEN SATURATION: 100 %

## 2023-01-17 VITALS — HEART RATE: 50 BPM | DIASTOLIC BLOOD PRESSURE: 62 MMHG | SYSTOLIC BLOOD PRESSURE: 138 MMHG

## 2023-01-17 VITALS — WEIGHT: 172 LBS | BODY MASS INDEX: 25.4 KG/M2

## 2023-01-17 DIAGNOSIS — N40.0 BENIGN PROSTATIC HYPERPLASIA WITHOUT LOWER URINARY TRACT SYMPMS: ICD-10-CM

## 2023-01-17 DIAGNOSIS — I42.0 DILATED CARDIOMYOPATHY: ICD-10-CM

## 2023-01-17 DIAGNOSIS — I70.209 UNSPECIFIED ATHEROSCLEROSIS OF NATIVE ARTERIES OF EXTREMITIES, UNSPECIFIED EXTREMITY: ICD-10-CM

## 2023-01-17 PROCEDURE — 36415 COLL VENOUS BLD VENIPUNCTURE: CPT

## 2023-01-17 PROCEDURE — 99215 OFFICE O/P EST HI 40 MIN: CPT | Mod: 25

## 2023-01-17 NOTE — HISTORY OF PRESENT ILLNESS
[FreeTextEntry1] : April 3, 2019. Patient has been followed by Dr. Larry Toth and was followed by cardiology at [facility]. In June of 2011 after a positive stress test had cardiac angiography, which had a distal 90% RCA lesion and only nonobstructive disease in the LAD and circumflex. This was stented and his symptoms were improved. His symptoms then recurred. They have been exertional chest pressure and shortness of breath when he walks after eating a meal, especially a large meal. Never gets it at rest and does not get it when he exerts himself if he has not just eaten. Did well for a while, but then the symptoms returned and in October of 2016 after another positive ECG treadmill test he had repeat cardiac catheterization. There was an 80% mid RCA lesion and again no significant disease in the LAD was circumflex and he had 2 stents to the RCA . The difference was this time his symptoms did not change. He has been placed on Omeprazole and this has not seemed to make a difference, or perhaps it did initially. He has not seen GI. He otherwise seems to be doing well. In July of 2014. He had bilateral carotid endarterectomies. His most recent echo showed normal LV function. His EKG is normal sinus rhythm and a normal tracing. He has hypertension and hyperlipidemia.He is a former smoker. His father had hypertension and CVA, but there does not seem to be a family history of coronary artery disease. \par April 24, 2019.  Patient tried to increase his omeprazole, and then said he got a stomach ache and stopped it. He went for a CTA of the coronary arteries. Minor disease in the LAD and borderline severe 60-69% in the proximal circumflex and 60-70% in the RCA. Problem with him is whether his symptoms are related to ischemia or not. After long discussion I recommended that he have the angiogram. If non-obstructive disease, try Ranexa and if no help, consider noncardiac causes. If significant disease then he should have stents and again, we can see if this relieves his symptoms or not. Patient to discuss with his wife and then let me know   \par March 1, 2021.Patient called.  His exertional chest pain is getting worse and coming more frequently and more easier for the last few months.  I have not seen him since April 2019 and back then when we did a CTA it was mostly unrevealing despite his previous 3 stents in his RCA.  I will schedule him for a stress echo as soon as possible. \par March 9, 2021.  Patient came for stress echocardiogram.  Resting echo with borderline severe though not yet critical aortic stenosis and totally normal LV function.  On treadmill within the first minute STs started to go down and heart rate shot up to 140.  I stopped the treadmill at 2 minutes with severe ST depressions and shortness of breath but no chest pain.  Post exercise echo shows new wall motion abnormality in the entire anterior wall and may be some hypokinesis in the lateral wall.  Long discussion with patient and wife and they will be booked for coronary angiography and left and right heart cath as soon as he can have a Covid swab done.  (He had the first vaccine already and the second vaccine was 5 days ago)\par March 16, 2021.Cath only showed 80% RCA lesion and the rest was nonobstructive.  The RCA was stented and the patient was sent home that evening.  He is on aspirin and Plavix.  He will continue metoprolol ER 50 daily.  He has a follow-up with me in 2 weeks and then will be evaluated by structural heart for a TAVR \par March 30, 2021.  Patient here in follow-up.  Had stent to his RCA and is now on a beta-blocker but no change in his exertional symptoms.  In addition somewhat anxious about upcoming procedure; has evaluation appointment 4/6.\par May 28, 2021.  Patient had TAVR on May 13, uncomplicated except his glucoses were high post procedure.  Since he has been home he has stopped eating cakes and bagels and has lost 7 or 8 pounds.  In terms of his angina he is "95% better".  His only complaint is severe ecchymoses once again from his dual antiplatelet therapy..  Has seen Dr. Johnston and Dr. Toth in follow-up.  Remains in sinus rhythm with an unchanged ECG.  Had a monitor with no significant arrhythmias.  Is off metoprolol and currently only on lisinopril 10 mg.  \par July 19, 2021.  Patient here in follow-up.  He had seen structural heart and an echo was done on July 1 which showed mild MR, normally functioning TAVR with no paravalvular AI.  Moderate LAE.  Normal LV size and function and normal RV size and function.  He had an ECG on July 1 that was sinus rhythm and unremarkable.  Since his TAVR he has had no episodes of his exertional chest pressure until 1 day after making sure that he mowed his lawn on an empty stomach and had no symptoms, he then went and had a big meal and then remembered he had not swept the street.  When he swept the street he got the chest tightness again and it was relieved with rest.  That was the only episode since and has not recurred.  Otherwise no real complaints initial blood test by structural heart had a potassium of 6.1 and his creatinine had gone up to 2.0.  These were repeated 5 days later and his potassium was 5.1 and his creatinine was 1.43.  He remains on lisinopril just 10 mg daily.\par September 15, 2021.  Urgent visit today because of multiple falls with trauma.  I reviewed the notes from Dr. COTTO and Dr. Toth.  EKG here is sinus rhythm with sinus arrhythmia around 76, mild first-degree AV block otherwise normal tracing.  Labs September 10 with worsening creatinine back up to 1.92 and BUN 64 which he thinks is from the dye for the head CT in the hospital but also his LFTs were up.  Echo in the hospital September 2 mostly unrevealing.  The history from the patient and his wife seems to be the 2 falls were when he was erect and then bent over and then went down without warning.  Has never had a true syncopal episode when lying down or sitting.  He is not sure if he has gotten dizzy briefly while sitting.  His blood pressure has been running low despite adjustments in his lisinopril and for now I would just hold it.  The symptoms do not sound like hypoglycemia but he has had his meds adjusted and he is only on metformin and an SGLT2 inhibitor.  Last A1c was 6.2 in August.  With Dr. Barroso and again here in the office not really orthostatic.  ZIO AT monitor was placed.\par September 17, 2021.  A few hours after putting on ZIO monitor, patient had 4.3 seconds of asystole with P waves but no QRS.  Was sent to the emergency room and had a pacemaker placed by Dr. Wong.  Patient being discharged later this morning. \par September 23, 2021. Patient was still feeling weak and dizzy and fatigued but ruled out any problem with the pacemaker.  Blood pressure has been running 145 and 150 which makes him concerned.  I reassured him that a blood pressure of 150 would not give him any symptoms but I have no problem with him going back on the low-dose lisinopril and following up with Dr. Toth.  If he is still feeling bad at Dr. Toth has no answer I will be happy to see him next week as well. \par October 20, 2021.  Urgent visit an urgent echo for this patient.  Difficult history to pin down as discussing with patient and his wife.  He thinks worsening shortness of breath with exertion for about a month now and she thinks it goes back at least until August meanwhile it has been edema and fluid retention more recently.  Dr. Toth put him on Lasix and he has had improvement in just 5 days so far.  Reviewing his interval records there is 1 EKG when he saw Dr. COTTO on September 8 and had new ST depressions in V5 and V6.  I do not believe he was complaining of any chest pain or shortness of breath at the time.  All his other EKGs seem to be the same including today and they are sinus rhythm.  His echo today especially compared to his post TAVR echoes in May and September does seem to have a new wall motion abnormality in his septum which seems to be more than just possible paradoxical septal motion seen after open heart surgery which he did not have just the past..  He did have a stent to his RCA pre-TAVR and there was some moderate nonobstructive lesions.  No episode of chest pain per se.  His BNP use which had been in the 300 400 range on October 14 was 3740.  No documentation of atrial fibrillation.  Does not seem to be using his pacemaker very often so pacemaker syndrome less likely as well.  LVEF on echo today is in the 40% to 50% range the patient has always seem to have symptoms a little bit out of proportion to objective findings.  Repeat labs including BNP was sent and consideration of repeat angiogram was discussed although patient is reluctant because of his kidney issues whenever he gets dye.\par November 2, 2021.  Patient returns today, just wants to keep telling me how much better he feels.  Was able to mow his whole lawn although he did it over 2 days without getting burning, shortness of breath, chest pain.  His weight is down at least 4 pounds and he no longer has edema so is responding well to the medical therapy.  Once again he would like to avoid an angiogram and is asking to just have his echo repeated.  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  Patient returns with his wife and for echocardiogram.  Continues to improve and thinks he is pretty much back to himself able to work in his yard etc. just like he mentioned above.  Echocardiogram done and in fact septal wall motion does seem to be much better and overall LVEF is between 50 and 55%.  Still with mild to moderate MR. \par March 18, 2022.  Here in follow-up.  Has not yet decided on an internist to a place Dr. Toth.  Had some labs with his urologist.  Potassium only 3.3 but no change in BUN and creatinine (patient unaware and does not think he is taking potassium citrate 10 mEq.  Urologist also had him stop his multivitamins.)  He is also having a lot of dental work and bridge work coming up so will need antibiotic prophylaxis.  The urologist also checked his A1c which went up to 7.5.  He is taking Metformin 1000 and Jardiance 25 but admits to being off his diet so hopefully he will go back on his diet first.  He saw Dr. Vyas for pacemaker follow-up together with his wife and Dr. Wong was very happy with how he is doing.  He remains in sinus rhythm with one VPC.  He has follow-up coming up with Dr. Membreno about his TAVR and he thinks he is going to have an echo at that time as well.\par April 1, 2022. Long discussion with patient today after Dr. Wong called me the other day about finding of up to 3-minute run of atrial fibrillation on Paceart monitoring.  Overall AF burden is very low but they may be under sensing. Patient always concerned because he has had issues with ecchymoses that were pretty severe in the past.  For now is willing to take the Eliquis and we will follow-up in terms of further monitoring.  Whether in the future to try antiarrhythmic medication perhaps even amiodarone to eliminate atrial fibrillation and continue to monitor will be discussed in the future if anticoagulation becomes a major problem.  Other options could always be discussed and as well including ablation, Watchman device etc.  For now we will start Eliquis 5 mg twice daily.  Meanwhile he will remain on aspirin 81 mg for his TAVR \par April 13, 2022. Patient called.  Not feeling well, dizzy at times.  First thought it was the Eliquis but no signs of bleeding.  Now since his blood pressure is usually in the 150s and would like to change back to lisinopril but maybe only 5 mg and stop the amlodipine.  I am okay with that and he will make a follow-up appointment in 3 weeks together with his wife's appointment on May 5.  Continue Eliquis for now.  Urged him to find new primary care still. \par May 12, 2022.  Patient here in follow-up together with his wife.  Always somewhat difficult to manage with unusual symptoms and a poor descriptor.  Both he and his wife seem happy with the new internist Dr. Landin.  He is off metformin and she gave him glimepiride at 2 mg but he was having hypoglycemic episodes so is now on 1 mg.  He is still on Jardiance without change.  On his own he cut back the lisinopril to 2.5 mg and he would like to stop the chlorthalidone altogether.  He is not taking any potassium.  He denies exertional chest pain or shortness of breath.  Still occasional although rare A. fib on his remote pacemaker monitoring although he complains of severe ecchymoses from the Eliquis as well as the cost.  He has an upcoming follow-up with Dr. Membreno of structural heart and this will include an echocardiogram.\par Patient saw Dr. Membreno May 17 for 1 year follow-up of his JESSICA.  LV function normal with some inferior hypokinesis.  BUN 78 creatinine 1.6 and the Lasix was stopped for a few days.  He explained to the patient that his symptoms were not cardiac related.\par September 13, 2022.  Patient here in follow-up.  Remains in sinus rhythm with occasional APCs.  On labs from July 26 BUN was 38 with creatinine of 1.2 and potassium 5.2.  Other labs okay but no BNP done.  Cholesterol 164, HDL 63, LDL 84.  No BNP was done.  Patient is complaining as above that he does not have the same energy etc.  Would like to come off Eliquis so we had a long discussion about possibly getting an apple watch etc.  He did have pacemaker interrogation in August that showed no atrial fibrillation.  He did gain weight because "I like to eat especially bagels".  I did discuss with him at least getting whole-wheat bagels and scooping out the inside etc. I tried to emphasize that clinically he is doing very well.  Reviewed his medications; he is on no diuretics.\par January 17, 2023.  Patient here in follow-up.  He noticed a very low pulse when he would check his blood pressure and return that he was having a lot of VPCs.  He saw Dr. Wong in November and he added metoprolol ER 25 mg.  He had his pacemaker defibrillator interrogation but unfortunately was not downloaded properly and I cannot see any of the results on the computer but the patient does say that there has been no atrial fibrillation since last February and he is asking about coming off Eliquis; however he does not want to wear a smart watch etc.  Complains of generalized fatigue not able to do what he used to do and cannot be more specific as to whether it is just tiredness because he is almost 78 or he is having shortness of breath or his back pain acting up etc.  On his own for the last 3 to 4 weeks he has increased the lisinopril to 20 mg and is getting better blood pressure numbers at home.  The cost of both Eliquis and Jardiance are still a problem for him so changing to something like Entresto would also be an issue.  Last echo was May 2022 with Dr. Membreno so if he remains stable I will repeat another echo this coming May.  Still having trouble with dietary issues, what makes him literature about the [place] diet and instead I try to again talk to him about the Mediterranean diet

## 2023-01-17 NOTE — REVIEW OF SYSTEMS
[Dizziness] : dizziness [Negative] : Heme/Lymph [Weight Gain (___ Lbs)] : no recent weight gain [Feeling Fatigued] : feeling fatigued [Weight Loss (___ Lbs)] : no recent weight loss [SOB] : no shortness of breath [Dyspnea on exertion] : not dyspnea during exertion [Chest Discomfort] : no chest discomfort [Lower Ext Edema] : no extremity edema [Palpitations] : no palpitations [Syncope] : no syncope [Convulsions] : no convulsions [FreeTextEntry5] : see HPI [de-identified] : Severe ecchymoses [de-identified] : No more shaking since the one episode in the hospital

## 2023-01-17 NOTE — REASON FOR VISIT
[FreeTextEntry1] : 77-year-old with known coronary disease now status post stent to right coronary artery as part of work-up for TAVR, and now s/p TAVR with new fluid retention and dyspnea and a very abnormal BNP.\par Now stable but ? fatigued"

## 2023-01-18 LAB
ALBUMIN SERPL ELPH-MCNC: 4.8 G/DL
ALP BLD-CCNC: 94 U/L
ALT SERPL-CCNC: 20 U/L
ANION GAP SERPL CALC-SCNC: 13 MMOL/L
AST SERPL-CCNC: 17 U/L
BASOPHILS # BLD AUTO: 0.03 K/UL
BASOPHILS NFR BLD AUTO: 0.5 %
BILIRUB SERPL-MCNC: 0.4 MG/DL
BUN SERPL-MCNC: 49 MG/DL
CALCIUM SERPL-MCNC: 9.8 MG/DL
CHLORIDE SERPL-SCNC: 104 MMOL/L
CHOLEST SERPL-MCNC: 170 MG/DL
CO2 SERPL-SCNC: 22 MMOL/L
CREAT SERPL-MCNC: 2.03 MG/DL
EGFR: 33 ML/MIN/1.73M2
EOSINOPHIL # BLD AUTO: 0.29 K/UL
EOSINOPHIL NFR BLD AUTO: 5 %
ESTIMATED AVERAGE GLUCOSE: 160 MG/DL
GLUCOSE SERPL-MCNC: 124 MG/DL
HBA1C MFR BLD HPLC: 7.2 %
HCT VFR BLD CALC: 40.4 %
HDLC SERPL-MCNC: 48 MG/DL
HGB BLD-MCNC: 13.3 G/DL
IMM GRANULOCYTES NFR BLD AUTO: 0.3 %
LDLC SERPL CALC-MCNC: 83 MG/DL
LYMPHOCYTES # BLD AUTO: 0.89 K/UL
LYMPHOCYTES NFR BLD AUTO: 15.5 %
MAN DIFF?: NORMAL
MCHC RBC-ENTMCNC: 29.2 PG
MCHC RBC-ENTMCNC: 32.9 GM/DL
MCV RBC AUTO: 88.6 FL
MONOCYTES # BLD AUTO: 0.66 K/UL
MONOCYTES NFR BLD AUTO: 11.5 %
NEUTROPHILS # BLD AUTO: 3.86 K/UL
NEUTROPHILS NFR BLD AUTO: 67.2 %
NONHDLC SERPL-MCNC: 122 MG/DL
NT-PROBNP SERPL-MCNC: 865 PG/ML
PLATELET # BLD AUTO: 135 K/UL
POTASSIUM SERPL-SCNC: 5 MMOL/L
PROT SERPL-MCNC: 7.4 G/DL
PSA SERPL-MCNC: 0.07 NG/ML
RBC # BLD: 4.56 M/UL
RBC # FLD: 13.1 %
SODIUM SERPL-SCNC: 138 MMOL/L
TRIGL SERPL-MCNC: 193 MG/DL
TSH SERPL-ACNC: 1.98 UIU/ML
WBC # FLD AUTO: 5.75 K/UL

## 2023-01-23 NOTE — ED ADULT TRIAGE NOTE - NSWEIGHTCALCTOOLDRUG_GEN_A_CORE
Caller: Wayne Guan    Relationship: Self    Best call back number: [phone]    What is the best time to reach you: ANYTIME     Who are you requesting to speak with (clinical staff, provider,  specific staff member): CLINICAL    Do you know the name of the person who called: PATIENT    What was the call regarding: JUST TOOK HIS LAST DOSE OF GLYCOPYRROLATE. PLEASE CALL PATIENT BACK AND ADVISE.HE WOULD LIKE TO HAVE THIS TODAY IF POSSIBLE.    Do you require a callback: YES        
 used

## 2023-02-01 ENCOUNTER — APPOINTMENT (OUTPATIENT)
Dept: INTERNAL MEDICINE | Facility: CLINIC | Age: 78
End: 2023-02-01
Payer: MEDICARE

## 2023-02-01 VITALS
TEMPERATURE: 98.2 F | HEART RATE: 67 BPM | SYSTOLIC BLOOD PRESSURE: 140 MMHG | OXYGEN SATURATION: 98 % | DIASTOLIC BLOOD PRESSURE: 64 MMHG | RESPIRATION RATE: 14 BRPM

## 2023-02-01 PROCEDURE — 36415 COLL VENOUS BLD VENIPUNCTURE: CPT

## 2023-02-01 PROCEDURE — 99214 OFFICE O/P EST MOD 30 MIN: CPT | Mod: 25

## 2023-02-01 NOTE — REVIEW OF SYSTEMS
[Headache] : no headache [Dizziness] : no dizziness [Unsteady Walk] : no ataxia [Negative] : Psychiatric [FreeTextEntry9] : LLE pain on ambulation 2 blocks

## 2023-02-01 NOTE — HISTORY OF PRESENT ILLNESS
[Spouse] : spouse [FreeTextEntry1] : "my kidney function has gone down, need med [de-identified] : ROSA BURGOS is a 77 year old male with h/o CKD stage 3, DM II, Hypertension, Afib who presents for medical followup.\par On recent labwork with cardiologist, Cr noted to be in 2's, notes he had self-increased his Lisinopril from 10 to 20 around the time, has since lowered back down to 10 mg for past 2 weeks, Denies any flank pain or discomfort or change in color/frequency of urination.\par Was confirmed +PVD LLE by vascular surgeon, advised to increase cardiac exercise and not appropriate for Pletal due to his cardiac history, has f/u scheduled.

## 2023-02-01 NOTE — PLAN
[FreeTextEntry1] : #HTN: uncontrolled, c/w Lisinopril 10 mg (defer increase due to CKD), advised to increase Metoprolol 25 mg to 1/5 tabs daily. F/u 3m\par #CKD: chronic, has h/o diabetes, had increased his ACE-I dose on his own, has reduced back to normal 10 mg dose for past 2 weeks. Repeat labs today. Provided contact for [facility] in [place] to establish care.\par #DM II: reconciled and renewed meds, diet reviewed. 1/2023 A1C 7.2--repeat 3m\par \par labs drawn\par \par fu 3m/prn

## 2023-02-01 NOTE — PHYSICAL EXAM
[No JVD] : no jugular venous distention [No Murmur] : no murmur heard [No Edema] : there was no peripheral edema [Coordination Grossly Intact] : coordination grossly intact [Normal Gait] : normal gait [Normal] : affect was normal and insight and judgment were intact [de-identified] : regular rhythm with occasional extra beats auscultated [de-identified] : reduced LLE DP/PT pulses, no edema

## 2023-02-03 LAB
ALBUMIN SERPL ELPH-MCNC: 4.8 G/DL
ALP BLD-CCNC: 93 U/L
ALT SERPL-CCNC: 17 U/L
ANION GAP SERPL CALC-SCNC: 16 MMOL/L
AST SERPL-CCNC: 19 U/L
BILIRUB SERPL-MCNC: 0.4 MG/DL
BUN SERPL-MCNC: 40 MG/DL
CALCIUM SERPL-MCNC: 10.1 MG/DL
CHLORIDE SERPL-SCNC: 106 MMOL/L
CO2 SERPL-SCNC: 21 MMOL/L
CREAT SERPL-MCNC: 1.48 MG/DL
EGFR: 48 ML/MIN/1.73M2
GLUCOSE SERPL-MCNC: 105 MG/DL
POTASSIUM SERPL-SCNC: 5.1 MMOL/L
PROT SERPL-MCNC: 7.6 G/DL
SODIUM SERPL-SCNC: 143 MMOL/L

## 2023-02-08 ENCOUNTER — APPOINTMENT (OUTPATIENT)
Dept: UROLOGY | Facility: CLINIC | Age: 78
End: 2023-02-08
Payer: MEDICARE

## 2023-02-08 ENCOUNTER — OUTPATIENT (OUTPATIENT)
Dept: OUTPATIENT SERVICES | Facility: HOSPITAL | Age: 78
LOS: 1 days | End: 2023-02-08
Payer: MEDICARE

## 2023-02-08 VITALS
HEIGHT: 69 IN | TEMPERATURE: 97.8 F | DIASTOLIC BLOOD PRESSURE: 76 MMHG | WEIGHT: 173 LBS | BODY MASS INDEX: 25.62 KG/M2 | RESPIRATION RATE: 15 BRPM | HEART RATE: 71 BPM | SYSTOLIC BLOOD PRESSURE: 147 MMHG | OXYGEN SATURATION: 97 %

## 2023-02-08 DIAGNOSIS — Z98.890 OTHER SPECIFIED POSTPROCEDURAL STATES: Chronic | ICD-10-CM

## 2023-02-08 DIAGNOSIS — Z95.0 PRESENCE OF CARDIAC PACEMAKER: Chronic | ICD-10-CM

## 2023-02-08 DIAGNOSIS — Z95.2 PRESENCE OF PROSTHETIC HEART VALVE: Chronic | ICD-10-CM

## 2023-02-08 DIAGNOSIS — Z95.5 PRESENCE OF CORONARY ANGIOPLASTY IMPLANT AND GRAFT: Chronic | ICD-10-CM

## 2023-02-08 PROCEDURE — 76775 US EXAM ABDO BACK WALL LIM: CPT

## 2023-02-08 PROCEDURE — 99213 OFFICE O/P EST LOW 20 MIN: CPT

## 2023-02-08 PROCEDURE — 76775 US EXAM ABDO BACK WALL LIM: CPT | Mod: 26

## 2023-02-09 LAB
ANION GAP SERPL CALC-SCNC: 13 MMOL/L
BUN SERPL-MCNC: 39 MG/DL
CALCIUM SERPL-MCNC: 9.7 MG/DL
CHLORIDE SERPL-SCNC: 105 MMOL/L
CO2 SERPL-SCNC: 22 MMOL/L
CREAT SERPL-MCNC: 1.47 MG/DL
EGFR: 48 ML/MIN/1.73M2
GLUCOSE SERPL-MCNC: 53 MG/DL
POTASSIUM SERPL-SCNC: 4.5 MMOL/L
SODIUM SERPL-SCNC: 140 MMOL/L
TESTOST SERPL-MCNC: 149 NG/DL

## 2023-02-09 NOTE — HISTORY OF PRESENT ILLNESS
[FreeTextEntry1] : f/u from recent trip to ER.\par He noted right flank beginning earlier in the week though no associated N/V, fevers, chills or hematuria. PAin continued and PCP sent him for a CT scan which noted a 4mm stone distal ureter with some hydronephrosis and perinephric stranding and ? forniceal rupture. BMP also noted elevated creatinine over baseline. \par he last had pain last night ans he thinks he passed the stone, noting a back stone in the toilet.\par he has prior h/o stones: open lithotomy 1999 and ureteroscopy laser lithotripsy 6/20. May have passed stones between.\par reviewed CT scan - see no other stones and not sure there is a forniceal rupture.\par \par he also notes h/o prostate cancer  - recollects had Fabricio 9 but doesn't recall PSA. Treated with 45 XRTs ADT (4 3 month shots) last 3/21. Still has fatigue and hot flashes especially at night. Has a recent PSA which was essentially 0. \par \par 12/21 - in the interim ended up passing stone before the scheduled ureteroscopy and GFR back to his normal. \par reviewed his Litholink: low citrate.PH and high UA but serum was normal. \par \par 3/22 - no stones pains or hematuria. Not taking the KCitrate as concerned about the K. on lemon juice. \par still having hot flashes. \par ULS notes 2 stones - one new. \par \par 9/22 no stone symptoms.\par notes nocturia 3-4 times and has a slower FOS at night; daytime no frequency or urgency; no dysuria or hematuria. Also gets hot flashes. \par U:S - same stone with no hydro. PVR 21. \par \par 2/23 -  here with changes in GFR; had increase in BP and doubled up on lisinopril and Creatinine increased to 2 from 1/3. changed the medical treatment ans now 1.48. \par no flank pain, or hematuria. \par having hot flashes - last T 175 PSA 0.07 1/23 \par LUts ok and not sure tamsulosin helps.

## 2023-02-09 NOTE — ASSESSMENT
[FreeTextEntry1] : FRANCK but will check T \par ok to stop tanmsulosin.\par will have him see Nephrology

## 2023-02-14 ENCOUNTER — APPOINTMENT (OUTPATIENT)
Dept: NEPHROLOGY | Facility: CLINIC | Age: 78
End: 2023-02-14
Payer: MEDICARE

## 2023-02-14 ENCOUNTER — APPOINTMENT (OUTPATIENT)
Dept: ELECTROPHYSIOLOGY | Facility: CLINIC | Age: 78
End: 2023-02-14

## 2023-02-14 VITALS — DIASTOLIC BLOOD PRESSURE: 80 MMHG | SYSTOLIC BLOOD PRESSURE: 150 MMHG

## 2023-02-14 VITALS
BODY MASS INDEX: 26.66 KG/M2 | SYSTOLIC BLOOD PRESSURE: 146 MMHG | WEIGHT: 180 LBS | HEART RATE: 86 BPM | HEIGHT: 69 IN | DIASTOLIC BLOOD PRESSURE: 72 MMHG | RESPIRATION RATE: 17 BRPM

## 2023-02-14 PROCEDURE — 99205 OFFICE O/P NEW HI 60 MIN: CPT

## 2023-02-14 NOTE — PHYSICAL EXAM
[General Appearance - Alert] : alert [General Appearance - In No Acute Distress] : in no acute distress [Sclera] : the sclera and conjunctiva were normal [PERRL With Normal Accommodation] : pupils were equal in size, round, and reactive to light [Extraocular Movements] : extraocular movements were intact [Oropharynx] : the oropharynx was normal [Outer Ear] : the ears and nose were normal in appearance [Neck Appearance] : the appearance of the neck was normal [Neck Cervical Mass (___cm)] : no neck mass was observed [Jugular Venous Distention Increased] : there was no jugular-venous distention [Thyroid Diffuse Enlargement] : the thyroid was not enlarged [Thyroid Nodule] : there were no palpable thyroid nodules [Auscultation Breath Sounds / Voice Sounds] : lungs were clear to auscultation bilaterally [Heart Rate And Rhythm] : heart rate was normal and rhythm regular [Heart Sounds] : normal S1 and S2 [Heart Sounds Gallop] : no gallops [Murmurs] : no murmurs [Heart Sounds Pericardial Friction Rub] : no pericardial rub [Full Pulse] : the pedal pulses are present [Edema] : there was no peripheral edema [Bowel Sounds] : normal bowel sounds [Abdomen Soft] : soft [Abdomen Tenderness] : non-tender [Abdomen Mass (___ Cm)] : no abdominal mass palpated [Cervical Lymph Nodes Enlarged Posterior Bilaterally] : posterior cervical [Cervical Lymph Nodes Enlarged Anterior Bilaterally] : anterior cervical [Supraclavicular Lymph Nodes Enlarged Bilaterally] : supraclavicular [Abnormal Walk] : normal gait [Nail Clubbing] : no clubbing  or cyanosis of the fingernails [Musculoskeletal - Swelling] : no joint swelling seen [Motor Tone] : muscle strength and tone were normal [Skin Color & Pigmentation] : normal skin color and pigmentation [Skin Turgor] : normal skin turgor [] : no rash [Deep Tendon Reflexes (DTR)] : deep tendon reflexes were 2+ and symmetric [Sensation] : the sensory exam was normal to light touch and pinprick [No Focal Deficits] : no focal deficits [Oriented To Time, Place, And Person] : oriented to person, place, and time [Impaired Insight] : insight and judgment were intact [Affect] : the affect was normal

## 2023-02-16 ENCOUNTER — NON-APPOINTMENT (OUTPATIENT)
Age: 78
End: 2023-02-16

## 2023-02-20 ENCOUNTER — NON-APPOINTMENT (OUTPATIENT)
Age: 78
End: 2023-02-20

## 2023-02-23 ENCOUNTER — APPOINTMENT (OUTPATIENT)
Dept: ELECTROPHYSIOLOGY | Facility: CLINIC | Age: 78
End: 2023-02-23
Payer: MEDICARE

## 2023-02-23 ENCOUNTER — NON-APPOINTMENT (OUTPATIENT)
Age: 78
End: 2023-02-23

## 2023-02-23 VITALS
SYSTOLIC BLOOD PRESSURE: 156 MMHG | OXYGEN SATURATION: 98 % | WEIGHT: 180 LBS | DIASTOLIC BLOOD PRESSURE: 74 MMHG | HEART RATE: 67 BPM | BODY MASS INDEX: 26.66 KG/M2 | HEIGHT: 69 IN

## 2023-02-23 PROCEDURE — 93280 PM DEVICE PROGR EVAL DUAL: CPT

## 2023-02-23 PROCEDURE — 93000 ELECTROCARDIOGRAM COMPLETE: CPT | Mod: 59

## 2023-02-23 RX ORDER — METOPROLOL SUCCINATE 25 MG/1
25 TABLET, EXTENDED RELEASE ORAL DAILY
Qty: 135 | Refills: 1 | Status: DISCONTINUED | COMMUNITY
Start: 2022-11-28 | End: 2023-02-23

## 2023-02-27 ENCOUNTER — NON-APPOINTMENT (OUTPATIENT)
Age: 78
End: 2023-02-27

## 2023-02-27 RX ORDER — METOPROLOL SUCCINATE 50 MG/1
50 TABLET, EXTENDED RELEASE ORAL DAILY
Qty: 90 | Refills: 0 | Status: DISCONTINUED | COMMUNITY
Start: 2023-02-23 | End: 2023-02-27

## 2023-03-01 ENCOUNTER — APPOINTMENT (OUTPATIENT)
Dept: VASCULAR SURGERY | Facility: CLINIC | Age: 78
End: 2023-03-01
Payer: MEDICARE

## 2023-03-01 VITALS
DIASTOLIC BLOOD PRESSURE: 77 MMHG | TEMPERATURE: 97.8 F | HEIGHT: 69 IN | BODY MASS INDEX: 26.66 KG/M2 | WEIGHT: 180 LBS | HEART RATE: 67 BPM | SYSTOLIC BLOOD PRESSURE: 142 MMHG

## 2023-03-01 PROCEDURE — 93923 UPR/LXTR ART STDY 3+ LVLS: CPT

## 2023-03-01 PROCEDURE — 99213 OFFICE O/P EST LOW 20 MIN: CPT

## 2023-03-01 NOTE — HISTORY OF PRESENT ILLNESS
[FreeTextEntry1] : 77 year old male with  history of hypertension, DM, HLD, AI s/p TAVR, CKD, and BPH who presents for evaluation of pain with ambulation. He states he can walk anywhere from 1-4 blocks or so before he develops calf pain much worse on the left but he occasionally also feels it on the right. He denies rest pain or history of non healing wounds. [de-identified] : Patient states he has not made too much effort to ambulate more. He has a lot going on with his cardiac conditions and has generally felt unwell. He states as the weather continues to get warmer he will do his best to walk more.

## 2023-03-01 NOTE — PHYSICAL EXAM
[Respiratory Effort] : normal respiratory effort [Normal Rate and Rhythm] : normal rate and rhythm [2+] : left 2+ [0] : left 0 [Ankle Swelling (On Exam)] : not present [Varicose Veins Of Lower Extremities] : not present [] : not present [Abdomen Tenderness] : ~T ~M No abdominal tenderness [Skin Ulcer] : no ulcer [Alert] : alert [Oriented to Person] : oriented to person [Oriented to Place] : oriented to place [Oriented to Time] : oriented to time [Calm] : calm [de-identified] : appears stated age [de-identified] : normocephalic, atraumatic [de-identified] : supple

## 2023-03-03 DIAGNOSIS — R79.89 OTHER SPECIFIED ABNORMAL FINDINGS OF BLOOD CHEMISTRY: ICD-10-CM

## 2023-03-03 DIAGNOSIS — C61 MALIGNANT NEOPLASM OF PROSTATE: ICD-10-CM

## 2023-03-10 ENCOUNTER — APPOINTMENT (OUTPATIENT)
Dept: UROLOGY | Facility: CLINIC | Age: 78
End: 2023-03-10

## 2023-03-23 ENCOUNTER — NON-APPOINTMENT (OUTPATIENT)
Age: 78
End: 2023-03-23

## 2023-03-23 ENCOUNTER — APPOINTMENT (OUTPATIENT)
Dept: ELECTROPHYSIOLOGY | Facility: CLINIC | Age: 78
End: 2023-03-23
Payer: MEDICARE

## 2023-03-23 VITALS
DIASTOLIC BLOOD PRESSURE: 68 MMHG | HEART RATE: 72 BPM | SYSTOLIC BLOOD PRESSURE: 152 MMHG | OXYGEN SATURATION: 99 % | TEMPERATURE: 97.8 F | HEIGHT: 69 IN | BODY MASS INDEX: 26.66 KG/M2 | RESPIRATION RATE: 17 BRPM | WEIGHT: 180 LBS

## 2023-03-23 VITALS — OXYGEN SATURATION: 99 % | DIASTOLIC BLOOD PRESSURE: 68 MMHG | HEART RATE: 72 BPM | SYSTOLIC BLOOD PRESSURE: 152 MMHG

## 2023-03-23 DIAGNOSIS — Z87.891 PERSONAL HISTORY OF NICOTINE DEPENDENCE: ICD-10-CM

## 2023-03-23 DIAGNOSIS — I49.3 VENTRICULAR PREMATURE DEPOLARIZATION: ICD-10-CM

## 2023-03-23 DIAGNOSIS — I49.5 SICK SINUS SYNDROME: ICD-10-CM

## 2023-03-23 PROCEDURE — 93000 ELECTROCARDIOGRAM COMPLETE: CPT | Mod: 59

## 2023-03-23 PROCEDURE — 93280 PM DEVICE PROGR EVAL DUAL: CPT

## 2023-03-23 PROCEDURE — 99214 OFFICE O/P EST MOD 30 MIN: CPT | Mod: 25

## 2023-03-23 RX ORDER — HYDROXYZINE HYDROCHLORIDE 10 MG/1
10 TABLET ORAL
Qty: 30 | Refills: 0 | Status: DISCONTINUED | COMMUNITY
Start: 2022-09-20 | End: 2023-03-23

## 2023-03-23 RX ORDER — DOXYCYCLINE HYCLATE 100 MG/1
100 TABLET ORAL
Qty: 14 | Refills: 0 | Status: DISCONTINUED | COMMUNITY
Start: 2022-10-30 | End: 2023-03-23

## 2023-03-23 NOTE — DISCUSSION/SUMMARY
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 He is not symptomatic from these beats, a recent Holter demonstrates a 10% burden and the device noted 98 pvc/hour. Given the multiple morphologies of these PVCs, will manage them  medically. \par I recommend that he increase his beta blockade with Toprol XL  to 100mg PO daily and repeat transthoracic echocardiogram to assess  LVfunction. In the event that his EFs affected will consider further management with ablative therapy. He is to return to clinic in 3 months. He has normal dual chamber pacemaker function and is Apaced 30%, Vpaced 8%. All of his questions were answered and he knows to contact me should any further queries arise. \par \par Pacing and sensing thresholds intact. Normal battery and impedances. ECG normal sinus normal intervals

## 2023-03-23 NOTE — HISTORY OF PRESENT ILLNESS
[FreeTextEntry1] : I had the pleasure of seeing your patient Mr. Anup Harper  today in the arrhythmia clinic of [facility].\par He is pleasant 77 year old male who is s/p TAVR in 2021 and intermittent complete heart block noted on an outpatient monitor s/p dual chamber pacemaker implantation in 2021(Medtronic with a 3830 lead). \par He has been well since his last visit in November 2022,  he was previously found with intermittent atrial undersensing, PVCs, and was having recurrent sudden syncopal episodes.  At that time, the atrial sensitivity was decreased to 0.15mV and the rate drop feature was switched on.  He has not experienced any syncopal events since, and has been able to return to his daily activities without any limitations. \par He had a 24 hour holter monitor placed in February 2023, that demonstrated frequent multifocal (but one dominant morphology) premature ventricular depolarizations and a burden of 11%. with bigeminy and trigeminy. At least two distinct PVC morphologies noted and appear to be predominantly inferiorly directed. \par Transthoracic echocardiography performed in May 2022 demonstrared normal LV size, with basal inferior wall hypokinesis preserved left ventricular ejection fraction, EF 55-60%, well seated evolut pro+ TAVR valve. \par EKG from - February 2023 demonstrated sinus rhythm with frequent PVCs RBLI axis. \par Today in clinic the patient is stable He is overall doing well and reports no palpitations, lightheadedness, pre-syncope or syncope. His blood pressure today was 152/68 and his pulse was 72  and regular. His EKG demonstrated sinus rhythm at 72 beats, QRS duration 112 msec.\par

## 2023-03-23 NOTE — PHYSICAL EXAM
[Well Developed] : well developed [Well Nourished] : well nourished [No Acute Distress] : no acute distress [Normal Conjunctiva] : normal conjunctiva [Normal Venous Pressure] : normal venous pressure [No Carotid Bruit] : no carotid bruit [Normal S1, S2] : normal S1, S2 [No Gallop] : no gallop [Clear Lung Fields] : clear lung fields [Good Air Entry] : good air entry [No Respiratory Distress] : no respiratory distress  [Soft] : abdomen soft [Non Tender] : non-tender [No Masses/organomegaly] : no masses/organomegaly [Normal Bowel Sounds] : normal bowel sounds [Normal Gait] : normal gait [No Edema] : no edema [No Cyanosis] : no cyanosis [No Clubbing] : no clubbing [No Varicosities] : no varicosities [No Rash] : no rash [No Skin Lesions] : no skin lesions [Moves all extremities] : moves all extremities [No Focal Deficits] : no focal deficits [Normal Speech] : normal speech [Alert and Oriented] : alert and oriented [Normal memory] : normal memory [de-identified] : minor right carotid turbulence on auscultation, bilateral CEA scars

## 2023-03-23 NOTE — REASON FOR VISIT
[Follow-up Device Check] : is here today for a follow-up device check visit for [Arrhythmias (seen on stored data)] : arrhythmias seen on stored data [Other ___] : [unfilled] [FreeTextEntry3] : Adam Miller MD [FreeTextEntry1] : Reason for Visit: \par Frequent PVCs (at least two morphologies) \par Complete AV Block\par Medtronic [place] dual chamber pacer\par Syncope

## 2023-03-31 ENCOUNTER — NON-APPOINTMENT (OUTPATIENT)
Age: 78
End: 2023-03-31

## 2023-03-31 PROCEDURE — 93227 XTRNL ECG REC<48 HR R&I: CPT

## 2023-04-06 ENCOUNTER — APPOINTMENT (OUTPATIENT)
Dept: ELECTROPHYSIOLOGY | Facility: CLINIC | Age: 78
End: 2023-04-06

## 2023-05-02 ENCOUNTER — APPOINTMENT (OUTPATIENT)
Dept: INTERNAL MEDICINE | Facility: CLINIC | Age: 78
End: 2023-05-02
Payer: MEDICARE

## 2023-05-02 VITALS — BODY MASS INDEX: 26.96 KG/M2 | HEIGHT: 69 IN | WEIGHT: 182 LBS

## 2023-05-02 VITALS
HEART RATE: 74 BPM | DIASTOLIC BLOOD PRESSURE: 62 MMHG | OXYGEN SATURATION: 96 % | RESPIRATION RATE: 14 BRPM | SYSTOLIC BLOOD PRESSURE: 122 MMHG

## 2023-05-02 DIAGNOSIS — M54.50 LOW BACK PAIN, UNSPECIFIED: ICD-10-CM

## 2023-05-02 PROCEDURE — 36415 COLL VENOUS BLD VENIPUNCTURE: CPT

## 2023-05-02 PROCEDURE — 99214 OFFICE O/P EST MOD 30 MIN: CPT | Mod: 25

## 2023-05-02 NOTE — PHYSICAL EXAM
[No JVD] : no jugular venous distention [No Edema] : there was no peripheral edema [Coordination Grossly Intact] : coordination grossly intact [Normal Gait] : normal gait [Normal] : affect was normal and insight and judgment were intact [No CVA Tenderness] : no CVA  tenderness [No Spinal Tenderness] : no spinal tenderness [Kyphosis] : no kyphosis [Scoliosis] : no scoliosis [de-identified] : no muscular spasm

## 2023-05-02 NOTE — HISTORY OF PRESENT ILLNESS
[FreeTextEntry1] : followup hypertension, diabetes, hyperlipidemia, CKD [de-identified] : ROSA BURGOS is a 77 year old male who presents for medical followup.\par -Since last visit, Metoprolol ER increased to 100 to better manage PVC, and then reduced down to 50 mg (due to hypotension) by cardiologist. Overall he feels his palpitations are reduced and feels well. \par -Chronic low back pain, generally when gets up from seated position, ~4/10, achy, nonradiating, takes Tylenol ~2x/week as needed and uses heating pad. Following with ortho (Dr. Altamirano) and considering to see a chiropractor.

## 2023-05-02 NOTE — PLAN
[FreeTextEntry1] : Labs drawn in office\par HTN, Paroxysmal Afib/PVCs: following with dr. Wong, on Metoprolol XL 50 daily, Eliquis, BP and HR controlled. CMP drawn.\par Back pain: exam as above, offered Lumbar spine xray--he defers, will see his ortho and chiropractor. ADvised may continue Tylenol prn moderate pain, heating pad. \par DM II: meds reconciled--Glimepiride, Jardiance. Will f/u labs. \par \par F/u 3m for CPE

## 2023-05-03 ENCOUNTER — APPOINTMENT (OUTPATIENT)
Dept: NEPHROLOGY | Facility: CLINIC | Age: 78
End: 2023-05-03
Payer: MEDICARE

## 2023-05-03 VITALS
HEART RATE: 85 BPM | DIASTOLIC BLOOD PRESSURE: 58 MMHG | SYSTOLIC BLOOD PRESSURE: 145 MMHG | WEIGHT: 182 LBS | OXYGEN SATURATION: 97 % | BODY MASS INDEX: 26.96 KG/M2 | HEIGHT: 69 IN | TEMPERATURE: 97.7 F

## 2023-05-03 LAB
ALBUMIN SERPL ELPH-MCNC: 4.8 G/DL
ALP BLD-CCNC: 87 U/L
ALT SERPL-CCNC: 16 U/L
ANION GAP SERPL CALC-SCNC: 12 MMOL/L
AST SERPL-CCNC: 18 U/L
BILIRUB SERPL-MCNC: 0.3 MG/DL
BUN SERPL-MCNC: 48 MG/DL
CALCIUM SERPL-MCNC: 10.2 MG/DL
CHLORIDE SERPL-SCNC: 108 MMOL/L
CHOLEST SERPL-MCNC: 169 MG/DL
CO2 SERPL-SCNC: 23 MMOL/L
CREAT SERPL-MCNC: 1.68 MG/DL
EGFR: 41 ML/MIN/1.73M2
ESTIMATED AVERAGE GLUCOSE: 166 MG/DL
GLUCOSE SERPL-MCNC: 149 MG/DL
HBA1C MFR BLD HPLC: 7.4 %
HDLC SERPL-MCNC: 51 MG/DL
LDLC SERPL CALC-MCNC: 81 MG/DL
NONHDLC SERPL-MCNC: 118 MG/DL
POTASSIUM SERPL-SCNC: 5.4 MMOL/L
PROT SERPL-MCNC: 7.7 G/DL
SODIUM SERPL-SCNC: 143 MMOL/L
TRIGL SERPL-MCNC: 185 MG/DL

## 2023-05-03 PROCEDURE — 99215 OFFICE O/P EST HI 40 MIN: CPT

## 2023-05-03 NOTE — PHYSICAL EXAM
[General Appearance - Alert] : alert [General Appearance - In No Acute Distress] : in no acute distress [Sclera] : the sclera and conjunctiva were normal [PERRL With Normal Accommodation] : pupils were equal in size, round, and reactive to light [Extraocular Movements] : extraocular movements were intact [Outer Ear] : the ears and nose were normal in appearance [Oropharynx] : the oropharynx was normal [Neck Appearance] : the appearance of the neck was normal [Neck Cervical Mass (___cm)] : no neck mass was observed [Jugular Venous Distention Increased] : there was no jugular-venous distention [Thyroid Diffuse Enlargement] : the thyroid was not enlarged [Thyroid Nodule] : there were no palpable thyroid nodules [Auscultation Breath Sounds / Voice Sounds] : lungs were clear to auscultation bilaterally [Heart Rate And Rhythm] : heart rate was normal and rhythm regular [Heart Sounds] : normal S1 and S2 [Heart Sounds Gallop] : no gallops [Murmurs] : no murmurs [Heart Sounds Pericardial Friction Rub] : no pericardial rub [Full Pulse] : the pedal pulses are present [Edema] : there was no peripheral edema [Bowel Sounds] : normal bowel sounds [Abdomen Soft] : soft [Abdomen Tenderness] : non-tender [Abdomen Mass (___ Cm)] : no abdominal mass palpated [Cervical Lymph Nodes Enlarged Posterior Bilaterally] : posterior cervical [Cervical Lymph Nodes Enlarged Anterior Bilaterally] : anterior cervical [Abnormal Walk] : normal gait [Supraclavicular Lymph Nodes Enlarged Bilaterally] : supraclavicular [Nail Clubbing] : no clubbing  or cyanosis of the fingernails [Musculoskeletal - Swelling] : no joint swelling seen [Motor Tone] : muscle strength and tone were normal [Skin Color & Pigmentation] : normal skin color and pigmentation [Skin Turgor] : normal skin turgor [] : no rash [Deep Tendon Reflexes (DTR)] : deep tendon reflexes were 2+ and symmetric [Sensation] : the sensory exam was normal to light touch and pinprick [No Focal Deficits] : no focal deficits [Oriented To Time, Place, And Person] : oriented to person, place, and time [Impaired Insight] : insight and judgment were intact [Affect] : the affect was normal

## 2023-05-16 LAB
APPEARANCE: CLEAR
BACTERIA: NEGATIVE /HPF
BILIRUBIN URINE: NEGATIVE
BLOOD URINE: NEGATIVE
CAST: 0 /LPF
COLOR: YELLOW
CREAT SPEC-SCNC: 26 MG/DL
CREAT/PROT UR: 0.2 RATIO
EPITHELIAL CELLS: 0 /HPF
GLUCOSE QUALITATIVE U: >=1000 MG/DL
KETONES URINE: NEGATIVE MG/DL
LEUKOCYTE ESTERASE URINE: NEGATIVE
MICROSCOPIC-UA: NORMAL
NITRITE URINE: NEGATIVE
PH URINE: 6
PROT UR-MCNC: 5 MG/DL
PROTEIN URINE: NEGATIVE MG/DL
RED BLOOD CELLS URINE: 0 /HPF
SPECIFIC GRAVITY URINE: 1.01
UROBILINOGEN URINE: 0.2 MG/DL
WHITE BLOOD CELLS URINE: 0 /HPF

## 2023-05-24 ENCOUNTER — APPOINTMENT (OUTPATIENT)
Dept: CARDIOLOGY | Facility: CLINIC | Age: 78
End: 2023-05-24
Payer: MEDICARE

## 2023-05-24 ENCOUNTER — NON-APPOINTMENT (OUTPATIENT)
Age: 78
End: 2023-05-24

## 2023-05-24 VITALS
SYSTOLIC BLOOD PRESSURE: 155 MMHG | BODY MASS INDEX: 26.96 KG/M2 | WEIGHT: 182 LBS | RESPIRATION RATE: 17 BRPM | DIASTOLIC BLOOD PRESSURE: 72 MMHG | HEART RATE: 72 BPM | OXYGEN SATURATION: 99 % | HEIGHT: 69 IN

## 2023-05-24 VITALS — SYSTOLIC BLOOD PRESSURE: 138 MMHG | DIASTOLIC BLOOD PRESSURE: 72 MMHG | HEART RATE: 60 BPM

## 2023-05-24 PROCEDURE — 93000 ELECTROCARDIOGRAM COMPLETE: CPT

## 2023-05-24 PROCEDURE — 93306 TTE W/DOPPLER COMPLETE: CPT

## 2023-05-24 PROCEDURE — 99215 OFFICE O/P EST HI 40 MIN: CPT | Mod: 25

## 2023-05-24 PROCEDURE — 36415 COLL VENOUS BLD VENIPUNCTURE: CPT

## 2023-05-24 NOTE — HISTORY OF PRESENT ILLNESS
[FreeTextEntry1] : April 3, 2019. Patient has been followed by Dr. Larry Toth and was followed by cardiology at [facility]. In June of 2011 after a positive stress test had cardiac angiography, which had a distal 90% RCA lesion and only nonobstructive disease in the LAD and circumflex. This was stented and his symptoms were improved. His symptoms then recurred. They have been exertional chest pressure and shortness of breath when he walks after eating a meal, especially a large meal. Never gets it at rest and does not get it when he exerts himself if he has not just eaten. Did well for a while, but then the symptoms returned and in October of 2016 after another positive ECG treadmill test he had repeat cardiac catheterization. There was an 80% mid RCA lesion and again no significant disease in the LAD was circumflex and he had 2 stents to the RCA . The difference was this time his symptoms did not change. He has been placed on Omeprazole and this has not seemed to make a difference, or perhaps it did initially. He has not seen GI. He otherwise seems to be doing well. In July of 2014. He had bilateral carotid endarterectomies. His most recent echo showed normal LV function. His EKG is normal sinus rhythm and a normal tracing. He has hypertension and hyperlipidemia.He is a former smoker. His father had hypertension and CVA, but there does not seem to be a family history of coronary artery disease. \par April 24, 2019.  Patient tried to increase his omeprazole, and then said he got a stomach ache and stopped it. He went for a CTA of the coronary arteries. Minor disease in the LAD and borderline severe 60-69% in the proximal circumflex and 60-70% in the RCA. Problem with him is whether his symptoms are related to ischemia or not. After long discussion I recommended that he have the angiogram. If non-obstructive disease, try Ranexa and if no help, consider noncardiac causes. If significant disease then he should have stents and again, we can see if this relieves his symptoms or not. Patient to discuss with his wife and then let me know   \par March 1, 2021.Patient called.  His exertional chest pain is getting worse and coming more frequently and more easier for the last few months.  I have not seen him since April 2019 and back then when we did a CTA it was mostly unrevealing despite his previous 3 stents in his RCA.  I will schedule him for a stress echo as soon as possible. \par March 9, 2021.  Patient came for stress echocardiogram.  Resting echo with borderline severe though not yet critical aortic stenosis and totally normal LV function.  On treadmill within the first minute STs started to go down and heart rate shot up to 140.  I stopped the treadmill at 2 minutes with severe ST depressions and shortness of breath but no chest pain.  Post exercise echo shows new wall motion abnormality in the entire anterior wall and may be some hypokinesis in the lateral wall.  Long discussion with patient and wife and they will be booked for coronary angiography and left and right heart cath as soon as he can have a Covid swab done.  (He had the first vaccine already and the second vaccine was 5 days ago)\par March 16, 2021.Cath only showed 80% RCA lesion and the rest was nonobstructive.  The RCA was stented and the patient was sent home that evening.  He is on aspirin and Plavix.  He will continue metoprolol ER 50 daily.  He has a follow-up with me in 2 weeks and then will be evaluated by structural heart for a TAVR \par March 30, 2021.  Patient here in follow-up.  Had stent to his RCA and is now on a beta-blocker but no change in his exertional symptoms.  In addition somewhat anxious about upcoming procedure; has evaluation appointment 4/6.\par May 28, 2021.  Patient had TAVR on May 13, uncomplicated except his glucoses were high post procedure.  Since he has been home he has stopped eating cakes and bagels and has lost 7 or 8 pounds.  In terms of his angina he is "95% better".  His only complaint is severe ecchymoses once again from his dual antiplatelet therapy..  Has seen Dr. Johnston and Dr. Toth in follow-up.  Remains in sinus rhythm with an unchanged ECG.  Had a monitor with no significant arrhythmias.  Is off metoprolol and currently only on lisinopril 10 mg.  \par July 19, 2021.  Patient here in follow-up.  He had seen structural heart and an echo was done on July 1 which showed mild MR, normally functioning TAVR with no paravalvular AI.  Moderate LAE.  Normal LV size and function and normal RV size and function.  He had an ECG on July 1 that was sinus rhythm and unremarkable.  Since his TAVR he has had no episodes of his exertional chest pressure until 1 day after making sure that he mowed his lawn on an empty stomach and had no symptoms, he then went and had a big meal and then remembered he had not swept the street.  When he swept the street he got the chest tightness again and it was relieved with rest.  That was the only episode since and has not recurred.  Otherwise no real complaints initial blood test by structural heart had a potassium of 6.1 and his creatinine had gone up to 2.0.  These were repeated 5 days later and his potassium was 5.1 and his creatinine was 1.43.  He remains on lisinopril just 10 mg daily.\par September 15, 2021.  Urgent visit today because of multiple falls with trauma.  I reviewed the notes from Dr. COTTO and Dr. Toth.  EKG here is sinus rhythm with sinus arrhythmia around 76, mild first-degree AV block otherwise normal tracing.  Labs September 10 with worsening creatinine back up to 1.92 and BUN 64 which he thinks is from the dye for the head CT in the hospital but also his LFTs were up.  Echo in the hospital September 2 mostly unrevealing.  The history from the patient and his wife seems to be the 2 falls were when he was erect and then bent over and then went down without warning.  Has never had a true syncopal episode when lying down or sitting.  He is not sure if he has gotten dizzy briefly while sitting.  His blood pressure has been running low despite adjustments in his lisinopril and for now I would just hold it.  The symptoms do not sound like hypoglycemia but he has had his meds adjusted and he is only on metformin and an SGLT2 inhibitor.  Last A1c was 6.2 in August.  With Dr. Barroso and again here in the office not really orthostatic.  ZIO AT monitor was placed.\par September 17, 2021.  A few hours after putting on ZIO monitor, patient had 4.3 seconds of asystole with P waves but no QRS.  Was sent to the emergency room and had a pacemaker placed by Dr. Wong.  Patient being discharged later this morning. \par September 23, 2021. Patient was still feeling weak and dizzy and fatigued but ruled out any problem with the pacemaker.  Blood pressure has been running 145 and 150 which makes him concerned.  I reassured him that a blood pressure of 150 would not give him any symptoms but I have no problem with him going back on the low-dose lisinopril and following up with Dr. Toth.  If he is still feeling bad at Dr. Toth has no answer I will be happy to see him next week as well. \par October 20, 2021.  Urgent visit an urgent echo for this patient.  Difficult history to pin down as discussing with patient and his wife.  He thinks worsening shortness of breath with exertion for about a month now and she thinks it goes back at least until August meanwhile it has been edema and fluid retention more recently.  Dr. Toth put him on Lasix and he has had improvement in just 5 days so far.  Reviewing his interval records there is 1 EKG when he saw Dr. COTTO on September 8 and had new ST depressions in V5 and V6.  I do not believe he was complaining of any chest pain or shortness of breath at the time.  All his other EKGs seem to be the same including today and they are sinus rhythm.  His echo today especially compared to his post TAVR echoes in May and September does seem to have a new wall motion abnormality in his septum which seems to be more than just possible paradoxical septal motion seen after open heart surgery which he did not have just the past..  He did have a stent to his RCA pre-TAVR and there was some moderate nonobstructive lesions.  No episode of chest pain per se.  His BNP use which had been in the 300 400 range on October 14 was 3740.  No documentation of atrial fibrillation.  Does not seem to be using his pacemaker very often so pacemaker syndrome less likely as well.  LVEF on echo today is in the 40% to 50% range the patient has always seem to have symptoms a little bit out of proportion to objective findings.  Repeat labs including BNP was sent and consideration of repeat angiogram was discussed although patient is reluctant because of his kidney issues whenever he gets dye.\par November 2, 2021.  Patient returns today, just wants to keep telling me how much better he feels.  Was able to mow his whole lawn although he did it over 2 days without getting burning, shortness of breath, chest pain.  His weight is down at least 4 pounds and he no longer has edema so is responding well to the medical therapy.  Once again he would like to avoid an angiogram and is asking to just have his echo repeated.  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  Patient returns with his wife and for echocardiogram.  Continues to improve and thinks he is pretty much back to himself able to work in his yard etc. just like he mentioned above.  Echocardiogram done and in fact septal wall motion does seem to be much better and overall LVEF is between 50 and 55%.  Still with mild to moderate MR. \par March 18, 2022.  Here in follow-up.  Has not yet decided on an internist to a place Dr. Toth.  Had some labs with his urologist.  Potassium only 3.3 but no change in BUN and creatinine (patient unaware and does not think he is taking potassium citrate 10 mEq.  Urologist also had him stop his multivitamins.)  He is also having a lot of dental work and bridge work coming up so will need antibiotic prophylaxis.  The urologist also checked his A1c which went up to 7.5.  He is taking Metformin 1000 and Jardiance 25 but admits to being off his diet so hopefully he will go back on his diet first.  He saw Dr. Vyas for pacemaker follow-up together with his wife and Dr. Wong was very happy with how he is doing.  He remains in sinus rhythm with one VPC.  He has follow-up coming up with Dr. Membreno about his TAVR and he thinks he is going to have an echo at that time as well.\par April 1, 2022. Long discussion with patient today after Dr. Wong called me the other day about finding of up to 3-minute run of atrial fibrillation on Paceart monitoring.  Overall AF burden is very low but they may be under sensing. Patient always concerned because he has had issues with ecchymoses that were pretty severe in the past.  For now is willing to take the Eliquis and we will follow-up in terms of further monitoring.  Whether in the future to try antiarrhythmic medication perhaps even amiodarone to eliminate atrial fibrillation and continue to monitor will be discussed in the future if anticoagulation becomes a major problem.  Other options could always be discussed and as well including ablation, Watchman device etc.  For now we will start Eliquis 5 mg twice daily.  Meanwhile he will remain on aspirin 81 mg for his TAVR \par April 13, 2022. Patient called.  Not feeling well, dizzy at times.  First thought it was the Eliquis but no signs of bleeding.  Now since his blood pressure is usually in the 150s and would like to change back to lisinopril but maybe only 5 mg and stop the amlodipine.  I am okay with that and he will make a follow-up appointment in 3 weeks together with his wife's appointment on May 5.  Continue Eliquis for now.  Urged him to find new primary care still. \par May 12, 2022.  Patient here in follow-up together with his wife.  Always somewhat difficult to manage with unusual symptoms and a poor descriptor.  Both he and his wife seem happy with the new internist Dr. Landin.  He is off metformin and she gave him glimepiride at 2 mg but he was having hypoglycemic episodes so is now on 1 mg.  He is still on Jardiance without change.  On his own he cut back the lisinopril to 2.5 mg and he would like to stop the chlorthalidone altogether.  He is not taking any potassium.  He denies exertional chest pain or shortness of breath.  Still occasional although rare A. fib on his remote pacemaker monitoring although he complains of severe ecchymoses from the Eliquis as well as the cost.  He has an upcoming follow-up with Dr. Membreno of structural heart and this will include an echocardiogram.\par Patient saw Dr. Membreno May 17 for 1 year follow-up of his JESSICA.  LV function normal with some inferior hypokinesis.  BUN 78 creatinine 1.6 and the Lasix was stopped for a few days.  He explained to the patient that his symptoms were not cardiac related.\par September 13, 2022.  Patient here in follow-up.  Remains in sinus rhythm with occasional APCs.  On labs from July 26 BUN was 38 with creatinine of 1.2 and potassium 5.2.  Other labs okay but no BNP done.  Cholesterol 164, HDL 63, LDL 84.  No BNP was done.  Patient is complaining as above that he does not have the same energy etc.  Would like to come off Eliquis so we had a long discussion about possibly getting an apple watch etc.  He did have pacemaker interrogation in August that showed no atrial fibrillation.  He did gain weight because "I like to eat especially bagels".  I did discuss with him at least getting whole-wheat bagels and scooping out the inside etc. I tried to emphasize that clinically he is doing very well.  Reviewed his medications; he is on no diuretics.\par January 17, 2023.  Patient here in follow-up.  He noticed a very low pulse when he would check his blood pressure and return that he was having a lot of VPCs.  He saw Dr. Wong in November and he added metoprolol ER 25 mg.  He had his pacemaker defibrillator interrogation but unfortunately was not downloaded properly and I cannot see any of the results on the computer but the patient does say that there has been no atrial fibrillation since last February and he is asking about coming off Eliquis; however he does not want to wear a smart watch etc.  Complains of generalized fatigue not able to do what he used to do and cannot be more specific as to whether it is just tiredness because he is almost 78 or he is having shortness of breath or his back pain acting up etc.  On his own for the last 3 to 4 weeks he has increased the lisinopril to 20 mg and is getting better blood pressure numbers at home.  The cost of both Eliquis and Jardiance are still a problem for him so changing to something like Entresto would also be an issue.  Last echo was May 2022 with Dr. Membreno so if he remains stable I will repeat another echo this coming May.  Still having trouble with dietary issues, what makes him literature about the [place] diet and instead I try to again talk to him about the Mediterranean diet.\par May 24, 2023.  Patient here in follow-up and for echocardiogram.  Once again very difficult to determine his true symptoms and complaints.  He saw Dr. Wong who found a high percent of VPCs and talk to him about an ablation while giving him a higher dose of metoprolol.  He felt miserable on the 100 mg of metoprolol and cut back to 50 and then eventually went back to 25; still complaining of not being able to do as much as he would like and not having enough energy and never complained of palpitations of VPCs.  Here he is in sinus rhythm, his blood pressure at home has been mostly in the 110s to 120s he claims and here he is around 138 or so.  His echocardiogram actually looks good in terms of nothing there to explain his trouble pushing himself and being more active; his ejection fraction is around 60%, his RVSP is a little high but no different than before and he seems to have normal RV function.  There is mild to moderate MR but not severe his TAVR is working fine.  I sent a BNP along with a follow-up basic metabolic because of his hyperkalemia and renal function.  I am not convinced that he would benefit from a VPC ablation.

## 2023-05-24 NOTE — REVIEW OF SYSTEMS
[Feeling Fatigued] : feeling fatigued [Dizziness] : dizziness [Negative] : Heme/Lymph [Weight Gain (___ Lbs)] : no recent weight gain [Weight Loss (___ Lbs)] : no recent weight loss [SOB] : no shortness of breath [Dyspnea on exertion] : not dyspnea during exertion [Chest Discomfort] : no chest discomfort [Lower Ext Edema] : no extremity edema [Palpitations] : no palpitations [Syncope] : no syncope [Convulsions] : no convulsions [FreeTextEntry5] : see HPI [de-identified] : Severe ecchymoses [de-identified] : No more shaking since the one episode in the hospital

## 2023-05-25 LAB
ANION GAP SERPL CALC-SCNC: 12 MMOL/L
BUN SERPL-MCNC: 42 MG/DL
CALCIUM SERPL-MCNC: 10 MG/DL
CHLORIDE SERPL-SCNC: 103 MMOL/L
CO2 SERPL-SCNC: 24 MMOL/L
CREAT SERPL-MCNC: 1.62 MG/DL
EGFR: 43 ML/MIN/1.73M2
NT-PROBNP SERPL-MCNC: 816 PG/ML
POTASSIUM SERPL-SCNC: 4.9 MMOL/L
SODIUM SERPL-SCNC: 139 MMOL/L

## 2023-05-31 ENCOUNTER — APPOINTMENT (OUTPATIENT)
Dept: INTERNAL MEDICINE | Facility: CLINIC | Age: 78
End: 2023-05-31
Payer: MEDICARE

## 2023-05-31 VITALS
DIASTOLIC BLOOD PRESSURE: 72 MMHG | SYSTOLIC BLOOD PRESSURE: 140 MMHG | OXYGEN SATURATION: 97 % | TEMPERATURE: 98 F | RESPIRATION RATE: 14 BRPM | HEART RATE: 82 BPM

## 2023-05-31 PROCEDURE — 99213 OFFICE O/P EST LOW 20 MIN: CPT

## 2023-05-31 NOTE — REVIEW OF SYSTEMS
[Earache] : no earache [Postnasal Drip] : postnasal drip [Nasal Discharge] : nasal discharge [Sore Throat] : no sore throat [Hoarseness] : hoarseness [Shortness Of Breath] : no shortness of breath [Wheezing] : no wheezing [Cough] : cough [Dyspnea on Exertion] : not dyspnea on exertion [Negative] : Cardiovascular

## 2023-05-31 NOTE — PLAN
[FreeTextEntry1] : Acute URI: Rx Zpak, Tessalon perles; advised on OTC Claritin (nondrowsy) and saline spray, warm tea/honey and lozenges to moisten throat.

## 2023-05-31 NOTE — PHYSICAL EXAM
[Normal Outer Ear/Nose] : the outer ears and nose were normal in appearance [Normal TMs] : both tympanic membranes were normal [No JVD] : no jugular venous distention [No Lymphadenopathy] : no lymphadenopathy [No Respiratory Distress] : no respiratory distress  [No Accessory Muscle Use] : no accessory muscle use [Normal] : normal rate, regular rhythm, normal S1 and S2 and no murmur heard [No Edema] : there was no peripheral edema [Normal Affect] : the affect was normal [Normal Insight/Judgement] : insight and judgment were intact [de-identified] : +post nasal drip, no pharyngeal erythema [de-identified] : trace crepitance right base auscultated posteriorly

## 2023-05-31 NOTE — HISTORY OF PRESENT ILLNESS
[FreeTextEntry8] : ROSA BURGOS is a 77 year old male who presents for acute medical evaluation, c/o "coughing spells".\par Onset ~2 weeks ago, congestion, cough worse at night/early mornings, goes into coughing spells. chest on both sides aching from coughing so much. Has not tried anything OTC as he feels nothing has helped in the past and he was concerned of possible interactions with Eliquis.\par No fever/chills. Wife has been sick with similar symptoms.

## 2023-06-22 ENCOUNTER — NON-APPOINTMENT (OUTPATIENT)
Age: 78
End: 2023-06-22

## 2023-06-22 ENCOUNTER — APPOINTMENT (OUTPATIENT)
Dept: ELECTROPHYSIOLOGY | Facility: CLINIC | Age: 78
End: 2023-06-22
Payer: MEDICARE

## 2023-06-22 PROCEDURE — 93294 REM INTERROG EVL PM/LDLS PM: CPT

## 2023-06-22 PROCEDURE — 93296 REM INTERROG EVL PM/IDS: CPT

## 2023-07-25 NOTE — ED PROVIDER NOTE - ATTENDING WITH...
Received: Yesterday  Anastasia Lemos [place] Ma Msg Pool  [place]     Reclast has been approved effective from 7/21/23 to 7/21/24. Pending Patient Contact.     Approval is based on the following:   [facility] Insurance review     Dose / frequency: 5mg once a year   Number of Visits: 1   Diagnosis: M81.0 (ICD-10-CM) - Osteoporosis, unspecified osteoporosis type, unspecified pathological fracture presence   Location: Office     Financial Assistance available: no     Left Message Regarding Copay/Benefits       If you need assistance with additional authorization please reach out to us at [facility]PAT[facility] [[id number]]     Anastasia Lemos   7/24/23      Resident

## 2023-08-01 ENCOUNTER — APPOINTMENT (OUTPATIENT)
Dept: INTERNAL MEDICINE | Facility: CLINIC | Age: 78
End: 2023-08-01
Payer: MEDICARE

## 2023-08-01 PROCEDURE — 36415 COLL VENOUS BLD VENIPUNCTURE: CPT

## 2023-08-08 ENCOUNTER — APPOINTMENT (OUTPATIENT)
Dept: INTERNAL MEDICINE | Facility: CLINIC | Age: 78
End: 2023-08-08
Payer: MEDICARE

## 2023-08-08 VITALS
RESPIRATION RATE: 14 BRPM | HEART RATE: 78 BPM | SYSTOLIC BLOOD PRESSURE: 148 MMHG | DIASTOLIC BLOOD PRESSURE: 78 MMHG | TEMPERATURE: 98 F | OXYGEN SATURATION: 98 %

## 2023-08-08 VITALS — HEIGHT: 69 IN | BODY MASS INDEX: 27.4 KG/M2 | WEIGHT: 185 LBS

## 2023-08-08 DIAGNOSIS — N17.9 ACUTE KIDNEY FAILURE, UNSPECIFIED: ICD-10-CM

## 2023-08-08 DIAGNOSIS — R06.02 SHORTNESS OF BREATH: ICD-10-CM

## 2023-08-08 DIAGNOSIS — Z86.39 PERSONAL HISTORY OF OTHER ENDOCRINE, NUTRITIONAL AND METABOLIC DISEASE: ICD-10-CM

## 2023-08-08 DIAGNOSIS — H65.119 ACUTE AND SUBACUTE ALLERGIC OTITIS MEDIA (MUCOID) (SANGUINOUS) (SEROUS), UNSPECIFIED EAR: ICD-10-CM

## 2023-08-08 DIAGNOSIS — R42 DIZZINESS AND GIDDINESS: ICD-10-CM

## 2023-08-08 DIAGNOSIS — Z87.898 PERSONAL HISTORY OF OTHER SPECIFIED CONDITIONS: ICD-10-CM

## 2023-08-08 DIAGNOSIS — E78.5 HYPERLIPIDEMIA, UNSPECIFIED: ICD-10-CM

## 2023-08-08 DIAGNOSIS — Z86.79 PERSONAL HISTORY OF OTHER DISEASES OF THE CIRCULATORY SYSTEM: ICD-10-CM

## 2023-08-08 DIAGNOSIS — Z87.448 PERSONAL HISTORY OF OTHER DISEASES OF URINARY SYSTEM: ICD-10-CM

## 2023-08-08 DIAGNOSIS — Z00.00 ENCOUNTER FOR GENERAL ADULT MEDICAL EXAMINATION W/OUT ABNORMAL FINDINGS: ICD-10-CM

## 2023-08-08 DIAGNOSIS — R55 SYNCOPE AND COLLAPSE: ICD-10-CM

## 2023-08-08 DIAGNOSIS — I48.0 PAROXYSMAL ATRIAL FIBRILLATION: ICD-10-CM

## 2023-08-08 DIAGNOSIS — R79.89 OTHER SPECIFIED ABNORMAL FINDINGS OF BLOOD CHEMISTRY: ICD-10-CM

## 2023-08-08 LAB
25(OH)D3 SERPL-MCNC: 50.6 NG/ML
ALBUMIN SERPL ELPH-MCNC: 4.7 G/DL
ALP BLD-CCNC: 80 U/L
ALT SERPL-CCNC: 17 U/L
ANION GAP SERPL CALC-SCNC: 14 MMOL/L
APPEARANCE: CLEAR
AST SERPL-CCNC: 20 U/L
BILIRUB SERPL-MCNC: 0.4 MG/DL
BILIRUBIN URINE: NEGATIVE
BLOOD URINE: NEGATIVE
BUN SERPL-MCNC: 47 MG/DL
CALCIUM SERPL-MCNC: 9.5 MG/DL
CHLORIDE SERPL-SCNC: 107 MMOL/L
CHOLEST SERPL-MCNC: 176 MG/DL
CO2 SERPL-SCNC: 18 MMOL/L
COLOR: YELLOW
CREAT SERPL-MCNC: 1.68 MG/DL
EGFR: 41 ML/MIN/1.73M2
ESTIMATED AVERAGE GLUCOSE: 163 MG/DL
GLUCOSE QUALITATIVE U: >=1000 MG/DL
GLUCOSE SERPL-MCNC: 135 MG/DL
HBA1C MFR BLD HPLC: 7.3 %
HDLC SERPL-MCNC: 51 MG/DL
KETONES URINE: NEGATIVE MG/DL
LDLC SERPL CALC-MCNC: 88 MG/DL
LEUKOCYTE ESTERASE URINE: NEGATIVE
NITRITE URINE: NEGATIVE
NONHDLC SERPL-MCNC: 125 MG/DL
PH URINE: 5.5
POTASSIUM SERPL-SCNC: 5.3 MMOL/L
PROT SERPL-MCNC: 7.2 G/DL
PROTEIN URINE: NORMAL MG/DL
PSA SERPL-MCNC: 0.09 NG/ML
SODIUM SERPL-SCNC: 140 MMOL/L
SPECIFIC GRAVITY URINE: 1.02
T4 SERPL-MCNC: 5.4 UG/DL
TRIGL SERPL-MCNC: 216 MG/DL
TSH SERPL-ACNC: 2.29 UIU/ML
UROBILINOGEN URINE: 0.2 MG/DL

## 2023-08-08 PROCEDURE — G0439: CPT

## 2023-08-08 PROCEDURE — G0444 DEPRESSION SCREEN ANNUAL: CPT

## 2023-08-08 RX ORDER — AMOXICILLIN 500 MG/1
500 CAPSULE ORAL 3 TIMES DAILY
Refills: 0 | Status: COMPLETED | COMMUNITY
End: 2023-08-08

## 2023-08-08 RX ORDER — BENZONATATE 100 MG/1
100 CAPSULE ORAL
Qty: 1 | Refills: 0 | Status: COMPLETED | COMMUNITY
Start: 2023-05-31 | End: 2023-08-08

## 2023-08-08 RX ORDER — AZITHROMYCIN 250 MG/1
250 TABLET, FILM COATED ORAL
Qty: 1 | Refills: 0 | Status: COMPLETED | COMMUNITY
Start: 2023-05-31 | End: 2023-08-08

## 2023-08-08 RX ORDER — METOPROLOL SUCCINATE 25 MG/1
25 TABLET, EXTENDED RELEASE ORAL DAILY
Qty: 135 | Refills: 1 | Status: ACTIVE | COMMUNITY
Start: 2023-02-27 | End: 1900-01-01

## 2023-08-08 NOTE — HISTORY OF PRESENT ILLNESS
[FreeTextEntry1] : "feeling pretty good" [de-identified] : ROSA BURGOS is a 78 year old male who presents for annual wellness visit. -H/o CKD stage 3, DM II, Hypertension, Afib, Aortic Valve stenosis s/p TAVR, Prostate CA s/o RT/hormonal tx, Kidney stones,  Peripheral Artery Disease. -Follows regularly with specialists (Dr Miller-cardiology, Dr. Pittman-renal, Dr. Peter--urology, Dr. Escobedo-vascular, Ophthalmologist, dermatologist). -Blood pressure readings at home checked regularly well-controlled in 110s/60s per patient, a few outliers of SBP 140s.  -Keeps active--maintains his own yard, cars which he enjoys doing. Has very sporadic/occasional pain in legs, but nonlimiting his function. Diet generally healthy/balanced.

## 2023-08-08 NOTE — PHYSICAL EXAM
[No Murmur] : no murmur heard [No Carotid Bruits] : no carotid bruits [No Abdominal Bruit] : a ~M bruit was not heard ~T in the abdomen [No Varicosities] : no varicosities [Pedal Pulses Present] : the pedal pulses are present [No Edema] : there was no peripheral edema [No Palpable Aorta] : no palpable aorta [No Axillary Lymphadenopathy] : no axillary lymphadenopathy [Declined Rectal Exam] : declined rectal exam [No Rash] : no rash [Normal] : affect was normal and insight and judgment were intact [de-identified] : device palpated left upper chest wall [de-identified] : no bladder fullness or tenderness, he declines full  exam [de-identified] : tanned skin, scattered hyperpigmentated moles on upper back, cherry angiomas, none with apparent concerning features

## 2023-08-08 NOTE — HEALTH RISK ASSESSMENT
[Very Good] : ~his/her~  mood as very good [No] : In the past 12 months have you used drugs other than those required for medical reasons? No [No falls in past year] : Patient reported no falls in the past year [0] : 2) Feeling down, depressed, or hopeless: Not at all (0) [PHQ-2 Negative - No further assessment needed] : PHQ-2 Negative - No further assessment needed [de-identified] : as above [de-identified] : as above [[id number]] : 0 [HIV test declined] : HIV test declined [Hepatitis C test declined] : Hepatitis C test declined [Change in mental status noted] : No change in mental status noted [Language] : denies difficulty with language [Behavior] : denies difficulty with behavior [Learning/Retaining New Information] : denies difficulty learning/retaining new information [Handling Complex Tasks] : denies difficulty handling complex tasks [Reasoning] : denies difficulty with reasoning [Spatial Ability and Orientation] : denies difficulty with spatial ability and orientation [None] : None [With Family] : lives with family [Retired] : retired [] :  [Feels Safe at Home] : Feels safe at home [Fully functional (bathing, dressing, toileting, transferring, walking, feeding)] : Fully functional (bathing, dressing, toileting, transferring, walking, feeding) [Fully functional (using the telephone, shopping, preparing meals, housekeeping, doing laundry, using] : Fully functional and needs no help or supervision to perform IADLs (using the telephone, shopping, preparing meals, housekeeping, doing laundry, using transportation, managing medications and managing finances) [Reports changes in hearing] : Reports no changes in hearing [Reports changes in vision] : Reports no changes in vision [Reports normal functional visual acuity (ie: able to read med bottle)] : Reports normal functional visual acuity [Reports changes in dental health] : Reports no changes in dental health [Smoke Detector] : smoke detector [Carbon Monoxide Detector] : carbon monoxide detector [Safety elements used in home] : safety elements used in home [Seat Belt] :  uses seat belt [Sunscreen] : uses sunscreen [TB Exposure] : is not being exposed to tuberculosis [Never] : Never

## 2023-08-08 NOTE — DATA REVIEWED
[FreeTextEntry1] : Patient deferred EKG, had done recently with cardiologist.  Labs reviewed with patient--note A1C 7.3%, lipids stable, CBC stable, ABHILASH/Cr 47/1.68, eGFR 41--chronic/stable. TFTs, Vit D normal. PSA 0.09--patient made aware, following with urologist. UA glucosuria, no protein.

## 2023-08-08 NOTE — PLAN
[FreeTextEntry1] : Cardiac--Afib, ASHD, s/p TAVR--conditions stable, HR controlled, BP elevated in office, but well controlled at home. Meds reconciled/renewed--Lisinopril 10 mg, Metoprolol ER 37.5 mg daily, Crestor 40 mg daily, on Eliquis, f/u Dr. Miller. Low salt diet.  DM II--A1C acceptable given his age >78. C/w present management of diet and Jardiance 25 mg daily, Glimepiride 1 mg daily. UTD ophthalmology. On ACE-I. GERD-controlled, on Omeprazole CKD, renal stones, h/o prostate CA: on Kcit, ACE-I, following with urologist and renal.  Vaccines reviewed--due for [id number]--st wishes to defer to later date as has a lot of plans this week; Shingrix--he will get at local pharmacy. Seasonal Flu vaccine and COVID19 booster when due.  F/u 6m/prn.

## 2023-08-11 ENCOUNTER — APPOINTMENT (OUTPATIENT)
Dept: UROLOGY | Facility: CLINIC | Age: 78
End: 2023-08-11
Payer: MEDICARE

## 2023-08-11 ENCOUNTER — OUTPATIENT (OUTPATIENT)
Dept: OUTPATIENT SERVICES | Facility: HOSPITAL | Age: 78
LOS: 1 days | End: 2023-08-11
Payer: MEDICARE

## 2023-08-11 DIAGNOSIS — Z98.890 OTHER SPECIFIED POSTPROCEDURAL STATES: Chronic | ICD-10-CM

## 2023-08-11 DIAGNOSIS — R35.0 FREQUENCY OF MICTURITION: ICD-10-CM

## 2023-08-11 DIAGNOSIS — Z95.2 PRESENCE OF PROSTHETIC HEART VALVE: Chronic | ICD-10-CM

## 2023-08-11 DIAGNOSIS — N20.0 CALCULUS OF KIDNEY: ICD-10-CM

## 2023-08-11 DIAGNOSIS — Z95.5 PRESENCE OF CORONARY ANGIOPLASTY IMPLANT AND GRAFT: Chronic | ICD-10-CM

## 2023-08-11 DIAGNOSIS — Z95.0 PRESENCE OF CARDIAC PACEMAKER: Chronic | ICD-10-CM

## 2023-08-11 PROCEDURE — 51741 ELECTRO-UROFLOWMETRY FIRST: CPT

## 2023-08-11 PROCEDURE — 76775 US EXAM ABDO BACK WALL LIM: CPT | Mod: 26

## 2023-08-11 PROCEDURE — 76775 US EXAM ABDO BACK WALL LIM: CPT

## 2023-08-11 PROCEDURE — 99213 OFFICE O/P EST LOW 20 MIN: CPT

## 2023-08-11 NOTE — HISTORY OF PRESENT ILLNESS
[FreeTextEntry1] : f/u from recent trip to ER. He noted right flank beginning earlier in the week though no associated N/V, fevers, chills or hematuria. PAin continued and PCP sent him for a CT scan which noted a 4mm stone distal ureter with some hydronephrosis and perinephric stranding and ? forniceal rupture. BMP also noted elevated creatinine over baseline.  he last had pain last night ans he thinks he passed the stone, noting a back stone in the toilet. he has prior h/o stones: open lithotomy 1999 and ureteroscopy laser lithotripsy 6/20. May have passed stones between. reviewed CT scan - see no other stones and not sure there is a forniceal rupture.  he also notes h/o prostate cancer  - recollects had [place] 9 but doesn't recall PSA. Treated with 45 XRTs ADT (4 3 month shots) last 3/21. Still has fatigue and hot flashes especially at night. Has a recent PSA which was essentially 0.   12/21 - in the interim ended up passing stone before the scheduled ureteroscopy and GFR back to his normal.  reviewed his Litholink: low citrate.PH and high UA but serum was normal.   3/22 - no stones pains or hematuria. Not taking the KCitrate as concerned about the K. on lemon juice.  still having hot flashes.  ULS notes 2 stones - one new.   9/22 no stone symptoms. notes nocturia 3-4 times and has a slower FOS at night; daytime no frequency or urgency; no dysuria or hematuria. Also gets hot flashes.  U:S - same stone with no hydro. PVR 21.   2/23 -  here with changes in GFR; had increase in BP and doubled up on lisinopril and Creatinine increased to 2 from 1/3. changed the medical treatment and now 1.48.  no flank pain, or hematuria.  having hot flashes - last T 175 PSA 0.07 1/23  LUts ok and not sure tamsulosin helps.   8/23 main complaints are hot flashes  last T 149 LUTs - stable off tamsulosin. No dysuria or hematuria.  On K Citrate - n colic and ULS today ? 3mm stone  Voded 164  16.7 peak FR

## 2023-08-11 NOTE — ASSESSMENT
[FreeTextEntry1] : FRANCK - duscussed using T supplement in this setting - will hold off. LITYS good off medcial therapy. stable stone situation

## 2023-08-14 DIAGNOSIS — N20.0 CALCULUS OF KIDNEY: ICD-10-CM

## 2023-08-14 DIAGNOSIS — C61 MALIGNANT NEOPLASM OF PROSTATE: ICD-10-CM

## 2023-08-14 DIAGNOSIS — R39.9 UNSPECIFIED SYMPTOMS AND SIGNS INVOLVING THE GENITOURINARY SYSTEM: ICD-10-CM

## 2023-08-28 ENCOUNTER — APPOINTMENT (OUTPATIENT)
Dept: ELECTROPHYSIOLOGY | Facility: CLINIC | Age: 78
End: 2023-08-28

## 2023-08-28 ENCOUNTER — APPOINTMENT (OUTPATIENT)
Dept: CARDIOLOGY | Facility: CLINIC | Age: 78
End: 2023-08-28
Payer: MEDICARE

## 2023-08-28 ENCOUNTER — NON-APPOINTMENT (OUTPATIENT)
Age: 78
End: 2023-08-28

## 2023-08-28 VITALS
BODY MASS INDEX: 26.81 KG/M2 | OXYGEN SATURATION: 99 % | HEART RATE: 82 BPM | SYSTOLIC BLOOD PRESSURE: 132 MMHG | WEIGHT: 181 LBS | HEIGHT: 69 IN | DIASTOLIC BLOOD PRESSURE: 60 MMHG

## 2023-08-28 DIAGNOSIS — I35.0 NONRHEUMATIC AORTIC (VALVE) STENOSIS: ICD-10-CM

## 2023-08-28 PROCEDURE — 99214 OFFICE O/P EST MOD 30 MIN: CPT | Mod: 25

## 2023-08-28 PROCEDURE — 93000 ELECTROCARDIOGRAM COMPLETE: CPT

## 2023-08-28 NOTE — REVIEW OF SYSTEMS
[Feeling Fatigued] : feeling fatigued [Dizziness] : dizziness [Negative] : Heme/Lymph [Weight Gain (___ Lbs)] : no recent weight gain [Weight Loss (___ Lbs)] : no recent weight loss [SOB] : no shortness of breath [Dyspnea on exertion] : not dyspnea during exertion [Chest Discomfort] : no chest discomfort [Lower Ext Edema] : no extremity edema [Palpitations] : no palpitations [Syncope] : no syncope [Convulsions] : no convulsions [de-identified] : Severe ecchymoses [FreeTextEntry5] : see HPI [de-identified] : No more shaking since the one episode in the hospital

## 2023-08-28 NOTE — DISCUSSION/SUMMARY
[FreeTextEntry1] : Continue current medications but add ezetimibe 10 mg daily.  Follow-up 4 months if remains stable. [EKG obtained to assist in diagnosis and management of assessed problem(s)] : EKG obtained to assist in diagnosis and management of assessed problem(s)

## 2023-08-28 NOTE — HISTORY OF PRESENT ILLNESS
[FreeTextEntry1] : April 3, 2019. Patient has been followed by Dr. Larry Toth and was followed by cardiology at [facility]. In June of 2011 after a positive stress test had cardiac angiography, which had a distal 90% RCA lesion and only nonobstructive disease in the LAD and circumflex. This was stented and his symptoms were improved. His symptoms then recurred. They have been exertional chest pressure and shortness of breath when he walks after eating a meal, especially a large meal. Never gets it at rest and does not get it when he exerts himself if he has not just eaten. Did well for a while, but then the symptoms returned and in October of 2016 after another positive ECG treadmill test he had repeat cardiac catheterization. There was an 80% mid RCA lesion and again no significant disease in the LAD was circumflex and he had 2 stents to the RCA . The difference was this time his symptoms did not change. He has been placed on Omeprazole and this has not seemed to make a difference, or perhaps it did initially. He has not seen GI. He otherwise seems to be doing well. In July of 2014. He had bilateral carotid endarterectomies. His most recent echo showed normal LV function. His EKG is normal sinus rhythm and a normal tracing. He has hypertension and hyperlipidemia.He is a former smoker. His father had hypertension and CVA, but there does not seem to be a family history of coronary artery disease.  April 24, 2019.  Patient tried to increase his omeprazole, and then said he got a stomach ache and stopped it. He went for a CTA of the coronary arteries. Minor disease in the LAD and borderline severe 60-69% in the proximal circumflex and 60-70% in the RCA. Problem with him is whether his symptoms are related to ischemia or not. After long discussion I recommended that he have the angiogram. If non-obstructive disease, try Ranexa and if no help, consider noncardiac causes. If significant disease then he should have stents and again, we can see if this relieves his symptoms or not. Patient to discuss with his wife and then let me know    March 1, 2021.Patient called.  His exertional chest pain is getting worse and coming more frequently and more easier for the last few months.  I have not seen him since April 2019 and back then when we did a CTA it was mostly unrevealing despite his previous 3 stents in his RCA.  I will schedule him for a stress echo as soon as possible.  March 9, 2021.  Patient came for stress echocardiogram.  Resting echo with borderline severe though not yet critical aortic stenosis and totally normal LV function.  On treadmill within the first minute STs started to go down and heart rate shot up to 140.  I stopped the treadmill at 2 minutes with severe ST depressions and shortness of breath but no chest pain.  Post exercise echo shows new wall motion abnormality in the entire anterior wall and may be some hypokinesis in the lateral wall.  Long discussion with patient and wife and they will be booked for coronary angiography and left and right heart cath as soon as he can have a Covid swab done.  (He had the first vaccine already and the second vaccine was 5 days ago) March 16, 2021.Cath only showed 80% RCA lesion and the rest was nonobstructive.  The RCA was stented and the patient was sent home that evening.  He is on aspirin and Plavix.  He will continue metoprolol ER 50 daily.  He has a follow-up with me in 2 weeks and then will be evaluated by structural heart for a TAVR  March 30, 2021.  Patient here in follow-up.  Had stent to his RCA and is now on a beta-blocker but no change in his exertional symptoms.  In addition somewhat anxious about upcoming procedure; has evaluation appointment 4/6. May 28, 2021.  Patient had TAVR on May 13, uncomplicated except his glucoses were high post procedure.  Since he has been home he has stopped eating cakes and bagels and has lost 7 or 8 pounds.  In terms of his angina he is "95% better".  His only complaint is severe ecchymoses once again from his dual antiplatelet therapy..  Has seen Dr. Johnston and Dr. Toth in follow-up.  Remains in sinus rhythm with an unchanged ECG.  Had a monitor with no significant arrhythmias.  Is off metoprolol and currently only on lisinopril 10 mg.   July 19, 2021.  Patient here in follow-up.  He had seen structural heart and an echo was done on July 1 which showed mild MR, normally functioning TAVR with no paravalvular AI.  Moderate LAE.  Normal LV size and function and normal RV size and function.  He had an ECG on July 1 that was sinus rhythm and unremarkable.  Since his TAVR he has had no episodes of his exertional chest pressure until 1 day after making sure that he mowed his lawn on an empty stomach and had no symptoms, he then went and had a big meal and then remembered he had not swept the street.  When he swept the street he got the chest tightness again and it was relieved with rest.  That was the only episode since and has not recurred.  Otherwise no real complaints initial blood test by structural heart had a potassium of 6.1 and his creatinine had gone up to 2.0.  These were repeated 5 days later and his potassium was 5.1 and his creatinine was 1.43.  He remains on lisinopril just 10 mg daily. September 15, 2021.  Urgent visit today because of multiple falls with trauma.  I reviewed the notes from Dr. COTTO and Dr. Toth.  EKG here is sinus rhythm with sinus arrhythmia around 76, mild first-degree AV block otherwise normal tracing.  Labs September 10 with worsening creatinine back up to 1.92 and BUN 64 which he thinks is from the dye for the head CT in the hospital but also his LFTs were up.  Echo in the hospital September 2 mostly unrevealing.  The history from the patient and his wife seems to be the 2 falls were when he was erect and then bent over and then went down without warning.  Has never had a true syncopal episode when lying down or sitting.  He is not sure if he has gotten dizzy briefly while sitting.  His blood pressure has been running low despite adjustments in his lisinopril and for now I would just hold it.  The symptoms do not sound like hypoglycemia but he has had his meds adjusted and he is only on metformin and an SGLT2 inhibitor.  Last A1c was 6.2 in August.  With Dr. Barroso and again here in the office not really orthostatic.  ZIO AT monitor was placed. September 17, 2021.  A few hours after putting on ZIO monitor, patient had 4.3 seconds of asystole with P waves but no QRS.  Was sent to the emergency room and had a pacemaker placed by Dr. Wong.  Patient being discharged later this morning.  September 23, 2021. Patient was still feeling weak and dizzy and fatigued but ruled out any problem with the pacemaker.  Blood pressure has been running 145 and 150 which makes him concerned.  I reassured him that a blood pressure of 150 would not give him any symptoms but I have no problem with him going back on the low-dose lisinopril and following up with Dr. Toth.  If he is still feeling bad at Dr. Toth has no answer I will be happy to see him next week as well.  October 20, 2021.  Urgent visit an urgent echo for this patient.  Difficult history to pin down as discussing with patient and his wife.  He thinks worsening shortness of breath with exertion for about a month now and she thinks it goes back at least until August meanwhile it has been edema and fluid retention more recently.  Dr. Toth put him on Lasix and he has had improvement in just 5 days so far.  Reviewing his interval records there is 1 EKG when he saw Dr. COTTO on September 8 and had new ST depressions in V5 and V6.  I do not believe he was complaining of any chest pain or shortness of breath at the time.  All his other EKGs seem to be the same including today and they are sinus rhythm.  His echo today especially compared to his post TAVR echoes in May and September does seem to have a new wall motion abnormality in his septum which seems to be more than just possible paradoxical septal motion seen after open heart surgery which he did not have just the past..  He did have a stent to his RCA pre-TAVR and there was some moderate nonobstructive lesions.  No episode of chest pain per se.  His BNP use which had been in the 300 400 range on October 14 was 3740.  No documentation of atrial fibrillation.  Does not seem to be using his pacemaker very often so pacemaker syndrome less likely as well.  LVEF on echo today is in the 40% to 50% range the patient has always seem to have symptoms a little bit out of proportion to objective findings.  Repeat labs including BNP was sent and consideration of repeat angiogram was discussed although patient is reluctant because of his kidney issues whenever he gets dye. November 2, 2021.  Patient returns today, just wants to keep telling me how much better he feels.  Was able to mow his whole lawn although he did it over 2 days without getting burning, shortness of breath, chest pain.  His weight is down at least 4 pounds and he no longer has edema so is responding well to the medical therapy.  Once again he would like to avoid an angiogram and is asking to just have his echo repeated.  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 December 15, 2021.  Patient returns with his wife and for echocardiogram.  Continues to improve and thinks he is pretty much back to himself able to work in his yard etc. just like he mentioned above.  Echocardiogram done and in fact septal wall motion does seem to be much better and overall LVEF is between 50 and 55%.  Still with mild to moderate MR.  March 18, 2022.  Here in follow-up.  Has not yet decided on an internist to a place Dr. Toth.  Had some labs with his urologist.  Potassium only 3.3 but no change in BUN and creatinine (patient unaware and does not think he is taking potassium citrate 10 mEq.  Urologist also had him stop his multivitamins.)  He is also having a lot of dental work and bridge work coming up so will need antibiotic prophylaxis.  The urologist also checked his A1c which went up to 7.5.  He is taking Metformin 1000 and Jardiance 25 but admits to being off his diet so hopefully he will go back on his diet first.  He saw Dr. Vyas for pacemaker follow-up together with his wife and Dr. Wong was very happy with how he is doing.  He remains in sinus rhythm with one VPC.  He has follow-up coming up with Dr. Membreno about his TAVR and he thinks he is going to have an echo at that time as well. April 1, 2022. Long discussion with patient today after Dr. Wong called me the other day about finding of up to 3-minute run of atrial fibrillation on Paceart monitoring.  Overall AF burden is very low but they may be under sensing. Patient always concerned because he has had issues with ecchymoses that were pretty severe in the past.  For now is willing to take the Eliquis and we will follow-up in terms of further monitoring.  Whether in the future to try antiarrhythmic medication perhaps even amiodarone to eliminate atrial fibrillation and continue to monitor will be discussed in the future if anticoagulation becomes a major problem.  Other options could always be discussed and as well including ablation, Watchman device etc.  For now we will start Eliquis 5 mg twice daily.  Meanwhile he will remain on aspirin 81 mg for his TAVR  April 13, 2022. Patient called.  Not feeling well, dizzy at times.  First thought it was the Eliquis but no signs of bleeding.  Now since his blood pressure is usually in the 150s and would like to change back to lisinopril but maybe only 5 mg and stop the amlodipine.  I am okay with that and he will make a follow-up appointment in 3 weeks together with his wife's appointment on May 5.  Continue Eliquis for now.  Urged him to find new primary care still.  May 12, 2022.  Patient here in follow-up together with his wife.  Always somewhat difficult to manage with unusual symptoms and a poor descriptor.  Both he and his wife seem happy with the new internist Dr. Landin.  He is off metformin and she gave him glimepiride at 2 mg but he was having hypoglycemic episodes so is now on 1 mg.  He is still on Jardiance without change.  On his own he cut back the lisinopril to 2.5 mg and he would like to stop the chlorthalidone altogether.  He is not taking any potassium.  He denies exertional chest pain or shortness of breath.  Still occasional although rare A. fib on his remote pacemaker monitoring although he complains of severe ecchymoses from the Eliquis as well as the cost.  He has an upcoming follow-up with Dr. Membreno of structural heart and this will include an echocardiogram. Patient saw Dr. Membreno May 17 for 1 year follow-up of his JESSICA.  LV function normal with some inferior hypokinesis.  BUN 78 creatinine 1.6 and the Lasix was stopped for a few days.  He explained to the patient that his symptoms were not cardiac related. September 13, 2022.  Patient here in follow-up.  Remains in sinus rhythm with occasional APCs.  On labs from July 26 BUN was 38 with creatinine of 1.2 and potassium 5.2.  Other labs okay but no BNP done.  Cholesterol 164, HDL 63, LDL 84.  No BNP was done.  Patient is complaining as above that he does not have the same energy etc.  Would like to come off Eliquis so we had a long discussion about possibly getting an apple watch etc.  He did have pacemaker interrogation in August that showed no atrial fibrillation.  He did gain weight because "I like to eat especially bagels".  I did discuss with him at least getting whole-wheat bagels and scooping out the inside etc. I tried to emphasize that clinically he is doing very well.  Reviewed his medications; he is on no diuretics. January 17, 2023.  Patient here in follow-up.  He noticed a very low pulse when he would check his blood pressure and return that he was having a lot of VPCs.  He saw Dr. Wong in November and he added metoprolol ER 25 mg.  He had his pacemaker defibrillator interrogation but unfortunately was not downloaded properly and I cannot see any of the results on the computer but the patient does say that there has been no atrial fibrillation since last February and he is asking about coming off Eliquis; however he does not want to wear a smart watch etc.  Complains of generalized fatigue not able to do what he used to do and cannot be more specific as to whether it is just tiredness because he is almost 80 or he is having shortness of breath or his back pain acting up etc.  On his own for the last 3 to 4 weeks he has increased the lisinopril to 20 mg and is getting better blood pressure numbers at home.  The cost of both Eliquis and Jardiance are still a problem for him so changing to something like Entresto would also be an issue.  Last echo was May 2022 with Dr. Membreno so if he remains stable I will repeat another echo this coming May.  Still having trouble with dietary issues, what makes him literature about the [place] diet and instead I try to again talk to him about the Mediterranean diet. May 24, 2023.  Patient here in follow-up and for echocardiogram.  Once again very difficult to determine his true symptoms and complaints.  He saw Dr. Wong who found a high percent of VPCs and talk to him about an ablation while giving him a higher dose of metoprolol.  He felt miserable on the 100 mg of metoprolol and cut back to 50 and then eventually went back to 25; still complaining of not being able to do as much as he would like and not having enough energy and never complained of palpitations of VPCs.  Here he is in sinus rhythm, his blood pressure at home has been mostly in the 110s to 120s he claims and here he is around 138 or so.  His echocardiogram actually looks good in terms of nothing there to explain his trouble pushing himself and being more active; his ejection fraction is around 60%, his RVSP is a little high but no different than before and he seems to have normal RV function.  There is mild to moderate MR but not severe his TAVR is working fine.  I sent a BNP along with a follow-up basic metabolic because of his hyperkalemia and renal function.  I am not convinced that he would benefit from a VPC ablation. August 28, 2023.  Returns in follow-up.  Had labs at the beginning of April with his PCP.  Hemoglobin A1c 7.3.  Cholesterol 176, triglycerides 216, HDL 51, LDL 88. (On rosuvastatin 40).  BUN 47 with creatinine 1.68 and  potassium 5.3.  No BNP done.  EKG is sinus rhythm but frequent VPCs.  Otherwise unchanged.  Initial twelve-lead in bigeminy and on rhythm strip 1 couplet and otherwise just occasional VPCs.  His blood pressure at home has been good and he no longer gets chest pain or short of breath when he mows the lawn although he make sure not to do it all at once.

## 2023-08-28 NOTE — ASSESSMENT
[FreeTextEntry1] : (Patient with known coronary artery disease, status post 3 stents to his RCA on 2 occasions. Nonobstructive disease elsewhere. Symptom of exertional chest pressure and shortness of breath after eating a large meal in 2019.  Patient reluctant to take angiogram because in the past dual antiplatelet therapy gave him severe ecchymoses in both hands.  CT angiogram was done then which seem to show more nonobstructive disease but I felt he should have the angiogram in any case.  He was going to discuss it with his wife but they never got back to me and I did not hear from them until the beginning of March when the patient called that his symptoms have worsened.  His wife later told me that he had shoveled snow and had significant symptoms.  In any case I scheduled him for stress echocardiogram which was markedly abnormal today indicating most likely severe coronary disease as well as borderline severe aortic stenosis. Patient had cardiac angiogram which showed only significant disease in the RCA and it was stented by Dr. Beaulieu.  He remained on beta-blocker but between the stent and the beta-blocker his symptoms did not improve and maybe slightly worse leading me to assume that his symptoms along with his markedly abnormal stress test must be from his aortic stenosis.  He had the TAVR on May 13, 2021 and was here initially saying that his angina was 95% gone.  He had issues with his glucose postop so he is now being strict about avoiding cakes and bagels etc.  He has lost some weight.  He is off metoprolol and only on 10 mg of lisinopril.  His MCOT showed no arrhythmias.  No dizziness or palpitations.  He had severe ecchymoses until we were able to stop the Plavix and just continue aspirin.  (He had dental work coming up and we reviewed his need for SBE prophylaxis and he will be using amoxicillin 2 g 1 hour before.)  He followed up with structural heart and had an echo as mentioned above with normal LV function and no WMA and was doing very well.  Did have one episode of chest pressure again when after eating a large meal he tried to sweep the street.  Otherwise asymptomatic and very happy since the TAVR.  We reviewed his carotid Doppler that was done preprocedure and he does still have greater than 70% stenosis on the right and 50 to 69% on the left despite having had carotid endarterectomies on both sides in the past.  He had a CT angiogram in the hospital in early September and his carotids were both less than 50%.  We will aim to keep his cholesterol extremely low.)  Had syncopal episode and seizure and was finally found to have conduction disease and pacemaker was placed.  Syncope has not recurred but then had issues with fluid retention and a very high BNP.  1 option could have been a pacemaker syndrome if he was paced most of the time but he is very rarely paced and seems to be in sinus rhythm on all his interval EKGs.  Last visit he tells me he had a scheduled pacemaker interrogation and was told that "120 times his heart rate dropped low enough for his pacemaker to kick in and the technician said perhaps he should have had a biventricular pacemaker"; in fact his report has atrial pacing only 9.5% of the time and ventricular pacing only 2.6% of the time. He has had other symptoms that are hard to pin down again him thinking that is just a few weeks and his wife thinking it goes back to August.  In any case after the very high BNP he was put on Lasix by Dr. Rubinstein which seems to have improved things initially for the last 5 days and the echo 10/20 did show new wall motion abnormality in the left ventricle although his overall LVEF was at least 40 to 45% but in the past he has had symptoms that have seemed out of proportion to objective findings.  I mentioned that he might need another angiogram and he was very anxious about that because of the issues of the dye playing havoc with his kidneys in the past.  He did not have any chest pain syndrome to suggest an interval myocardial infarction although there was one somewhat abnormal ECG as mentioned above but it was just ST depressions V5 and V6.  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  This was done December 15, 2021 along with his follow-up and in fact his septal motion is improved and overall EF is back to 50 to 55%.  Sure enough there is return to baseline of his LV function so perhaps there was a transient myocarditis.  We will hold off on cardiac cath or any further work-up at this time.  Long discussion about the importance of not shoveling should diabetes no fall and that he could use the snowblower if it is not strenuous but that is all.  He returned and as mentioned had not found a replacement for Dr. Toth yet.  Had labs recently with his urologist and the issues are a hemoglobin A1c going up to 7.5 despite Metformin 1000 and Jardiance 25. He did admit to being off his diet.  His potassium was only 3.3; he does not think he is taking potassium and the urologist told him to stop all his multivitamins which perhaps had some potassium in them.   Here May, 2022 with his wife and he and his wife seemed happy with Dr. Toth's replacement Dr. Landin.  She stopped his metformin as it was not helping and gave him glimepiride 2 mg.  On this he began having some hypoglycemia and he has been stricter with his diet and this was cut back to 1 mg.  He would also like to cut down his lisinopril and he did on his own to 2-1/2 mg.  He would like to stop the chlorthalidone altogether and he claims he is not taking the potassium.  Renal function and electrolytes will be rechecked along with proBNP he remains on Jardiance 25 mg.  His blood pressure does seem adequate right despite the changes in his medications.  Follow-up with structural heart soon will include an echo.  Follow-up with electrophysiology was fine.  Patient still very anxious overall.  Patient doing okay, here September 13, 2022.  His weight has gone up but no evidence of CHF.  Remains in sinus rhythm and no A. fib on his pacemaker interrogation.  I tend to agree with Dr. Membreno that his complaints are all noncardiac.  He is dying to get off Eliquis so we had a long discussion about the pros and cons of getting an apple smart watch and being able to monitor that way in addition to his pacemaker follow-ups and then perhaps we would be willing to stop the Eliquis.  He will keep a sample with him in case his watch indicated an irregular heartbeat until he could get clinical follow-up with either myself or Dr. Wong.  He will look into that.  Labs were repeated mostly to obtain a BNP but also because of his increased weight.  I tried to encourage him and emphasize how well he is doing overall.  I reviewed the echo that he had with structural heart. He returned January 17 and it is a virtual repeat of everything we discussed on the last visit.  He has been having a lot of VPCs saw Dr. Wong put him on metoprolol ER 25.  His EKG here has mostly ventricular bigeminy otherwise with sinus rhythm and unchanged.  By the time I examined him there were no longer any extra beats.  He claims that on his last pacemaker interrogation there has been no atrial fibrillation since February and again I discussed with him the apple watch etc. but he does not think he can do that yet he still would like to come off Eliquis so this was another long discussion. He saw Dr. Gar and had a fairly high incidence of VPCs and he talked to the patient about VPC ablation which the patient did not totally understand.  He tried increasing the metoprolol to 100 mg but it only made the patient feel worse etc.  He is here now May 24, and I still cannot get a good handle on his fatigue and his not being able to do what he used to do.  His echo looks good as mentioned above and he is in sinus rhythm with no VPCs and I told him I do not think there is any cardiac cause for his complaints but I am sending a pro BNP along with a basic metabolic to follow his renal function and especially potassium..  I do not think he would benefit from a VPC ablation and I certainly do not think that is the cause of his symptoms; Dr. Wong might want to do another monitor in the future or just based on his pacemaker interrogation see what the percent of VPCs is going forward.  I tried to reassure the patient as much as possible.  He returns today August 28 and doing better overall with no symptoms when he mows the lawn and he does not seem to be bothered by the VPCs.  His internist does not want his A1c lower than 7 so she is satisfied and he will continue with Jardiance 25 mg.  Blood pressure is satisfactory.  EKG is otherwise unchanged besides the VPCs.  LDL still remaining above 70 and he is on rosuvastatin 40 so I am adding ezetimibe 10 mg daily.  If he remains stable I would see him again in 4 months.  No need for stress or echo at this time.

## 2023-09-06 ENCOUNTER — APPOINTMENT (OUTPATIENT)
Dept: VASCULAR SURGERY | Facility: CLINIC | Age: 78
End: 2023-09-06
Payer: MEDICARE

## 2023-09-06 VITALS
DIASTOLIC BLOOD PRESSURE: 78 MMHG | BODY MASS INDEX: 26.81 KG/M2 | TEMPERATURE: 97.6 F | HEIGHT: 69 IN | HEART RATE: 75 BPM | WEIGHT: 181 LBS | SYSTOLIC BLOOD PRESSURE: 168 MMHG

## 2023-09-06 PROCEDURE — 93923 UPR/LXTR ART STDY 3+ LVLS: CPT

## 2023-09-06 PROCEDURE — 99213 OFFICE O/P EST LOW 20 MIN: CPT

## 2023-09-21 NOTE — DISCHARGE NOTE NURSING/CASE MANAGEMENT/SOCIAL WORK - CAREGIVER PHONE NUMBER
Refill request received for hydrOXYzine (ATARAX) 25 MG tablet    No protocol populated    Last office visit: 4/18/2023  Next office visit: 10/18/2023  Last refill: 7/27/2023  Last labs:      [phone]

## 2023-09-26 ENCOUNTER — APPOINTMENT (OUTPATIENT)
Dept: INTERNAL MEDICINE | Facility: CLINIC | Age: 78
End: 2023-09-26
Payer: MEDICARE

## 2023-09-26 VITALS
TEMPERATURE: 97.8 F | HEART RATE: 72 BPM | SYSTOLIC BLOOD PRESSURE: 148 MMHG | RESPIRATION RATE: 14 BRPM | OXYGEN SATURATION: 98 % | DIASTOLIC BLOOD PRESSURE: 72 MMHG

## 2023-09-26 VITALS — WEIGHT: 180 LBS | BODY MASS INDEX: 26.66 KG/M2 | HEIGHT: 69 IN

## 2023-09-26 PROCEDURE — 99213 OFFICE O/P EST LOW 20 MIN: CPT

## 2023-09-26 RX ORDER — LORATADINE 10 MG/1
10 TABLET ORAL
Qty: 30 | Refills: 2 | Status: ACTIVE | COMMUNITY
Start: 2023-09-26 | End: 1900-01-01

## 2023-09-28 ENCOUNTER — NON-APPOINTMENT (OUTPATIENT)
Age: 78
End: 2023-09-28

## 2023-09-28 ENCOUNTER — APPOINTMENT (OUTPATIENT)
Dept: ELECTROPHYSIOLOGY | Facility: CLINIC | Age: 78
End: 2023-09-28
Payer: MEDICARE

## 2023-09-28 VITALS — SYSTOLIC BLOOD PRESSURE: 178 MMHG | OXYGEN SATURATION: 97 % | HEART RATE: 71 BPM | DIASTOLIC BLOOD PRESSURE: 80 MMHG

## 2023-09-28 DIAGNOSIS — Z95.3 PRESENCE OF XENOGENIC HEART VALVE: ICD-10-CM

## 2023-09-28 DIAGNOSIS — Z95.0 PRESENCE OF CARDIAC PACEMAKER: ICD-10-CM

## 2023-09-28 PROCEDURE — 99213 OFFICE O/P EST LOW 20 MIN: CPT | Mod: 25

## 2023-09-28 PROCEDURE — 93280 PM DEVICE PROGR EVAL DUAL: CPT

## 2023-09-28 PROCEDURE — 93000 ELECTROCARDIOGRAM COMPLETE: CPT | Mod: 59

## 2023-10-03 ENCOUNTER — APPOINTMENT (OUTPATIENT)
Dept: INTERNAL MEDICINE | Facility: CLINIC | Age: 78
End: 2023-10-03
Payer: MEDICARE

## 2023-10-03 DIAGNOSIS — H10.13 ACUTE ATOPIC CONJUNCTIVITIS, BILATERAL: ICD-10-CM

## 2023-10-03 PROCEDURE — 99442: CPT

## 2023-10-03 RX ORDER — EMPAGLIFLOZIN 25 MG/1
25 TABLET, FILM COATED ORAL
Qty: 90 | Refills: 3 | Status: ACTIVE | COMMUNITY
Start: 2021-11-03 | End: 1900-01-01

## 2023-11-13 NOTE — ED ADULT NURSE NOTE - 
ADDITIONAL INFORMATION
Discussed positive throat culture result with mother. Sent amoxicillin to preferred pharmacy. Discussed boiling toothbrush each night and replace after 2-3 days of treatment. Mother verbalized understanding. two weeks ago

## 2023-11-14 ENCOUNTER — APPOINTMENT (OUTPATIENT)
Dept: NEPHROLOGY | Facility: CLINIC | Age: 78
End: 2023-11-14
Payer: MEDICARE

## 2023-11-14 VITALS — DIASTOLIC BLOOD PRESSURE: 66 MMHG | SYSTOLIC BLOOD PRESSURE: 130 MMHG

## 2023-11-14 VITALS
WEIGHT: 182.98 LBS | DIASTOLIC BLOOD PRESSURE: 70 MMHG | BODY MASS INDEX: 27.1 KG/M2 | HEART RATE: 87 BPM | SYSTOLIC BLOOD PRESSURE: 151 MMHG | TEMPERATURE: 98.1 F | OXYGEN SATURATION: 97 % | HEIGHT: 69 IN

## 2023-11-14 PROCEDURE — 99215 OFFICE O/P EST HI 40 MIN: CPT

## 2023-11-17 LAB
25(OH)D3 SERPL-MCNC: 47.9 NG/ML
ALBUMIN SERPL ELPH-MCNC: 4.4 G/DL
ANION GAP SERPL CALC-SCNC: 13 MMOL/L
APPEARANCE: CLEAR
BACTERIA: NEGATIVE /HPF
BASOPHILS # BLD AUTO: 0.04 K/UL
BASOPHILS NFR BLD AUTO: 0.7 %
BILIRUBIN URINE: NEGATIVE
BLOOD URINE: ABNORMAL
BUN SERPL-MCNC: 47 MG/DL
CALCIUM SERPL-MCNC: 9.4 MG/DL
CALCIUM SERPL-MCNC: 9.4 MG/DL
CAST: 0 /LPF
CHLORIDE SERPL-SCNC: 106 MMOL/L
CO2 SERPL-SCNC: 19 MMOL/L
COLOR: YELLOW
CREAT SERPL-MCNC: 1.53 MG/DL
CREAT SPEC-SCNC: 31 MG/DL
CREAT/PROT UR: 0.1 RATIO
EGFR: 46 ML/MIN/1.73M2
EOSINOPHIL # BLD AUTO: 0.2 K/UL
EOSINOPHIL NFR BLD AUTO: 3.5 %
EPITHELIAL CELLS: 0 /HPF
GLUCOSE QUALITATIVE U: >=1000 MG/DL
GLUCOSE SERPL-MCNC: 265 MG/DL
HCT VFR BLD CALC: 42.2 %
HGB BLD-MCNC: 12.9 G/DL
IMM GRANULOCYTES NFR BLD AUTO: 0.9 %
KETONES URINE: NEGATIVE MG/DL
LEUKOCYTE ESTERASE URINE: NEGATIVE
LYMPHOCYTES # BLD AUTO: 0.85 K/UL
LYMPHOCYTES NFR BLD AUTO: 14.7 %
MAN DIFF?: NORMAL
MCHC RBC-ENTMCNC: 29.6 PG
MCHC RBC-ENTMCNC: 30.6 GM/DL
MCV RBC AUTO: 96.8 FL
MICROSCOPIC-UA: NORMAL
MONOCYTES # BLD AUTO: 0.54 K/UL
MONOCYTES NFR BLD AUTO: 9.3 %
NEUTROPHILS # BLD AUTO: 4.1 K/UL
NEUTROPHILS NFR BLD AUTO: 70.9 %
NITRITE URINE: NEGATIVE
PARATHYROID HORMONE INTACT: 29 PG/ML
PH URINE: 5.5
PHOSPHATE SERPL-MCNC: 3.1 MG/DL
PLATELET # BLD AUTO: 182 K/UL
POTASSIUM SERPL-SCNC: 5.2 MMOL/L
PROT UR-MCNC: 4 MG/DL
PROTEIN URINE: NEGATIVE MG/DL
RBC # BLD: 4.36 M/UL
RBC # FLD: 12.7 %
RED BLOOD CELLS URINE: 0 /HPF
REVIEW: NORMAL
SODIUM SERPL-SCNC: 138 MMOL/L
SPECIFIC GRAVITY URINE: 1.02
UROBILINOGEN URINE: 0.2 MG/DL
WBC # FLD AUTO: 5.78 K/UL
WHITE BLOOD CELLS URINE: 0 /HPF

## 2023-12-05 ENCOUNTER — APPOINTMENT (OUTPATIENT)
Dept: INTERNAL MEDICINE | Facility: CLINIC | Age: 78
End: 2023-12-05
Payer: MEDICARE

## 2023-12-05 VITALS
SYSTOLIC BLOOD PRESSURE: 140 MMHG | DIASTOLIC BLOOD PRESSURE: 80 MMHG | OXYGEN SATURATION: 97 % | RESPIRATION RATE: 14 BRPM | TEMPERATURE: 97.9 F | HEART RATE: 80 BPM

## 2023-12-05 DIAGNOSIS — J06.9 ACUTE UPPER RESPIRATORY INFECTION, UNSPECIFIED: ICD-10-CM

## 2023-12-05 PROCEDURE — 99213 OFFICE O/P EST LOW 20 MIN: CPT

## 2023-12-28 ENCOUNTER — NON-APPOINTMENT (OUTPATIENT)
Age: 78
End: 2023-12-28

## 2023-12-28 ENCOUNTER — APPOINTMENT (OUTPATIENT)
Dept: ELECTROPHYSIOLOGY | Facility: CLINIC | Age: 78
End: 2023-12-28
Payer: MEDICARE

## 2023-12-28 PROCEDURE — 93294 REM INTERROG EVL PM/LDLS PM: CPT

## 2023-12-28 PROCEDURE — 93296 REM INTERROG EVL PM/IDS: CPT

## 2023-12-29 ENCOUNTER — RX CHANGE (OUTPATIENT)
Age: 78
End: 2023-12-29

## 2023-12-29 RX ORDER — AZELASTINE HYDROCHLORIDE 137 UG/1
0.1 SPRAY, METERED NASAL TWICE DAILY
Qty: 3 | Refills: 0 | Status: ACTIVE | COMMUNITY
Start: 2023-12-05 | End: 1900-01-01

## 2024-01-12 ENCOUNTER — APPOINTMENT (OUTPATIENT)
Dept: INTERNAL MEDICINE | Facility: CLINIC | Age: 79
End: 2024-01-12
Payer: MEDICARE

## 2024-01-12 VITALS
SYSTOLIC BLOOD PRESSURE: 130 MMHG | RESPIRATION RATE: 14 BRPM | OXYGEN SATURATION: 97 % | HEART RATE: 80 BPM | DIASTOLIC BLOOD PRESSURE: 68 MMHG

## 2024-01-12 DIAGNOSIS — J30.89 OTHER ALLERGIC RHINITIS: ICD-10-CM

## 2024-01-12 PROCEDURE — 99213 OFFICE O/P EST LOW 20 MIN: CPT

## 2024-01-12 PROCEDURE — G2211 COMPLEX E/M VISIT ADD ON: CPT

## 2024-01-12 RX ORDER — AMOXICILLIN AND CLAVULANATE POTASSIUM 875; 125 MG/1; MG/1
875-125 TABLET, COATED ORAL
Qty: 14 | Refills: 0 | Status: COMPLETED | COMMUNITY
Start: 2023-12-05 | End: 2024-01-12

## 2024-01-12 NOTE — HISTORY OF PRESENT ILLNESS
[FreeTextEntry1] : still have a runny nose [de-identified] : ROSA BURGOS is a 78 year old male who presents for 6 week followup visit. Previously treated for acute Uri with Augmentin, Afrin. Overall purulent nasal discharge, cough have all resolved. Still has persistent runny nose--clear to occasional scant yellow mucous. No pain, no headache, no cough. Afrin helps.

## 2024-01-12 NOTE — PLAN
[FreeTextEntry1] : Post viral rhinitis: advised to continue Afrin as needed, add daily OTC Claritin (Loratadine). To f/u with ENT if symptoms persist.

## 2024-01-12 NOTE — REVIEW OF SYSTEMS
[Earache] : no earache [Postnasal Drip] : postnasal drip [Nasal Discharge] : nasal discharge [Sore Throat] : no sore throat [Hoarseness] : no hoarseness [Negative] : Respiratory

## 2024-01-12 NOTE — PHYSICAL EXAM
[Normal Outer Ear/Nose] : the outer ears and nose were normal in appearance [Normal Oropharynx] : the oropharynx was normal [No Lymphadenopathy] : no lymphadenopathy [Normal] : normal rate, regular rhythm, normal S1 and S2 and no murmur heard

## 2024-01-30 NOTE — DISCHARGE NOTE PROVIDER - NSDCHHNEEDSERVICE_GEN_ALL_CORE
[X] All other systems negative aside from above.  [  ] ROS unobtainable because: Medication teaching and assessment/Observation and assessment/Teaching and training

## 2024-02-26 ENCOUNTER — APPOINTMENT (OUTPATIENT)
Dept: INTERNAL MEDICINE | Facility: CLINIC | Age: 79
End: 2024-02-26
Payer: MEDICARE

## 2024-02-26 ENCOUNTER — LABORATORY RESULT (OUTPATIENT)
Age: 79
End: 2024-02-26

## 2024-02-26 PROCEDURE — 36415 COLL VENOUS BLD VENIPUNCTURE: CPT

## 2024-03-01 ENCOUNTER — RX RENEWAL (OUTPATIENT)
Age: 79
End: 2024-03-01

## 2024-03-01 ENCOUNTER — APPOINTMENT (OUTPATIENT)
Dept: INTERNAL MEDICINE | Facility: CLINIC | Age: 79
End: 2024-03-01
Payer: MEDICARE

## 2024-03-01 VITALS — WEIGHT: 178 LBS | HEIGHT: 69 IN | BODY MASS INDEX: 26.36 KG/M2

## 2024-03-01 VITALS
HEART RATE: 78 BPM | TEMPERATURE: 98 F | OXYGEN SATURATION: 97 % | RESPIRATION RATE: 14 BRPM | DIASTOLIC BLOOD PRESSURE: 75 MMHG | SYSTOLIC BLOOD PRESSURE: 148 MMHG

## 2024-03-01 DIAGNOSIS — D69.6 THROMBOCYTOPENIA, UNSPECIFIED: ICD-10-CM

## 2024-03-01 DIAGNOSIS — E11.9 TYPE 2 DIABETES MELLITUS W/OUT COMPLICATIONS: ICD-10-CM

## 2024-03-01 DIAGNOSIS — R31.29 OTHER MICROSCOPIC HEMATURIA: ICD-10-CM

## 2024-03-01 PROCEDURE — G2211 COMPLEX E/M VISIT ADD ON: CPT

## 2024-03-01 PROCEDURE — 99214 OFFICE O/P EST MOD 30 MIN: CPT

## 2024-03-01 RX ORDER — GLIMEPIRIDE 1 MG/1
1 TABLET ORAL
Qty: 180 | Refills: 1 | Status: ACTIVE | COMMUNITY
Start: 2022-04-25 | End: 1900-01-01

## 2024-03-01 NOTE — PLAN
[FreeTextEntry1] : Hypertension: BP elevated in office and at home. Increase Lisinopril to 30 mg daily. Cautious to maintain low potassium diet noting K 5.6. Continue Metoprolol ER 37.5 mg daily, Crestor 40 mg daily, on Eliquis, f/u Dr. Miller. Low salt diet. DM II--A1C trended up to 9!, admittedly diet has been uncontrolled ++candy. Given his age >78, goal A1C <7.5%. Will slightly increase Glimepiride from 1 mg to 1mg twice a day. Continue Jardiance 25 mg daily, UTD ophthalmology. On ACE-I. CKD, renal stones, h/o prostate CA, HYperkaleima, hematuria: on Kcit--advised to hold for 2 days given Hyperkalemia and restart, continue ACE-I. Note hematuria--has upcoming appts with urologist and renal and advised patient to d/w them on this. THrombocytopenia: chronic, on Eliquis, will f/u 3 months CBC.  F/u 3m with labs prior--CMP, A1C, CBC

## 2024-03-01 NOTE — HISTORY OF PRESENT ILLNESS
[Spouse] : spouse [FreeTextEntry1] : Follow-up diabetes 2, Chronic kidney disease, hypertension [de-identified] : ROSA BURGOS is a 78 year old male who presents for medical follow-up DM II, Hypertension, CKD. He had an episode of dizziness last night when he was in the bathroom, resolved after he drank some water.  Denies any weakness in extremities. He admits his diet has been "bad" the past few months, eating a lot of "good and plenty candy".  Blood pressure checked daily has been elevated, ~140/70s-150s/80s, This morning was 150/80. -Follows regularly with specialists (Dr Miller-cardiology, Dr. Pittman-renal, Dr. Peter--urology, Dr. Escobedo-vascular, Ophthalmologist, dermatologist).

## 2024-03-05 LAB
ALBUMIN SERPL ELPH-MCNC: 4.9 G/DL
ALP BLD-CCNC: 111 U/L
ALT SERPL-CCNC: 24 U/L
ANION GAP SERPL CALC-SCNC: 10 MMOL/L
APPEARANCE: CLEAR
AST SERPL-CCNC: 21 U/L
BASOPHILS # BLD AUTO: 0.03 K/UL
BASOPHILS NFR BLD AUTO: 0.6 %
BILIRUB SERPL-MCNC: 0.3 MG/DL
BILIRUBIN URINE: NEGATIVE
BLOOD URINE: ABNORMAL
BUN SERPL-MCNC: 41 MG/DL
CALCIUM SERPL-MCNC: 9.6 MG/DL
CHLORIDE SERPL-SCNC: 105 MMOL/L
CHOLEST SERPL-MCNC: 139 MG/DL
CO2 SERPL-SCNC: 26 MMOL/L
COLOR: YELLOW
CREAT SERPL-MCNC: 1.49 MG/DL
CREAT SPEC-SCNC: 59 MG/DL
EGFR: 47 ML/MIN/1.73M2
EOSINOPHIL # BLD AUTO: 0.31 K/UL
EOSINOPHIL NFR BLD AUTO: 6.3 %
ESTIMATED AVERAGE GLUCOSE: 212 MG/DL
GLUCOSE QUALITATIVE U: >=1000 MG/DL
GLUCOSE SERPL-MCNC: 193 MG/DL
HBA1C MFR BLD HPLC: 9 %
HCT VFR BLD CALC: 42.4 %
HDLC SERPL-MCNC: 48 MG/DL
HGB BLD-MCNC: 13.2 G/DL
IMM GRANULOCYTES NFR BLD AUTO: 1 %
KETONES URINE: NEGATIVE MG/DL
LDLC SERPL CALC-MCNC: 69 MG/DL
LEUKOCYTE ESTERASE URINE: NEGATIVE
LYMPHOCYTES # BLD AUTO: 0.94 K/UL
LYMPHOCYTES NFR BLD AUTO: 19 %
MAN DIFF?: NORMAL
MCHC RBC-ENTMCNC: 28.5 PG
MCHC RBC-ENTMCNC: 31.1 GM/DL
MCV RBC AUTO: 91.6 FL
MICROALBUMIN 24H UR DL<=1MG/L-MCNC: 3 MG/DL
MICROALBUMIN/CREAT 24H UR-RTO: 51 MG/G
MONOCYTES # BLD AUTO: 0.6 K/UL
MONOCYTES NFR BLD AUTO: 12.1 %
NEUTROPHILS # BLD AUTO: 3.01 K/UL
NEUTROPHILS NFR BLD AUTO: 61 %
NITRITE URINE: NEGATIVE
NONHDLC SERPL-MCNC: 92 MG/DL
PH URINE: 6
PLATELET # BLD AUTO: 127 K/UL
POTASSIUM SERPL-SCNC: 5.6 MMOL/L
PROT SERPL-MCNC: 7.3 G/DL
PROTEIN URINE: NEGATIVE MG/DL
PSA SERPL-MCNC: 0.08 NG/ML
RBC # BLD: 4.63 M/UL
RBC # FLD: 13 %
SODIUM SERPL-SCNC: 141 MMOL/L
SPECIFIC GRAVITY URINE: 1.03
TRIGL SERPL-MCNC: 126 MG/DL
TSH SERPL-ACNC: 2.52 UIU/ML
UROBILINOGEN URINE: 0.2 MG/DL
WBC # FLD AUTO: 4.94 K/UL

## 2024-03-12 ENCOUNTER — APPOINTMENT (OUTPATIENT)
Dept: NEPHROLOGY | Facility: CLINIC | Age: 79
End: 2024-03-12
Payer: MEDICARE

## 2024-03-12 ENCOUNTER — APPOINTMENT (OUTPATIENT)
Dept: CARDIOLOGY | Facility: CLINIC | Age: 79
End: 2024-03-12

## 2024-03-12 VITALS
OXYGEN SATURATION: 94 % | BODY MASS INDEX: 27.1 KG/M2 | DIASTOLIC BLOOD PRESSURE: 58 MMHG | SYSTOLIC BLOOD PRESSURE: 133 MMHG | TEMPERATURE: 98 F | HEART RATE: 81 BPM | HEIGHT: 69 IN | WEIGHT: 182.98 LBS

## 2024-03-12 DIAGNOSIS — N20.0 CALCULUS OF KIDNEY: ICD-10-CM

## 2024-03-12 PROCEDURE — 99215 OFFICE O/P EST HI 40 MIN: CPT

## 2024-03-12 PROCEDURE — G2211 COMPLEX E/M VISIT ADD ON: CPT

## 2024-03-12 RX ORDER — SODIUM BICARBONATE 650 MG/1
650 TABLET ORAL
Qty: 180 | Refills: 3 | Status: ACTIVE | COMMUNITY
Start: 2024-03-12 | End: 1900-01-01

## 2024-03-12 NOTE — CURRENT MEDS
[TextEntry] : aspirin 81mg Jardiance 25mg lisinopril 30mg po qd Metoprolol 25mg po qd Glimeperide 1mg Eliquis 5mg bid Rosuvastatin 40mg Ezetimibe 5mg omeprazole 20mg qod Potassium citrate 10meq bid MVI Vit D

## 2024-03-12 NOTE — PLAN
[TextEntry] : Will cont lisinopril and metoprolol, cont jardiance. Recent labs stable. Will repeat UA, U Pr/Cr today as he had trace hematuria last time. BP monitoring at home as well as low salt diet and low potassium diet. Will switch  kcitrate based on last labs with elev K of 5.6. Cont low salt diet.  Folllow up with me in 4 months for BP, ckd

## 2024-03-12 NOTE — PHYSICAL EXAM
[General Appearance - Alert] : alert [General Appearance - In No Acute Distress] : in no acute distress [Sclera] : the sclera and conjunctiva were normal [PERRL With Normal Accommodation] : pupils were equal in size, round, and reactive to light [Outer Ear] : the ears and nose were normal in appearance [Extraocular Movements] : extraocular movements were intact [Oropharynx] : the oropharynx was normal [Neck Appearance] : the appearance of the neck was normal [Jugular Venous Distention Increased] : there was no jugular-venous distention [Neck Cervical Mass (___cm)] : no neck mass was observed [Thyroid Diffuse Enlargement] : the thyroid was not enlarged [Thyroid Nodule] : there were no palpable thyroid nodules [Auscultation Breath Sounds / Voice Sounds] : lungs were clear to auscultation bilaterally [Heart Sounds] : normal S1 and S2 [Heart Rate And Rhythm] : heart rate was normal and rhythm regular [Heart Sounds Gallop] : no gallops [Heart Sounds Pericardial Friction Rub] : no pericardial rub [Murmurs] : no murmurs [Bowel Sounds] : normal bowel sounds [Edema] : there was no peripheral edema [Abdomen Soft] : soft [Abdomen Tenderness] : non-tender [Abdomen Mass (___ Cm)] : no abdominal mass palpated [Nail Clubbing] : no clubbing  or cyanosis of the fingernails [Abnormal Walk] : normal gait [Musculoskeletal - Swelling] : no joint swelling seen [Motor Tone] : muscle strength and tone were normal [Skin Color & Pigmentation] : normal skin color and pigmentation [Skin Turgor] : normal skin turgor [] : no rash [No Focal Deficits] : no focal deficits [Oriented To Time, Place, And Person] : oriented to person, place, and time [Impaired Insight] : insight and judgment were intact [Affect] : the affect was normal

## 2024-03-12 NOTE — HISTORY OF PRESENT ILLNESS
[FreeTextEntry1] : 78M with hx of Prostate ca dx 2019 Fabricio 7&9 s/p RT and hormone shots, CKD3a, NIDDM, and kidney stones presenting to follow up. He says he has had a runny nose for many months now. He was told that it is allergies and likely bc of wearing a mask for a long time. He has no fevers or chills. He has been tested positive for COVID back in November.   His BP was up and down. His meds have been changed so he is now on metoprolol 25mg and lisinopril 30mg.   He has no flank pain, blood in the urine, no dysuria. No LE swelling, no sob or chest pain.

## 2024-03-15 ENCOUNTER — OUTPATIENT (OUTPATIENT)
Dept: OUTPATIENT SERVICES | Facility: HOSPITAL | Age: 79
LOS: 1 days | End: 2024-03-15
Payer: MEDICARE

## 2024-03-15 ENCOUNTER — APPOINTMENT (OUTPATIENT)
Dept: UROLOGY | Facility: CLINIC | Age: 79
End: 2024-03-15
Payer: MEDICARE

## 2024-03-15 DIAGNOSIS — Z95.2 PRESENCE OF PROSTHETIC HEART VALVE: Chronic | ICD-10-CM

## 2024-03-15 DIAGNOSIS — Z95.0 PRESENCE OF CARDIAC PACEMAKER: Chronic | ICD-10-CM

## 2024-03-15 DIAGNOSIS — Z98.890 OTHER SPECIFIED POSTPROCEDURAL STATES: Chronic | ICD-10-CM

## 2024-03-15 DIAGNOSIS — R39.9 UNSPECIFIED SYMPTOMS AND SIGNS INVOLVING THE GENITOURINARY SYSTEM: ICD-10-CM

## 2024-03-15 DIAGNOSIS — Z95.5 PRESENCE OF CORONARY ANGIOPLASTY IMPLANT AND GRAFT: Chronic | ICD-10-CM

## 2024-03-15 DIAGNOSIS — N18.30 CHRONIC KIDNEY DISEASE, STAGE 3 UNSPECIFIED: ICD-10-CM

## 2024-03-15 DIAGNOSIS — C61 MALIGNANT NEOPLASM OF PROSTATE: ICD-10-CM

## 2024-03-15 DIAGNOSIS — R35.0 FREQUENCY OF MICTURITION: ICD-10-CM

## 2024-03-15 PROCEDURE — 99213 OFFICE O/P EST LOW 20 MIN: CPT

## 2024-03-15 PROCEDURE — 76775 US EXAM ABDO BACK WALL LIM: CPT

## 2024-03-15 PROCEDURE — 76775 US EXAM ABDO BACK WALL LIM: CPT | Mod: 26

## 2024-03-15 NOTE — END OF VISIT
I will send in the medicine as requested. Thank you!   [Time Spent: ___ minutes] : I have spent [unfilled] minutes of time on the encounter.

## 2024-03-15 NOTE — HISTORY OF PRESENT ILLNESS
[FreeTextEntry1] : f/u from recent trip to ER. He noted right flank beginning earlier in the week though no associated N/V, fevers, chills or hematuria. PAin continued and PCP sent him for a CT scan which noted a 4mm stone distal ureter with some hydronephrosis and perinephric stranding and ? forniceal rupture. BMP also noted elevated creatinine over baseline.  he last had pain last night ans he thinks he passed the stone, noting a back stone in the toilet. he has prior h/o stones: open lithotomy 1999 and ureteroscopy laser lithotripsy 6/20. May have passed stones between. reviewed CT scan - see no other stones and not sure there is a forniceal rupture.  he also notes h/o prostate cancer  - recollects had [place] 9 but doesn't recall PSA. Treated with 45 XRTs ADT (4 3 month shots) last 3/21. Still has fatigue and hot flashes especially at night. Has a recent PSA which was essentially 0.   12/21 - in the interim ended up passing stone before the scheduled ureteroscopy and GFR back to his normal.  reviewed his [facility]ol[facility]: low citrate.PH and high UA but serum was normal.   3/22 - no stones pains or hematuria. Not taking the KCitrate as concerned about the K. on lemon juice.  still having hot flashes.  ULS notes 2 stones - one new.   9/22 no stone symptoms. notes nocturia 3-4 times and has a slower FOS at night; daytime no frequency or urgency; no dysuria or hematuria. Also gets hot flashes.  U:S - same stone with no hydro. PVR 21.   2/23 -  here with changes in GFR; had increase in BP and doubled up on lisinopril and Creatinine increased to 2 from 1/3. changed the medical treatment and now 1.48.  no flank pain, or hematuria.  having hot flashes - last T 175 PSA 0.07 1/23  LUts ok and not sure tamsulosin helps.   8/23 main complaints are hot flashes  last T 149 LUTs - stable off tamsulosin. No dysuria or hematuria.  On K Citrate - n colic and ULS today ? 3mm stone  Voded 164  16.7 peak FR  3/24 - off tamsulosin has night sweat - hot flashes. last known T 2023  175. no new voiding issues.

## 2024-03-17 ENCOUNTER — RX RENEWAL (OUTPATIENT)
Age: 79
End: 2024-03-17

## 2024-03-17 RX ORDER — EZETIMIBE 10 MG/1
10 TABLET ORAL
Qty: 90 | Refills: 1 | Status: ACTIVE | COMMUNITY
Start: 2023-08-28 | End: 1900-01-01

## 2024-03-20 DIAGNOSIS — N18.30 CHRONIC KIDNEY DISEASE, STAGE 3 UNSPECIFIED: ICD-10-CM

## 2024-03-20 DIAGNOSIS — C61 MALIGNANT NEOPLASM OF PROSTATE: ICD-10-CM

## 2024-03-20 DIAGNOSIS — R39.9 UNSPECIFIED SYMPTOMS AND SIGNS INVOLVING THE GENITOURINARY SYSTEM: ICD-10-CM

## 2024-03-21 ENCOUNTER — APPOINTMENT (OUTPATIENT)
Dept: CARDIOLOGY | Facility: CLINIC | Age: 79
End: 2024-03-21
Payer: MEDICARE

## 2024-03-21 ENCOUNTER — NON-APPOINTMENT (OUTPATIENT)
Age: 79
End: 2024-03-21

## 2024-03-21 VITALS
OXYGEN SATURATION: 100 % | SYSTOLIC BLOOD PRESSURE: 155 MMHG | DIASTOLIC BLOOD PRESSURE: 69 MMHG | BODY MASS INDEX: 26.43 KG/M2 | WEIGHT: 179 LBS | HEART RATE: 73 BPM

## 2024-03-21 DIAGNOSIS — Z86.79 PERSONAL HISTORY OF OTHER DISEASES OF THE CIRCULATORY SYSTEM: ICD-10-CM

## 2024-03-21 DIAGNOSIS — N18.32 CHRONIC KIDNEY DISEASE, STAGE 3B: ICD-10-CM

## 2024-03-21 DIAGNOSIS — I25.10 ATHEROSCLEROTIC HEART DISEASE OF NATIVE CORONARY ARTERY W/OUT ANGINA PECTORIS: ICD-10-CM

## 2024-03-21 PROCEDURE — 99213 OFFICE O/P EST LOW 20 MIN: CPT

## 2024-03-21 PROCEDURE — G2211 COMPLEX E/M VISIT ADD ON: CPT

## 2024-03-21 PROCEDURE — 93000 ELECTROCARDIOGRAM COMPLETE: CPT

## 2024-03-21 NOTE — REASON FOR VISIT
[FreeTextEntry1] : 78-year-old with known coronary disease now status post stent to right coronary artery as part of work-up for TAVR, and now s/p TAVR with new fluid retention and dyspnea and a very abnormal BNP. Now stable but off his diet with increased HbA1c and increased BP.

## 2024-03-21 NOTE — HISTORY OF PRESENT ILLNESS
[FreeTextEntry1] : April 3, 2019. Patient has been followed by Dr. Larry Toth and was followed by cardiology at [facility]. In June of 2011 after a positive stress test had cardiac angiography, which had a distal 90% RCA lesion and only nonobstructive disease in the LAD and circumflex. This was stented and his symptoms were improved. His symptoms then recurred. They have been exertional chest pressure and shortness of breath when he walks after eating a meal, especially a large meal. Never gets it at rest and does not get it when he exerts himself if he has not just eaten. Did well for a while, but then the symptoms returned and in October of 2016 after another positive ECG treadmill test he had repeat cardiac catheterization. There was an 80% mid RCA lesion and again no significant disease in the LAD was circumflex and he had 2 stents to the RCA . The difference was this time his symptoms did not change. He has been placed on Omeprazole and this has not seemed to make a difference, or perhaps it did initially. He has not seen GI. He otherwise seems to be doing well. In July of 2014. He had bilateral carotid endarterectomies. His most recent echo showed normal LV function. His EKG is normal sinus rhythm and a normal tracing. He has hypertension and hyperlipidemia.He is a former smoker. His father had hypertension and CVA, but there does not seem to be a family history of coronary artery disease.  April 24, 2019.  Patient tried to increase his omeprazole, and then said he got a stomach ache and stopped it. He went for a CTA of the coronary arteries. Minor disease in the LAD and borderline severe 60-69% in the proximal circumflex and 60-70% in the RCA. Problem with him is whether his symptoms are related to ischemia or not. After long discussion I recommended that he have the angiogram. If non-obstructive disease, try Ranexa and if no help, consider noncardiac causes. If significant disease then he should have stents and again, we can see if this relieves his symptoms or not. Patient to discuss with his wife and then let me know    March 1, 2021.Patient called.  His exertional chest pain is getting worse and coming more frequently and more easier for the last few months.  I have not seen him since April 2019 and back then when we did a CTA it was mostly unrevealing despite his previous 3 stents in his RCA.  I will schedule him for a stress echo as soon as possible.  March 9, 2021.  Patient came for stress echocardiogram.  Resting echo with borderline severe though not yet critical aortic stenosis and totally normal LV function.  On treadmill within the first minute STs started to go down and heart rate shot up to 140.  I stopped the treadmill at 2 minutes with severe ST depressions and shortness of breath but no chest pain.  Post exercise echo shows new wall motion abnormality in the entire anterior wall and may be some hypokinesis in the lateral wall.  Long discussion with patient and wife and they will be booked for coronary angiography and left and right heart cath as soon as he can have a Covid swab done.  (He had the first vaccine already and the second vaccine was 5 days ago) March 16, 2021.Cath only showed 80% RCA lesion and the rest was nonobstructive.  The RCA was stented and the patient was sent home that evening.  He is on aspirin and Plavix.  He will continue metoprolol ER 50 daily.  He has a follow-up with me in 2 weeks and then will be evaluated by structural heart for a TAVR  March 30, 2021.  Patient here in follow-up.  Had stent to his RCA and is now on a beta-blocker but no change in his exertional symptoms.  In addition somewhat anxious about upcoming procedure; has evaluation appointment 4/6. May 28, 2021.  Patient had TAVR on May 13, uncomplicated except his glucoses were high post procedure.  Since he has been home he has stopped eating cakes and bagels and has lost 7 or 8 pounds.  In terms of his angina he is "95% better".  His only complaint is severe ecchymoses once again from his dual antiplatelet therapy..  Has seen Dr. Johnston and Dr. Toth in follow-up.  Remains in sinus rhythm with an unchanged ECG.  Had a monitor with no significant arrhythmias.  Is off metoprolol and currently only on lisinopril 10 mg.   July 19, 2021.  Patient here in follow-up.  He had seen structural heart and an echo was done on July 1 which showed mild MR, normally functioning TAVR with no paravalvular AI.  Moderate LAE.  Normal LV size and function and normal RV size and function.  He had an ECG on July 1 that was sinus rhythm and unremarkable.  Since his TAVR he has had no episodes of his exertional chest pressure until 1 day after making sure that he mowed his lawn on an empty stomach and had no symptoms, he then went and had a big meal and then remembered he had not swept the street.  When he swept the street he got the chest tightness again and it was relieved with rest.  That was the only episode since and has not recurred.  Otherwise no real complaints initial blood test by structural heart had a potassium of 6.1 and his creatinine had gone up to 2.0.  These were repeated 5 days later and his potassium was 5.1 and his creatinine was 1.43.  He remains on lisinopril just 10 mg daily. September 15, 2021.  Urgent visit today because of multiple falls with trauma.  I reviewed the notes from Dr. COTTO and Dr. Toth.  EKG here is sinus rhythm with sinus arrhythmia around 76, mild first-degree AV block otherwise normal tracing.  Labs September 10 with worsening creatinine back up to 1.92 and BUN 64 which he thinks is from the dye for the head CT in the hospital but also his LFTs were up.  Echo in the hospital September 2 mostly unrevealing.  The history from the patient and his wife seems to be the 2 falls were when he was erect and then bent over and then went down without warning.  Has never had a true syncopal episode when lying down or sitting.  He is not sure if he has gotten dizzy briefly while sitting.  His blood pressure has been running low despite adjustments in his lisinopril and for now I would just hold it.  The symptoms do not sound like hypoglycemia but he has had his meds adjusted and he is only on metformin and an SGLT2 inhibitor.  Last A1c was 6.2 in August.  With Dr. Barroso and again here in the office not really orthostatic.  ZIO AT monitor was placed. September 17, 2021.  A few hours after putting on ZIO monitor, patient had 4.3 seconds of asystole with P waves but no QRS.  Was sent to the emergency room and had a pacemaker placed by Dr. Wong.  Patient being discharged later this morning.  September 23, 2021. Patient was still feeling weak and dizzy and fatigued but ruled out any problem with the pacemaker.  Blood pressure has been running 145 and 150 which makes him concerned.  I reassured him that a blood pressure of 150 would not give him any symptoms but I have no problem with him going back on the low-dose lisinopril and following up with Dr. Toth.  If he is still feeling bad at Dr. Toth has no answer I will be happy to see him next week as well.  October 20, 2021.  Urgent visit an urgent echo for this patient.  Difficult history to pin down as discussing with patient and his wife.  He thinks worsening shortness of breath with exertion for about a month now and she thinks it goes back at least until August meanwhile it has been edema and fluid retention more recently.  Dr. Toth put him on Lasix and he has had improvement in just 5 days so far.  Reviewing his interval records there is 1 EKG when he saw Dr. COTTO on September 8 and had new ST depressions in V5 and V6.  I do not believe he was complaining of any chest pain or shortness of breath at the time.  All his other EKGs seem to be the same including today and they are sinus rhythm.  His echo today especially compared to his post TAVR echoes in May and September does seem to have a new wall motion abnormality in his septum which seems to be more than just possible paradoxical septal motion seen after open heart surgery which he did not have just the past..  He did have a stent to his RCA pre-TAVR and there was some moderate nonobstructive lesions.  No episode of chest pain per se.  His BNP use which had been in the 300 400 range on October 14 was 3740.  No documentation of atrial fibrillation.  Does not seem to be using his pacemaker very often so pacemaker syndrome less likely as well.  LVEF on echo today is in the 40% to 50% range the patient has always seem to have symptoms a little bit out of proportion to objective findings.  Repeat labs including BNP was sent and consideration of repeat angiogram was discussed although patient is reluctant because of his kidney issues whenever he gets dye. November 2, 2021.  Patient returns today, just wants to keep telling me how much better he feels.  Was able to mow his whole lawn although he did it over 2 days without getting burning, shortness of breath, chest pain.  His weight is down at least 4 pounds and he no longer has edema so is responding well to the medical therapy.  Once again he would like to avoid an angiogram and is asking to just have his echo repeated.  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 December 15, 2021.  Patient returns with his wife and for echocardiogram.  Continues to improve and thinks he is pretty much back to himself able to work in his yard etc. just like he mentioned above.  Echocardiogram done and in fact septal wall motion does seem to be much better and overall LVEF is between 50 and 55%.  Still with mild to moderate MR.  March 18, 2022.  Here in follow-up.  Has not yet decided on an internist to a place Dr. Toth.  Had some labs with his urologist.  Potassium only 3.3 but no change in BUN and creatinine (patient unaware and does not think he is taking potassium citrate 10 mEq.  Urologist also had him stop his multivitamins.)  He is also having a lot of dental work and bridge work coming up so will need antibiotic prophylaxis.  The urologist also checked his A1c which went up to 7.5.  He is taking Metformin 1000 and Jardiance 25 but admits to being off his diet so hopefully he will go back on his diet first.  He saw Dr. Vyas for pacemaker follow-up together with his wife and Dr. Wong was very happy with how he is doing.  He remains in sinus rhythm with one VPC.  He has follow-up coming up with Dr. Membreno about his TAVR and he thinks he is going to have an echo at that time as well. April 1, 2022. Long discussion with patient today after Dr. Wong called me the other day about finding of up to 3-minute run of atrial fibrillation on Paceart monitoring.  Overall AF burden is very low but they may be under sensing. Patient always concerned because he has had issues with ecchymoses that were pretty severe in the past.  For now is willing to take the Eliquis and we will follow-up in terms of further monitoring.  Whether in the future to try antiarrhythmic medication perhaps even amiodarone to eliminate atrial fibrillation and continue to monitor will be discussed in the future if anticoagulation becomes a major problem.  Other options could always be discussed and as well including ablation, Watchman device etc.  For now we will start Eliquis 5 mg twice daily.  Meanwhile he will remain on aspirin 81 mg for his TAVR  April 13, 2022. Patient called.  Not feeling well, dizzy at times.  First thought it was the Eliquis but no signs of bleeding.  Now since his blood pressure is usually in the 150s and would like to change back to lisinopril but maybe only 5 mg and stop the amlodipine.  I am okay with that and he will make a follow-up appointment in 3 weeks together with his wife's appointment on May 5.  Continue Eliquis for now.  Urged him to find new primary care still.  May 12, 2022.  Patient here in follow-up together with his wife.  Always somewhat difficult to manage with unusual symptoms and a poor descriptor.  Both he and his wife seem happy with the new internist Dr. Landin.  He is off metformin and she gave him glimepiride at 2 mg but he was having hypoglycemic episodes so is now on 1 mg.  He is still on Jardiance without change.  On his own he cut back the lisinopril to 2.5 mg and he would like to stop the chlorthalidone altogether.  He is not taking any potassium.  He denies exertional chest pain or shortness of breath.  Still occasional although rare A. fib on his remote pacemaker monitoring although he complains of severe ecchymoses from the Eliquis as well as the cost.  He has an upcoming follow-up with Dr. Membreno of structural heart and this will include an echocardiogram. Patient saw Dr. Membreno May 17 for 1 year follow-up of his JESSICA.  LV function normal with some inferior hypokinesis.  BUN 78 creatinine 1.6 and the Lasix was stopped for a few days.  He explained to the patient that his symptoms were not cardiac related. September 13, 2022.  Patient here in follow-up.  Remains in sinus rhythm with occasional APCs.  On labs from July 26 BUN was 38 with creatinine of 1.2 and potassium 5.2.  Other labs okay but no BNP done.  Cholesterol 164, HDL 63, LDL 84.  No BNP was done.  Patient is complaining as above that he does not have the same energy etc.  Would like to come off Eliquis so we had a long discussion about possibly getting an apple watch etc.  He did have pacemaker interrogation in August that showed no atrial fibrillation.  He did gain weight because "I like to eat especially bagels".  I did discuss with him at least getting whole-wheat bagels and scooping out the inside etc. I tried to emphasize that clinically he is doing very well.  Reviewed his medications; he is on no diuretics. January 17, 2023.  Patient here in follow-up.  He noticed a very low pulse when he would check his blood pressure and return that he was having a lot of VPCs.  He saw Dr. Wong in November and he added metoprolol ER 25 mg.  He had his pacemaker defibrillator interrogation but unfortunately was not downloaded properly and I cannot see any of the results on the computer but the patient does say that there has been no atrial fibrillation since last February and he is asking about coming off Eliquis; however he does not want to wear a smart watch etc.  Complains of generalized fatigue not able to do what he used to do and cannot be more specific as to whether it is just tiredness because he is almost 80 or he is having shortness of breath or his back pain acting up etc.  On his own for the last 3 to 4 weeks he has increased the lisinopril to 20 mg and is getting better blood pressure numbers at home.  The cost of both Eliquis and Jardiance are still a problem for him so changing to something like Entresto would also be an issue.  Last echo was May 2022 with Dr. Membreno so if he remains stable I will repeat another echo this coming May.  Still having trouble with dietary issues, what makes him literature about the [place] diet and instead I try to again talk to him about the Mediterranean diet. May 24, 2023.  Patient here in follow-up and for echocardiogram.  Once again very difficult to determine his true symptoms and complaints.  He saw Dr. Wong who found a high percent of VPCs and talk to him about an ablation while giving him a higher dose of metoprolol.  He felt miserable on the 100 mg of metoprolol and cut back to 50 and then eventually went back to 25; still complaining of not being able to do as much as he would like and not having enough energy and never complained of palpitations of VPCs.  Here he is in sinus rhythm, his blood pressure at home has been mostly in the 110s to 120s he claims and here he is around 138 or so.  His echocardiogram actually looks good in terms of nothing there to explain his trouble pushing himself and being more active; his ejection fraction is around 60%, his RVSP is a little high but no different than before and he seems to have normal RV function.  There is mild to moderate MR but not severe his TAVR is working fine.  I sent a BNP along with a follow-up basic metabolic because of his hyperkalemia and renal function.  I am not convinced that he would benefit from a VPC ablation. August 28, 2023.  Returns in follow-up.  Had labs at the beginning of April with his PCP.  Hemoglobin A1c 7.3.  Cholesterol 176, triglycerides 216, HDL 51, LDL 88. (On rosuvastatin 40).  BUN 47 with creatinine 1.68 and potassium 5.3.  No BNP done.  EKG is sinus rhythm but frequent VPCs.  Otherwise unchanged.  Initial twelve-lead in [facility] and on rhythm strip 1 couplet and otherwise just occasional VPCs.  His blood pressure at home has been good and he no longer gets chest pain or short of breath when he mows the lawn although he make sure not to do it all at once. March 21, 2024.  Returns in follow-up.  Admits to being off his diet and less activity over the whole winter; hemoglobin A1c went up to 9.0 although weight does not seem to have changed much.  He is very nervous about his high blood pressure but on his visits to renal and to his internist his blood pressure was fine so I reassured him.  He went back down from lisinopril 30 to lisinopril 20 and because his potassium was 5.6 renal put him on sodium bicarb and took away potassium.  Was able to mow his entire lawn and not having any cardiac symptoms but just very anxious about all his other issues.  Has muscle pain in his left shoulder and arm and keeps asking about different medications; I doubt it is from his statin and he has not yet seen an orthopedist or had x-rays and I advised him that that might be the best.  He had labs February 26 with cholesterol 139, triglycerides 126, HDL 48, and LDL 69. He had a pacemaker check number and seems to be doing well with 1 episode of NSVT lasting 1 second.

## 2024-03-21 NOTE — REVIEW OF SYSTEMS
[Feeling Fatigued] : feeling fatigued [Dizziness] : dizziness [Negative] : Heme/Lymph [Weight Loss (___ Lbs)] : no recent weight loss [Weight Gain (___ Lbs)] : no recent weight gain [SOB] : no shortness of breath [Dyspnea on exertion] : not dyspnea during exertion [Chest Discomfort] : no chest discomfort [Lower Ext Edema] : no extremity edema [Palpitations] : no palpitations [Syncope] : no syncope [Convulsions] : no convulsions [de-identified] : No more shaking since the one episode in the hospital [de-identified] : Severe ecchymoses [FreeTextEntry5] : see HPI

## 2024-03-21 NOTE — PHYSICAL EXAM
[General Appearance - Well Developed] : well developed [Normal Appearance] : normal appearance [Well Groomed] : well groomed [General Appearance - Well Nourished] : well nourished [No Deformities] : no deformities [General Appearance - In No Acute Distress] : no acute distress [Normal Oral Mucosa] : normal oral mucosa [No Oral Pallor] : no oral pallor [No Oral Cyanosis] : no oral cyanosis [Normal Jugular Venous A Waves Present] : normal jugular venous A waves present [No Jugular Venous Navas A Waves] : no jugular venous navas A waves [Normal Jugular Venous V Waves Present] : normal jugular venous V waves present [Respiration, Rhythm And Depth] : normal respiratory rhythm and effort [Exaggerated Use Of Accessory Muscles For Inspiration] : no accessory muscle use [Auscultation Breath Sounds / Voice Sounds] : lungs were clear to auscultation bilaterally [Heart Sounds] : normal S1 and S2 [Heart Rate And Rhythm] : heart rate and rhythm were normal [Abdomen Soft] : soft [Abdomen Tenderness] : non-tender [Abdomen Mass (___ Cm)] : no abdominal mass palpated [Abnormal Walk] : normal gait [Gait - Sufficient For Exercise Testing] : the gait was sufficient for exercise testing [Nail Clubbing] : no clubbing of the fingernails [Cyanosis, Localized] : no localized cyanosis [Petechial Hemorrhages (___cm)] : no petechial hemorrhages [Skin Color & Pigmentation] : normal skin color and pigmentation [] : no rash [No Venous Stasis] : no venous stasis [Skin Lesions] : no skin lesions [No Skin Ulcers] : no skin ulcer [No Xanthoma] : no  xanthoma was observed [Oriented To Time, Place, And Person] : oriented to person, place, and time [Mood] : the mood was normal [Affect] : the affect was normal [FreeTextEntry1] : slightly less anxious

## 2024-03-21 NOTE — DISCUSSION/SUMMARY
[FreeTextEntry1] : Reassure the patient is much as possible especially about his blood pressure which is fine at some of the other doctors recently.  Not having cardiac symptoms.  EKG and exam stable.  No change in cardiac medications and just follow-up in 4 to 6 months. [EKG obtained to assist in diagnosis and management of assessed problem(s)] : EKG obtained to assist in diagnosis and management of assessed problem(s)

## 2024-03-21 NOTE — ASSESSMENT
[FreeTextEntry1] : (Patient with known coronary artery disease, status post 3 stents to his RCA on 2 occasions. Nonobstructive disease elsewhere. Symptom of exertional chest pressure and shortness of breath after eating a large meal in 2019. Patient reluctant to take angiogram because in the past dual antiplatelet therapy gave him severe ecchymoses in both hands. CT angiogram was done then which seem to show more nonobstructive disease but I felt he should have the angiogram in any case. He was going to discuss it with his wife but they never got back to me and I did not hear from them until the beginning of March when the patient called that his symptoms have worsened. His wife later told me that he had shoveled snow and had significant symptoms. In any case I scheduled him for stress echocardiogram which was markedly abnormal today indicating most likely severe coronary disease as well as borderline severe aortic stenosis. Patient had cardiac angiogram which showed only significant disease in the RCA and it was stented by Dr. Beaulieu. He remained on beta-blocker but between the stent and the beta-blocker his symptoms did not improve and maybe slightly worse leading me to assume that his symptoms along with his markedly abnormal stress test must be from his aortic stenosis. He had the TAVR on May 13, 2021 and was here initially saying that his angina was 95% gone. He had issues with his glucose postop so he is now being strict about avoiding cakes and bagels etc. He has lost some weight. He is off metoprolol and only on 10 mg of lisinopril. His MCOT showed no arrhythmias. No dizziness or palpitations. He had severe ecchymoses until we were able to stop the Plavix and just continue aspirin. (He had dental work coming up and we reviewed his need for SBE prophylaxis and he will be using amoxicillin 2 g 1 hour before.) He followed up with structural heart and had an echo as mentioned above with normal LV function and no WMA and was doing very well. Did have one episode of chest pressure again when after eating a large meal he tried to sweep the street. Otherwise asymptomatic and very happy since the TAVR. We reviewed his carotid Doppler that was done preprocedure and he does still have greater than 70% stenosis on the right and 50 to 69% on the left despite having had carotid endarterectomies on both sides in the past. He had a CT angiogram in the hospital in early September and his carotids were both less than 50%. We will aim to keep his cholesterol extremely low.) Had syncopal episode and seizure and was finally found to have conduction disease and pacemaker was placed. Syncope has not recurred but then had issues with fluid retention and a very high BNP. 1 option could have been a pacemaker syndrome if he was paced most of the time but he is very rarely paced and seems to be in sinus rhythm on all his interval EKGs. Last visit he tells me he had a scheduled pacemaker interrogation and was told that "120 times his heart rate dropped low enough for his pacemaker to kick in and the technician said perhaps he should have had a biventricular pacemaker"; in fact his report has atrial pacing only 9.5% of the time and ventricular pacing only 2.6% of the time. He has had other symptoms that are hard to pin down again him thinking that is just a few weeks and his wife thinking it goes back to August. In any case after the very high BNP he was put on Lasix by Dr. Rubinstein which seems to have improved things initially for the last 5 days and the echo 10/20 did show new wall motion abnormality in the left ventricle although his overall LVEF was at least 40 to 45% but in the past he has had symptoms that have seemed out of proportion to objective findings. I mentioned that he might need another angiogram and he was very anxious about that because of the issues of the dye playing havoc with his kidneys in the past. He did not have any chest pain syndrome to suggest an interval myocardial infarction although there was one somewhat abnormal ECG as mentioned above but it was just ST depressions V5 and V6. 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 This was done December 15, 2021 along with his follow-up and in fact his septal motion is improved and overall EF is back to 50 to 55%. Sure enough there is return to baseline of his LV function so perhaps there was a transient myocarditis. We will hold off on cardiac cath or any further work-up at this time. Long discussion about the importance of not shoveling should diabetes no fall and that he could use the snowblower if it is not strenuous but that is all. He returned and as mentioned had not found a replacement for Dr. Toth yet. Had labs recently with his urologist and the issues are a hemoglobin A1c going up to 7.5 despite Metformin 1000 and Jardiance 25. He did admit to being off his diet. His potassium was only 3.3; he does not think he is taking potassium and the urologist told him to stop all his multivitamins which perhaps had some potassium in them. Here May, 2022 with his wife and he and his wife seemed happy with Dr. Toth's replacement Dr. Landin. She stopped his metformin as it was not helping and gave him glimepiride 2 mg. On this he began having some hypoglycemia and he has been stricter with his diet and this was cut back to 1 mg. He would also like to cut down his lisinopril and he did on his own to 2-1/2 mg. He would like to stop the chlorthalidone altogether and he claims he is not taking the potassium. Renal function and electrolytes will be rechecked along with proBNP he remains on Jardiance 25 mg. His blood pressure does seem adequate right despite the changes in his medications. Follow-up with structural heart soon will include an echo. Follow-up with electrophysiology was fine. Patient still very anxious overall. Patient doing okay, here September 13, 2022. His weight has gone up but no evidence of CHF. Remains in sinus rhythm and no A. fib on his pacemaker interrogation. I tend to agree with Dr. Membreno that his complaints are all noncardiac. He is dying to get off Eliquis so we had a long discussion about the pros and cons of getting an apple smart watch and being able to monitor that way in addition to his pacemaker follow-ups and then perhaps we would be willing to stop the Eliquis. He will keep a sample with him in case his watch indicated an irregular heartbeat until he could get clinical follow-up with either myself or Dr. Wong. He will look into that. Labs were repeated mostly to obtain a BNP but also because of his increased weight. I tried to encourage him and emphasize how well he is doing overall. I reviewed the echo that he had with structural heart. He returned January 17, 2023 and it is a virtual repeat of everything we discussed on the last visit. He has been having a lot of VPCs saw Dr. Wong put him on metoprolol ER 25. His EKG here has mostly ventricular bigeminy otherwise with sinus rhythm and unchanged. By the time I examined him there were no longer any extra beats. He claims that on his last pacemaker interrogation there has been no atrial fibrillation since February and again I discussed with him the apple watch etc. but he does not think he can do that yet he still would like to come off Eliquis so this was another long discussion. He saw Dr. Wong and had a fairly high incidence of VPCs and he talked to the patient about VPC ablation which the patient did not totally understand. He tried increasing the metoprolol to 100 mg but it only made the patient feel worse etc. He is here now May 24, and I still cannot get a good handle on his fatigue and his not being able to do what he used to do. His echo looks good as mentioned above and he is in sinus rhythm with no VPCs and I told him I do not think there is any cardiac cause for his complaints but I am sending a pro BNP along with a basic metabolic to follow his renal function and especially potassium.. I do not think he would benefit from a VPC ablation and I certainly do not think that is the cause of his symptoms; Dr. Wong might want to do another monitor in the future or just based on his pacemaker interrogation see what the percent of VPCs is going forward. I tried to reassure the patient as much as possible. He returned August 28, 2023 and doing better overall with no symptoms when he mows the lawn and he does not seem to be bothered by the VPCs. His internist does not want his A1c lower than 7 so she is satisfied and he will continue with Jardiance 25 mg. Blood pressure is satisfactory. EKG is otherwise unchanged besides the VPCs. LDL still remaining above 70 and he is on rosuvastatin 40 so I am adding ezetimibe 10 mg daily. If he remains stable I would see him again in 4 months. No need for stress or echo at this time.  Patient returns today March 21, 2024.  Lots of complaints and issues and questions but very stable from a cardiac point of view including mowing his lawn without a problem.  However he went off his diet over the winter and his hemoglobin A1c shot up to 9.0 so he is now back on a strict diet.  Some change in his kidney medications when his potassium was 5.6.  On rosuvastatin and ezetimibe we finally have his LDL down to 69 with his HDL of 48.  His last pacemaker report at the end of December was fine and his EKG today is sinus rhythm and unchanged.  His left muscle shoulder pain might be orthopedic and less likely to be related to medications.

## 2024-03-28 ENCOUNTER — NON-APPOINTMENT (OUTPATIENT)
Age: 79
End: 2024-03-28

## 2024-03-28 ENCOUNTER — APPOINTMENT (OUTPATIENT)
Dept: ELECTROPHYSIOLOGY | Facility: CLINIC | Age: 79
End: 2024-03-28
Payer: MEDICARE

## 2024-03-28 PROCEDURE — 93296 REM INTERROG EVL PM/IDS: CPT

## 2024-03-28 PROCEDURE — 93294 REM INTERROG EVL PM/LDLS PM: CPT

## 2024-05-23 ENCOUNTER — RX RENEWAL (OUTPATIENT)
Age: 79
End: 2024-05-23

## 2024-05-23 RX ORDER — TAMSULOSIN HYDROCHLORIDE 0.4 MG/1
0.4 CAPSULE ORAL
Qty: 90 | Refills: 3 | Status: ACTIVE | COMMUNITY
Start: 2022-09-16 | End: 1900-01-01

## 2024-06-04 ENCOUNTER — APPOINTMENT (OUTPATIENT)
Dept: INTERNAL MEDICINE | Facility: CLINIC | Age: 79
End: 2024-06-04
Payer: MEDICARE

## 2024-06-04 PROCEDURE — 36415 COLL VENOUS BLD VENIPUNCTURE: CPT

## 2024-06-07 ENCOUNTER — APPOINTMENT (OUTPATIENT)
Dept: INTERNAL MEDICINE | Facility: CLINIC | Age: 79
End: 2024-06-07
Payer: MEDICARE

## 2024-06-07 VITALS — HEIGHT: 69 IN | BODY MASS INDEX: 26.22 KG/M2 | WEIGHT: 177 LBS

## 2024-06-07 VITALS
OXYGEN SATURATION: 97 % | SYSTOLIC BLOOD PRESSURE: 158 MMHG | DIASTOLIC BLOOD PRESSURE: 72 MMHG | RESPIRATION RATE: 14 BRPM | HEART RATE: 78 BPM

## 2024-06-07 DIAGNOSIS — N18.31 TYPE 2 DIABETES MELLITUS WITH DIABETIC CHRONIC KIDNEY DISEASE: ICD-10-CM

## 2024-06-07 DIAGNOSIS — N18.31 CHRONIC KIDNEY DISEASE, STAGE 3A: ICD-10-CM

## 2024-06-07 DIAGNOSIS — E11.22 TYPE 2 DIABETES MELLITUS WITH DIABETIC CHRONIC KIDNEY DISEASE: ICD-10-CM

## 2024-06-07 DIAGNOSIS — E87.5 HYPERKALEMIA: ICD-10-CM

## 2024-06-07 LAB
ALBUMIN SERPL ELPH-MCNC: 4.8 G/DL
ALP BLD-CCNC: 89 U/L
ALT SERPL-CCNC: 27 U/L
ANION GAP SERPL CALC-SCNC: 14 MMOL/L
AST SERPL-CCNC: 23 U/L
BASOPHILS # BLD AUTO: 0.03 K/UL
BASOPHILS NFR BLD AUTO: 0.7 %
BILIRUB SERPL-MCNC: 0.3 MG/DL
BUN SERPL-MCNC: 56 MG/DL
CALCIUM SERPL-MCNC: 9.7 MG/DL
CHLORIDE SERPL-SCNC: 108 MMOL/L
CHOLEST SERPL-MCNC: 145 MG/DL
CO2 SERPL-SCNC: 17 MMOL/L
CREAT SERPL-MCNC: 1.79 MG/DL
EGFR: 38 ML/MIN/1.73M2
EOSINOPHIL # BLD AUTO: 0.2 K/UL
EOSINOPHIL NFR BLD AUTO: 4.5 %
ESTIMATED AVERAGE GLUCOSE: 174 MG/DL
GLUCOSE SERPL-MCNC: 156 MG/DL
HBA1C MFR BLD HPLC: 7.7 %
HCT VFR BLD CALC: 42.3 %
HDLC SERPL-MCNC: 50 MG/DL
HGB BLD-MCNC: 12.9 G/DL
IMM GRANULOCYTES NFR BLD AUTO: 0.5 %
LDLC SERPL CALC-MCNC: 71 MG/DL
LYMPHOCYTES # BLD AUTO: 0.79 K/UL
LYMPHOCYTES NFR BLD AUTO: 17.8 %
MAN DIFF?: NORMAL
MCHC RBC-ENTMCNC: 28.2 PG
MCHC RBC-ENTMCNC: 30.5 GM/DL
MCV RBC AUTO: 92.6 FL
MONOCYTES # BLD AUTO: 0.51 K/UL
MONOCYTES NFR BLD AUTO: 11.5 %
NEUTROPHILS # BLD AUTO: 2.88 K/UL
NEUTROPHILS NFR BLD AUTO: 65 %
NONHDLC SERPL-MCNC: 95 MG/DL
PLATELET # BLD AUTO: 129 K/UL
POTASSIUM SERPL-SCNC: 5.5 MMOL/L
PROT SERPL-MCNC: 7.4 G/DL
PSA SERPL-MCNC: 0.09 NG/ML
RBC # BLD: 4.57 M/UL
RBC # FLD: 13.6 %
SODIUM SERPL-SCNC: 139 MMOL/L
TRIGL SERPL-MCNC: 138 MG/DL
WBC # FLD AUTO: 4.43 K/UL

## 2024-06-07 PROCEDURE — 99214 OFFICE O/P EST MOD 30 MIN: CPT

## 2024-06-07 PROCEDURE — G2211 COMPLEX E/M VISIT ADD ON: CPT

## 2024-06-07 RX ORDER — POTASSIUM CITRATE 10 MEQ/1
10 MEQ TABLET, EXTENDED RELEASE ORAL TWICE DAILY
Qty: 180 | Refills: 3 | Status: COMPLETED | COMMUNITY
Start: 2023-02-14 | End: 2024-06-07

## 2024-06-07 RX ORDER — BLOOD-GLUCOSE,RECEIVER,CONT
EACH MISCELLANEOUS
Qty: 1 | Refills: 0 | Status: ACTIVE | COMMUNITY
Start: 2024-06-07 | End: 1900-01-01

## 2024-06-07 RX ORDER — LISINOPRIL 5 MG/1
5 TABLET ORAL
Qty: 180 | Refills: 3 | Status: ACTIVE | COMMUNITY
Start: 2022-08-05 | End: 1900-01-01

## 2024-06-07 RX ORDER — BLOOD-GLUCOSE SENSOR
EACH MISCELLANEOUS
Qty: 1 | Refills: 11 | Status: ACTIVE | COMMUNITY
Start: 2024-06-07 | End: 1900-01-01

## 2024-06-07 NOTE — HISTORY OF PRESENT ILLNESS
[Spouse] : spouse [FreeTextEntry1] : Follow-up diabetes 2, Chronic kidney disease, hypertension [de-identified] : ROSA BURGOS is a 78 year old male who presents for medical follow-up DM II, Hypertension, CKD. He has been feeling great, no recurrence of dizziness. Blood pressure had been too "low" on Lisinopril 30 mg, so he stopped this ~ 3 weeks ago, had leftover Lisinopril 10 mg daily. and blood pressure seems to be better, back to ~130-140s/80s. Feels better when blood pressure is in higher range.  -Has been trying to be more careful with hid diet, cut down on candies. -Follows regularly with specialists (Dr Miller-cardiology, Dr. Pittman-renal, Dr. Peter--urology, Dr. Escobedo-vascular, Ophthalmologist, dermatologist).

## 2024-06-07 NOTE — REVIEW OF SYSTEMS
[Negative] : Integumentary [Abdominal Pain] : no abdominal pain [Nausea] : no nausea [Vomiting] : no vomiting [Dysuria] : no dysuria

## 2024-06-07 NOTE — PLAN
[FreeTextEntry1] : Coronary artery disease, Paroxysmal Afib, Hypertension: BP elevated in office, was low at home and self-reduced LIsinopril from 30 to 10 mg--will renew at 5 mg twice a day given hyperkalemia on labwork, advised him too continue home log of BP and followup with me in 2 weeks for telephone visit to review. Maintain low potassium diet noting K 5.5. Continue Metoprolol ER 37.5 mg daily, Crestor 40 mg daily, on Eliquis, f/u Dr. Miller. Low salt diet. DM II with CKD: A1C improved to 7.7, goal <7.5, continue Glimepiride 1 mg twice a day, Jardiance 25 mg daily, UTD ophthalmology. On ACE-I. Has f/u with renal Dr. Pittman next month, repeat CMP ordered for 7/1.  Thrombocytopenia: chronic, on Eliquis, stable.  2 weeks telephone visit to review home BP readings 3 months office visit/followup

## 2024-06-12 ENCOUNTER — APPOINTMENT (OUTPATIENT)
Dept: VASCULAR SURGERY | Facility: CLINIC | Age: 79
End: 2024-06-12
Payer: MEDICARE

## 2024-06-12 VITALS
SYSTOLIC BLOOD PRESSURE: 154 MMHG | WEIGHT: 177 LBS | BODY MASS INDEX: 26.22 KG/M2 | HEIGHT: 69 IN | DIASTOLIC BLOOD PRESSURE: 64 MMHG | TEMPERATURE: 97.7 F | HEART RATE: 74 BPM

## 2024-06-12 DIAGNOSIS — I73.9 PERIPHERAL VASCULAR DISEASE, UNSPECIFIED: ICD-10-CM

## 2024-06-12 PROCEDURE — 99213 OFFICE O/P EST LOW 20 MIN: CPT

## 2024-06-12 PROCEDURE — 93923 UPR/LXTR ART STDY 3+ LVLS: CPT

## 2024-06-12 NOTE — DATA REVIEWED
[FreeTextEntry1] : R TONO 1.01 R TBI 0.72 (toe pressure 112)  L TONO 0.69 L TBI 0.40 (toe pressure 63)

## 2024-06-12 NOTE — PHYSICAL EXAM
[Respiratory Effort] : normal respiratory effort [Normal Rate and Rhythm] : normal rate and rhythm [2+] : left 2+ [1+] : right 1+ [0] : left 0 [Ankle Swelling (On Exam)] : not present [Varicose Veins Of Lower Extremities] : not present [] : not present [Abdomen Tenderness] : ~T ~M No abdominal tenderness [Skin Ulcer] : no ulcer [Alert] : alert [Oriented to Person] : oriented to person [Oriented to Place] : oriented to place [Oriented to Time] : oriented to time [Calm] : calm [de-identified] : appears stated age [de-identified] : normocephalic, atraumatic [de-identified] : supple

## 2024-06-12 NOTE — ASSESSMENT
[FreeTextEntry1] : Problem #1 peripheral arterial disease asymptomatic continue best medical therapy follow up in 6 months

## 2024-06-12 NOTE — HISTORY OF PRESENT ILLNESS
[FreeTextEntry1] : 77 year old male with  history of hypertension, DM, HLD, AI s/p TAVR, CKD, and BPH who presents for evaluation of pain with ambulation. He states he can walk anywhere from 1-4 blocks or so before he develops calf pain much worse on the left but he occasionally also feels it on the right. He denies rest pain or history of non healing wounds.  03/01/23 - Patient states he has not made too much effort to ambulate more. He has a lot going on with his cardiac conditions and has generally felt unwell. He states as the weather continues to get warmer he will do his best to walk more.   06/12/24 - Patient overall states he is doing much better with walking. He is able to walk slightly longer distances and his legs feel improved. He denies rest pain or non-healing wounds. [de-identified] : 06/12/24 - Denies claudication today. States his lower back has been painful. Occasional nocturnal left calf cramps. No rest pain.

## 2024-06-21 ENCOUNTER — APPOINTMENT (OUTPATIENT)
Dept: INTERNAL MEDICINE | Facility: CLINIC | Age: 79
End: 2024-06-21
Payer: MEDICARE

## 2024-06-21 DIAGNOSIS — I10 ESSENTIAL (PRIMARY) HYPERTENSION: ICD-10-CM

## 2024-06-21 PROCEDURE — 99442: CPT

## 2024-06-21 PROCEDURE — G2211 COMPLEX E/M VISIT ADD ON: CPT | Mod: NC

## 2024-06-27 ENCOUNTER — APPOINTMENT (OUTPATIENT)
Dept: ELECTROPHYSIOLOGY | Facility: CLINIC | Age: 79
End: 2024-06-27

## 2024-06-27 PROCEDURE — 93296 REM INTERROG EVL PM/IDS: CPT

## 2024-06-27 PROCEDURE — 93294 REM INTERROG EVL PM/LDLS PM: CPT
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
## 2024-07-10 ENCOUNTER — APPOINTMENT (OUTPATIENT)
Dept: INTERNAL MEDICINE | Facility: CLINIC | Age: 79
End: 2024-07-10
Payer: MEDICARE

## 2024-07-10 VITALS
RESPIRATION RATE: 14 BRPM | SYSTOLIC BLOOD PRESSURE: 140 MMHG | DIASTOLIC BLOOD PRESSURE: 72 MMHG | HEART RATE: 72 BPM | OXYGEN SATURATION: 97 %

## 2024-07-10 VITALS — WEIGHT: 171 LBS | BODY MASS INDEX: 25.33 KG/M2 | HEIGHT: 69 IN

## 2024-07-10 VITALS — BODY MASS INDEX: 25.33 KG/M2 | HEIGHT: 69 IN | WEIGHT: 171 LBS

## 2024-07-10 DIAGNOSIS — K64.9 UNSPECIFIED HEMORRHOIDS: ICD-10-CM

## 2024-07-10 DIAGNOSIS — Z95.0 PRESENCE OF CARDIAC PACEMAKER: ICD-10-CM

## 2024-07-10 DIAGNOSIS — I10 ESSENTIAL (PRIMARY) HYPERTENSION: ICD-10-CM

## 2024-07-10 PROCEDURE — G2211 COMPLEX E/M VISIT ADD ON: CPT

## 2024-07-10 PROCEDURE — 99214 OFFICE O/P EST MOD 30 MIN: CPT

## 2024-07-16 ENCOUNTER — NON-APPOINTMENT (OUTPATIENT)
Age: 79
End: 2024-07-16

## 2024-07-19 ENCOUNTER — APPOINTMENT (OUTPATIENT)
Dept: INTERNAL MEDICINE | Facility: CLINIC | Age: 79
End: 2024-07-19
Payer: MEDICARE

## 2024-07-19 PROCEDURE — 36415 COLL VENOUS BLD VENIPUNCTURE: CPT

## 2024-07-24 LAB
ALBUMIN SERPL ELPH-MCNC: 4.6 G/DL
ALP BLD-CCNC: 80 U/L
ALT SERPL-CCNC: 22 U/L
ANION GAP SERPL CALC-SCNC: 12 MMOL/L
AST SERPL-CCNC: 22 U/L
BILIRUB SERPL-MCNC: 0.4 MG/DL
BUN SERPL-MCNC: 50 MG/DL
CALCIUM SERPL-MCNC: 9.8 MG/DL
CHLORIDE SERPL-SCNC: 104 MMOL/L
CO2 SERPL-SCNC: 22 MMOL/L
CREAT SERPL-MCNC: 1.56 MG/DL
EGFR: 44 ML/MIN/1.73M2
GLUCOSE SERPL-MCNC: 160 MG/DL
POTASSIUM SERPL-SCNC: 5.4 MMOL/L
PROT SERPL-MCNC: 7.4 G/DL
SODIUM SERPL-SCNC: 138 MMOL/L

## 2024-07-26 ENCOUNTER — NON-APPOINTMENT (OUTPATIENT)
Age: 79
End: 2024-07-26

## 2024-07-29 ENCOUNTER — NON-APPOINTMENT (OUTPATIENT)
Age: 79
End: 2024-07-29

## 2024-07-29 ENCOUNTER — APPOINTMENT (OUTPATIENT)
Dept: CARDIOLOGY | Facility: CLINIC | Age: 79
End: 2024-07-29
Payer: MEDICARE

## 2024-07-29 VITALS
OXYGEN SATURATION: 97 % | DIASTOLIC BLOOD PRESSURE: 79 MMHG | HEART RATE: 74 BPM | WEIGHT: 182 LBS | SYSTOLIC BLOOD PRESSURE: 163 MMHG | BODY MASS INDEX: 26.88 KG/M2

## 2024-07-29 VITALS — SYSTOLIC BLOOD PRESSURE: 170 MMHG | DIASTOLIC BLOOD PRESSURE: 82 MMHG | HEART RATE: 72 BPM

## 2024-07-29 DIAGNOSIS — I25.10 ATHEROSCLEROTIC HEART DISEASE OF NATIVE CORONARY ARTERY W/OUT ANGINA PECTORIS: ICD-10-CM

## 2024-07-29 DIAGNOSIS — Z86.79 PERSONAL HISTORY OF OTHER DISEASES OF THE CIRCULATORY SYSTEM: ICD-10-CM

## 2024-07-29 DIAGNOSIS — I35.0 NONRHEUMATIC AORTIC (VALVE) STENOSIS: ICD-10-CM

## 2024-07-29 PROCEDURE — 93000 ELECTROCARDIOGRAM COMPLETE: CPT

## 2024-07-29 PROCEDURE — G2211 COMPLEX E/M VISIT ADD ON: CPT

## 2024-07-29 PROCEDURE — 99214 OFFICE O/P EST MOD 30 MIN: CPT

## 2024-07-29 NOTE — DISCUSSION/SUMMARY
[FreeTextEntry1] : No cardiac symptoms per se but still issues with blood pressure, renal function and potassium, and tolerating ezetimibe.  Will see what blood pressure and labs are at renal on August 8.  Tentative follow-up probably 4 months unless new issues arise.  If necessary will discuss with renal preferable Eliquis dose especially given that pacemaker interrogations have shown virtually no atrial fibrillation on the last 2 interrogations. [EKG obtained to assist in diagnosis and management of assessed problem(s)] : EKG obtained to assist in diagnosis and management of assessed problem(s)

## 2024-07-29 NOTE — REVIEW OF SYSTEMS
[Feeling Fatigued] : feeling fatigued [Dizziness] : dizziness [Negative] : Heme/Lymph [Weight Gain (___ Lbs)] : [unfilled] ~Ulb weight gain [Weight Loss (___ Lbs)] : no recent weight loss [SOB] : no shortness of breath [Dyspnea on exertion] : not dyspnea during exertion [Chest Discomfort] : no chest discomfort [Lower Ext Edema] : no extremity edema [Palpitations] : no palpitations [Syncope] : no syncope [Convulsions] : no convulsions [FreeTextEntry5] : see HPI [FreeTextEntry8] : Seeing renal August 8. [FreeTextEntry9] : Severe back pains, waiting for results of MRI done Saturday and seeing his orthopedist next week. [de-identified] : No more shaking since the one episode in the hospital

## 2024-07-29 NOTE — PHYSICAL EXAM
[General Appearance - Well Developed] : well developed [Normal Appearance] : normal appearance [Well Groomed] : well groomed [General Appearance - Well Nourished] : well nourished [No Deformities] : no deformities [General Appearance - In No Acute Distress] : no acute distress [Normal Oral Mucosa] : normal oral mucosa [No Oral Pallor] : no oral pallor [No Oral Cyanosis] : no oral cyanosis [Normal Jugular Venous A Waves Present] : normal jugular venous A waves present [Normal Jugular Venous V Waves Present] : normal jugular venous V waves present [No Jugular Venous Navas A Waves] : no jugular venous navas A waves [Respiration, Rhythm And Depth] : normal respiratory rhythm and effort [Exaggerated Use Of Accessory Muscles For Inspiration] : no accessory muscle use [Auscultation Breath Sounds / Voice Sounds] : lungs were clear to auscultation bilaterally [Heart Rate And Rhythm] : heart rate and rhythm were normal [Heart Sounds] : normal S1 and S2 [Abdomen Tenderness] : non-tender [Abdomen Soft] : soft [Abdomen Mass (___ Cm)] : no abdominal mass palpated [Abnormal Walk] : normal gait [Gait - Sufficient For Exercise Testing] : the gait was sufficient for exercise testing [Nail Clubbing] : no clubbing of the fingernails [Cyanosis, Localized] : no localized cyanosis [Petechial Hemorrhages (___cm)] : no petechial hemorrhages [Skin Color & Pigmentation] : normal skin color and pigmentation [] : no rash [No Venous Stasis] : no venous stasis [Skin Lesions] : no skin lesions [No Skin Ulcers] : no skin ulcer [No Xanthoma] : no  xanthoma was observed [Oriented To Time, Place, And Person] : oriented to person, place, and time [Affect] : the affect was normal [Mood] : the mood was normal [FreeTextEntry1] : slightly more anxious

## 2024-07-29 NOTE — REASON FOR VISIT
[FreeTextEntry1] : 79-year-old with known coronary disease now status post stent to right coronary artery as part of work-up for TAVR, and now s/p TAVR with new fluid retention and dyspnea and a very abnormal BNP. Now stable but off his diet with increased HbA1c and increased BP. Now with back issues.

## 2024-07-29 NOTE — ASSESSMENT
[FreeTextEntry1] : (Patient with known coronary artery disease, status post 3 stents to his RCA on 2 occasions. Nonobstructive disease elsewhere. Symptom of exertional chest pressure and shortness of breath after eating a large meal in 2019. Patient reluctant to take angiogram because in the past dual antiplatelet therapy gave him severe ecchymoses in both hands. CT angiogram was done then which seem to show more nonobstructive disease but I felt he should have the angiogram in any case. He was going to discuss it with his wife but they never got back to me and I did not hear from them until the beginning of March when the patient called that his symptoms have worsened. His wife later told me that he had shoveled snow and had significant symptoms. In any case I scheduled him for stress echocardiogram which was markedly abnormal today indicating most likely severe coronary disease as well as borderline severe aortic stenosis. Patient had cardiac angiogram which showed only significant disease in the RCA and it was stented by Dr. Beaulieu. He remained on beta-blocker but between the stent and the beta-blocker his symptoms did not improve and maybe slightly worse leading me to assume that his symptoms along with his markedly abnormal stress test must be from his aortic stenosis. He had the TAVR on May 13, 2021 and was here initially saying that his angina was 95% gone. He had issues with his glucose postop so he is now being strict about avoiding cakes and bagels etc. He has lost some weight. He is off metoprolol and only on 10 mg of lisinopril. His MCOT showed no arrhythmias. No dizziness or palpitations. He had severe ecchymoses until we were able to stop the Plavix and just continue aspirin. (He had dental work coming up and we reviewed his need for SBE prophylaxis and he will be using amoxicillin 2 g 1 hour before.) He followed up with structural heart and had an echo as mentioned above with normal LV function and no WMA and was doing very well. Did have one episode of chest pressure again when after eating a large meal he tried to sweep the street. Otherwise asymptomatic and very happy since the TAVR. We reviewed his carotid Doppler that was done preprocedure and he does still have greater than 70% stenosis on the right and 50 to 69% on the left despite having had carotid endarterectomies on both sides in the past. He had a CT angiogram in the hospital in early September and his carotids were both less than 50%. We will aim to keep his cholesterol extremely low.) Had syncopal episode and seizure and was finally found to have conduction disease and pacemaker was placed. Syncope has not recurred but then had issues with fluid retention and a very high BNP. 1 option could have been a pacemaker syndrome if he was paced most of the time but he is very rarely paced and seems to be in sinus rhythm on all his interval EKGs. Last visit he tells me he had a scheduled pacemaker interrogation and was told that "120 times his heart rate dropped low enough for his pacemaker to kick in and the technician said perhaps he should have had a biventricular pacemaker"; in fact his report has atrial pacing only 9.5% of the time and ventricular pacing only 2.6% of the time. He has had other symptoms that are hard to pin down again him thinking that is just a few weeks and his wife thinking it goes back to August. In any case after the very high BNP he was put on Lasix by Dr. Rubinstein which seems to have improved things initially for the last 5 days and the echo 10/20 did show new wall motion abnormality in the left ventricle although his overall LVEF was at least 40 to 45% but in the past he has had symptoms that have seemed out of proportion to objective findings. I mentioned that he might need another angiogram and he was very anxious about that because of the issues of the dye playing havoc with his kidneys in the past. He did not have any chest pain syndrome to suggest an interval myocardial infarction although there was one somewhat abnormal ECG as mentioned above but it was just ST depressions V5 and V6. 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 This was done December 15, 2021 along with his follow-up and in fact his septal motion is improved and overall EF is back to 50 to 55%. Sure enough there is return to baseline of his LV function so perhaps there was a transient myocarditis. We will hold off on cardiac cath or any further work-up at this time. Long discussion about the importance of not shoveling should diabetes no fall and that he could use the snowblower if it is not strenuous but that is all. He returned and as mentioned had not found a replacement for Dr. Toth yet. Had labs recently with his urologist and the issues are a hemoglobin A1c going up to 7.5 despite Metformin 1000 and Jardiance 25. He did admit to being off his diet. His potassium was only 3.3; he does not think he is taking potassium and the urologist told him to stop all his multivitamins which perhaps had some potassium in them. Here May, 2022 with his wife and he and his wife seemed happy with Dr. Toth's replacement Dr. Landin. She stopped his metformin as it was not helping and gave him glimepiride 2 mg. On this he began having some hypoglycemia and he has been stricter with his diet and this was cut back to 1 mg. He would also like to cut down his lisinopril and he did on his own to 2-1/2 mg. He would like to stop the chlorthalidone altogether and he claims he is not taking the potassium. Renal function and electrolytes will be rechecked along with proBNP he remains on Jardiance 25 mg. His blood pressure does seem adequate right despite the changes in his medications. Follow-up with structural heart soon will include an echo. Follow-up with electrophysiology was fine. Patient still very anxious overall. Patient doing okay, here September 13, 2022. His weight has gone up but no evidence of CHF. Remains in sinus rhythm and no A. fib on his pacemaker interrogation. I tend to agree with Dr. Membreno that his complaints are all noncardiac. He is dying to get off Eliquis so we had a long discussion about the pros and cons of getting an apple smart watch and being able to monitor that way in addition to his pacemaker follow-ups and then perhaps we would be willing to stop the Eliquis. He will keep a sample with him in case his watch indicated an irregular heartbeat until he could get clinical follow-up with either myself or Dr. Wong. He will look into that. Labs were repeated mostly to obtain a BNP but also because of his increased weight. I tried to encourage him and emphasize how well he is doing overall. I reviewed the echo that he had with structural heart. He returned January 17, 2023 and it is a virtual repeat of everything we discussed on the last visit. He has been having a lot of VPCs saw Dr. Wong put him on metoprolol ER 25. His EKG here has mostly ventricular bigeminy otherwise with sinus rhythm and unchanged. By the time I examined him there were no longer any extra beats. He claims that on his last pacemaker interrogation there has been no atrial fibrillation since February and again I discussed with him the apple watch etc. but he does not think he can do that yet he still would like to come off Eliquis so this was another long discussion. He saw Dr. Wong and had a fairly high incidence of VPCs and he talked to the patient about VPC ablation which the patient did not totally understand. He tried increasing the metoprolol to 100 mg but it only made the patient feel worse etc. He is here now May 24, and I still cannot get a good handle on his fatigue and his not being able to do what he used to do. His echo looks good as mentioned above and he is in sinus rhythm with no VPCs and I told him I do not think there is any cardiac cause for his complaints but I am sending a pro BNP along with a basic metabolic to follow his renal function and especially potassium.. I do not think he would benefit from a VPC ablation and I certainly do not think that is the cause of his symptoms; Dr. Wong might want to do another monitor in the future or just based on his pacemaker interrogation see what the percent of VPCs is going forward. I tried to reassure the patient as much as possible. He returned August 28, 2023 and doing better overall with no symptoms when he mows the lawn and he does not seem to be bothered by the VPCs. His internist does not want his A1c lower than 7 so she is satisfied and he will continue with Jardiance 25 mg. Blood pressure is satisfactory. EKG is otherwise unchanged besides the VPCs. LDL still remaining above 70 and he is on rosuvastatin 40 so I am adding ezetimibe 10 mg daily. If he remains stable I would see him again in 4 months. No need for stress or echo at this time. Patient returned March 21, 2024. Lots of complaints and issues and questions but very stable from a cardiac point of view including mowing his lawn without a problem. However he went off his diet over the winter and his hemoglobin A1c shot up to 9.0 so he is now back on a strict diet. Some change in his kidney medications when his potassium was 5.6. On rosuvastatin and ezetimibe we finally have his LDL down to 69 with his HDL of 48. His last pacemaker report at the end of December was fine and his EKG today is sinus rhythm and unchanged. His left muscle shoulder pain might be orthopedic and less likely to be related to medications.  Patient returns today in follow-up together with his wife, July 29, 2024.  States right off the bat that his heart is okay and he is mowing his lawn again etc.  His EKG does show sinus rhythm with just 1 VPC and his most recent pacemaker check does not show any atrial fibrillation.  His creatinine always hovers around 1.5 which is right around the cutoff for Eliquis 5 mg versus 2.5 mg; he is seeing renal on August 8.  Pressure very high today but he claims it is because of aggravation as mentioned above.  It did not really come down by the end of the exam.  As he is seeing renal in about a week or 2 we will wait and see what his blood pressure is there and whether there are any renal adjustments of his medications.  He remains on Jardiance 25 mg just complaining of the cost.  Having a lot of back issues and had an MRI of his spine at [place] and is waiting to see his orthopedist who is on vacation this week.  No cardiac symptoms per se.  He did have left shoulder pain when he started his ezetimibe and somehow by cutting the pill in half that seems to have resolved.  He has been on the lower dose for about 6 weeks now and his lipid profile just before that on June 7 had cholesterol 145, triglycerides 138, HDL 50, LDL 71.  I have asked renal when they do bloods on August 8 to add a lipid profile and a CK.

## 2024-07-29 NOTE — HISTORY OF PRESENT ILLNESS
[FreeTextEntry1] : April 3, 2019. Patient has been followed by Dr. Larry Toth and was followed by cardiology at [facility]. In June of 2011 after a positive stress test had cardiac angiography, which had a distal 90% RCA lesion and only nonobstructive disease in the LAD and circumflex. This was stented and his symptoms were improved. His symptoms then recurred. They have been exertional chest pressure and shortness of breath when he walks after eating a meal, especially a large meal. Never gets it at rest and does not get it when he exerts himself if he has not just eaten. Did well for a while, but then the symptoms returned and in October of 2016 after another positive ECG treadmill test he had repeat cardiac catheterization. There was an 80% mid RCA lesion and again no significant disease in the LAD was circumflex and he had 2 stents to the RCA . The difference was this time his symptoms did not change. He has been placed on Omeprazole and this has not seemed to make a difference, or perhaps it did initially. He has not seen GI. He otherwise seems to be doing well. In July of 2014. He had bilateral carotid endarterectomies. His most recent echo showed normal LV function. His EKG is normal sinus rhythm and a normal tracing. He has hypertension and hyperlipidemia.He is a former smoker. His father had hypertension and CVA, but there does not seem to be a family history of coronary artery disease.  April 24, 2019.  Patient tried to increase his omeprazole, and then said he got a stomach ache and stopped it. He went for a CTA of the coronary arteries. Minor disease in the LAD and borderline severe 60-69% in the proximal circumflex and 60-70% in the RCA. Problem with him is whether his symptoms are related to ischemia or not. After long discussion I recommended that he have the angiogram. If non-obstructive disease, try Ranexa and if no help, consider noncardiac causes. If significant disease then he should have stents and again, we can see if this relieves his symptoms or not. Patient to discuss with his wife and then let me know    March 1, 2021.Patient called.  His exertional chest pain is getting worse and coming more frequently and more easier for the last few months.  I have not seen him since April 2019 and back then when we did a CTA it was mostly unrevealing despite his previous 3 stents in his RCA.  I will schedule him for a stress echo as soon as possible.  March 9, 2021.  Patient came for stress echocardiogram.  Resting echo with borderline severe though not yet critical aortic stenosis and totally normal LV function.  On treadmill within the first minute STs started to go down and heart rate shot up to 140.  I stopped the treadmill at 2 minutes with severe ST depressions and shortness of breath but no chest pain.  Post exercise echo shows new wall motion abnormality in the entire anterior wall and may be some hypokinesis in the lateral wall.  Long discussion with patient and wife and they will be booked for coronary angiography and left and right heart cath as soon as he can have a Covid swab done.  (He had the first vaccine already and the second vaccine was 5 days ago) March 16, 2021.Cath only showed 80% RCA lesion and the rest was nonobstructive.  The RCA was stented and the patient was sent home that evening.  He is on aspirin and Plavix.  He will continue metoprolol ER 50 daily.  He has a follow-up with me in 2 weeks and then will be evaluated by structural heart for a TAVR  March 30, 2021.  Patient here in follow-up.  Had stent to his RCA and is now on a beta-blocker but no change in his exertional symptoms.  In addition somewhat anxious about upcoming procedure; has evaluation appointment 4/6. May 28, 2021.  Patient had TAVR on May 13, uncomplicated except his glucoses were high post procedure.  Since he has been home he has stopped eating cakes and bagels and has lost 7 or 8 pounds.  In terms of his angina he is "95% better".  His only complaint is severe ecchymoses once again from his dual antiplatelet therapy..  Has seen Dr. Johnston and Dr. Toth in follow-up.  Remains in sinus rhythm with an unchanged ECG.  Had a monitor with no significant arrhythmias.  Is off metoprolol and currently only on lisinopril 10 mg.   July 19, 2021.  Patient here in follow-up.  He had seen structural heart and an echo was done on July 1 which showed mild MR, normally functioning TAVR with no paravalvular AI.  Moderate LAE.  Normal LV size and function and normal RV size and function.  He had an ECG on July 1 that was sinus rhythm and unremarkable.  Since his TAVR he has had no episodes of his exertional chest pressure until 1 day after making sure that he mowed his lawn on an empty stomach and had no symptoms, he then went and had a big meal and then remembered he had not swept the street.  When he swept the street he got the chest tightness again and it was relieved with rest.  That was the only episode since and has not recurred.  Otherwise no real complaints initial blood test by structural heart had a potassium of 6.1 and his creatinine had gone up to 2.0.  These were repeated 5 days later and his potassium was 5.1 and his creatinine was 1.43.  He remains on lisinopril just 10 mg daily. September 15, 2021.  Urgent visit today because of multiple falls with trauma.  I reviewed the notes from Dr. COTTO and Dr. Toth.  EKG here is sinus rhythm with sinus arrhythmia around 76, mild first-degree AV block otherwise normal tracing.  Labs September 10 with worsening creatinine back up to 1.92 and BUN 64 which he thinks is from the dye for the head CT in the hospital but also his LFTs were up.  Echo in the hospital September 2 mostly unrevealing.  The history from the patient and his wife seems to be the 2 falls were when he was erect and then bent over and then went down without warning.  Has never had a true syncopal episode when lying down or sitting.  He is not sure if he has gotten dizzy briefly while sitting.  His blood pressure has been running low despite adjustments in his lisinopril and for now I would just hold it.  The symptoms do not sound like hypoglycemia but he has had his meds adjusted and he is only on metformin and an SGLT2 inhibitor.  Last A1c was 6.2 in August.  With Dr. Barroso and again here in the office not really orthostatic.  ZIO AT monitor was placed. September 17, 2021.  A few hours after putting on ZIO monitor, patient had 4.3 seconds of asystole with P waves but no QRS.  Was sent to the emergency room and had a pacemaker placed by Dr. Wong.  Patient being discharged later this morning.  September 23, 2021. Patient was still feeling weak and dizzy and fatigued but ruled out any problem with the pacemaker.  Blood pressure has been running 145 and 150 which makes him concerned.  I reassured him that a blood pressure of 150 would not give him any symptoms but I have no problem with him going back on the low-dose lisinopril and following up with Dr. Toth.  If he is still feeling bad at Dr. Toth has no answer I will be happy to see him next week as well.  October 20, 2021.  Urgent visit an urgent echo for this patient.  Difficult history to pin down as discussing with patient and his wife.  He thinks worsening shortness of breath with exertion for about a month now and she thinks it goes back at least until August meanwhile it has been edema and fluid retention more recently.  Dr. Toth put him on Lasix and he has had improvement in just 5 days so far.  Reviewing his interval records there is 1 EKG when he saw Dr. COTTO on September 8 and had new ST depressions in V5 and V6.  I do not believe he was complaining of any chest pain or shortness of breath at the time.  All his other EKGs seem to be the same including today and they are sinus rhythm.  His echo today especially compared to his post TAVR echoes in May and September does seem to have a new wall motion abnormality in his septum which seems to be more than just possible paradoxical septal motion seen after open heart surgery which he did not have just the past..  He did have a stent to his RCA pre-TAVR and there was some moderate nonobstructive lesions.  No episode of chest pain per se.  His BNP use which had been in the 300 400 range on October 14 was 3740.  No documentation of atrial fibrillation.  Does not seem to be using his pacemaker very often so pacemaker syndrome less likely as well.  LVEF on echo today is in the 40% to 50% range the patient has always seem to have symptoms a little bit out of proportion to objective findings.  Repeat labs including BNP was sent and consideration of repeat angiogram was discussed although patient is reluctant because of his kidney issues whenever he gets dye. November 2, 2021.  Patient returns today, just wants to keep telling me how much better he feels.  Was able to mow his whole lawn although he did it over 2 days without getting burning, shortness of breath, chest pain.  His weight is down at least 4 pounds and he no longer has edema so is responding well to the medical therapy.  Once again he would like to avoid an angiogram and is asking to just have his echo repeated.  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 December 15, 2021.  Patient returns with his wife and for echocardiogram.  Continues to improve and thinks he is pretty much back to himself able to work in his yard etc. just like he mentioned above.  Echocardiogram done and in fact septal wall motion does seem to be much better and overall LVEF is between 50 and 55%.  Still with mild to moderate MR.  March 18, 2022.  Here in follow-up.  Has not yet decided on an internist to a place Dr. Toth.  Had some labs with his urologist.  Potassium only 3.3 but no change in BUN and creatinine (patient unaware and does not think he is taking potassium citrate 10 mEq.  Urologist also had him stop his multivitamins.)  He is also having a lot of dental work and bridge work coming up so will need antibiotic prophylaxis.  The urologist also checked his A1c which went up to 7.5.  He is taking Metformin 1000 and Jardiance 25 but admits to being off his diet so hopefully he will go back on his diet first.  He saw Dr. Vyas for pacemaker follow-up together with his wife and Dr. Wong was very happy with how he is doing.  He remains in sinus rhythm with one VPC.  He has follow-up coming up with Dr. Membreno about his TAVR and he thinks he is going to have an echo at that time as well. April 1, 2022. Long discussion with patient today after Dr. Wong called me the other day about finding of up to 3-minute run of atrial fibrillation on Paceart monitoring.  Overall AF burden is very low but they may be under sensing. Patient always concerned because he has had issues with ecchymoses that were pretty severe in the past.  For now is willing to take the Eliquis and we will follow-up in terms of further monitoring.  Whether in the future to try antiarrhythmic medication perhaps even amiodarone to eliminate atrial fibrillation and continue to monitor will be discussed in the future if anticoagulation becomes a major problem.  Other options could always be discussed and as well including ablation, Watchman device etc.  For now we will start Eliquis 5 mg twice daily.  Meanwhile he will remain on aspirin 81 mg for his TAVR  April 13, 2022. Patient called.  Not feeling well, dizzy at times.  First thought it was the Eliquis but no signs of bleeding.  Now since his blood pressure is usually in the 150s and would like to change back to lisinopril but maybe only 5 mg and stop the amlodipine.  I am okay with that and he will make a follow-up appointment in 3 weeks together with his wife's appointment on May 5.  Continue Eliquis for now.  Urged him to find new primary care still.  May 12, 2022.  Patient here in follow-up together with his wife.  Always somewhat difficult to manage with unusual symptoms and a poor descriptor.  Both he and his wife seem happy with the new internist Dr. Landin.  He is off metformin and she gave him glimepiride at 2 mg but he was having hypoglycemic episodes so is now on 1 mg.  He is still on Jardiance without change.  On his own he cut back the lisinopril to 2.5 mg and he would like to stop the chlorthalidone altogether.  He is not taking any potassium.  He denies exertional chest pain or shortness of breath.  Still occasional although rare A. fib on his remote pacemaker monitoring although he complains of severe ecchymoses from the Eliquis as well as the cost.  He has an upcoming follow-up with Dr. Membreno of structural heart and this will include an echocardiogram. Patient saw Dr. Membreno May 17 for 1 year follow-up of his JESSICA.  LV function normal with some inferior hypokinesis.  BUN 78 creatinine 1.6 and the Lasix was stopped for a few days.  He explained to the patient that his symptoms were not cardiac related. September 13, 2022.  Patient here in follow-up.  Remains in sinus rhythm with occasional APCs.  On labs from July 26 BUN was 38 with creatinine of 1.2 and potassium 5.2.  Other labs okay but no BNP done.  Cholesterol 164, HDL 63, LDL 84.  No BNP was done.  Patient is complaining as above that he does not have the same energy etc.  Would like to come off Eliquis so we had a long discussion about possibly getting an apple watch etc.  He did have pacemaker interrogation in August that showed no atrial fibrillation.  He did gain weight because "I like to eat especially bagels".  I did discuss with him at least getting whole-wheat bagels and scooping out the inside etc. I tried to emphasize that clinically he is doing very well.  Reviewed his medications; he is on no diuretics. January 17, 2023.  Patient here in follow-up.  He noticed a very low pulse when he would check his blood pressure and return that he was having a lot of VPCs.  He saw Dr. Wong in November and he added metoprolol ER 25 mg.  He had his pacemaker defibrillator interrogation but unfortunately was not downloaded properly and I cannot see any of the results on the computer but the patient does say that there has been no atrial fibrillation since last February and he is asking about coming off Eliquis; however he does not want to wear a smart watch etc.  Complains of generalized fatigue not able to do what he used to do and cannot be more specific as to whether it is just tiredness because he is almost 80 or he is having shortness of breath or his back pain acting up etc.  On his own for the last 3 to 4 weeks he has increased the lisinopril to 20 mg and is getting better blood pressure numbers at home.  The cost of both Eliquis and Jardiance are still a problem for him so changing to something like Entresto would also be an issue.  Last echo was May 2022 with Dr. Membreno so if he remains stable I will repeat another echo this coming May.  Still having trouble with dietary issues, what makes him literature about the [place] diet and instead I try to again talk to him about the Mediterranean diet. May 24, 2023.  Patient here in follow-up and for echocardiogram.  Once again very difficult to determine his true symptoms and complaints.  He saw Dr. Wong who found a high percent of VPCs and talk to him about an ablation while giving him a higher dose of metoprolol.  He felt miserable on the 100 mg of metoprolol and cut back to 50 and then eventually went back to 25; still complaining of not being able to do as much as he would like and not having enough energy and never complained of palpitations of VPCs.  Here he is in sinus rhythm, his blood pressure at home has been mostly in the 110s to 120s he claims and here he is around 138 or so.  His echocardiogram actually looks good in terms of nothing there to explain his trouble pushing himself and being more active; his ejection fraction is around 60%, his RVSP is a little high but no different than before and he seems to have normal RV function.  There is mild to moderate MR but not severe his TAVR is working fine.  I sent a BNP along with a follow-up basic metabolic because of his hyperkalemia and renal function.  I am not convinced that he would benefit from a VPC ablation. August 28, 2023.  Returns in follow-up.  Had labs at the beginning of April with his PCP.  Hemoglobin A1c 7.3.  Cholesterol 176, triglycerides 216, HDL 51, LDL 88. (On rosuvastatin 40).  BUN 47 with creatinine 1.68 and potassium 5.3.  No BNP done.  EKG is sinus rhythm but frequent VPCs.  Otherwise unchanged.  Initial twelve-lead in [facility] and on rhythm strip 1 couplet and otherwise just occasional VPCs.  His blood pressure at home has been good and he no longer gets chest pain or short of breath when he mows the lawn although he make sure not to do it all at once. March 21, 2024.  Returns in follow-up.  Admits to being off his diet and less activity over the whole winter; hemoglobin A1c went up to 9.0 although weight does not seem to have changed much.  He is very nervous about his high blood pressure but on his visits to renal and to his internist his blood pressure was fine so I reassured him.  He went back down from lisinopril 30 to lisinopril 20 and because his potassium was 5.6 renal put him on sodium bicarb and took away potassium.  Was able to mow his entire lawn and not having any cardiac symptoms but just very anxious about all his other issues.  Has muscle pain in his left shoulder and arm and keeps asking about different medications; I doubt it is from his statin and he has not yet seen an orthopedist or had x-rays and I advised him that that might be the best.  He had labs February 26 with cholesterol 139, triglycerides 126, HDL 48, and LDL 69. He had a pacemaker check number and seems to be doing well with 1 episode of NSVT lasting 1 second. July 29, 2024.  Patient returns in follow-up together with his wife.  This morning they stopped at [place] to  an MRI of his back that has been giving him trouble and it was supposed to be ready but was not now he is all upset and he blames his high blood pressure on that.  Denies any cardiac symptoms per se.  Had labs with his internist about 6 weeks ago; internist very concerned about his kidney function although it is extremely stable and he is seeing his nephrologist on August 8.  In addition his potassium remains between 5 and 5-1/2 and he does not watch his diet that strictly because they are too confused about the medications.  Lastly he was getting shoulder pains ever since he started ezetimibe and by cutting it in half about 6 weeks ago those pains seem to have resolved.  I will add a lipid profile to the labs of the nephrologist on August 8 to review as well as a CK.  His EKG remains sinus rhythm at 74 with 1 VPC and otherwise unremarkable; he claims his heart is fine he is mowing the lawn again etc.

## 2024-08-08 ENCOUNTER — LABORATORY RESULT (OUTPATIENT)
Age: 79
End: 2024-08-08

## 2024-08-08 ENCOUNTER — APPOINTMENT (OUTPATIENT)
Dept: NEPHROLOGY | Facility: CLINIC | Age: 79
End: 2024-08-08

## 2024-08-08 PROCEDURE — G2211 COMPLEX E/M VISIT ADD ON: CPT

## 2024-08-08 PROCEDURE — 99215 OFFICE O/P EST HI 40 MIN: CPT

## 2024-08-08 NOTE — HISTORY OF PRESENT ILLNESS
[FreeTextEntry1] : 79M with hx of Prostate ca dx 2019 Fabricio 7&9 s/p RT and hormone shots, CKD3a, NIDDM, and kidney stones presenting to follow up. When he last saw me we started bicarb tabs.   His BP was up and down. His meds have been changed so he is now on metoprolol 25mg and lisinopril 30mg.  He has no flank pain, blood in the urine, no dysuria. No LE swelling, no sob or chest pain.

## 2024-08-08 NOTE — CURRENT MEDS
[TextEntry] : aspirin 81mg Bicarb 650mg po bid Jardiance 25mg lisinopril 30mg po qd Metoprolol 25mg po qd Glimeperide 1mg Eliquis 5mg bid Rosuvastatin 40mg omeprazole 20mg qod MVI Vit D

## 2024-09-30 ENCOUNTER — NON-APPOINTMENT (OUTPATIENT)
Age: 79
End: 2024-09-30

## 2024-09-30 ENCOUNTER — APPOINTMENT (OUTPATIENT)
Dept: ELECTROPHYSIOLOGY | Facility: CLINIC | Age: 79
End: 2024-09-30

## 2024-09-30 VITALS — SYSTOLIC BLOOD PRESSURE: 163 MMHG | DIASTOLIC BLOOD PRESSURE: 78 MMHG | HEART RATE: 89 BPM | OXYGEN SATURATION: 97 %

## 2024-09-30 PROCEDURE — 93280 PM DEVICE PROGR EVAL DUAL: CPT

## 2024-09-30 PROCEDURE — 93000 ELECTROCARDIOGRAM COMPLETE: CPT | Mod: 59

## 2024-10-09 RX ORDER — GLIMEPIRIDE 1 MG/1
1 TABLET ORAL
Qty: 180 | Refills: 1 | Status: ACTIVE | COMMUNITY
Start: 2024-09-30 | End: 1900-01-01

## 2024-11-06 ENCOUNTER — APPOINTMENT (OUTPATIENT)
Dept: INTERNAL MEDICINE | Facility: CLINIC | Age: 79
End: 2024-11-06
Payer: MEDICARE

## 2024-11-06 PROCEDURE — 36415 COLL VENOUS BLD VENIPUNCTURE: CPT

## 2024-11-12 ENCOUNTER — APPOINTMENT (OUTPATIENT)
Dept: INTERNAL MEDICINE | Facility: CLINIC | Age: 79
End: 2024-11-12
Payer: MEDICARE

## 2024-11-12 VITALS
SYSTOLIC BLOOD PRESSURE: 138 MMHG | TEMPERATURE: 98.2 F | RESPIRATION RATE: 14 BRPM | OXYGEN SATURATION: 98 % | HEART RATE: 77 BPM | DIASTOLIC BLOOD PRESSURE: 76 MMHG

## 2024-11-12 VITALS — HEIGHT: 69 IN | WEIGHT: 172 LBS | BODY MASS INDEX: 25.48 KG/M2

## 2024-11-12 DIAGNOSIS — N18.32 CHRONIC KIDNEY DISEASE, STAGE 3B: ICD-10-CM

## 2024-11-12 DIAGNOSIS — I10 ESSENTIAL (PRIMARY) HYPERTENSION: ICD-10-CM

## 2024-11-12 DIAGNOSIS — E78.5 HYPERLIPIDEMIA, UNSPECIFIED: ICD-10-CM

## 2024-11-12 DIAGNOSIS — E11.9 TYPE 2 DIABETES MELLITUS W/OUT COMPLICATIONS: ICD-10-CM

## 2024-11-12 LAB
ALBUMIN SERPL ELPH-MCNC: 4.8 G/DL
ALP BLD-CCNC: 84 U/L
ALT SERPL-CCNC: 29 U/L
ANION GAP SERPL CALC-SCNC: 15 MMOL/L
APPEARANCE: CLEAR
AST SERPL-CCNC: 25 U/L
BASOPHILS # BLD AUTO: 0.03 K/UL
BASOPHILS NFR BLD AUTO: 0.6 %
BILIRUB SERPL-MCNC: 0.5 MG/DL
BILIRUBIN URINE: NEGATIVE
BLOOD URINE: NEGATIVE
BUN SERPL-MCNC: 35 MG/DL
CALCIUM SERPL-MCNC: 9.8 MG/DL
CHLORIDE SERPL-SCNC: 106 MMOL/L
CHOLEST SERPL-MCNC: 155 MG/DL
CO2 SERPL-SCNC: 22 MMOL/L
COLOR: YELLOW
CREAT SERPL-MCNC: 1.49 MG/DL
CREAT SPEC-SCNC: 72 MG/DL
EGFR: 47 ML/MIN/1.73M2
EOSINOPHIL # BLD AUTO: 0.26 K/UL
EOSINOPHIL NFR BLD AUTO: 5.5 %
ESTIMATED AVERAGE GLUCOSE: 146 MG/DL
GLUCOSE QUALITATIVE U: >=1000 MG/DL
GLUCOSE SERPL-MCNC: 141 MG/DL
HBA1C MFR BLD HPLC: 6.7 %
HCT VFR BLD CALC: 43.1 %
HDLC SERPL-MCNC: 58 MG/DL
HGB BLD-MCNC: 13.1 G/DL
IMM GRANULOCYTES NFR BLD AUTO: 0.6 %
KETONES URINE: NEGATIVE MG/DL
LDLC SERPL CALC-MCNC: 74 MG/DL
LEUKOCYTE ESTERASE URINE: NEGATIVE
LYMPHOCYTES # BLD AUTO: 0.88 K/UL
LYMPHOCYTES NFR BLD AUTO: 18.5 %
MAN DIFF?: NORMAL
MCHC RBC-ENTMCNC: 28.1 PG
MCHC RBC-ENTMCNC: 30.4 G/DL
MCV RBC AUTO: 92.5 FL
MICROALBUMIN 24H UR DL<=1MG/L-MCNC: 4 MG/DL
MICROALBUMIN/CREAT 24H UR-RTO: 56 MG/G
MONOCYTES # BLD AUTO: 0.64 K/UL
MONOCYTES NFR BLD AUTO: 13.5 %
NEUTROPHILS # BLD AUTO: 2.91 K/UL
NEUTROPHILS NFR BLD AUTO: 61.3 %
NITRITE URINE: NEGATIVE
NONHDLC SERPL-MCNC: 98 MG/DL
PH URINE: 5.5
PLATELET # BLD AUTO: 162 K/UL
POTASSIUM SERPL-SCNC: 5 MMOL/L
PROT SERPL-MCNC: 7.3 G/DL
PROTEIN URINE: NORMAL MG/DL
PSA SERPL-MCNC: 0.1 NG/ML
RBC # BLD: 4.66 M/UL
RBC # FLD: 13.7 %
SODIUM SERPL-SCNC: 143 MMOL/L
SPECIFIC GRAVITY URINE: 1.02
TRIGL SERPL-MCNC: 138 MG/DL
TSH SERPL-ACNC: 1.98 UIU/ML
UROBILINOGEN URINE: 0.2 MG/DL
WBC # FLD AUTO: 4.75 K/UL

## 2024-11-12 PROCEDURE — G2211 COMPLEX E/M VISIT ADD ON: CPT

## 2024-11-12 PROCEDURE — 99214 OFFICE O/P EST MOD 30 MIN: CPT

## 2024-11-23 ENCOUNTER — APPOINTMENT (OUTPATIENT)
Dept: INTERNAL MEDICINE | Facility: CLINIC | Age: 79
End: 2024-11-23
Payer: MEDICARE

## 2024-11-23 ENCOUNTER — TRANSCRIPTION ENCOUNTER (OUTPATIENT)
Age: 79
End: 2024-11-23

## 2024-11-23 VITALS — DIASTOLIC BLOOD PRESSURE: 60 MMHG | RESPIRATION RATE: 14 BRPM | SYSTOLIC BLOOD PRESSURE: 130 MMHG | HEART RATE: 74 BPM

## 2024-11-23 DIAGNOSIS — S09.90XA UNSPECIFIED INJURY OF HEAD, INITIAL ENCOUNTER: ICD-10-CM

## 2024-11-23 DIAGNOSIS — S06.0X0A CONCUSSION W/OUT LOSS OF CONSCIOUSNESS, INITIAL ENCOUNTER: ICD-10-CM

## 2024-11-23 PROCEDURE — 99214 OFFICE O/P EST MOD 30 MIN: CPT

## 2024-11-25 ENCOUNTER — OUTPATIENT (OUTPATIENT)
Dept: OUTPATIENT SERVICES | Facility: HOSPITAL | Age: 79
LOS: 1 days | End: 2024-11-25
Payer: MEDICARE

## 2024-11-25 ENCOUNTER — APPOINTMENT (OUTPATIENT)
Dept: CT IMAGING | Facility: IMAGING CENTER | Age: 79
End: 2024-11-25
Payer: MEDICARE

## 2024-11-25 ENCOUNTER — NON-APPOINTMENT (OUTPATIENT)
Age: 79
End: 2024-11-25

## 2024-11-25 DIAGNOSIS — Z95.5 PRESENCE OF CORONARY ANGIOPLASTY IMPLANT AND GRAFT: Chronic | ICD-10-CM

## 2024-11-25 DIAGNOSIS — S09.90XA UNSPECIFIED INJURY OF HEAD, INITIAL ENCOUNTER: ICD-10-CM

## 2024-11-25 DIAGNOSIS — Z98.890 OTHER SPECIFIED POSTPROCEDURAL STATES: Chronic | ICD-10-CM

## 2024-11-25 DIAGNOSIS — Z95.0 PRESENCE OF CARDIAC PACEMAKER: Chronic | ICD-10-CM

## 2024-11-25 DIAGNOSIS — Z95.2 PRESENCE OF PROSTHETIC HEART VALVE: Chronic | ICD-10-CM

## 2024-11-25 PROCEDURE — 70450 CT HEAD/BRAIN W/O DYE: CPT | Mod: 26

## 2024-11-25 PROCEDURE — 70450 CT HEAD/BRAIN W/O DYE: CPT

## 2024-12-04 ENCOUNTER — APPOINTMENT (OUTPATIENT)
Dept: NEPHROLOGY | Facility: CLINIC | Age: 79
End: 2024-12-04
Payer: MEDICARE

## 2024-12-04 DIAGNOSIS — N18.32 CHRONIC KIDNEY DISEASE, STAGE 3B: ICD-10-CM

## 2024-12-04 DIAGNOSIS — I10 ESSENTIAL (PRIMARY) HYPERTENSION: ICD-10-CM

## 2024-12-04 PROCEDURE — 99443: CPT

## 2024-12-18 ENCOUNTER — APPOINTMENT (OUTPATIENT)
Dept: VASCULAR SURGERY | Facility: CLINIC | Age: 79
End: 2024-12-18

## 2024-12-30 ENCOUNTER — NON-APPOINTMENT (OUTPATIENT)
Age: 79
End: 2024-12-30

## 2024-12-30 ENCOUNTER — APPOINTMENT (OUTPATIENT)
Dept: ELECTROPHYSIOLOGY | Facility: CLINIC | Age: 79
End: 2024-12-30

## 2024-12-30 PROCEDURE — 93296 REM INTERROG EVL PM/IDS: CPT

## 2024-12-30 PROCEDURE — 93294 REM INTERROG EVL PM/LDLS PM: CPT

## 2025-01-14 ENCOUNTER — RX RENEWAL (OUTPATIENT)
Age: 80
End: 2025-01-14

## 2025-02-10 ENCOUNTER — NON-APPOINTMENT (OUTPATIENT)
Age: 80
End: 2025-02-10

## 2025-02-10 ENCOUNTER — APPOINTMENT (OUTPATIENT)
Dept: CARDIOLOGY | Facility: CLINIC | Age: 80
End: 2025-02-10
Payer: MEDICARE

## 2025-02-10 VITALS
OXYGEN SATURATION: 98 % | WEIGHT: 177 LBS | DIASTOLIC BLOOD PRESSURE: 80 MMHG | HEART RATE: 75 BPM | SYSTOLIC BLOOD PRESSURE: 128 MMHG | BODY MASS INDEX: 26.14 KG/M2

## 2025-02-10 VITALS
OXYGEN SATURATION: 90 % | HEART RATE: 65 BPM | DIASTOLIC BLOOD PRESSURE: 60 MMHG | WEIGHT: 166 LBS | SYSTOLIC BLOOD PRESSURE: 117 MMHG | BODY MASS INDEX: 24.51 KG/M2

## 2025-02-10 VITALS
SYSTOLIC BLOOD PRESSURE: 128 MMHG | DIASTOLIC BLOOD PRESSURE: 80 MMHG | BODY MASS INDEX: 26.14 KG/M2 | HEART RATE: 75 BPM | WEIGHT: 177 LBS | OXYGEN SATURATION: 97 %

## 2025-02-10 DIAGNOSIS — I10 ESSENTIAL (PRIMARY) HYPERTENSION: ICD-10-CM

## 2025-02-10 DIAGNOSIS — I35.0 NONRHEUMATIC AORTIC (VALVE) STENOSIS: ICD-10-CM

## 2025-02-10 DIAGNOSIS — Z86.79 PERSONAL HISTORY OF OTHER DISEASES OF THE CIRCULATORY SYSTEM: ICD-10-CM

## 2025-02-10 DIAGNOSIS — I25.10 ATHEROSCLEROTIC HEART DISEASE OF NATIVE CORONARY ARTERY W/OUT ANGINA PECTORIS: ICD-10-CM

## 2025-02-10 PROCEDURE — 99214 OFFICE O/P EST MOD 30 MIN: CPT

## 2025-02-10 PROCEDURE — 93000 ELECTROCARDIOGRAM COMPLETE: CPT

## 2025-02-10 PROCEDURE — G2211 COMPLEX E/M VISIT ADD ON: CPT

## 2025-02-19 ENCOUNTER — APPOINTMENT (OUTPATIENT)
Dept: VASCULAR SURGERY | Facility: CLINIC | Age: 80
End: 2025-02-19
Payer: MEDICARE

## 2025-02-19 VITALS
DIASTOLIC BLOOD PRESSURE: 85 MMHG | BODY MASS INDEX: 26.22 KG/M2 | TEMPERATURE: 97.9 F | SYSTOLIC BLOOD PRESSURE: 177 MMHG | HEART RATE: 81 BPM | WEIGHT: 177 LBS | HEIGHT: 69 IN

## 2025-02-19 DIAGNOSIS — I73.9 PERIPHERAL VASCULAR DISEASE, UNSPECIFIED: ICD-10-CM

## 2025-02-19 PROCEDURE — 99213 OFFICE O/P EST LOW 20 MIN: CPT

## 2025-02-19 PROCEDURE — 93923 UPR/LXTR ART STDY 3+ LVLS: CPT

## 2025-02-26 RX ORDER — ENALAPRIL MALEATE 5 MG/1
5 TABLET ORAL
Qty: 180 | Refills: 1 | Status: ACTIVE | COMMUNITY
Start: 2025-02-26 | End: 1900-01-01

## 2025-03-05 ENCOUNTER — LABORATORY RESULT (OUTPATIENT)
Age: 80
End: 2025-03-05

## 2025-03-05 ENCOUNTER — APPOINTMENT (OUTPATIENT)
Dept: INTERNAL MEDICINE | Facility: CLINIC | Age: 80
End: 2025-03-05
Payer: MEDICARE

## 2025-03-05 PROCEDURE — 36415 COLL VENOUS BLD VENIPUNCTURE: CPT

## 2025-03-12 ENCOUNTER — APPOINTMENT (OUTPATIENT)
Dept: INTERNAL MEDICINE | Facility: CLINIC | Age: 80
End: 2025-03-12
Payer: MEDICARE

## 2025-03-12 VITALS
OXYGEN SATURATION: 98 % | HEART RATE: 77 BPM | SYSTOLIC BLOOD PRESSURE: 152 MMHG | RESPIRATION RATE: 14 BRPM | DIASTOLIC BLOOD PRESSURE: 82 MMHG

## 2025-03-12 VITALS — WEIGHT: 178 LBS | BODY MASS INDEX: 26.36 KG/M2 | HEIGHT: 69 IN

## 2025-03-12 DIAGNOSIS — N18.32 CHRONIC KIDNEY DISEASE, STAGE 3B: ICD-10-CM

## 2025-03-12 DIAGNOSIS — I25.10 ATHEROSCLEROTIC HEART DISEASE OF NATIVE CORONARY ARTERY W/OUT ANGINA PECTORIS: ICD-10-CM

## 2025-03-12 DIAGNOSIS — Z00.00 ENCOUNTER FOR GENERAL ADULT MEDICAL EXAMINATION W/OUT ABNORMAL FINDINGS: ICD-10-CM

## 2025-03-12 DIAGNOSIS — E78.5 HYPERLIPIDEMIA, UNSPECIFIED: ICD-10-CM

## 2025-03-12 DIAGNOSIS — E11.9 TYPE 2 DIABETES MELLITUS W/OUT COMPLICATIONS: ICD-10-CM

## 2025-03-12 DIAGNOSIS — I10 ESSENTIAL (PRIMARY) HYPERTENSION: ICD-10-CM

## 2025-03-12 DIAGNOSIS — Z86.79 PERSONAL HISTORY OF OTHER DISEASES OF THE CIRCULATORY SYSTEM: ICD-10-CM

## 2025-03-12 DIAGNOSIS — I48.0 PAROXYSMAL ATRIAL FIBRILLATION: ICD-10-CM

## 2025-03-12 DIAGNOSIS — J06.9 ACUTE UPPER RESPIRATORY INFECTION, UNSPECIFIED: ICD-10-CM

## 2025-03-12 DIAGNOSIS — B02.29 OTHER POSTHERPETIC NERVOUS SYSTEM INVOLVEMENT: ICD-10-CM

## 2025-03-12 DIAGNOSIS — Z87.828 PERSONAL HISTORY OF OTHER (HEALED) PHYSICAL INJURY AND TRAUMA: ICD-10-CM

## 2025-03-12 LAB
ALBUMIN SERPL ELPH-MCNC: 4.7 G/DL
ALP BLD-CCNC: 90 U/L
ALT SERPL-CCNC: 25 U/L
ANION GAP SERPL CALC-SCNC: 17 MMOL/L
APPEARANCE: CLEAR
AST SERPL-CCNC: 24 U/L
BASOPHILS # BLD AUTO: 0.04 K/UL
BASOPHILS NFR BLD AUTO: 0.7 %
BILIRUB SERPL-MCNC: 0.4 MG/DL
BILIRUBIN URINE: NEGATIVE
BLOOD URINE: ABNORMAL
BUN SERPL-MCNC: 33 MG/DL
CALCIUM SERPL-MCNC: 9.9 MG/DL
CHLORIDE SERPL-SCNC: 101 MMOL/L
CHOLEST SERPL-MCNC: 156 MG/DL
CO2 SERPL-SCNC: 20 MMOL/L
COLOR: YELLOW
CREAT SERPL-MCNC: 1.37 MG/DL
CREAT SPEC-SCNC: 47 MG/DL
EGFRCR SERPLBLD CKD-EPI 2021: 52 ML/MIN/1.73M2
EOSINOPHIL # BLD AUTO: 0.39 K/UL
EOSINOPHIL NFR BLD AUTO: 6.5 %
ESTIMATED AVERAGE GLUCOSE: 237 MG/DL
GLUCOSE QUALITATIVE U: >=1000 MG/DL
GLUCOSE SERPL-MCNC: 160 MG/DL
HBA1C MFR BLD HPLC: 9.9 %
HCT VFR BLD CALC: 45 %
HDLC SERPL-MCNC: 50 MG/DL
HGB BLD-MCNC: 14 G/DL
IMM GRANULOCYTES NFR BLD AUTO: 0.7 %
KETONES URINE: NEGATIVE MG/DL
LDLC SERPL CALC-MCNC: 75 MG/DL
LEUKOCYTE ESTERASE URINE: NEGATIVE
LYMPHOCYTES # BLD AUTO: 1.01 K/UL
LYMPHOCYTES NFR BLD AUTO: 16.9 %
MAN DIFF?: NORMAL
MCHC RBC-ENTMCNC: 28.7 PG
MCHC RBC-ENTMCNC: 31.1 G/DL
MCV RBC AUTO: 92.2 FL
MICROALBUMIN 24H UR DL<=1MG/L-MCNC: 5.4 MG/DL
MICROALBUMIN/CREAT 24H UR-RTO: 115 MG/G
MONOCYTES # BLD AUTO: 0.7 K/UL
MONOCYTES NFR BLD AUTO: 11.7 %
NEUTROPHILS # BLD AUTO: 3.81 K/UL
NEUTROPHILS NFR BLD AUTO: 63.5 %
NITRITE URINE: NEGATIVE
NONHDLC SERPL-MCNC: 106 MG/DL
PH URINE: 5.5
PLATELET # BLD AUTO: 141 K/UL
POTASSIUM SERPL-SCNC: 5.3 MMOL/L
PROT SERPL-MCNC: 7.4 G/DL
PROTEIN URINE: NORMAL MG/DL
PSA SERPL-MCNC: 0.08 NG/ML
RBC # BLD: 4.88 M/UL
RBC # FLD: 13.5 %
SODIUM SERPL-SCNC: 138 MMOL/L
SPECIFIC GRAVITY URINE: 1.02
T4 FREE SERPL-MCNC: 1.1 NG/DL
TRIGL SERPL-MCNC: 183 MG/DL
TSH SERPL-ACNC: 2.14 UIU/ML
UROBILINOGEN URINE: 0.2 MG/DL
WBC # FLD AUTO: 5.99 K/UL

## 2025-03-12 PROCEDURE — 36415 COLL VENOUS BLD VENIPUNCTURE: CPT

## 2025-03-12 PROCEDURE — G0439: CPT

## 2025-03-12 RX ORDER — GABAPENTIN 100 MG/1
100 CAPSULE ORAL
Qty: 90 | Refills: 0 | Status: ACTIVE | COMMUNITY
Start: 2025-03-12 | End: 1900-01-01

## 2025-03-14 LAB — NT-PROBNP SERPL-MCNC: 748 PG/ML

## 2025-03-21 ENCOUNTER — OUTPATIENT (OUTPATIENT)
Dept: OUTPATIENT SERVICES | Facility: HOSPITAL | Age: 80
LOS: 1 days | End: 2025-03-21
Payer: MEDICARE

## 2025-03-21 ENCOUNTER — APPOINTMENT (OUTPATIENT)
Dept: UROLOGY | Facility: CLINIC | Age: 80
End: 2025-03-21
Payer: MEDICARE

## 2025-03-21 DIAGNOSIS — R39.9 UNSPECIFIED SYMPTOMS AND SIGNS INVOLVING THE GENITOURINARY SYSTEM: ICD-10-CM

## 2025-03-21 DIAGNOSIS — Z98.890 OTHER SPECIFIED POSTPROCEDURAL STATES: Chronic | ICD-10-CM

## 2025-03-21 DIAGNOSIS — R35.0 FREQUENCY OF MICTURITION: ICD-10-CM

## 2025-03-21 DIAGNOSIS — Z95.5 PRESENCE OF CORONARY ANGIOPLASTY IMPLANT AND GRAFT: Chronic | ICD-10-CM

## 2025-03-21 DIAGNOSIS — Z95.0 PRESENCE OF CARDIAC PACEMAKER: Chronic | ICD-10-CM

## 2025-03-21 DIAGNOSIS — C61 MALIGNANT NEOPLASM OF PROSTATE: ICD-10-CM

## 2025-03-21 DIAGNOSIS — Z95.2 PRESENCE OF PROSTHETIC HEART VALVE: Chronic | ICD-10-CM

## 2025-03-21 PROCEDURE — 76775 US EXAM ABDO BACK WALL LIM: CPT

## 2025-03-21 PROCEDURE — 99213 OFFICE O/P EST LOW 20 MIN: CPT

## 2025-03-21 PROCEDURE — 76775 US EXAM ABDO BACK WALL LIM: CPT | Mod: 26

## 2025-03-24 DIAGNOSIS — C61 MALIGNANT NEOPLASM OF PROSTATE: ICD-10-CM

## 2025-03-24 DIAGNOSIS — N20.0 CALCULUS OF KIDNEY: ICD-10-CM

## 2025-03-24 LAB
APPEARANCE: CLEAR
BACTERIA: NEGATIVE /HPF
BILIRUBIN URINE: NEGATIVE
BLOOD URINE: ABNORMAL
CAST: 0 /LPF
COLOR: YELLOW
EPITHELIAL CELLS: 0 /HPF
GLUCOSE QUALITATIVE U: >=1000 MG/DL
KETONES URINE: NEGATIVE MG/DL
LEUKOCYTE ESTERASE URINE: NEGATIVE
MICROSCOPIC-UA: NORMAL
NITRITE URINE: NEGATIVE
PH URINE: 5.5
PROTEIN URINE: NORMAL MG/DL
RED BLOOD CELLS URINE: 37 /HPF
SPECIFIC GRAVITY URINE: 1.02
UROBILINOGEN URINE: 0.2 MG/DL
WHITE BLOOD CELLS URINE: 0 /HPF

## 2025-03-25 ENCOUNTER — RESULT REVIEW (OUTPATIENT)
Age: 80
End: 2025-03-25

## 2025-03-25 ENCOUNTER — APPOINTMENT (OUTPATIENT)
Dept: NEPHROLOGY | Facility: CLINIC | Age: 80
End: 2025-03-25
Payer: MEDICARE

## 2025-03-25 VITALS — DIASTOLIC BLOOD PRESSURE: 80 MMHG | SYSTOLIC BLOOD PRESSURE: 120 MMHG

## 2025-03-25 VITALS
DIASTOLIC BLOOD PRESSURE: 90 MMHG | TEMPERATURE: 97.6 F | BODY MASS INDEX: 26.58 KG/M2 | HEART RATE: 87 BPM | OXYGEN SATURATION: 96 % | SYSTOLIC BLOOD PRESSURE: 187 MMHG | WEIGHT: 180 LBS

## 2025-03-25 DIAGNOSIS — I10 ESSENTIAL (PRIMARY) HYPERTENSION: ICD-10-CM

## 2025-03-25 DIAGNOSIS — N18.32 CHRONIC KIDNEY DISEASE, STAGE 3B: ICD-10-CM

## 2025-03-25 DIAGNOSIS — N20.0 CALCULUS OF KIDNEY: ICD-10-CM

## 2025-03-25 DIAGNOSIS — R31.29 OTHER MICROSCOPIC HEMATURIA: ICD-10-CM

## 2025-03-25 DIAGNOSIS — R10.9 UNSPECIFIED ABDOMINAL PAIN: ICD-10-CM

## 2025-03-25 LAB — BACTERIA UR CULT: NORMAL

## 2025-03-25 PROCEDURE — G2211 COMPLEX E/M VISIT ADD ON: CPT

## 2025-03-25 PROCEDURE — 99215 OFFICE O/P EST HI 40 MIN: CPT

## 2025-03-27 ENCOUNTER — OUTPATIENT (OUTPATIENT)
Dept: OUTPATIENT SERVICES | Facility: HOSPITAL | Age: 80
LOS: 1 days | End: 2025-03-27
Payer: MEDICARE

## 2025-03-27 ENCOUNTER — APPOINTMENT (OUTPATIENT)
Dept: CT IMAGING | Facility: CLINIC | Age: 80
End: 2025-03-27
Payer: MEDICARE

## 2025-03-27 DIAGNOSIS — Z95.5 PRESENCE OF CORONARY ANGIOPLASTY IMPLANT AND GRAFT: Chronic | ICD-10-CM

## 2025-03-27 DIAGNOSIS — Z98.890 OTHER SPECIFIED POSTPROCEDURAL STATES: Chronic | ICD-10-CM

## 2025-03-27 DIAGNOSIS — Z95.0 PRESENCE OF CARDIAC PACEMAKER: Chronic | ICD-10-CM

## 2025-03-27 PROCEDURE — 74176 CT ABD & PELVIS W/O CONTRAST: CPT

## 2025-03-27 PROCEDURE — 74176 CT ABD & PELVIS W/O CONTRAST: CPT | Mod: 26

## 2025-03-31 LAB
APPEARANCE: CLEAR
BACTERIA UR CULT: NORMAL
BACTERIA: NEGATIVE /HPF
BILIRUBIN URINE: NEGATIVE
BLOOD URINE: ABNORMAL
CAST: 0 /LPF
COLOR: YELLOW
CREAT SPEC-SCNC: 31 MG/DL
CREAT/PROT UR: 0.3 RATIO
EPITHELIAL CELLS: 0 /HPF
GLUCOSE QUALITATIVE U: >=1000 MG/DL
KETONES URINE: NEGATIVE MG/DL
LEUKOCYTE ESTERASE URINE: NEGATIVE
MICROSCOPIC-UA: NORMAL
NITRITE URINE: NEGATIVE
PH URINE: 6
PROT UR-MCNC: 9 MG/DL
PROTEIN URINE: NORMAL MG/DL
RED BLOOD CELLS URINE: 0 /HPF
REVIEW: NORMAL
SPECIFIC GRAVITY URINE: 1.02
UROBILINOGEN URINE: 0.2 MG/DL
WHITE BLOOD CELLS URINE: 0 /HPF

## 2025-04-02 ENCOUNTER — APPOINTMENT (OUTPATIENT)
Dept: ELECTROPHYSIOLOGY | Facility: CLINIC | Age: 80
End: 2025-04-02

## 2025-04-02 PROCEDURE — 93296 REM INTERROG EVL PM/IDS: CPT

## 2025-04-02 PROCEDURE — 93294 REM INTERROG EVL PM/LDLS PM: CPT

## 2025-04-15 ENCOUNTER — APPOINTMENT (OUTPATIENT)
Dept: UROLOGY | Facility: CLINIC | Age: 80
End: 2025-04-15
Payer: MEDICARE

## 2025-04-15 ENCOUNTER — OUTPATIENT (OUTPATIENT)
Dept: OUTPATIENT SERVICES | Facility: HOSPITAL | Age: 80
LOS: 1 days | End: 2025-04-15
Payer: MEDICARE

## 2025-04-15 VITALS — SYSTOLIC BLOOD PRESSURE: 175 MMHG | DIASTOLIC BLOOD PRESSURE: 78 MMHG

## 2025-04-15 DIAGNOSIS — R35.0 FREQUENCY OF MICTURITION: ICD-10-CM

## 2025-04-15 DIAGNOSIS — R31.29 OTHER MICROSCOPIC HEMATURIA: ICD-10-CM

## 2025-04-15 DIAGNOSIS — Z98.890 OTHER SPECIFIED POSTPROCEDURAL STATES: Chronic | ICD-10-CM

## 2025-04-15 DIAGNOSIS — Z95.2 PRESENCE OF PROSTHETIC HEART VALVE: Chronic | ICD-10-CM

## 2025-04-15 DIAGNOSIS — Z95.5 PRESENCE OF CORONARY ANGIOPLASTY IMPLANT AND GRAFT: Chronic | ICD-10-CM

## 2025-04-15 DIAGNOSIS — Z95.0 PRESENCE OF CARDIAC PACEMAKER: Chronic | ICD-10-CM

## 2025-04-15 PROCEDURE — 52000 CYSTOURETHROSCOPY: CPT

## 2025-04-15 PROCEDURE — 99213 OFFICE O/P EST LOW 20 MIN: CPT | Mod: 57

## 2025-04-16 DIAGNOSIS — R31.29 OTHER MICROSCOPIC HEMATURIA: ICD-10-CM

## 2025-04-16 DIAGNOSIS — D49.4 NEOPLASM OF UNSPECIFIED BEHAVIOR OF BLADDER: ICD-10-CM

## 2025-04-17 LAB — URINE CYTOLOGY: NORMAL

## 2025-04-23 ENCOUNTER — APPOINTMENT (OUTPATIENT)
Dept: INTERNAL MEDICINE | Facility: CLINIC | Age: 80
End: 2025-04-23
Payer: MEDICARE

## 2025-04-23 ENCOUNTER — NON-APPOINTMENT (OUTPATIENT)
Age: 80
End: 2025-04-23

## 2025-04-23 VITALS
RESPIRATION RATE: 14 BRPM | TEMPERATURE: 98 F | HEART RATE: 65 BPM | DIASTOLIC BLOOD PRESSURE: 64 MMHG | SYSTOLIC BLOOD PRESSURE: 120 MMHG | OXYGEN SATURATION: 98 %

## 2025-04-23 VITALS — BODY MASS INDEX: 25.84 KG/M2 | WEIGHT: 175 LBS

## 2025-04-23 DIAGNOSIS — E11.9 TYPE 2 DIABETES MELLITUS W/OUT COMPLICATIONS: ICD-10-CM

## 2025-04-23 DIAGNOSIS — E78.5 HYPERLIPIDEMIA, UNSPECIFIED: ICD-10-CM

## 2025-04-23 DIAGNOSIS — Z01.818 ENCOUNTER FOR OTHER PREPROCEDURAL EXAMINATION: ICD-10-CM

## 2025-04-23 DIAGNOSIS — Z86.79 PERSONAL HISTORY OF OTHER DISEASES OF THE CIRCULATORY SYSTEM: ICD-10-CM

## 2025-04-23 DIAGNOSIS — I25.10 ATHEROSCLEROTIC HEART DISEASE OF NATIVE CORONARY ARTERY W/OUT ANGINA PECTORIS: ICD-10-CM

## 2025-04-23 DIAGNOSIS — I10 ESSENTIAL (PRIMARY) HYPERTENSION: ICD-10-CM

## 2025-04-23 DIAGNOSIS — D49.4 NEOPLASM OF UNSPECIFIED BEHAVIOR OF BLADDER: ICD-10-CM

## 2025-04-23 PROCEDURE — 36415 COLL VENOUS BLD VENIPUNCTURE: CPT

## 2025-04-23 PROCEDURE — G2211 COMPLEX E/M VISIT ADD ON: CPT

## 2025-04-23 PROCEDURE — 99214 OFFICE O/P EST MOD 30 MIN: CPT

## 2025-04-23 PROCEDURE — 93000 ELECTROCARDIOGRAM COMPLETE: CPT

## 2025-04-25 ENCOUNTER — NON-APPOINTMENT (OUTPATIENT)
Age: 80
End: 2025-04-25

## 2025-04-25 LAB
ALBUMIN SERPL ELPH-MCNC: 4.8 G/DL
ALP BLD-CCNC: 85 U/L
ALT SERPL-CCNC: 24 U/L
ANION GAP SERPL CALC-SCNC: 15 MMOL/L
AST SERPL-CCNC: 22 U/L
BILIRUB SERPL-MCNC: 0.5 MG/DL
BUN SERPL-MCNC: 43 MG/DL
CALCIUM SERPL-MCNC: 9.9 MG/DL
CHLORIDE SERPL-SCNC: 103 MMOL/L
CO2 SERPL-SCNC: 21 MMOL/L
CREAT SERPL-MCNC: 1.71 MG/DL
EGFRCR SERPLBLD CKD-EPI 2021: 40 ML/MIN/1.73M2
ESTIMATED AVERAGE GLUCOSE: 220 MG/DL
GLUCOSE SERPL-MCNC: 83 MG/DL
HBA1C MFR BLD HPLC: 9.3 %
POTASSIUM SERPL-SCNC: 6.1 MMOL/L
PROT SERPL-MCNC: 7.4 G/DL
SODIUM SERPL-SCNC: 139 MMOL/L

## 2025-05-07 ENCOUNTER — APPOINTMENT (OUTPATIENT)
Dept: INTERNAL MEDICINE | Facility: CLINIC | Age: 80
End: 2025-05-07
Payer: MEDICARE

## 2025-05-07 VITALS
HEART RATE: 70 BPM | OXYGEN SATURATION: 98 % | RESPIRATION RATE: 14 BRPM | SYSTOLIC BLOOD PRESSURE: 150 MMHG | DIASTOLIC BLOOD PRESSURE: 80 MMHG

## 2025-05-07 DIAGNOSIS — N18.30 CHRONIC KIDNEY DISEASE, STAGE 3 UNSPECIFIED: ICD-10-CM

## 2025-05-07 DIAGNOSIS — E11.9 TYPE 2 DIABETES MELLITUS W/OUT COMPLICATIONS: ICD-10-CM

## 2025-05-07 DIAGNOSIS — E87.5 HYPERKALEMIA: ICD-10-CM

## 2025-05-07 PROCEDURE — 99214 OFFICE O/P EST MOD 30 MIN: CPT

## 2025-05-07 PROCEDURE — 36415 COLL VENOUS BLD VENIPUNCTURE: CPT

## 2025-05-07 PROCEDURE — G2211 COMPLEX E/M VISIT ADD ON: CPT

## 2025-05-08 LAB
ANION GAP SERPL CALC-SCNC: 14 MMOL/L
BUN SERPL-MCNC: 26 MG/DL
CALCIUM SERPL-MCNC: 9.6 MG/DL
CHLORIDE SERPL-SCNC: 106 MMOL/L
CO2 SERPL-SCNC: 24 MMOL/L
CREAT SERPL-MCNC: 1.32 MG/DL
EGFRCR SERPLBLD CKD-EPI 2021: 54 ML/MIN/1.73M2
ESTIMATED AVERAGE GLUCOSE: 194 MG/DL
HBA1C MFR BLD HPLC: 8.4 %
POTASSIUM SERPL-SCNC: 4.9 MMOL/L
SODIUM SERPL-SCNC: 143 MMOL/L

## 2025-05-09 ENCOUNTER — OUTPATIENT (OUTPATIENT)
Dept: OUTPATIENT SERVICES | Facility: HOSPITAL | Age: 80
LOS: 1 days | End: 2025-05-09

## 2025-05-09 VITALS
TEMPERATURE: 98 F | DIASTOLIC BLOOD PRESSURE: 65 MMHG | OXYGEN SATURATION: 97 % | RESPIRATION RATE: 14 BRPM | WEIGHT: 175.05 LBS | SYSTOLIC BLOOD PRESSURE: 116 MMHG | HEART RATE: 75 BPM | HEIGHT: 69 IN

## 2025-05-09 DIAGNOSIS — Z95.0 PRESENCE OF CARDIAC PACEMAKER: Chronic | ICD-10-CM

## 2025-05-09 DIAGNOSIS — D49.4 NEOPLASM OF UNSPECIFIED BEHAVIOR OF BLADDER: ICD-10-CM

## 2025-05-09 DIAGNOSIS — Z98.890 OTHER SPECIFIED POSTPROCEDURAL STATES: Chronic | ICD-10-CM

## 2025-05-09 DIAGNOSIS — H26.9 UNSPECIFIED CATARACT: Chronic | ICD-10-CM

## 2025-05-09 DIAGNOSIS — Z95.5 PRESENCE OF CORONARY ANGIOPLASTY IMPLANT AND GRAFT: Chronic | ICD-10-CM

## 2025-05-09 DIAGNOSIS — Z95.2 PRESENCE OF PROSTHETIC HEART VALVE: Chronic | ICD-10-CM

## 2025-05-09 PROBLEM — E78.5 HYPERLIPIDEMIA, UNSPECIFIED: Chronic | Status: INACTIVE | Noted: 2021-03-15 | Resolved: 2025-05-09

## 2025-05-09 PROBLEM — I10 ESSENTIAL (PRIMARY) HYPERTENSION: Chronic | Status: INACTIVE | Noted: 2021-03-15 | Resolved: 2025-05-09

## 2025-05-09 PROBLEM — E11.9 TYPE 2 DIABETES MELLITUS WITHOUT COMPLICATIONS: Chronic | Status: INACTIVE | Noted: 2021-03-15 | Resolved: 2025-05-09

## 2025-05-09 PROBLEM — I25.10 ATHEROSCLEROTIC HEART DISEASE OF NATIVE CORONARY ARTERY WITHOUT ANGINA PECTORIS: Chronic | Status: INACTIVE | Noted: 2021-03-15 | Resolved: 2025-05-09

## 2025-05-09 RX ORDER — GABAPENTIN 400 MG/1
1 CAPSULE ORAL
Refills: 0 | DISCHARGE

## 2025-05-09 RX ORDER — METOPROLOL SUCCINATE 50 MG/1
1 TABLET, EXTENDED RELEASE ORAL
Refills: 0 | DISCHARGE

## 2025-05-09 NOTE — H&P PST ADULT - HISTORY OF PRESENT ILLNESS
80y/o male scheduled for TURBT on 5/19/2025.  Pt with hx of , HLD, DM2, Carotid stenosis s/p bilateral endarterectomy, CAD CALLUM x 3 to RCA, most recent 3/15/21., s/p TAVR, , PPM (Sept 2021). Patient with right flank pain for the last one month. CT scan revealed renal stones.  Patient scheduled for right ureteroscopy laser lithotripsy JJ stent      80y/o male scheduled for TURBT on 5/19/2025.  Pt with xh of htn, PAF, hld, dm type 2, ckd stage 3, cardiac stents X2 RCA 10/2016, X1 RCA 3/2021, s/p TAVR 2021, sick sinus syndrome s/p pacemaker 2021, prostate cancer tx with radiation 2019, hx of kidney stones went for routine f/u was found to have mass in bladder.

## 2025-05-09 NOTE — H&P PST ADULT - PROBLEM SELECTOR PLAN 1
Pt scheduled for TURBT on 5/19/2025.  urine culture sent.  Preop teaching done, pt able to verbalize understanding.   medication day of procedure-  enalapril, metoprolol, sodium bicarb, omeprazole   dm-  no diabetic medications the day of procedure            Jardiance hold 3 days prior to the procedure-  last dose 5/15/2025.    cardiac stents-  pt instructed to continue asa   anesthesia MARCO A precautions  OR booking notified of pacemaker.

## 2025-05-09 NOTE — H&P PST ADULT - LAST E[facility]OCARDIOGRAM
[facility]% mild segmental left ventricular systolic dysfunction. nl right ventricular systolic fuction mild- moderate mitral regurg, mildly dilated left atrium. mild tricuspid regur, mild pulmonic regurg

## 2025-05-09 NOTE — H&P PST ADULT - MUSCULOSKELETAL
no calf tenderness details… normal/ROM intact/normal gait/strength 5/5 bilateral upper extremities/strength 5/5 bilateral lower extremities

## 2025-05-09 NOTE — H&P PST ADULT - NSICDXPASTMEDICALHX_GEN_ALL_CORE_FT
PAST MEDICAL HISTORY:  ANTONIO (acute kidney injury)     Aortic valve stenosis, etiology of cardiac valve disease unspecified     Chronic kidney disease, unspecified CKD stage     Coronary artery disease involving native coronary artery of native heart, angina presence unspecified     Essential hypertension     Hyperlipidemia, unspecified hyperlipidemia type     Mitral valve insufficiency, unspecified etiology     Prostate cancer 5/2019 - treated with radiation and hormones    Renal calculi     Type 2 diabetes mellitus without complication, without long-term current use of insulin      PAST MEDICAL HISTORY:  ANTONIO (acute kidney injury)     Aortic valve stenosis, etiology of cardiac valve disease unspecified     GERD (gastroesophageal reflux disease)     Hyperlipidemia     Mitral valve insufficiency, unspecified etiology     Neoplasm of unspecified behavior of bladder     PAF (paroxysmal atrial fibrillation)     Prostate cancer 5/2019 - treated with radiation and hormones    Renal calculi     S/P coronary artery stent placement     Sick sinus syndrome     Stage 3 chronic kidney disease     Type 2 diabetes mellitus

## 2025-05-09 NOTE — H&P PST ADULT - NEGATIVE NEUROLOGICAL SYMPTOMS
no transient paralysis/no weakness/no paresthesias/no focal seizures/no syncope/no tremors/no vertigo/no loss of sensation/no difficulty walking/no loss of consciousness/no hemiparesis/no confusion/no facial palsy

## 2025-05-09 NOTE — H&P PST ADULT - NSICDXPASTSURGICALHX_GEN_ALL_CORE_FT
PAST SURGICAL HISTORY:  H/O carotid endarterectomy bilateral 7/2014    H/O hernia repair 1966, left inguinal    H/O lithotripsy 6/2020    History of transcatheter aortic valve replacement (TAVR)     Pacemaker Implant date 9/16/21  [employer]   Sera number [id number]  Model Number [id number]    Stented coronary artery X 3 in RCA, last one 3/15/21     PAST SURGICAL HISTORY:  Cardiac pacemaker     Cataract     H/O carotid endarterectomy bilateral 7/2014    H/O hernia repair 1966, left inguinal    H/O lithotripsy 6/2020    History of transcatheter aortic valve replacement (TAVR)

## 2025-05-09 NOTE — H&P PST ADULT - NSANTHOSAYNRD_GEN_A_CORE
DASI 8.97 mows the lawn/No. MARCO A screening performed.  STOP BANG Legend: 0-2 = LOW Risk; 3-4 = INTERMEDIATE Risk; 5-8 = HIGH Risk No. MARCO A screening performed.  STOP BANG Legend: 0-2 = LOW Risk; 3-4 = INTERMEDIATE Risk; 5-8 = HIGH Risk

## 2025-05-09 NOTE — H&P PST ADULT - GASTROINTESTINAL
details… soft/nontender/nondistended/normal active bowel sounds/no guarding/no rigidity/no organomegaly/no palpable nyla/no masses palpable

## 2025-05-09 NOTE — H&P PST ADULT - CARDIOVASCULAR COMMENTS
hx of PAF was on eliquis stopped 4 months ago due to causing bruising in arms reports cardiologist aware left chest pacemaker hx of PAF was on Eliquis stopped 4 months ago due to causing bruising in arms reports cardiologist aware, cardiac stents X3 last placed  2021 on asa , s/p TAVR 2021

## 2025-05-09 NOTE — H&P PST ADULT - GENITOURINARY COMMENTS
MICU Transfer Note    Transfer from: MICU    Transfer to: (  ) Medicine    ( x ) Telemetry     (   ) RCU        (    ) Palliative         (   ) Stroke Unit       (  ) MICU     Accepting Physician: Jorge Bowie   Signout given to: Adi Bowie     HPI: 55 yo F with PMH of HTN, HLD, DM2, CKD, pulm HTN, hypothyroidism. Family called EMS at home for dizziness and weakness, Upon EMS arrival pt was found to be bradycardic to the 30s. Pt was treated with Versed 4 mg IVP prior to transcutaneous pacing by EMS. Pt was hypotermic  to 88 in ED and was placed on hypothermia blanket. Pt was also found to be hyperkalemic and hyponatremic in the ED. Patient was lethargic and not arousable upon arrival to ED, and was treated with Flumazenil without improvement.     Dx: Sinus bradycardia of unclear etiology, possibly myxedema coma.       MICU COURSE: pt was transferred to MICU for transcutaneous pacing and Dopamine infusion for symptomatic sinus bradycardia. TSH elevated, free T4 wnl but given clinical status, there was high suspicion for myxedema coma and endocrinology consultation was called. Cortisol was elevated but this was drawn after Solu-Cortef was given. As per discussion with endocrinology, Levothyroxine was increased to 100 mcg and pt was started on Hydrocortisone 100 mg on 11/25 and continued today, 11/26.       on dopamine for bradycardia, titrate to HR of 60 or MAP of 60  ISAMAR on CKD - hyperkalemia, will give lokelma and trend labs, uop  will start abx for possible aspiration pneumonia, f/u cultures  given pulm htn, will eventually need RHC      PAST MEDICAL & SURGICAL HISTORY:  HTN (hypertension)      HLD (hyperlipidemia)      DM (diabetes mellitus)      Hypothyroid      H/O pulmonary hypertension      CKD (chronic kidney disease)      No significant past surgical history          Vital Signs Last 24 Hrs  T(C): 34.4 (26 Nov 2022 10:30), Max: 36.8 (26 Nov 2022 08:00)  T(F): 94 (26 Nov 2022 10:30), Max: 98.2 (26 Nov 2022 08:00)  HR: 63 (26 Nov 2022 10:00) (39 - 67)  BP: 155/60 (26 Nov 2022 10:00) (82/65 - 165/68)  BP(mean): 83 (26 Nov 2022 10:00) (65 - 138)  RR: 11 (26 Nov 2022 10:00) (9 - 28)  SpO2: 100% (26 Nov 2022 10:00) (97% - 100%)    Parameters below as of 25 Nov 2022 13:21  Patient On (Oxygen Delivery Method): nasal cannula  O2 Flow (L/min): 3    I&O's Summary    25 Nov 2022 07:01  -  26 Nov 2022 07:00  --------------------------------------------------------  IN: 317.5 mL / OUT: 2425 mL / NET: -2107.5 mL    26 Nov 2022 07:01  -  26 Nov 2022 11:36  --------------------------------------------------------  IN: 0 mL / OUT: 325 mL / NET: -325 mL      Allergies    No Known Allergies    Intolerances      MEDICATIONS  (STANDING):  chlorhexidine 2% Cloths 1 Application(s) Topical <User Schedule>  dextrose 5%. 1000 milliLiter(s) (50 mL/Hr) IV Continuous <Continuous>  dextrose 5%. 1000 milliLiter(s) (100 mL/Hr) IV Continuous <Continuous>  dextrose 50% Injectable 25 Gram(s) IV Push once  dextrose 50% Injectable 12.5 Gram(s) IV Push once  dextrose 50% Injectable 25 Gram(s) IV Push once  glucagon  Injectable 1 milliGRAM(s) IntraMuscular once  heparin   Injectable 5000 Unit(s) SubCutaneous every 8 hours  hydrocortisone sodium succinate Injectable 100 milliGRAM(s) IV Push every 8 hours  insulin glargine Injectable (LANTUS) 16 Unit(s) SubCutaneous at bedtime  insulin lispro (ADMELOG) corrective regimen sliding scale   SubCutaneous three times a day before meals  insulin lispro (ADMELOG) corrective regimen sliding scale   SubCutaneous at bedtime  insulin lispro Injectable (ADMELOG) 6 Unit(s) SubCutaneous three times a day before meals  levothyroxine Injectable 100 MICROGram(s) IV Push <User Schedule>  piperacillin/tazobactam IVPB.. 3.375 Gram(s) IV Intermittent every 8 hours    MEDICATIONS  (PRN):  dextrose Oral Gel 15 Gram(s) Oral once PRN Blood Glucose LESS THAN 70 milliGRAM(s)/deciliter      Adult Advanced Hemodynamics Last 24 Hrs  CVP(mm Hg): --  CVP(cm H2O): --  CO: --  CI: --  PA: --  PA(mean): --  PCWP: --  SVR: --  SVRI: --  PVR: --  PVRI: --    CARDIAC MARKERS ( 25 Nov 2022 21:00 )  x     / x     / 150 U/L / x     / x                                7.2    5.36  )-----------( 118      ( 26 Nov 2022 02:00 )             22.3     11-26    132<L>  |  97<L>  |  101<H>  ----------------------------<  229<H>  5.4<H>   |  19<L>  |  2.17<H>    Ca    9.9      26 Nov 2022 02:00  Phos  5.7     11-26  Mg     2.10     11-26    TPro  6.4  /  Alb  3.7  /  TBili  0.6  /  DBili  x   /  AST  64<H>  /  ALT  111<H>  /  AlkPhos  373<H>  11-26    PT/INR - ( 25 Nov 2022 09:57 )   PT: 15.2 sec;   INR: 1.31 ratio         PTT - ( 25 Nov 2022 09:57 )  PTT:39.6 sec  PHYSICAL EXAM:      Constitutional:    Eyes:    ENMT:    Neck:    Breasts:    Back:    Respiratory:    Cardiovascular:    Gastrointestinal:    Genitourinary:    Rectal:    Extremities:    Vascular:    Neurological:    Skin:    Lymph Nodes:    Musculoskeletal:    Psychiatric:      ABG - ( 25 Nov 2022 14:55 )  pH, Arterial: 7.30  pH, Blood: x     /  pCO2: 44    /  pO2: 155   / HCO3: 22    / Base Excess: -4.5  /  SaO2: 96.6              Lactate:    Ekg:  Telemetry:    ASSESSMENT & PLAN:           FOR FOLLOW UP:    Lilliana Iglesias PA-C MICU Transfer Note    Transfer from: MICU    Transfer to: (  ) Medicine    ( x ) Telemetry     (   ) RCU        (    ) Palliative         (   ) Stroke Unit       (  ) MICU     Accepting Physician: Jorge Bowie   Signout given to: Adi Bowie     HPI: 53 yo F with PMH of HTN, HLD, DM2, CKD, pulm HTN, hypothyroidism. Family called EMS at home for dizziness and weakness, Upon EMS arrival pt was found to be bradycardic to the 30s. Pt was treated with Versed 4 mg IVP prior to transcutaneous pacing by EMS. Pt was hypotermic  to 88 in ED and was placed on hypothermia blanket. Pt was also found to be hyperkalemic and hyponatremic in the ED. Patient was lethargic and not arousable upon arrival to ED, and was treated with Flumazenil without improvement.     Dx: Sinus bradycardia of unclear etiology, possibly myxedema coma.       MICU COURSE: pt was transferred to MICU for transcutaneous pacing and Dopamine infusion for symptomatic sinus bradycardia. Pt was treated with transcutaneous pacing and Dopamine for symptomatic bradycardia. TSH elevated, free T4 wnl but given clinical status, there was high suspicion for myxedema coma and endocrinology consultation was called. Cortisol was elevated but this was drawn after Solu-Cortef was given. As per discussion with endocrinology, Levothyroxine was increased to 100 mcg ( pt is on 50 mcg at home) and pt was medicated with Hydrocortisone 100 mg IVP on 11/25, which continued today, 11/26__ 100 mg IVP q c8 hrs. Pt was also admitted with ISAMAR on CKD, and hyperkalemia, for which he was treated with lokelma a, D 50, and regular insulin       will start abx for possible aspiration pneumonia, f/u cultures  given pulm htn, will eventually need RHC      PAST MEDICAL & SURGICAL HISTORY:  HTN (hypertension)      HLD (hyperlipidemia)      DM (diabetes mellitus)      Hypothyroid      H/O pulmonary hypertension      CKD (chronic kidney disease)      No significant past surgical history          Vital Signs Last 24 Hrs  T(C): 34.4 (26 Nov 2022 10:30), Max: 36.8 (26 Nov 2022 08:00)  T(F): 94 (26 Nov 2022 10:30), Max: 98.2 (26 Nov 2022 08:00)  HR: 63 (26 Nov 2022 10:00) (39 - 67)  BP: 155/60 (26 Nov 2022 10:00) (82/65 - 165/68)  BP(mean): 83 (26 Nov 2022 10:00) (65 - 138)  RR: 11 (26 Nov 2022 10:00) (9 - 28)  SpO2: 100% (26 Nov 2022 10:00) (97% - 100%)    Parameters below as of 25 Nov 2022 13:21  Patient On (Oxygen Delivery Method): nasal cannula  O2 Flow (L/min): 3    I&O's Summary    25 Nov 2022 07:01  -  26 Nov 2022 07:00  --------------------------------------------------------  IN: 317.5 mL / OUT: 2425 mL / NET: -2107.5 mL    26 Nov 2022 07:01  -  26 Nov 2022 11:36  --------------------------------------------------------  IN: 0 mL / OUT: 325 mL / NET: -325 mL      Allergies    No Known Allergies    Intolerances      MEDICATIONS  (STANDING):  chlorhexidine 2% Cloths 1 Application(s) Topical <User Schedule>  dextrose 5%. 1000 milliLiter(s) (50 mL/Hr) IV Continuous <Continuous>  dextrose 5%. 1000 milliLiter(s) (100 mL/Hr) IV Continuous <Continuous>  dextrose 50% Injectable 25 Gram(s) IV Push once  dextrose 50% Injectable 12.5 Gram(s) IV Push once  dextrose 50% Injectable 25 Gram(s) IV Push once  glucagon  Injectable 1 milliGRAM(s) IntraMuscular once  heparin   Injectable 5000 Unit(s) SubCutaneous every 8 hours  hydrocortisone sodium succinate Injectable 100 milliGRAM(s) IV Push every 8 hours  insulin glargine Injectable (LANTUS) 16 Unit(s) SubCutaneous at bedtime  insulin lispro (ADMELOG) corrective regimen sliding scale   SubCutaneous three times a day before meals  insulin lispro (ADMELOG) corrective regimen sliding scale   SubCutaneous at bedtime  insulin lispro Injectable (ADMELOG) 6 Unit(s) SubCutaneous three times a day before meals  levothyroxine Injectable 100 MICROGram(s) IV Push <User Schedule>  piperacillin/tazobactam IVPB.. 3.375 Gram(s) IV Intermittent every 8 hours    MEDICATIONS  (PRN):  dextrose Oral Gel 15 Gram(s) Oral once PRN Blood Glucose LESS THAN 70 milliGRAM(s)/deciliter      Adult Advanced Hemodynamics Last 24 Hrs  CVP(mm Hg): --  CVP(cm H2O): --  CO: --  CI: --  PA: --  PA(mean): --  PCWP: --  SVR: --  SVRI: --  PVR: --  PVRI: --    CARDIAC MARKERS ( 25 Nov 2022 21:00 )  x     / x     / 150 U/L / x     / x                                7.2    5.36  )-----------( 118      ( 26 Nov 2022 02:00 )             22.3     11-26    132<L>  |  97<L>  |  101<H>  ----------------------------<  229<H>  5.4<H>   |  19<L>  |  2.17<H>    Ca    9.9      26 Nov 2022 02:00  Phos  5.7     11-26  Mg     2.10     11-26    TPro  6.4  /  Alb  3.7  /  TBili  0.6  /  DBili  x   /  AST  64<H>  /  ALT  111<H>  /  AlkPhos  373<H>  11-26    PT/INR - ( 25 Nov 2022 09:57 )   PT: 15.2 sec;   INR: 1.31 ratio         PTT - ( 25 Nov 2022 09:57 )  PTT:39.6 sec  PHYSICAL EXAM:      Constitutional:    Eyes:    ENMT:    Neck:    Breasts:    Back:    Respiratory:    Cardiovascular:    Gastrointestinal:    Genitourinary:    Rectal:    Extremities:    Vascular:    Neurological:    Skin:    Lymph Nodes:    Musculoskeletal:    Psychiatric:      ABG - ( 25 Nov 2022 14:55 )  pH, Arterial: 7.30  pH, Blood: x     /  pCO2: 44    /  pO2: 155   / HCO3: 22    / Base Excess: -4.5  /  SaO2: 96.6              Lactate:    Ekg:  Telemetry:    ASSESSMENT & PLAN:           FOR FOLLOW UP:    Lilliana Iglesias PA-C MICU Transfer Note    Transfer from: MICU    Transfer to: (  ) Medicine    ( x ) Telemetry     (   ) RCU        (    ) Palliative         (   ) Stroke Unit       (  ) MICU     Accepting Physician: Jorge Bowie   Signout given to: Adi Bowie     HPI: 55 yo F with PMH of HTN, HLD, DM2, CKD, pulm HTN, hypothyroidism. Family called EMS at home for dizziness and weakness, Upon EMS arrival pt was found to be bradycardic to the 30s. Pt was treated with Versed 4 mg IVP prior to transcutaneous pacing by EMS. Pt was hypotermic  to 88 in ED and was placed on hypothermia blanket. Pt was also found to be hyperkalemic and hyponatremic in the ED. Patient was lethargic and not arousable upon arrival to ED, and was treated with Flumazenil without improvement.     Dx: Sinus bradycardia of unclear etiology, possibly myxedema coma.       MICU COURSE: pt was transferred to MICU for transcutaneous pacing and Dopamine infusion for symptomatic sinus bradycardia. Pt was treated with transcutaneous pacing and Dopamine for symptomatic bradycardia. TSH elevated, free T4 wnl but given clinical status, there was high suspicion for myxedema coma and endocrinology consultation was called. Cortisol was elevated but this was drawn after Solu-Cortef was given. As per discussion with endocrinology, Levothyroxine was increased to 100 mcg ( pt is on 50 mcg at home) and pt was medicated with Hydrocortisone 100 mg IVP on 11/25, which continued today, 11/26__ 100 mg IVP q c8 hrs. Pt was also admitted with ISAMAR on CKD, and hyperkalemia, for which he was treated with lokelma a, D 50, and regular insulin       will start abx for possible aspiration pneumonia, f/u cultures  given pulm htn, will eventually need RHC      PAST MEDICAL & SURGICAL HISTORY:  HTN (hypertension)      HLD (hyperlipidemia)      DM (diabetes mellitus)      Hypothyroid      H/O pulmonary hypertension      CKD (chronic kidney disease)      No significant past surgical history          Vital Signs Last 24 Hrs  T(C): 34.4 (26 Nov 2022 10:30), Max: 36.8 (26 Nov 2022 08:00)  T(F): 94 (26 Nov 2022 10:30), Max: 98.2 (26 Nov 2022 08:00)  HR: 63 (26 Nov 2022 10:00) (39 - 67)  BP: 155/60 (26 Nov 2022 10:00) (82/65 - 165/68)  BP(mean): 83 (26 Nov 2022 10:00) (65 - 138)  RR: 11 (26 Nov 2022 10:00) (9 - 28)  SpO2: 100% (26 Nov 2022 10:00) (97% - 100%)    Parameters below as of 25 Nov 2022 13:21  Patient On (Oxygen Delivery Method): nasal cannula  O2 Flow (L/min): 3    I&O's Summary    25 Nov 2022 07:01  -  26 Nov 2022 07:00  --------------------------------------------------------  IN: 317.5 mL / OUT: 2425 mL / NET: -2107.5 mL    26 Nov 2022 07:01  -  26 Nov 2022 11:36  --------------------------------------------------------  IN: 0 mL / OUT: 325 mL / NET: -325 mL      Allergies    No Known Allergies    Intolerances      MEDICATIONS  (STANDING):  chlorhexidine 2% Cloths 1 Application(s) Topical <User Schedule>  dextrose 5%. 1000 milliLiter(s) (50 mL/Hr) IV Continuous <Continuous>  dextrose 5%. 1000 milliLiter(s) (100 mL/Hr) IV Continuous <Continuous>  dextrose 50% Injectable 25 Gram(s) IV Push once  dextrose 50% Injectable 12.5 Gram(s) IV Push once  dextrose 50% Injectable 25 Gram(s) IV Push once  glucagon  Injectable 1 milliGRAM(s) IntraMuscular once  heparin   Injectable 5000 Unit(s) SubCutaneous every 8 hours  hydrocortisone sodium succinate Injectable 100 milliGRAM(s) IV Push every 8 hours  insulin glargine Injectable (LANTUS) 16 Unit(s) SubCutaneous at bedtime  insulin lispro (ADMELOG) corrective regimen sliding scale   SubCutaneous three times a day before meals  insulin lispro (ADMELOG) corrective regimen sliding scale   SubCutaneous at bedtime  insulin lispro Injectable (ADMELOG) 6 Unit(s) SubCutaneous three times a day before meals  levothyroxine Injectable 100 MICROGram(s) IV Push <User Schedule>  piperacillin/tazobactam IVPB.. 3.375 Gram(s) IV Intermittent every 8 hours    MEDICATIONS  (PRN):  dextrose Oral Gel 15 Gram(s) Oral once PRN Blood Glucose LESS THAN 70 milliGRAM(s)/deciliter      Adult Advanced Hemodynamics Last 24 Hrs  CVP(mm Hg): --  CVP(cm H2O): --  CO: --  CI: --  PA: --  PA(mean): --  PCWP: --  SVR: --  SVRI: --  PVR: --  PVRI: --    CARDIAC MARKERS ( 25 Nov 2022 21:00 )  x     / x     / 150 U/L / x     / x                                7.2    5.36  )-----------( 118      ( 26 Nov 2022 02:00 )             22.3     11-26    132<L>  |  97<L>  |  101<H>  ----------------------------<  229<H>  5.4<H>   |  19<L>  |  2.17<H>    Ca    9.9      26 Nov 2022 02:00  Phos  5.7     11-26  Mg     2.10     11-26    TPro  6.4  /  Alb  3.7  /  TBili  0.6  /  DBili  x   /  AST  64<H>  /  ALT  111<H>  /  AlkPhos  373<H>  11-26    PT/INR - ( 25 Nov 2022 09:57 )   PT: 15.2 sec;   INR: 1.31 ratio         PTT - ( 25 Nov 2022 09:57 )  PTT:39.6 sec    PHYSICAL EXAM:  GENERAL: NAD, lying in bed comfortably  HEAD:  Atraumatic, Normocephalic  EYES: EOMI, PERRLA, conjunctiva and sclera clear  ENT: Moist mucous membranes  NECK: Supple, No JVD  CHEST/LUNG: Clear to auscultation bilaterally; No rales, rhonchi, wheezing, or rubs. Unlabored respirations  HEART: Regular rate and rhythm; No murmurs, rubs, or gallops  ABDOMEN: Bowel sounds present; Soft, Nontender, Nondistended. No hepatomegally  EXTREMITIES:  2+ Peripheral Pulses, brisk capillary refill. No clubbing, cyanosis, or edema  NERVOUS SYSTEM:  Alert & Oriented X3, speech clear. No deficits   MSK: FROM all 4 extremities, full and equal strength  SKIN: No rashes or lesions      ABG - ( 25 Nov 2022 14:55 )  pH, Arterial: 7.30  pH, Blood: x     /  pCO2: 44    /  pO2: 155   / HCO3: 22    / Base Excess: -4.5  /  SaO2: 96.6      Lactate:    Ekg:  Telemetry:    ASSESSMENT & PLAN:   Shila Hernandez is a 54 year old female with pmh of HTN, DM, pHTN, CKD presenting with dizziness, found to be profoundly bradycardic to the 30s, also noted to have a potassium of 6.9 on inital laboratory work. Patient admitted to MICU for management of hemodynamic instability, now improved off of dopamine gtt, stable for transfer to floors.     #Neuro  - AMS/lethargy- likely cardiac in origin i/s/o profound bradycardia vs 2/2 midazolam used prior to transcutaneous pacing   - now AOx3    #Cardiovascular  - profound bradycardia- was transcutaneously paced by EMS  - s/p Dopamine infusion   - now hemodynamically stable off pressors    On home torsemide, currently holding while giving pushes of IV lasix due to volume overload.     #Respiratory  -no active issues    #GI/Nutrition  - start diet    #/Renal  Hyperkalemia   - improved s/p lasix and insulin  - continue to trend BMP  - IV lasix 40 today    CKD  - baseline 1.4-1.7 in September  - may have component of ISAMAR  - continue to trend BMP    #Skin  - no active issues     #ID  - continue zosyn for now pending cultures  - f/u BCx, UCx, MRSA swab    #Endocrine  Myxedema  - elevated TSH, free thyroxine wnl  - Endocrine following  - currently receiving hydrocortisone IV q8h  - continue synthroid 100 daily    T2DM  - A1C 9.7  - continue with lantus and SSI     #Hematologic/DVT ppx  - anemia/thrombocytopenia- possible CKD contribution- will trend cbc's   - may be close to her baseline   - keep type/screen active      FOR FOLLOW UP:  [ ] f/u BMP for hyperkalemia   [ ] f/u endocrine recs  [ ] f/u ECHO  [ } PT    Lilliana Iglesias PA-C MICU Transfer Note    Transfer from: MICU    Transfer to: (  ) Medicine    ( x ) Telemetry     (   ) RCU        (    ) Palliative         (   ) Stroke Unit       (  ) MICU     Accepting Physician: Jorge Bowie MAR, and ADS team,     Signout given to: Adi Bowie MAR and ADS team     HPI: 55 yo F with PMH of HTN, HLD, DM2, CKD, pulm HTN, hypothyroidism. Family called EMS at home for dizziness and weakness, Upon EMS arrival pt was found to be bradycardic to the 30s. Pt was treated with Versed 4 mg IVP prior to transcutaneous pacing by EMS. Pt was hypotermic  to 88 in ED and was placed on hypothermia blanket. Pt was also found to be hyperkalemic and hyponatremic in the ED. Patient was lethargic and not arousable upon arrival to ED, and was treated with Flumazenil without improvement.     Dx: Sinus bradycardia of unclear etiology, possibly myxedema coma.       MICU COURSE: pt was transferred to MICU for transcutaneous pacing and Dopamine infusion for symptomatic sinus bradycardia. Pt was treated with transcutaneous pacing and Dopamine for symptomatic bradycardia. TSH elevated, free T4 wnl but given clinical status, there was high suspicion for myxedema coma and endocrinology consultation was called. Cortisol was elevated but this was drawn after Solu-Cortef was given. As per discussion with endocrinology, Levothyroxine was increased to 100 mcg ( pt is on 50 mcg at home) and pt was medicated with Hydrocortisone 100 mg IVP on 11/25, which continued today,11/26__ 100 mg IVP q 8 hrs, and as per endocrinology recs, was decreased to 50 mg TID. Pt's bradycardia resolved and pt remains in NSR in 60's. Pt remains on RA, Mental status is back to baseline., A&O x 4. Pt was also admitted with ISAMAR on CKD, and hyperkalemia, for which he was treated with Lokelma, D 50, Calcium gluconate and regular insulin. Repeat K is 5.4, for which he was treated with Lokelma. pt was found to have R lung infiltrate on POCUS and CXR and started on Zosyn on 11/25 for possible aspiration PNA. blood cxs were send on 11/26 and are in lab.     PAST MEDICAL & SURGICAL HISTORY:  HTN (hypertension)      HLD (hyperlipidemia)      DM (diabetes mellitus)      Hypothyroid      H/O pulmonary hypertension      CKD (chronic kidney disease)      No significant past surgical history          Vital Signs Last 24 Hrs  T(C): 34.4 (26 Nov 2022 10:30), Max: 36.8 (26 Nov 2022 08:00)  T(F): 94 (26 Nov 2022 10:30), Max: 98.2 (26 Nov 2022 08:00)  HR: 63 (26 Nov 2022 10:00) (39 - 67)  BP: 155/60 (26 Nov 2022 10:00) (82/65 - 165/68)  BP(mean): 83 (26 Nov 2022 10:00) (65 - 138)  RR: 11 (26 Nov 2022 10:00) (9 - 28)  SpO2: 100% (26 Nov 2022 10:00) (97% - 100%)    Parameters below as of 25 Nov 2022 13:21  Patient On (Oxygen Delivery Method): nasal cannula  O2 Flow (L/min): 3    I&O's Summary    25 Nov 2022 07:01  -  26 Nov 2022 07:00  --------------------------------------------------------  IN: 317.5 mL / OUT: 2425 mL / NET: -2107.5 mL    26 Nov 2022 07:01  -  26 Nov 2022 11:36  --------------------------------------------------------  IN: 0 mL / OUT: 325 mL / NET: -325 mL      Allergies    No Known Allergies    Intolerances      MEDICATIONS  (STANDING):  chlorhexidine 2% Cloths 1 Application(s) Topical <User Schedule>  dextrose 5%. 1000 milliLiter(s) (50 mL/Hr) IV Continuous <Continuous>  dextrose 5%. 1000 milliLiter(s) (100 mL/Hr) IV Continuous <Continuous>  dextrose 50% Injectable 25 Gram(s) IV Push once  dextrose 50% Injectable 12.5 Gram(s) IV Push once  dextrose 50% Injectable 25 Gram(s) IV Push once  glucagon  Injectable 1 milliGRAM(s) IntraMuscular once  heparin   Injectable 5000 Unit(s) SubCutaneous every 8 hours  hydrocortisone sodium succinate Injectable 100 milliGRAM(s) IV Push every 8 hours  insulin glargine Injectable (LANTUS) 16 Unit(s) SubCutaneous at bedtime  insulin lispro (ADMELOG) corrective regimen sliding scale   SubCutaneous three times a day before meals  insulin lispro (ADMELOG) corrective regimen sliding scale   SubCutaneous at bedtime  insulin lispro Injectable (ADMELOG) 6 Unit(s) SubCutaneous three times a day before meals  levothyroxine Injectable 100 MICROGram(s) IV Push <User Schedule>  piperacillin/tazobactam IVPB.. 3.375 Gram(s) IV Intermittent every 8 hours    MEDICATIONS  (PRN):  dextrose Oral Gel 15 Gram(s) Oral once PRN Blood Glucose LESS THAN 70 milliGRAM(s)/deciliter    CARDIAC MARKERS ( 25 Nov 2022 21:00 )  x     / x     / 150 U/L / x     / x                                7.2    5.36  )-----------( 118      ( 26 Nov 2022 02:00 )             22.3     11-26    132<L>  |  97<L>  |  101<H>  ----------------------------<  229<H>  5.4<H>   |  19<L>  |  2.17<H>    Ca    9.9      26 Nov 2022 02:00  Phos  5.7     11-26  Mg     2.10     11-26    TPro  6.4  /  Alb  3.7  /  TBili  0.6  /  DBili  x   /  AST  64<H>  /  ALT  111<H>  /  AlkPhos  373<H>  11-26    PT/INR - ( 25 Nov 2022 09:57 )   PT: 15.2 sec;   INR: 1.31 ratio         PTT - ( 25 Nov 2022 09:57 )  PTT:39.6 sec    PHYSICAL EXAM:  GENERAL: NAD, lying in bed comfortably  HEAD:  Atraumatic, Normocephalic  EYES: EOMI, PERRLA, conjunctiva and sclera clear  ENT: Moist mucous membranes  NECK: Supple, No JVD  CHEST/LUNG: Clear to auscultation bilaterally; No rales, rhonchi, wheezing, or rubs. Unlabored respirations  HEART: Regular rate and rhythm; No murmurs, rubs, or gallops  ABDOMEN: Bowel sounds present; Soft, Nontender, Nondistended. No hepatomegally  EXTREMITIES:  2+ Peripheral Pulses, brisk capillary refill. No clubbing, cyanosis, or edema  NERVOUS SYSTEM:  Alert & Oriented X3, speech clear. No deficits   MSK: FROM all 4 extremities, full and equal strength  SKIN: No rashes or lesions      ABG - ( 25 Nov 2022 14:55 )  pH, Arterial: 7.30  pH, Blood: x     /  pCO2: 44    /  pO2: 155   / HCO3: 22    / Base Excess: -4.5  /  SaO2: 96.6      Lactate:    Ekg:  Telemetry:    ASSESSMENT & PLAN:   Shila Hernandez is a 54 year old female with pmh of HTN, DM, pHTN, CKD presenting with dizziness, found to be profoundly bradycardic to the 30s, also noted to have a potassium of 6.9 on inital laboratory work. Patient admitted to MICU for management of hemodynamic instability, now improved off of dopamine gtt, stable for transfer to floors.     #Neuro  - AMS/lethargy- likely cardiac in origin i/s/o profound bradycardia vs 2/2 midazolam used prior to transcutaneous pacing   - now AOx3    #Cardiovascular  - profound bradycardia- was transcutaneously paced by EMS  - s/p Dopamine infusion   - now hemodynamically stable off pressors    On home torsemide, currently holding while giving pushes of IV lasix due to volume overload.     #Respiratory  -no active issues    #GI/Nutrition  - start diet    #/Renal  Hyperkalemia   - improved s/p lasix and insulin  - continue to trend BMP  - IV lasix 40 today    CKD  - baseline 1.4-1.7 in September  - may have component of ISAMAR  - continue to trend BMP    #Skin  - no active issues     #ID  - continue zosyn for now pending cultures  - f/u BCx, UCx, MRSA swab    #Endocrine  Myxedema  - elevated TSH, free thyroxine wnl  - Endocrine following  - currently receiving hydrocortisone IV q8h  - continue synthroid 100 daily    T2DM  - A1C 9.7  - continue with lantus and SSI     #Hematologic/DVT ppx  - anemia/thrombocytopenia- possible CKD contribution- will trend cbc's   - may be close to her baseline   - keep type/screen active      FOR FOLLOW UP:  [ ] f/u BMP in am for hyperkalemia   [ ] f/u endocrine recs, __ Hydrocortisone was  tapered to 50 mg q 8 hrs on 11/26, cont Synthroid 100 mcg daily,  [] continue Zosyn, empiric dosing for possible PNA since 11/25, _ follow up blood and urine cxs, RVP neg, MRSA- neg,    [] hyperglycemia, worsening in s/o steroids; steroids have been tapered down - pt is on Lantus 16 U qhs and 6 U of Admelog qac, __ monitor and adjust Insulin as needed   [] PT -- home PT   [] monitor I & O  [] monitor H/H  []will start abx for possible aspiration pneumonia, f/u cultures  given pulm htn, will eventually need RHC    Lilliana Iglesias PA-C MICU Transfer Note    Transfer from: MICU    Transfer to: (  ) Medicine    ( x ) Telemetry     (   ) RCU        (    ) Palliative         (   ) Stroke Unit       (  ) MICU     Accepting Physician: Jorge Bowie MAR, and ADS team,     Signout given to: Adi Bowie MAR and ADS team     HPI: 55 yo F with PMH of HTN, HLD, DM2, CKD, pulm HTN, hypothyroidism. Family called EMS at home for dizziness and weakness, Upon EMS arrival pt was found to be bradycardic to the 30s. Pt was treated with Versed 4 mg IVP prior to transcutaneous pacing by EMS. Pt was hypotermic  to 88 in ED and was placed on hypothermia blanket. Pt was also found to be hyperkalemic and hyponatremic in the ED. Patient was lethargic and not arousable upon arrival to ED, and was treated with Flumazenil without improvement.     Dx: Sinus bradycardia of unclear etiology, possibly myxedema coma.       MICU COURSE: pt was transferred to MICU for transcutaneous pacing and Dopamine infusion for symptomatic sinus bradycardia. Pt was treated with transcutaneous pacing and Dopamine for symptomatic bradycardia. TSH elevated, free T4 wnl but given clinical status, there was high suspicion for myxedema coma and endocrinology consultation was called. Cortisol was elevated but this was drawn after Solu-Cortef was given. As per discussion with endocrinology, Levothyroxine was increased to 100 mcg ( pt is on 50 mcg at home) and pt was medicated with Hydrocortisone 100 mg IVP on 11/25, which continued today,11/26__ 100 mg IVP q 8 hrs, and as per endocrinology recs, was decreased to 50 mg TID. Pt's bradycardia resolved and pt remains in NSR in 60's. Pt remains on RA, Mental status is back to baseline., A&O x 4. Pt was also admitted with ISAMAR on CKD, and hyperkalemia, for which he was treated with Lokelma, D 50, Calcium gluconate and regular insulin. Repeat K is 5.4, for which he was treated with Lokelma. pt was found to have R lung infiltrate on POCUS and CXR and started on Zosyn on 11/25 for possible aspiration PNA. blood cxs were send on 11/26 and are in lab.     PAST MEDICAL & SURGICAL HISTORY:  HTN (hypertension)      HLD (hyperlipidemia)      DM (diabetes mellitus)      Hypothyroid      H/O pulmonary hypertension      CKD (chronic kidney disease)      No significant past surgical history          Vital Signs Last 24 Hrs  T(C): 34.4 (26 Nov 2022 10:30), Max: 36.8 (26 Nov 2022 08:00)  T(F): 94 (26 Nov 2022 10:30), Max: 98.2 (26 Nov 2022 08:00)  HR: 63 (26 Nov 2022 10:00) (39 - 67)  BP: 155/60 (26 Nov 2022 10:00) (82/65 - 165/68)  BP(mean): 83 (26 Nov 2022 10:00) (65 - 138)  RR: 11 (26 Nov 2022 10:00) (9 - 28)  SpO2: 100% (26 Nov 2022 10:00) (97% - 100%)    Parameters below as of 25 Nov 2022 13:21  Patient On (Oxygen Delivery Method): nasal cannula  O2 Flow (L/min): 3    I&O's Summary    25 Nov 2022 07:01  -  26 Nov 2022 07:00  --------------------------------------------------------  IN: 317.5 mL / OUT: 2425 mL / NET: -2107.5 mL    26 Nov 2022 07:01  -  26 Nov 2022 11:36  --------------------------------------------------------  IN: 0 mL / OUT: 325 mL / NET: -325 mL      Allergies    No Known Allergies    Intolerances      MEDICATIONS  (STANDING):  chlorhexidine 2% Cloths 1 Application(s) Topical <User Schedule>  dextrose 5%. 1000 milliLiter(s) (50 mL/Hr) IV Continuous <Continuous>  dextrose 5%. 1000 milliLiter(s) (100 mL/Hr) IV Continuous <Continuous>  dextrose 50% Injectable 25 Gram(s) IV Push once  dextrose 50% Injectable 12.5 Gram(s) IV Push once  dextrose 50% Injectable 25 Gram(s) IV Push once  glucagon  Injectable 1 milliGRAM(s) IntraMuscular once  heparin   Injectable 5000 Unit(s) SubCutaneous every 8 hours  hydrocortisone sodium succinate Injectable 100 milliGRAM(s) IV Push every 8 hours  insulin glargine Injectable (LANTUS) 16 Unit(s) SubCutaneous at bedtime  insulin lispro (ADMELOG) corrective regimen sliding scale   SubCutaneous three times a day before meals  insulin lispro (ADMELOG) corrective regimen sliding scale   SubCutaneous at bedtime  insulin lispro Injectable (ADMELOG) 6 Unit(s) SubCutaneous three times a day before meals  levothyroxine Injectable 100 MICROGram(s) IV Push <User Schedule>  piperacillin/tazobactam IVPB.. 3.375 Gram(s) IV Intermittent every 8 hours    MEDICATIONS  (PRN):  dextrose Oral Gel 15 Gram(s) Oral once PRN Blood Glucose LESS THAN 70 milliGRAM(s)/deciliter    CARDIAC MARKERS ( 25 Nov 2022 21:00 )  x     / x     / 150 U/L / x     / x                                7.2    5.36  )-----------( 118      ( 26 Nov 2022 02:00 )             22.3     11-26    132<L>  |  97<L>  |  101<H>  ----------------------------<  229<H>  5.4<H>   |  19<L>  |  2.17<H>    Ca    9.9      26 Nov 2022 02:00  Phos  5.7     11-26  Mg     2.10     11-26    TPro  6.4  /  Alb  3.7  /  TBili  0.6  /  DBili  x   /  AST  64<H>  /  ALT  111<H>  /  AlkPhos  373<H>  11-26    PT/INR - ( 25 Nov 2022 09:57 )   PT: 15.2 sec;   INR: 1.31 ratio         PTT - ( 25 Nov 2022 09:57 )  PTT:39.6 sec    PHYSICAL EXAM:  GENERAL: NAD, lying in bed comfortably  HEAD:  Atraumatic, Normocephalic  EYES: EOMI, PERRLA, conjunctiva and sclera clear  ENT: Moist mucous membranes  NECK: Supple, No JVD  CHEST/LUNG: Clear to auscultation bilaterally; No rales, rhonchi, wheezing, or rubs. Unlabored respirations  HEART: Regular rate and rhythm; No murmurs, rubs, or gallops  ABDOMEN: Bowel sounds present; Soft, Nontender, Nondistended. No hepatomegally  EXTREMITIES:  2+ Peripheral Pulses, brisk capillary refill. No clubbing, cyanosis, or edema  NERVOUS SYSTEM:  Alert & Oriented X3, speech clear. No deficits   MSK: FROM all 4 extremities, full and equal strength  SKIN: No rashes or lesions      ABG - ( 25 Nov 2022 14:55 )  pH, Arterial: 7.30  pH, Blood: x     /  pCO2: 44    /  pO2: 155   / HCO3: 22    / Base Excess: -4.5  /  SaO2: 96.6      Lactate:    Ekg:  Telemetry:    ASSESSMENT & PLAN:   Shila Hernandez is a 54 year old female with pmh of HTN, DM, pHTN, CKD presenting with dizziness, found to be profoundly bradycardic to the 30s, also noted to have a potassium of 6.9 on inital laboratory work. Patient admitted to MICU for management of hemodynamic instability, now improved off of dopamine gtt, stable for transfer to floors.     #Neuro  - AMS/lethargy- likely cardiac in origin i/s/o profound bradycardia vs 2/2 midazolam used prior to transcutaneous pacing   - now AOx3    #Cardiovascular  - profound bradycardia- was transcutaneously paced by EMS  - s/p Dopamine infusion   - now hemodynamically stable off pressors    On home torsemide, currently holding while giving pushes of IV lasix due to volume overload.     #Respiratory  -no active issues    #GI/Nutrition  - start diet    #/Renal  Hyperkalemia   - improved s/p lasix and insulin  - continue to trend BMP  - IV lasix 40 today    CKD  - baseline 1.4-1.7 in September  - may have component of ISAMAR  - continue to trend BMP    #Skin  - no active issues     #ID  - continue zosyn for now pending cultures  - f/u BCx, UCx, MRSA swab    #Endocrine  Myxedema  - elevated TSH, free thyroxine wnl  - Endocrine following  - currently receiving hydrocortisone IV q8h  - continue synthroid 100 daily    T2DM  - A1C 9.7  - continue with lantus and SSI     #Hematologic/DVT ppx  - anemia/thrombocytopenia- possible CKD contribution- will trend cbc's   - may be close to her baseline   - keep type/screen active      FOR FOLLOW UP:  [ ] f/u BMP in am for hyperkalemia   [ ] f/u endocrine recs, __ Hydrocortisone was  tapered to 50 mg q 8 hrs on 11/26, cont Synthroid 100 mcg daily,  [] continue Zosyn, empiric dosing for possible PNA since 11/25, _ follow up blood and urine cxs, RVP neg, MRSA- neg,    [] hyperglycemia, worsening in s/o steroids; steroids have been tapered down - pt is on Lantus 16 U qhs and 6 U of Admelog qac, __ monitor and adjust Insulin as needed   [] PT -- home PT   [] monitor I & O  [] monitor H/H  []will start abx for possible aspiration pneumonia, f/u cultures  given pulm htn, will eventually need RHC    __  spoke with daughter Sulema Hernandez, updated her about pt's transfer and all questions were answered     Lilliana Iglesias PA-C prostate cancer s/p radiation 2019,  hx of kidney stones, went for resent f/u was scoped in the office identified mass in bladder pre op dx: calculus of urether declined

## 2025-05-09 NOTE — H&P PST ADULT - OTHER CARE PROVIDERS
Dr Adam Miller, (cardiology) [phone] Dr Adam Miller, (cardiology) [phone], Dr. Charlotte Ambrosio nephrologist

## 2025-05-10 LAB
CULTURE RESULTS: NO GROWTH — SIGNIFICANT CHANGE UP
SPECIMEN SOURCE: SIGNIFICANT CHANGE UP

## 2025-05-16 ENCOUNTER — APPOINTMENT (OUTPATIENT)
Dept: CARDIOLOGY | Facility: CLINIC | Age: 80
End: 2025-05-16
Payer: MEDICARE

## 2025-05-16 VITALS
SYSTOLIC BLOOD PRESSURE: 170 MMHG | BODY MASS INDEX: 25.99 KG/M2 | OXYGEN SATURATION: 97 % | HEART RATE: 70 BPM | WEIGHT: 176 LBS | DIASTOLIC BLOOD PRESSURE: 90 MMHG

## 2025-05-16 VITALS — HEART RATE: 60 BPM | SYSTOLIC BLOOD PRESSURE: 170 MMHG | DIASTOLIC BLOOD PRESSURE: 80 MMHG

## 2025-05-16 DIAGNOSIS — Z01.810 ENCOUNTER FOR PREPROCEDURAL CARDIOVASCULAR EXAMINATION: ICD-10-CM

## 2025-05-16 DIAGNOSIS — I25.10 ATHEROSCLEROTIC HEART DISEASE OF NATIVE CORONARY ARTERY W/OUT ANGINA PECTORIS: ICD-10-CM

## 2025-05-16 DIAGNOSIS — Z86.79 PERSONAL HISTORY OF OTHER DISEASES OF THE CIRCULATORY SYSTEM: ICD-10-CM

## 2025-05-16 DIAGNOSIS — I10 ESSENTIAL (PRIMARY) HYPERTENSION: ICD-10-CM

## 2025-05-16 PROCEDURE — G2211 COMPLEX E/M VISIT ADD ON: CPT

## 2025-05-16 PROCEDURE — 99215 OFFICE O/P EST HI 40 MIN: CPT

## 2025-05-19 ENCOUNTER — OUTPATIENT (OUTPATIENT)
Dept: OUTPATIENT SERVICES | Facility: HOSPITAL | Age: 80
LOS: 1 days | End: 2025-05-19
Payer: MEDICARE

## 2025-05-19 ENCOUNTER — RESULT REVIEW (OUTPATIENT)
Age: 80
End: 2025-05-19

## 2025-05-19 ENCOUNTER — TRANSCRIPTION ENCOUNTER (OUTPATIENT)
Age: 80
End: 2025-05-19

## 2025-05-19 ENCOUNTER — APPOINTMENT (OUTPATIENT)
Dept: UROLOGY | Facility: AMBULATORY SURGERY CENTER | Age: 80
End: 2025-05-19

## 2025-05-19 VITALS
TEMPERATURE: 98 F | SYSTOLIC BLOOD PRESSURE: 180 MMHG | DIASTOLIC BLOOD PRESSURE: 92 MMHG | WEIGHT: 175.05 LBS | RESPIRATION RATE: 14 BRPM | HEART RATE: 77 BPM | OXYGEN SATURATION: 99 % | HEIGHT: 69 IN

## 2025-05-19 VITALS
OXYGEN SATURATION: 97 % | SYSTOLIC BLOOD PRESSURE: 169 MMHG | DIASTOLIC BLOOD PRESSURE: 77 MMHG | HEART RATE: 68 BPM | RESPIRATION RATE: 14 BRPM | TEMPERATURE: 98 F

## 2025-05-19 DIAGNOSIS — Z95.0 PRESENCE OF CARDIAC PACEMAKER: Chronic | ICD-10-CM

## 2025-05-19 DIAGNOSIS — Z98.890 OTHER SPECIFIED POSTPROCEDURAL STATES: Chronic | ICD-10-CM

## 2025-05-19 DIAGNOSIS — D49.4 NEOPLASM OF UNSPECIFIED BEHAVIOR OF BLADDER: ICD-10-CM

## 2025-05-19 DIAGNOSIS — Z95.5 PRESENCE OF CORONARY ANGIOPLASTY IMPLANT AND GRAFT: Chronic | ICD-10-CM

## 2025-05-19 DIAGNOSIS — Z95.2 PRESENCE OF PROSTHETIC HEART VALVE: Chronic | ICD-10-CM

## 2025-05-19 DIAGNOSIS — H26.9 UNSPECIFIED CATARACT: Chronic | ICD-10-CM

## 2025-05-19 LAB — GLUCOSE BLDC GLUCOMTR-MCNC: 154 MG/DL — HIGH (ref 70–99)

## 2025-05-19 PROCEDURE — 88307 TISSUE EXAM BY PATHOLOGIST: CPT | Mod: 26

## 2025-05-19 PROCEDURE — 52234 CYSTOSCOPY AND TREATMENT: CPT

## 2025-05-19 RX ORDER — OMEPRAZOLE 20 MG/1
1 CAPSULE, DELAYED RELEASE ORAL
Refills: 0 | DISCHARGE

## 2025-05-19 RX ORDER — EZETIMIBE 10 MG/1
1 TABLET ORAL
Refills: 0 | DISCHARGE

## 2025-05-19 RX ORDER — GLIMEPIRIDE 4 MG/1
1 TABLET ORAL
Refills: 0 | DISCHARGE

## 2025-05-19 RX ORDER — ENALAPRIL MALEATE 20 MG
1 TABLET ORAL
Refills: 0 | DISCHARGE

## 2025-05-19 RX ORDER — ERGOCALCIFEROL 1.25 MG/1
CAPSULE ORAL
Refills: 0 | DISCHARGE

## 2025-05-19 RX ORDER — METOPROLOL SUCCINATE 50 MG/1
1 TABLET, EXTENDED RELEASE ORAL
Refills: 0 | DISCHARGE

## 2025-05-19 RX ORDER — GEMCITABINE HYDROCHLORIDE 38 MG/ML
2000 INJECTION, SOLUTION INTRAVENOUS ONCE
Refills: 0 | Status: DISCONTINUED | OUTPATIENT
Start: 2025-05-19 | End: 2025-06-02

## 2025-05-19 NOTE — ASU DISCHARGE PLAN (ADULT/PEDIATRIC) - FINANCIAL ASSISTANCE
[facility] provides services at a reduced cost to those who are determined to be eligible through [facility]’s financial assistance program. Information regarding [facility]’s financial assistance program can be found by going to [email or web address] or by calling [phone].

## 2025-05-19 NOTE — ASU DISCHARGE PLAN (ADULT/PEDIATRIC) - CARE PROVIDER_API CALL
Abdirashid Peter  Urology  [address] [place] [address], [address] [address]  Phone: [phone]  Fax: [phone]  Established Patient  Follow Up Time:

## 2025-05-19 NOTE — ASU DISCHARGE PLAN (ADULT/PEDIATRIC) - ASU DC SPECIAL INSTRUCTIONSFT
GENERAL: It is common to have blood in your urine after your procedure. It may be pink or even red; inform your doctor if you have a significant amount of clot in the urine or if you are unable to void at all or if your catheter stops draining. The urine may clear and then become bloody again especially as you are more physically active. It is not uncommon to have some burning when you urinate, this will gradually improve.  BATHING: You may shower or bathe.  DIET: You may resume your regular diet and regular medication regimen.  PAIN: You may take Tylenol (acetaminophen) 650-975mg and/or Motrin (ibuprofen) 400-600mg, both available over the counter, for pain every 6 hours as needed. Do not exceed 4000mg of Tylenol (acetaminophen) daily. You may alternate these medications such that you take one or the other every 3 hours for around the clock pain coverage.  ANTIBIOTICS: You do not need antibiotics.  STOOL SOFTENERS: Do not allow yourself to become constipated as straining may cause bleeding. Take stool softeners or a laxative (ex. Miralax, Colace, Senokot, ExLax, etc), available over the counter, if needed.  ACTIVITY: No heavy lifting or strenuous exercise until you are evaluated at your post-operative appointment. Otherwise, you may return to your usual level of physical activity.  FOLLOW-UP: Dr. Peter will call with pathology results and need for follow up.  CALL YOUR UROLOGIST IF: You have any bleeding that does not stop, inability to void >8 hours, fever over 100.4 F, chills, persistent nausea/vomiting, or if your pain is not controlled on your discharge pain medications.

## 2025-05-22 LAB — SURGICAL PATHOLOGY STUDY: SIGNIFICANT CHANGE UP

## 2025-05-23 LAB
APPEARANCE: CLEAR
BACTERIA UR CULT: NORMAL
BACTERIA: NEGATIVE /HPF
BILIRUBIN URINE: NEGATIVE
BLOOD URINE: ABNORMAL
CAST: 1 /LPF
COLOR: YELLOW
EPITHELIAL CELLS: 0 /HPF
GLUCOSE QUALITATIVE U: >=1000 MG/DL
KETONES URINE: NEGATIVE MG/DL
LEUKOCYTE ESTERASE URINE: NEGATIVE
MICROSCOPIC-UA: NORMAL
NITRITE URINE: NEGATIVE
PH URINE: 6
PROTEIN URINE: NORMAL MG/DL
RED BLOOD CELLS URINE: 75 /HPF
SPECIFIC GRAVITY URINE: 1.02
UROBILINOGEN URINE: 0.2 MG/DL
WHITE BLOOD CELLS URINE: 3 /HPF

## 2025-05-24 ENCOUNTER — EMERGENCY (EMERGENCY)
Facility: HOSPITAL | Age: 80
LOS: 1 days | End: 2025-05-24
Attending: STUDENT IN AN ORGANIZED HEALTH CARE EDUCATION/TRAINING PROGRAM
Payer: MEDICARE

## 2025-05-24 VITALS
HEART RATE: 92 BPM | WEIGHT: 175.05 LBS | RESPIRATION RATE: 18 BRPM | HEIGHT: 69 IN | SYSTOLIC BLOOD PRESSURE: 183 MMHG | DIASTOLIC BLOOD PRESSURE: 90 MMHG | OXYGEN SATURATION: 96 % | TEMPERATURE: 98 F

## 2025-05-24 DIAGNOSIS — H26.9 UNSPECIFIED CATARACT: Chronic | ICD-10-CM

## 2025-05-24 DIAGNOSIS — Z98.890 OTHER SPECIFIED POSTPROCEDURAL STATES: Chronic | ICD-10-CM

## 2025-05-24 DIAGNOSIS — Z95.2 PRESENCE OF PROSTHETIC HEART VALVE: Chronic | ICD-10-CM

## 2025-05-24 DIAGNOSIS — Z95.0 PRESENCE OF CARDIAC PACEMAKER: Chronic | ICD-10-CM

## 2025-05-24 PROCEDURE — 99284 EMERGENCY DEPT VISIT MOD MDM: CPT

## 2025-05-24 NOTE — ED ADULT NURSE REASSESSMENT NOTE - NS ED NURSE REASSESS COMMENT FT1
Indwelling urinary "christine" catheter inserted by break coverage RN Coty. Bedside drainage to gravity. Stat lock in place. Patient with no output to christine after placement. RN and ED resident performed irrigation with clots removed; patient's christine draining. Patient endorses improvement in discomfort.

## 2025-05-24 NOTE — ED PROVIDER NOTE - PROGRESS NOTE DETAILS
(Aleksandra Lowe MD-PGY1): Pt feeling okay. Draining some bloody fluids. Drinking water. Uro saw and recs for follow up on UA, with keeping christine in place for uro follow up with Dr. Peter next week. Pt amenable. Will dc with leg bag and care instructions once UA returns with/without Abx depending on results. Return instructions given. (Aleksandra Lowe MD-PGY1): Per uro, bactrim 5 days and follow up next week with Dr. Peter. Pt aware. DCing now with return precautions.

## 2025-05-24 NOTE — ED ADULT NURSE NOTE - CAS ELECT INFOMATION PROVIDED
Pt switched to leg bag for DC. Pt educated on use, and emptying bag. Pt and pt's wife at bedside demonstrated understanding. DC instructions reviewed with MD Lowe/NICOLE instructions

## 2025-05-24 NOTE — ED PROVIDER NOTE - PATIENT PORTAL LINK FT
You can access the FollowMyHealth Patient Portal offered by [facility] by registering at the following website: [email or web address]. By joining [employer]’s FollowMyHealth portal, you will also be able to view your health information using other applications (apps) compatible with our system.

## 2025-05-24 NOTE — ED PROVIDER NOTE - CLINICAL SUMMARY MEDICAL DECISION MAKING FREE TEXT BOX
(Aleksandra Lowe MD-PGY1): 79M with bladder tumor s/p resection, HTN, pAF, DM2, HLD, CKD3, CAD s/p stents, PPM for sinus sick, and prostate cancer s/p radiation here for evaluation of urinary retention symptoms. Patient reports that he had a tumor resected in his bladder 5 days ago with [facility].  He was told to monitor for difficulty urination and to expect some blood in the urine.  Today after lunch, he drank a good amount of water and was not able to urinate.  Since, has had increasing pain and still not able to urinate, prompting ER evaluation.  Denies fevers, chest pain, shortness of breath, and any other symptoms.  Feeling at baseline prior.  No other concerns at this time.    Vitals on arrival notable for elevated BP of 183/90, otherwise were appropriate.  Physical exam showing uncomfortable appearing man, family at bedside, abdomen soft with tenderness over the suprapubic region, none otherwise.  No edema.  Bedside ultrasound showing full bladder and some clots.  Attempted to place three-way Christine for possible irrigation, but this did not pass through the meatus well.  Placed to a 2way christine and manually irrigated with some clots removed and decompressed bladder, confirmed with US. Plan for UA/UCx, observation to see if retains, and urology. Likely discharge home with leg bag and uro follow up. Pt and family amenable. Updates in progress notes section.

## 2025-05-24 NOTE — ED PROVIDER NOTE - CARE PROVIDER_API CALL
Abdirashid Peter  Urology  [address] [place] [address], [address] [address]  Phone: [phone]  Fax: [phone]  Follow Up Time:

## 2025-05-24 NOTE — ED PROVIDER NOTE - NSFOLLOWUPINSTRUCTIONS_ED_ALL_ED_FT
SUMMARY  You were seen in the ER on 5/24/2025 for evaluation of urinary retention with some clots.  We placed a Christine and were able to get some of the clots to come out and relieve your retention.  We checked your urine for infection and you may have a small UTI, which we are sending you with Bactrim to treat.  You can follow-up with urology: Dr. Abdirashid Peter, next week to assess need for christine. The number is [phone]. Please keep christine in place and follow care instructions given. Come back to ER if you feel like the retention is coming back, or you develop a fever, or have other emergency concerns. Thank you for allowing us to care for you today.     RECOMMENDATIONS  For Pain  - You can take Acetaminophen (Tylenol) 650mg-1000mg every 6 hours as needed (max 4000mg/day)    For UTI  - Take Bactrim twice daily, see directions on bottle  - Drink water  - Monitor christine    SCHEDULE APPOINTMENT WITH  - Urology: Dr Abdirashid Peter next week [phone]    RETURN TO THE ER...  For any worsening symptoms or concerns: chest pain, shortness of breath, palpitations, lightheadedness, dizziness, fainting, new weakness/numbness or new difficulty speaking or swallowing, severe pain, severe bleeding or bleeding that doesn't stop, inability to drink liquids (constant vomiting), new inability to walk or frequent falls, or anything else concerning.

## 2025-05-24 NOTE — ED ADULT NURSE NOTE - OBJECTIVE STATEMENT
79 yr old male by EMS from home with wife c/o urinary urgency and retention, s/p bladder tumor removal earlier this week, c/o urinary retention, dribbling when urinating starting this afternoon during lunch time, +bladder distention, increased pain, tachypneic, wife presnet

## 2025-05-24 NOTE — ED ADULT TRIAGE NOTE - PATIENT ON (OXYGEN DELIVERY METHOD)
Continuity of Care Form    Patient Name: Gracie Naik   :  1947  MRN:  [id number]    Admit date:  2023  Discharge date:  ***    Code Status Order: No Order   Advance Directives:     Admitting Physician:  No admitting provider for patient encounter. PCP: Rachel David MD    Discharging Nurse: [facility] Unit/Room#:   Discharging Unit Phone Number: ***    Emergency Contact:   Extended Emergency Contact Information  Primary Emergency Contact: [address] [place] [address] Phone: [phone]  Relation: Child  Preferred language: English   needed?  No  Secondary Emergency Contact: [facility] Phone: [phone]  Relation: Child    Past Surgical History:  Past Surgical History:   Procedure Laterality Date    ABDOMEN SURGERY  1970 partial small and large bowel resection    APPENDECTOMY      BREAST SURGERY Right 2015    lumpectomy and one lymph node (stage one ca)    COLONOSCOPY      ENDOSCOPY, COLON, DIAGNOSTIC      SMALL INTESTINE SURGERY  1970    TONSILLECTOMY         Immunization History:   Immunization History   Administered Date(s) Administered    COVID-19, MODERNA BLUE border, Primary or Immunocompromised, (age 12y+), IM, 100 mcg/0.5mL 07/10/2021, 2021    COVID-19, MODERNA Bivalent, (age 12y+), IM, 48 mcg/0.5 mL 2022    COVID-19, MODERNA Booster BLUE border, (age 18y+), IM, 50mcg/0.25mL 2022    Pneumococcal Conjugate 7-valent (Kylah Red) 1947    Pneumococcal, PCV20, PREVNAR 21, (age 6w+), IM, 0.5mL 2023    Pneumococcal, PPSV23, PNEUMOVAX 21, (age 2y+), SC/IM, 0.5mL 2022    TDaP, ADACEL (age 6y-58y), BOOSTRIX (age 10y+), IM, 0.5mL 2021       Active Problems:  Patient Active Problem List   Diagnosis Code    Osteoporosis M81.0    Abnormal mammogram R92.8    Breast pain N64.4    Crohn's disease ([address]) K50.90    Fatigue R53.83    Hearing loss H91.90    Heart murmur previously undiagnosed R01.1    History of
room air

## 2025-05-24 NOTE — ED PROVIDER NOTE - ATTENDING CONTRIBUTION TO CARE
79-year-old male with past medical history of hypertension, A-fib, hyperlipidemia, diabetes, CKD, CAD status post stents no prior TAVR, prostate cancer with radiation in remission as well as new onset bladder cancer status post cystectomy presents with hematuria.  The patient reports that he had a procedure performed about a week ago to remove a bladder mass as well as radiation was inserted into his bladder.  He reports that since then, that hematuria has been worsening with clot passage.  He reports today, difficulty urinating secondary to hematuria.      PE: well appearing, nontoxic, no respiratory distress. Abdominal distention and tenderness in suprapubic region. Neuro nonfocal.  Skin intact. Psych normal mood.    MDM: Urinary retention secondary to hematuria in setting of recent procedure  Agrawal placed with continued retention, placement confirmed with POCUS   Will troubleshoot vs uro consult

## 2025-05-25 ENCOUNTER — EMERGENCY (EMERGENCY)
Facility: HOSPITAL | Age: 80
LOS: 1 days | End: 2025-05-25
Attending: EMERGENCY MEDICINE
Payer: MEDICARE

## 2025-05-25 VITALS
SYSTOLIC BLOOD PRESSURE: 122 MMHG | OXYGEN SATURATION: 97 % | DIASTOLIC BLOOD PRESSURE: 66 MMHG | RESPIRATION RATE: 17 BRPM | HEART RATE: 86 BPM

## 2025-05-25 VITALS
HEART RATE: 70 BPM | OXYGEN SATURATION: 95 % | TEMPERATURE: 98 F | SYSTOLIC BLOOD PRESSURE: 116 MMHG | RESPIRATION RATE: 18 BRPM | DIASTOLIC BLOOD PRESSURE: 61 MMHG

## 2025-05-25 VITALS
WEIGHT: 175.05 LBS | DIASTOLIC BLOOD PRESSURE: 57 MMHG | OXYGEN SATURATION: 96 % | SYSTOLIC BLOOD PRESSURE: 119 MMHG | HEART RATE: 81 BPM | RESPIRATION RATE: 18 BRPM | TEMPERATURE: 98 F | HEIGHT: 69 IN

## 2025-05-25 DIAGNOSIS — Z98.890 OTHER SPECIFIED POSTPROCEDURAL STATES: Chronic | ICD-10-CM

## 2025-05-25 DIAGNOSIS — Z95.0 PRESENCE OF CARDIAC PACEMAKER: Chronic | ICD-10-CM

## 2025-05-25 DIAGNOSIS — Z95.2 PRESENCE OF PROSTHETIC HEART VALVE: Chronic | ICD-10-CM

## 2025-05-25 DIAGNOSIS — H26.9 UNSPECIFIED CATARACT: Chronic | ICD-10-CM

## 2025-05-25 PROBLEM — N18.30 CHRONIC KIDNEY DISEASE, STAGE 3 UNSPECIFIED: Chronic | Status: ACTIVE | Noted: 2025-05-09

## 2025-05-25 PROBLEM — Z95.5 PRESENCE OF CORONARY ANGIOPLASTY IMPLANT AND GRAFT: Chronic | Status: ACTIVE | Noted: 2025-05-09

## 2025-05-25 PROBLEM — I49.5 SICK SINUS SYNDROME: Chronic | Status: ACTIVE | Noted: 2025-05-09

## 2025-05-25 PROBLEM — E11.9 TYPE 2 DIABETES MELLITUS WITHOUT COMPLICATIONS: Chronic | Status: ACTIVE | Noted: 2025-05-09

## 2025-05-25 PROBLEM — I48.0 PAROXYSMAL ATRIAL FIBRILLATION: Chronic | Status: ACTIVE | Noted: 2025-05-09

## 2025-05-25 PROBLEM — D49.4 NEOPLASM OF UNSPECIFIED BEHAVIOR OF BLADDER: Chronic | Status: ACTIVE | Noted: 2025-05-09

## 2025-05-25 PROBLEM — E78.5 HYPERLIPIDEMIA, UNSPECIFIED: Chronic | Status: ACTIVE | Noted: 2025-05-09

## 2025-05-25 PROBLEM — K21.9 GASTRO-ESOPHAGEAL REFLUX DISEASE WITHOUT ESOPHAGITIS: Chronic | Status: ACTIVE | Noted: 2025-05-09

## 2025-05-25 LAB
ALBUMIN SERPL ELPH-MCNC: 4 G/DL — SIGNIFICANT CHANGE UP (ref 3.3–5)
ALP SERPL-CCNC: 78 U/L — SIGNIFICANT CHANGE UP (ref 40–120)
ALT FLD-CCNC: 22 U/L — SIGNIFICANT CHANGE UP (ref 10–45)
ANION GAP SERPL CALC-SCNC: 13 MMOL/L — SIGNIFICANT CHANGE UP (ref 5–17)
APPEARANCE UR: ABNORMAL
AST SERPL-CCNC: 21 U/L — SIGNIFICANT CHANGE UP (ref 10–40)
BACTERIA # UR AUTO: NEGATIVE /HPF — SIGNIFICANT CHANGE UP
BASOPHILS # BLD AUTO: 0.01 K/UL — SIGNIFICANT CHANGE UP (ref 0–0.2)
BASOPHILS NFR BLD AUTO: 0.2 % — SIGNIFICANT CHANGE UP (ref 0–2)
BILIRUB SERPL-MCNC: 0.7 MG/DL — SIGNIFICANT CHANGE UP (ref 0.2–1.2)
BILIRUB UR-MCNC: NEGATIVE — SIGNIFICANT CHANGE UP
BUN SERPL-MCNC: 46 MG/DL — HIGH (ref 7–23)
CALCIUM SERPL-MCNC: 9.1 MG/DL — SIGNIFICANT CHANGE UP (ref 8.4–10.5)
CAST: 4 /LPF — SIGNIFICANT CHANGE UP (ref 0–4)
CHLORIDE SERPL-SCNC: 102 MMOL/L — SIGNIFICANT CHANGE UP (ref 96–108)
CO2 SERPL-SCNC: 20 MMOL/L — LOW (ref 22–31)
COLOR SPEC: ABNORMAL
CREAT SERPL-MCNC: 1.9 MG/DL — HIGH (ref 0.5–1.3)
CULTURE RESULTS: NO GROWTH — SIGNIFICANT CHANGE UP
DIFF PNL FLD: ABNORMAL
EGFR: 35 ML/MIN/1.73M2 — LOW
EGFR: 35 ML/MIN/1.73M2 — LOW
EOSINOPHIL # BLD AUTO: 0.01 K/UL — SIGNIFICANT CHANGE UP (ref 0–0.5)
EOSINOPHIL NFR BLD AUTO: 0.2 % — SIGNIFICANT CHANGE UP (ref 0–6)
GLUCOSE SERPL-MCNC: 294 MG/DL — HIGH (ref 70–99)
GLUCOSE UR QL: >=1000 MG/DL
HCT VFR BLD CALC: 36.6 % — LOW (ref 39–50)
HGB BLD-MCNC: 12.1 G/DL — LOW (ref 13–17)
IMM GRANULOCYTES NFR BLD AUTO: 0.4 % — SIGNIFICANT CHANGE UP (ref 0–0.9)
KETONES UR QL: ABNORMAL MG/DL
LEUKOCYTE ESTERASE UR-ACNC: ABNORMAL
LYMPHOCYTES # BLD AUTO: 0.45 K/UL — LOW (ref 1–3.3)
LYMPHOCYTES # BLD AUTO: 8.7 % — LOW (ref 13–44)
MCHC RBC-ENTMCNC: 29.1 PG — SIGNIFICANT CHANGE UP (ref 27–34)
MCHC RBC-ENTMCNC: 33.1 G/DL — SIGNIFICANT CHANGE UP (ref 32–36)
MCV RBC AUTO: 88 FL — SIGNIFICANT CHANGE UP (ref 80–100)
MONOCYTES # BLD AUTO: 0.47 K/UL — SIGNIFICANT CHANGE UP (ref 0–0.9)
MONOCYTES NFR BLD AUTO: 9.1 % — SIGNIFICANT CHANGE UP (ref 2–14)
NEUTROPHILS # BLD AUTO: 4.19 K/UL — SIGNIFICANT CHANGE UP (ref 1.8–7.4)
NEUTROPHILS NFR BLD AUTO: 81.4 % — HIGH (ref 43–77)
NITRITE UR-MCNC: NEGATIVE — SIGNIFICANT CHANGE UP
NRBC BLD AUTO-RTO: 0 /100 WBCS — SIGNIFICANT CHANGE UP (ref 0–0)
PH UR: 5 — SIGNIFICANT CHANGE UP (ref 5–8)
PLATELET # BLD AUTO: 120 K/UL — LOW (ref 150–400)
POTASSIUM SERPL-MCNC: 4.2 MMOL/L — SIGNIFICANT CHANGE UP (ref 3.5–5.3)
POTASSIUM SERPL-SCNC: 4.2 MMOL/L — SIGNIFICANT CHANGE UP (ref 3.5–5.3)
PROT SERPL-MCNC: 6.4 G/DL — SIGNIFICANT CHANGE UP (ref 6–8.3)
PROT UR-MCNC: 100 MG/DL
RBC # BLD: 4.16 M/UL — LOW (ref 4.2–5.8)
RBC # FLD: 13.1 % — SIGNIFICANT CHANGE UP (ref 10.3–14.5)
RBC CASTS # UR COMP ASSIST: 1355 /HPF — HIGH (ref 0–4)
REVIEW: SIGNIFICANT CHANGE UP
SODIUM SERPL-SCNC: 135 MMOL/L — SIGNIFICANT CHANGE UP (ref 135–145)
SP GR SPEC: 1.03 — SIGNIFICANT CHANGE UP (ref 1–1.03)
SPECIMEN SOURCE: SIGNIFICANT CHANGE UP
SQUAMOUS # UR AUTO: 16 /HPF — HIGH (ref 0–5)
UROBILINOGEN FLD QL: 0.2 MG/DL — SIGNIFICANT CHANGE UP (ref 0.2–1)
WBC # BLD: 5.15 K/UL — SIGNIFICANT CHANGE UP (ref 3.8–10.5)
WBC # FLD AUTO: 5.15 K/UL — SIGNIFICANT CHANGE UP (ref 3.8–10.5)
WBC UR QL: 27 /HPF — HIGH (ref 0–5)

## 2025-05-25 PROCEDURE — 99283 EMERGENCY DEPT VISIT LOW MDM: CPT

## 2025-05-25 PROCEDURE — 51702 INSERT TEMP BLADDER CATH: CPT

## 2025-05-25 PROCEDURE — 81001 URINALYSIS AUTO W/SCOPE: CPT

## 2025-05-25 PROCEDURE — 99284 EMERGENCY DEPT VISIT MOD MDM: CPT | Mod: 25

## 2025-05-25 PROCEDURE — 51700 IRRIGATION OF BLADDER: CPT

## 2025-05-25 PROCEDURE — 85025 COMPLETE CBC W/AUTO DIFF WBC: CPT

## 2025-05-25 PROCEDURE — 80053 COMPREHEN METABOLIC PANEL: CPT

## 2025-05-25 PROCEDURE — 36415 COLL VENOUS BLD VENIPUNCTURE: CPT

## 2025-05-25 PROCEDURE — 99284 EMERGENCY DEPT VISIT MOD MDM: CPT

## 2025-05-25 PROCEDURE — 87086 URINE CULTURE/COLONY COUNT: CPT

## 2025-05-25 RX ORDER — SULFAMETHOXAZOLE/TRIMETHOPRIM 800-160 MG
1 TABLET ORAL
Qty: 10 | Refills: 0
Start: 2025-05-25 | End: 2025-05-29

## 2025-05-25 RX ORDER — SULFAMETHOXAZOLE/TRIMETHOPRIM 800-160 MG
1 TABLET ORAL ONCE
Refills: 0 | Status: COMPLETED | OUTPATIENT
Start: 2025-05-25 | End: 2025-05-25

## 2025-05-25 RX ADMIN — Medication 1 TABLET(S): at 02:00

## 2025-05-25 NOTE — CONSULT NOTE ADULT - SUBJECTIVE AND OBJECTIVE BOX
HPI:  Patient is a 79y Male s/p TURBT 5/19/2025 with urologist Abdirashid Peter MD presenting to ED with difficulty voiding and gross hematuria. pt states difficulty voiding began yesterday and progressed today to the point he could barely void. +frequency and urgency  Denies any fever. Denies any heavy lifting or straining with bowel movements. In ED, 3way christine placement was attempted but unsuccessful ED staff was able to place 16F silicone catheter    PAST MEDICAL & SURGICAL HISTORY:  Aortic valve stenosis, etiology of cardiac valve disease unspecified      Mitral valve insufficiency, unspecified etiology      ANTONIO (acute kidney injury)      Renal calculi      Prostate cancer  5/2019 - treated with radiation and hormones      PAF (paroxysmal atrial fibrillation)      Stage 3 chronic kidney disease      Hyperlipidemia      Sick sinus syndrome      S/P coronary artery stent placement      GERD (gastroesophageal reflux disease)      Type 2 diabetes mellitus      Neoplasm of unspecified behavior of bladder      H/O carotid endarterectomy  bilateral 7/2014      H/O hernia repair  1966, left inguinal      H/O lithotripsy  6/2020      History of transcatheter aortic valve replacement (TAVR)      Cardiac pacemaker      Cataract        FAMILY HISTORY:  Family history of essential hypertension      SOCIAL HISTORY: Retired   MEDICATIONS  (STANDING):    MEDICATIONS  (PRN):    Allergies    Zytiga (Unknown)    Intolerances        REVIEW OF SYSTEMS: Pertinent positives and negatives as stated in HPI, otherwise negative    Vital signs  T(C): 36.9 (05-24-25 @ 22:40), Max: 36.9 (05-24-25 @ 22:40)  HR: 105 (05-24-25 @ 22:40)  BP: 190/87 (05-24-25 @ 22:40)  SpO2: 98% (05-24-25 @ 22:40)  Wt(kg): --    Physical Exam  Gen: NAD  HEENT: normocephalic, atraumatic, no scleral icterus  Pulm:  No respiratory distress, no subcostal retractions, no accessory muscle use   CV:  no JVD  Abd: Soft, NT, ND, no rebound tenderness or guarding  : 16F silicone christine catheter in place, urine pink. bladder irrigated with NS. few clots evacuated  MSK:  No CVAT, Moving all extremities, full ROM in all extremities, No edema present  SKIN: warm, dry, no rash.

## 2025-05-25 NOTE — ED PROVIDER NOTE - PHYSICAL EXAMINATION
: christine noted to be in place however bag empty. no bleeding or drainage noted.   done with nurse perez

## 2025-05-25 NOTE — ED PROVIDER NOTE - NEUROLOGICAL, MLM
S: Patient is a pleasant 62-year-old female who is seen for a consultation with regard to patient's abnormal CT scan finding.  She had a CT scan done last year that showed mild right hydronephrosis.  She denies any flank pain or discomfort.  However she had a right abdominal ultrasound done in September of this year that was completely normal.  Current Outpatient Medications   Medication Sig Dispense Refill     acetaminophen (TYLENOL) 500 MG tablet Take 500-1,000 mg by mouth every 6 hours as needed for mild pain       Calcium Carbonate-Vitamin D (CALCIUM-CARB 600 + D PO) Take 1 tablet by mouth daily       clonazePAM (KLONOPIN) 0.5 MG tablet TAKE 1 TABLET(0.5 MG) BY MOUTH THREE TIMES DAILY AS NEEDED FOR ANXIETY 90 tablet 2     DiphenhydrAMINE HCl (BENADRYL PO) Take 25 mg by mouth nightly as needed for allergies        ELIQUIS ANTICOAGULANT 5 MG tablet TAKE 1 TABLET(5 MG) BY MOUTH TWICE DAILY 180 tablet 3     Ferrous Sulfate (IRON SUPPLEMENT PO) Take 130 mg by mouth daily        fluocinolone acetonide 0.01 % OIL Place 5 drops in ear(s) 2 times daily as needed (ear eczema) 20 mL 2     fluticasone-salmeterol (ADVAIR) 250-50 MCG/ACT inhaler Wixela Inhub 250 mcg-50 mcg/dose powder for inhalation   INHALE 1 PUFF BY MOUTH TWICE DAILY       MELATONIN PO Take 1-3 mg by mouth nightly as needed        Menaquinone-7 (VITAMIN K2 PO) Take 1 tablet by mouth daily       METAMUCIL FIBER PO        methylPREDNISolone (MEDROL) 8 MG tablet Take 5 tablets (40 mg) by mouth daily for 30 days 150 tablet 0     metoprolol succinate ER (TOPROL XL) 25 MG 24 hr tablet Take 0.5 tablets (12.5 mg) by mouth in the morning. 45 tablet 3     Multiple Vitamin (DAILY MULTIVITAMIN PO) Take 1 tablet by mouth every morning       nitroGLYcerin (NITROSTAT) 0.4 MG sublingual tablet Place 1 tablet (0.4 mg) under the tongue every 5 minutes as needed for chest pain For chest pain place 1 tablet under the tongue every 5 minutes for 3 doses. If symptoms persist 5  minutes after 1st dose call 911. 25 tablet 1     Probiotic Product (PROBIOTIC DAILY PO) Take 1 capsule by mouth 2 times daily Takes additional doses if has diarrhea       spironolactone (ALDACTONE) 25 MG tablet Take 0.5 tablets (12.5 mg) by mouth daily 45 tablet 3     SYNTHROID 150 MCG tablet TAKE ONE TABLET BY MOUTH SIX DAYS A WEEK; TAKE ONE-HALF TABLET ONE DAY A WEEK 90 tablet 3     triamcinolone (KENALOG) 0.1 % external cream        TURMERIC PO Take 1 tablet by mouth every morning        vitamin D3 (CHOLECALCIFEROL) 2000 units tablet Take 1 tablet by mouth daily 1 tablet 0     albuterol (PROAIR HFA/PROVENTIL HFA/VENTOLIN HFA) 108 (90 BASE) MCG/ACT Inhaler Inhale 2 puffs into the lungs as needed for shortness of breath / dyspnea or wheezing  (Patient not taking: Reported on 10/27/2022)       albuterol (PROVENTIL) (2.5 MG/3ML) 0.083% neb solution Take 1 vial (2.5 mg) by nebulization every 6 hours as needed for shortness of breath / dyspnea or wheezing (Patient not taking: Reported on 10/27/2022) 90 mL 0     azelastine (ASTELIN) 0.1 % nasal spray Spray 1 spray into both nostrils 2 times daily as needed (as needed) (Patient not taking: Reported on 10/27/2022)  0     clindamycin (CLEOCIN T) 1 % external solution clindamycin phosphate 1 % topical solution   APPLY TOPICALLY TO THE AFFECTED AREA TWICE DAILY (Patient not taking: Reported on 10/27/2022)       fluticasone-salmeterol (ADVAIR) 250-50 MCG/DOSE inhaler Inhale 1 puff into the lungs every 12 hours (Patient not taking: Reported on 10/27/2022)       ipratropium (ATROVENT) 0.06 % nasal spray Spray 2 sprays into both nostrils 4 times daily as needed for rhinitis (Patient not taking: Reported on 10/27/2022)       MAGNESIUM LACTATE PO Take  mg by mouth nightly as needed  (Patient not taking: Reported on 10/27/2022)       nebulizer nebulization as needed (Patient not taking: Reported on 10/27/2022)       vitamin C (ASCORBIC ACID) 250 MG tablet Take 250 mg by mouth  daily (Patient not taking: Reported on 10/27/2022)       Zinc 15 MG CAPS Take 15 mg by mouth  (Patient not taking: Reported on 10/27/2022)       Allergies   Allergen Reactions     Tetracycline Swelling     Tongue swelling     Viagra [Sildenafil] Muscle Pain (Myalgia) and Diarrhea     Zofran [Ondansetron]      Prolonged QT  Prolonged QT at baseline per patient     Flagyl [Metronidazole] Rash     Pruritic rash      Buspar [Buspirone]      Muscle weakness     Corn Oil Other (See Comments)     Headache       Cymbalta Other (See Comments) and Diarrhea     Confusion     Prozac [Fluoxetine] Diarrhea     Seasonal Allergies Difficulty breathing     Sulfamethoxazole-Trimethoprim      Other reaction(s): Other  Passed out     Cyclobenzaprine Other (See Comments)     Extreme heat intolerance     Levaquin [Levofloxacin]      Other reaction(s): Other  Tendon rupture     Nsaids Other (See Comments)     Exacerbated asthma     Past Medical History:   Diagnosis Date     Arthritis      Cardiomyopathy (H)     ff Cardiology     Chronic depressive personality disorder      Congestive heart failure (H) see dr martínez    heart failure correct term     COPD exacerbation (H)      Esophageal reflux      Ex-smoker     quit 2006; 1 PPD x 30     Hyperparathyroidism (H)     s/p parathyroidectomy     Hypothyroidism      LARRY on CPAP     ff Sleep medicine     Pulmonary nodules     ff Pulmonologist     S/P cardiac pacemaker procedure     checked every 6 months at the [facility]     Uncomplicated asthma years     Past Surgical History:   Procedure Laterality Date     BUNIONECTOMY Bilateral      COLONOSCOPY N/A 8/30/2021    Procedure: COLONOSCOPY, WITH POLYPECTOMY AND BIOPSY;  Surgeon: Ranjit Penn MD;  Location:  GI     CV CORONARY ANGIOGRAM N/A 9/26/2019    Procedure: CV CORONARY ANGIOGRAM;  Surgeon: Erickson Trejo MD;  Location:  HEART CARDIAC CATH LAB     CV RIGHT HEART CATH MEASUREMENTS RECORDED N/A 7/20/2020    Procedure: CV RIGHT  HEART CATH;  Surgeon: Alonso Ordonez MD;  Location:  HEART CARDIAC CATH LAB     EP ABLATION / EP STUDIES  04/21/2017     EP PPM UPGRADE TO BIVENT N/A 1/19/2022    Procedure: EP PPM UPGRADE TO BIVENT;  Surgeon: Lino Funes MD;  Location:  HEART CARDIAC CATH LAB     EXPLORE NECK N/A 9/19/2018    Procedure: EXPLORE NECK;  Neck Exploration Resection of Left superior Parathyroid gland;  Surgeon: Kristine Venegas MD;  Location: [facility] OR      CORRECT BUNION,SIMPLE       PARATHYROIDECTOMY N/A 9/19/2018    Procedure: PARATHYROIDECTOMY;;  Surgeon: Kristine Venegas MD;  Location: [facility] OR     PPM INSERT OF NEW OR REPL W/VENT LEAD  04/21/2017     TONSILLECTOMY & ADENOIDECTOMY        Family History   Problem Relation Age of Onset     Hypothyroidism Mother      Hypertension Mother      Osteoarthritis Mother      Coronary Artery Disease Father         nonfatal MI in his 70s     Asthma Father      Diabetes Sister      Hypothyroidism Sister      Anxiety Disorder Sister      Breast Cancer Maternal Aunt      Social History     Socioeconomic History     Marital status:      Spouse name: None     Number of children: None     Years of education: None     Highest education level: Master's degree (e.g., MA, MS, Florian, MEd, MSW, SONIA)   Tobacco Use     Smoking status: Former     Packs/day: 0.00     Years: 0.00     Pack years: 0.00     Types: Cigarettes     Smokeless tobacco: Never   Vaping Use     Vaping Use: Never used   Substance and Sexual Activity     Alcohol use: Not Currently     Alcohol/week: 0.0 - 8.0 standard drinks     Comment: seldom     Drug use: No     Sexual activity: Not Currently     Partners: Male     Birth control/protection: None     Comment: vasectomy   Other Topics Concern     Parent/sibling w/ CABG, MI or angioplasty before 65F 55M? No   Social History Narrative    CRNA on disability for vestibular symptoms      Social Determinants of Health     Intimate Partner Violence: Not At Risk     Fear of  Current or Ex-Partner: No     Emotionally Abused: No     Physically Abused: No     Sexually Abused: No       REVIEW OF SYSTEMS  =================  C: NEGATIVE for fever, chills, change in weight  I: NEGATIVE for worrisome rashes, moles or lesions  E/M: NEGATIVE for ear, mouth and throat problems  R: NEGATIVE for significant cough or SHORTNESS OF BREATH  CV:  NEGATIVE for chest pain, palpitations or peripheral edema  GI: NEGATIVE for nausea, abdominal pain, heartburn, or change in bowel habits  NEURO: NEGATIVE numbness/weakness  : see HPI  PSYCH: NEGATIVE depression/anxiety  LYmph: no new enlarged lymph nodes  Ortho: no new trauma/movements      Physical Exam:  /78 (BP Location: Left arm, Patient Position: Right side, Cuff Size: Adult Large)   Pulse 97   LMP 08/21/2007   SpO2 98%    GENERAL: Healthy, alert and no distress  EYES: Eyes grossly normal to inspection.  No discharge or erythema, or obvious scleral/conjunctival abnormalities.  RESP: No audible wheeze, cough, or visible cyanosis.  No visible retractions or increased work of breathing.    SKIN: Visible skin clear. No significant rash, abnormal pigmentation or lesions.  NEURO: Cranial nerves grossly intact.  Mentation and speech appropriate for age.  PSYCH: Mentation appears normal, affect normal/bright, judgement and insight intact, normal speech and appearance well-groomed.    Assessment/Plan:     62-year-old female with abnormal CT scan that showed right hydronephrosis however recent renal ultrasound was unremarkable.  Patient was reassured.  Follow-up as needed.        Alert and oriented, no focal deficits, no motor or sensory deficits.

## 2025-05-25 NOTE — CHART NOTE - NSCHARTNOTEFT_GEN_A_CORE
Called to assess patient for possibly an obstructed christine catheter.  The patient had a christine catheter placed for urinary retention overnight, and was discharged home with a 16fr catheter after irrigation.  He is now back because this AM, he felt pressure to urinate and there was less drainage into the christine bag.  In the ER, the RN flushed his catheter with minimal clots returned.  I flushed the catheter as well with no clots returned.  Urine is clear yellow.  The patient's catheter was draining well even upon ambulation, and the patient remained comfortable.  Pt may follow up with Dr Peter outpatient for trial of void.

## 2025-05-25 NOTE — ED PROVIDER NOTE - PATIENT PORTAL LINK FT
You can access the FollowMyHealth Patient Portal offered by [facility] by registering at the following website: [email or web address]. By joining [facility]’s FollowMyHealth portal, you will also be able to view your health information using other applications (apps) compatible with our system.

## 2025-05-25 NOTE — ED PROVIDER NOTE - NSFOLLOWUPINSTRUCTIONS_ED_ALL_ED_FT
1. It is important to follow up with your primary care doctor in 1-2 days    follow up with your urologist in 1-3 days     2. bring a copy of all your results to your follow up appointments    3. stay hydrated     4. if your symptoms worsen, persist, or if any new symptoms develop, or if you experience any signs of distress, return to the ER right away.  '

## 2025-05-25 NOTE — ED ADULT TRIAGE NOTE - CHIEF COMPLAINT QUOTE
Pt had Agrawal catheter inserted last night, noticed blood and clots draining, states he feels catheter is not draining.

## 2025-05-25 NOTE — ED PROVIDER NOTE - NSICDXPASTMEDICALHX_GEN_ALL_CORE_FT
PAST MEDICAL HISTORY:  ANTONIO (acute kidney injury)     Aortic valve stenosis, etiology of cardiac valve disease unspecified     GERD (gastroesophageal reflux disease)     Hyperlipidemia     Mitral valve insufficiency, unspecified etiology     Neoplasm of unspecified behavior of bladder     PAF (paroxysmal atrial fibrillation)     Prostate cancer 5/2019 - treated with radiation and hormones    Renal calculi     S/P coronary artery stent placement     Sick sinus syndrome     Stage 3 chronic kidney disease     Type 2 diabetes mellitus

## 2025-05-25 NOTE — ED PROVIDER NOTE - PROGRESS NOTE DETAILS
Maria R brady PA-C: spoke with urology. will come see patient. Maria R Sterling PA-C: results reviewed and discussed with patient.  has a baseline of elevated cr. states will follow up with his urologist as well as pcp. patient well appearing. christine draining. stable for discharge. discussed with ED attending

## 2025-05-25 NOTE — ED PROVIDER NOTE - OBJECTIVE STATEMENT
79M with bladder tumor s/p resection, HTN, pAF, DM2, HLD, CKD3, CAD s/p stents, PPM, and prostate cancer s/p radiation here for evaluation of urinary retention. Patient reports that he had a tumor resected in his bladder 6 days ago. presented to the ER yesterday with urinary retention. had christine placed, was doing well all night. states this morning has been experiencing retention again and has been unable to urinate. states he did notice some blood and clots. denies f/n/v/d, CP, SOB, HA, dizziness, abdominal pain.

## 2025-05-25 NOTE — CONSULT NOTE ADULT - ASSESSMENT
79y Male s/p TURBT 5/19/2025 with urologist Abdirashid Peter MD presenting to ED with difficulty voiding and gross hematuria.     -follow up UA, UCx  -keep christine in place  -can dc home with christine to leg bag and f/u with Dr Abdirashid Peter next week [phone]  -  will discuss with attending, full recs to follow                                            ***incomplete note*** 79y Male s/p TURBT 5/19/2025 with urologist Abdirashid Peter MD presenting to ED with difficulty voiding and gross hematuria.     -follow up UA, UCx  -keep christine in place  -can dc home with christine to leg bag and f/u with Dr Abdirashid Peter next week [phone]  -dc with bactrim x 5 days

## 2025-05-25 NOTE — ED PROVIDER NOTE - NSICDXPASTSURGICALHX_GEN_ALL_CORE_FT
PAST SURGICAL HISTORY:  Cardiac pacemaker     Cataract     H/O carotid endarterectomy bilateral 7/2014    H/O hernia repair 1966, left inguinal    H/O lithotripsy 6/2020    History of transcatheter aortic valve replacement (TAVR)

## 2025-05-25 NOTE — ED PROVIDER NOTE - CLINICAL SUMMARY MEDICAL DECISION MAKING FREE TEXT BOX
79 y old male with multiple medical issues presented to ER with Christine cathter problem ,no output and also noticed small amount of blood in a Christine  ,no fever or chills ,will obtain blood work ,change a christine ,reassess ZR

## 2025-05-27 ENCOUNTER — OUTPATIENT (OUTPATIENT)
Dept: OUTPATIENT SERVICES | Facility: HOSPITAL | Age: 80
LOS: 1 days | End: 2025-05-27
Payer: MEDICARE

## 2025-05-27 ENCOUNTER — APPOINTMENT (OUTPATIENT)
Dept: UROLOGY | Facility: CLINIC | Age: 80
End: 2025-05-27
Payer: MEDICARE

## 2025-05-27 VITALS
SYSTOLIC BLOOD PRESSURE: 145 MMHG | OXYGEN SATURATION: 99 % | DIASTOLIC BLOOD PRESSURE: 68 MMHG | RESPIRATION RATE: 16 BRPM | TEMPERATURE: 98.2 F | HEART RATE: 81 BPM

## 2025-05-27 DIAGNOSIS — Z98.890 OTHER SPECIFIED POSTPROCEDURAL STATES: Chronic | ICD-10-CM

## 2025-05-27 DIAGNOSIS — H26.9 UNSPECIFIED CATARACT: Chronic | ICD-10-CM

## 2025-05-27 DIAGNOSIS — Z95.0 PRESENCE OF CARDIAC PACEMAKER: Chronic | ICD-10-CM

## 2025-05-27 DIAGNOSIS — Z95.2 PRESENCE OF PROSTHETIC HEART VALVE: Chronic | ICD-10-CM

## 2025-05-27 DIAGNOSIS — R33.9 RETENTION OF URINE, UNSPECIFIED: ICD-10-CM

## 2025-05-27 DIAGNOSIS — R35.0 FREQUENCY OF MICTURITION: ICD-10-CM

## 2025-05-27 PROCEDURE — 51700 IRRIGATION OF BLADDER: CPT

## 2025-05-28 DIAGNOSIS — R33.9 RETENTION OF URINE, UNSPECIFIED: ICD-10-CM

## 2025-06-03 ENCOUNTER — APPOINTMENT (OUTPATIENT)
Dept: INTERNAL MEDICINE | Facility: CLINIC | Age: 80
End: 2025-06-03
Payer: MEDICARE

## 2025-06-03 PROCEDURE — 36415 COLL VENOUS BLD VENIPUNCTURE: CPT

## 2025-06-05 ENCOUNTER — APPOINTMENT (OUTPATIENT)
Dept: INTERNAL MEDICINE | Facility: CLINIC | Age: 80
End: 2025-06-05
Payer: MEDICARE

## 2025-06-05 VITALS — BODY MASS INDEX: 25.92 KG/M2 | HEIGHT: 68.5 IN | WEIGHT: 173 LBS

## 2025-06-05 VITALS
OXYGEN SATURATION: 98 % | TEMPERATURE: 98 F | DIASTOLIC BLOOD PRESSURE: 76 MMHG | SYSTOLIC BLOOD PRESSURE: 138 MMHG | HEART RATE: 69 BPM | RESPIRATION RATE: 14 BRPM

## 2025-06-05 DIAGNOSIS — I10 ESSENTIAL (PRIMARY) HYPERTENSION: ICD-10-CM

## 2025-06-05 DIAGNOSIS — D49.4 NEOPLASM OF UNSPECIFIED BEHAVIOR OF BLADDER: ICD-10-CM

## 2025-06-05 DIAGNOSIS — N18.30 CHRONIC KIDNEY DISEASE, STAGE 3 UNSPECIFIED: ICD-10-CM

## 2025-06-05 DIAGNOSIS — N18.31 CHRONIC KIDNEY DISEASE, STAGE 3A: ICD-10-CM

## 2025-06-05 DIAGNOSIS — E78.5 HYPERLIPIDEMIA, UNSPECIFIED: ICD-10-CM

## 2025-06-05 LAB
ALBUMIN SERPL ELPH-MCNC: 4.5 G/DL
ALP BLD-CCNC: 98 U/L
ALT SERPL-CCNC: 40 U/L
ANION GAP SERPL CALC-SCNC: 14 MMOL/L
APPEARANCE: CLEAR
AST SERPL-CCNC: 29 U/L
BACTERIA UR CULT: NORMAL
BILIRUB SERPL-MCNC: 0.5 MG/DL
BILIRUBIN URINE: NEGATIVE
BLOOD URINE: NEGATIVE
BUN SERPL-MCNC: 32 MG/DL
CALCIUM SERPL-MCNC: 9.4 MG/DL
CHLORIDE SERPL-SCNC: 105 MMOL/L
CHOLEST SERPL-MCNC: 143 MG/DL
CO2 SERPL-SCNC: 20 MMOL/L
COLOR: YELLOW
CREAT SERPL-MCNC: 1.45 MG/DL
CREAT SPEC-SCNC: 59 MG/DL
EGFRCR SERPLBLD CKD-EPI 2021: 48 ML/MIN/1.73M2
ESTIMATED AVERAGE GLUCOSE: 171 MG/DL
GLUCOSE QUALITATIVE U: >=1000 MG/DL
GLUCOSE SERPL-MCNC: 143 MG/DL
HBA1C MFR BLD HPLC: 7.6 %
HDLC SERPL-MCNC: 52 MG/DL
KETONES URINE: NEGATIVE MG/DL
LDLC SERPL-MCNC: 71 MG/DL
LEUKOCYTE ESTERASE URINE: NEGATIVE
MICROALBUMIN 24H UR DL<=1MG/L-MCNC: 2.9 MG/DL
MICROALBUMIN/CREAT 24H UR-RTO: 49 MG/G
NITRITE URINE: NEGATIVE
NONHDLC SERPL-MCNC: 91 MG/DL
PH URINE: 6
POTASSIUM SERPL-SCNC: 4.5 MMOL/L
PROT SERPL-MCNC: 6.9 G/DL
PROTEIN URINE: NEGATIVE MG/DL
SODIUM SERPL-SCNC: 139 MMOL/L
SPECIFIC GRAVITY URINE: 1.02
TRIGL SERPL-MCNC: 113 MG/DL
UROBILINOGEN URINE: 0.2 MG/DL

## 2025-06-05 PROCEDURE — G2211 COMPLEX E/M VISIT ADD ON: CPT

## 2025-06-05 PROCEDURE — 99214 OFFICE O/P EST MOD 30 MIN: CPT

## 2025-07-01 ENCOUNTER — RX RENEWAL (OUTPATIENT)
Age: 80
End: 2025-07-01

## 2025-07-03 ENCOUNTER — APPOINTMENT (OUTPATIENT)
Dept: ELECTROPHYSIOLOGY | Facility: CLINIC | Age: 80
End: 2025-07-03
Payer: MEDICARE

## 2025-07-03 ENCOUNTER — NON-APPOINTMENT (OUTPATIENT)
Age: 80
End: 2025-07-03

## 2025-07-03 PROCEDURE — 93294 REM INTERROG EVL PM/LDLS PM: CPT

## 2025-07-03 PROCEDURE — 93296 REM INTERROG EVL PM/IDS: CPT

## 2025-07-23 ENCOUNTER — APPOINTMENT (OUTPATIENT)
Dept: NEPHROLOGY | Facility: CLINIC | Age: 80
End: 2025-07-23
Payer: MEDICARE

## 2025-07-23 VITALS
DIASTOLIC BLOOD PRESSURE: 63 MMHG | HEIGHT: 68.5 IN | OXYGEN SATURATION: 96 % | BODY MASS INDEX: 25.92 KG/M2 | SYSTOLIC BLOOD PRESSURE: 130 MMHG | WEIGHT: 173 LBS | TEMPERATURE: 97.3 F | HEART RATE: 71 BPM

## 2025-07-23 DIAGNOSIS — E87.5 HYPERKALEMIA: ICD-10-CM

## 2025-07-23 DIAGNOSIS — N18.31 TYPE 2 DIABETES MELLITUS WITH DIABETIC CHRONIC KIDNEY DISEASE: ICD-10-CM

## 2025-07-23 DIAGNOSIS — E11.22 TYPE 2 DIABETES MELLITUS WITH DIABETIC CHRONIC KIDNEY DISEASE: ICD-10-CM

## 2025-07-23 DIAGNOSIS — N18.32 CHRONIC KIDNEY DISEASE, STAGE 3B: ICD-10-CM

## 2025-07-23 DIAGNOSIS — I10 ESSENTIAL (PRIMARY) HYPERTENSION: ICD-10-CM

## 2025-07-23 PROCEDURE — G2211 COMPLEX E/M VISIT ADD ON: CPT

## 2025-07-23 PROCEDURE — 99215 OFFICE O/P EST HI 40 MIN: CPT

## 2025-07-23 RX ORDER — LISINOPRIL 10 MG/1
10 TABLET ORAL DAILY
Qty: 90 | Refills: 3 | Status: ACTIVE | COMMUNITY
Start: 2025-07-23 | End: 1900-01-01

## 2025-08-14 ENCOUNTER — APPOINTMENT (OUTPATIENT)
Dept: ELECTROPHYSIOLOGY | Facility: CLINIC | Age: 80
End: 2025-08-14

## 2025-08-14 VITALS — SYSTOLIC BLOOD PRESSURE: 179 MMHG | OXYGEN SATURATION: 99 % | HEART RATE: 70 BPM | DIASTOLIC BLOOD PRESSURE: 85 MMHG

## 2025-08-14 PROCEDURE — 99214 OFFICE O/P EST MOD 30 MIN: CPT | Mod: 25

## 2025-08-14 PROCEDURE — 93000 ELECTROCARDIOGRAM COMPLETE: CPT | Mod: 59

## 2025-08-14 PROCEDURE — 93280 PM DEVICE PROGR EVAL DUAL: CPT

## 2025-08-14 RX ORDER — DAPAGLIFLOZIN 10 MG/1
10 TABLET, FILM COATED ORAL
Qty: 90 | Refills: 3 | Status: DISCONTINUED | COMMUNITY
Start: 2025-07-23 | End: 2025-08-14

## 2025-08-14 RX ORDER — DRONEDARONE 400 MG/1
400 TABLET, FILM COATED ORAL TWICE DAILY
Qty: 120 | Refills: 3 | Status: ACTIVE | COMMUNITY
Start: 2025-08-14 | End: 1900-01-01

## 2025-08-29 ENCOUNTER — APPOINTMENT (OUTPATIENT)
Dept: CARDIOLOGY | Facility: CLINIC | Age: 80
End: 2025-08-29
Payer: MEDICARE

## 2025-08-29 VITALS — HEART RATE: 71 BPM | SYSTOLIC BLOOD PRESSURE: 160 MMHG | OXYGEN SATURATION: 97 % | DIASTOLIC BLOOD PRESSURE: 68 MMHG

## 2025-08-29 VITALS
WEIGHT: 177.13 LBS | BODY MASS INDEX: 26.54 KG/M2 | DIASTOLIC BLOOD PRESSURE: 78 MMHG | SYSTOLIC BLOOD PRESSURE: 148 MMHG | HEART RATE: 68 BPM

## 2025-08-29 DIAGNOSIS — Z86.79 PERSONAL HISTORY OF OTHER DISEASES OF THE CIRCULATORY SYSTEM: ICD-10-CM

## 2025-08-29 DIAGNOSIS — I25.10 ATHEROSCLEROTIC HEART DISEASE OF NATIVE CORONARY ARTERY W/OUT ANGINA PECTORIS: ICD-10-CM

## 2025-08-29 DIAGNOSIS — I35.0 NONRHEUMATIC AORTIC (VALVE) STENOSIS: ICD-10-CM

## 2025-08-29 DIAGNOSIS — I10 ESSENTIAL (PRIMARY) HYPERTENSION: ICD-10-CM

## 2025-08-29 DIAGNOSIS — I73.9 PERIPHERAL VASCULAR DISEASE, UNSPECIFIED: ICD-10-CM

## 2025-08-29 PROCEDURE — 99215 OFFICE O/P EST HI 40 MIN: CPT

## 2025-08-29 PROCEDURE — 93000 ELECTROCARDIOGRAM COMPLETE: CPT

## 2025-08-29 PROCEDURE — G2211 COMPLEX E/M VISIT ADD ON: CPT

## 2025-09-03 LAB
ALBUMIN SERPL ELPH-MCNC: 4.5 G/DL
ALP BLD-CCNC: 102 U/L
ALT SERPL-CCNC: 23 U/L
ANION GAP SERPL CALC-SCNC: 13 MMOL/L
APO LP(A) SERPL-MCNC: 241.9 NMOL/L
AST SERPL-CCNC: 20 U/L
BASOPHILS # BLD AUTO: 0.05 K/UL
BASOPHILS NFR BLD AUTO: 1 %
BILIRUB SERPL-MCNC: 0.4 MG/DL
BUN SERPL-MCNC: 37 MG/DL
CALCIUM SERPL-MCNC: 9.4 MG/DL
CHLORIDE SERPL-SCNC: 107 MMOL/L
CHOLEST SERPL-MCNC: 151 MG/DL
CK SERPL-CCNC: 81 U/L
CO2 SERPL-SCNC: 22 MMOL/L
CREAT SERPL-MCNC: 1.82 MG/DL
EGFRCR SERPLBLD CKD-EPI 2021: 37 ML/MIN/1.73M2
EOSINOPHIL # BLD AUTO: 0.35 K/UL
EOSINOPHIL NFR BLD AUTO: 7 %
ESTIMATED AVERAGE GLUCOSE: 200 MG/DL
GLUCOSE SERPL-MCNC: 164 MG/DL
HBA1C MFR BLD HPLC: 8.6 %
HCT VFR BLD CALC: 41.7 %
HDLC SERPL-MCNC: 48 MG/DL
HGB BLD-MCNC: 13.1 G/DL
IMM GRANULOCYTES NFR BLD AUTO: 0.4 %
LDLC SERPL-MCNC: 84 MG/DL
LYMPHOCYTES # BLD AUTO: 0.76 K/UL
LYMPHOCYTES NFR BLD AUTO: 15.2 %
MAN DIFF?: NORMAL
MCHC RBC-ENTMCNC: 27.5 PG
MCHC RBC-ENTMCNC: 31.4 G/DL
MCV RBC AUTO: 87.6 FL
MONOCYTES # BLD AUTO: 0.65 K/UL
MONOCYTES NFR BLD AUTO: 13 %
NEUTROPHILS # BLD AUTO: 3.16 K/UL
NEUTROPHILS NFR BLD AUTO: 63.4 %
NONHDLC SERPL-MCNC: 103 MG/DL
NT-PROBNP SERPL-MCNC: 1381 PG/ML
PLATELET # BLD AUTO: 172 K/UL
POTASSIUM SERPL-SCNC: 5.4 MMOL/L
PROT SERPL-MCNC: 7.2 G/DL
RBC # BLD: 4.76 M/UL
RBC # FLD: 14.6 %
SODIUM SERPL-SCNC: 143 MMOL/L
TRIGL SERPL-MCNC: 106 MG/DL
TSH SERPL-ACNC: 2.11 UIU/ML
WBC # FLD AUTO: 4.99 K/UL

## 2025-09-05 ENCOUNTER — OUTPATIENT (OUTPATIENT)
Dept: OUTPATIENT SERVICES | Facility: HOSPITAL | Age: 80
LOS: 1 days | End: 2025-09-05
Payer: MEDICARE

## 2025-09-05 ENCOUNTER — APPOINTMENT (OUTPATIENT)
Dept: UROLOGY | Facility: CLINIC | Age: 80
End: 2025-09-05
Payer: MEDICARE

## 2025-09-05 VITALS — OXYGEN SATURATION: 99 % | DIASTOLIC BLOOD PRESSURE: 85 MMHG | SYSTOLIC BLOOD PRESSURE: 173 MMHG | HEART RATE: 75 BPM

## 2025-09-05 DIAGNOSIS — Z98.890 OTHER SPECIFIED POSTPROCEDURAL STATES: Chronic | ICD-10-CM

## 2025-09-05 DIAGNOSIS — Z95.0 PRESENCE OF CARDIAC PACEMAKER: Chronic | ICD-10-CM

## 2025-09-05 DIAGNOSIS — H26.9 UNSPECIFIED CATARACT: Chronic | ICD-10-CM

## 2025-09-05 DIAGNOSIS — R35.0 FREQUENCY OF MICTURITION: ICD-10-CM

## 2025-09-05 DIAGNOSIS — C67.0 MALIGNANT NEOPLASM OF TRIGONE OF BLADDER: ICD-10-CM

## 2025-09-05 DIAGNOSIS — Z95.2 PRESENCE OF PROSTHETIC HEART VALVE: Chronic | ICD-10-CM

## 2025-09-05 PROCEDURE — 52000 CYSTOURETHROSCOPY: CPT

## 2025-09-08 DIAGNOSIS — C67.0 MALIGNANT NEOPLASM OF TRIGONE OF BLADDER: ICD-10-CM

## 2025-09-09 LAB — URINE CYTOLOGY: NORMAL

## (undated) DEVICE — FOLEY CATH 2-WAY 16FR 5CC LATEX LUBRICATH

## (undated) DEVICE — CAM-ESU 1501230: Type: DURABLE MEDICAL EQUIPMENT

## (undated) DEVICE — DRAINAGE BAG URINARY 4000CC

## (undated) DEVICE — ELCTR CUTTING LOOP 24FR RIGHT ANGLE

## (undated) DEVICE — ELCTR GROUNDING PAD ADULT COVIDIEN

## (undated) DEVICE — TUBING TUR 2 PRONG

## (undated) DEVICE — PACK CYSTO

## (undated) DEVICE — LABELS BLANK W PEN

## (undated) DEVICE — FOLEY CATH 2-WAY 18FR 5CC LATEX HYDROGEL

## (undated) DEVICE — SOL IRR BAG H2O 3000ML

## (undated) DEVICE — VENODYNE/SCD SLEEVE CALF MEDIUM

## (undated) DEVICE — GLV 7.5 PROTEXIS (WHITE)

## (undated) DEVICE — WARMING BLANKET UPPER ADULT

## (undated) DEVICE — SOL IRR BAG NS 0.9% 3000ML

## (undated) DEVICE — POSITIONER PATIENT SAFETY STRAP 3X60"

## (undated) DEVICE — CABLE DAC ACTIVE CORD

## (undated) DEVICE — SOL IRR POUR H2O 500ML

## (undated) DEVICE — DRAINAGE BAG URINARY 2L

## (undated) DEVICE — POSITIONER FOAM EGG CRATE ULNAR 2PCS (PINK)